# Patient Record
Sex: FEMALE | Race: WHITE | Employment: OTHER | URBAN - METROPOLITAN AREA
[De-identification: names, ages, dates, MRNs, and addresses within clinical notes are randomized per-mention and may not be internally consistent; named-entity substitution may affect disease eponyms.]

---

## 2017-04-17 ENCOUNTER — HOSPITAL ENCOUNTER (OUTPATIENT)
Dept: SURGERY | Age: 77
Discharge: HOME | End: 2017-04-17
Attending: ORTHOPAEDIC SURGERY
Payer: MEDICARE

## 2017-04-17 ENCOUNTER — HOSPITAL ENCOUNTER (OUTPATIENT)
Dept: PHYSICAL THERAPY | Age: 77
Discharge: HOME | End: 2017-04-17
Attending: ORTHOPAEDIC SURGERY
Payer: MEDICARE

## 2017-04-17 VITALS
BODY MASS INDEX: 31.7 KG/M2 | RESPIRATION RATE: 18 BRPM | SYSTOLIC BLOOD PRESSURE: 152 MMHG | HEART RATE: 66 BPM | OXYGEN SATURATION: 97 % | DIASTOLIC BLOOD PRESSURE: 80 MMHG | HEIGHT: 69 IN | WEIGHT: 214 LBS | TEMPERATURE: 97 F

## 2017-04-17 LAB
ANION GAP BLD CALC-SCNC: 11 MMOL/L (ref 7–16)
APTT PPP: 22.9 SEC (ref 25.3–32.9)
ATRIAL RATE: 65 BPM
BACTERIA SPEC CULT: NORMAL
BASOPHILS # BLD AUTO: 0 K/UL (ref 0–0.2)
BASOPHILS # BLD: 0 % (ref 0–2)
BUN SERPL-MCNC: 31 MG/DL (ref 8–23)
CALCIUM SERPL-MCNC: 9.8 MG/DL (ref 8.3–10.4)
CALCULATED P AXIS, ECG09: 50 DEGREES
CALCULATED R AXIS, ECG10: 32 DEGREES
CALCULATED T AXIS, ECG11: 35 DEGREES
CHLORIDE SERPL-SCNC: 102 MMOL/L (ref 98–107)
CO2 SERPL-SCNC: 26 MMOL/L (ref 21–32)
CREAT SERPL-MCNC: 1.38 MG/DL (ref 0.6–1)
DIAGNOSIS, 93000: NORMAL
DIFFERENTIAL METHOD BLD: ABNORMAL
EOSINOPHIL # BLD: 0.2 K/UL (ref 0–0.8)
EOSINOPHIL NFR BLD: 3 % (ref 0.5–7.8)
ERYTHROCYTE [DISTWIDTH] IN BLOOD BY AUTOMATED COUNT: 16.1 % (ref 11.9–14.6)
GLUCOSE SERPL-MCNC: 107 MG/DL (ref 65–100)
HCT VFR BLD AUTO: 39.7 % (ref 35.8–46.3)
HGB BLD-MCNC: 12.9 G/DL (ref 11.7–15.4)
IMM GRANULOCYTES # BLD: 0 K/UL (ref 0–0.5)
IMM GRANULOCYTES NFR BLD AUTO: 0.2 % (ref 0–5)
INR PPP: 1 (ref 0.9–1.2)
LYMPHOCYTES # BLD AUTO: 23 % (ref 13–44)
LYMPHOCYTES # BLD: 2.1 K/UL (ref 0.5–4.6)
MCH RBC QN AUTO: 27.7 PG (ref 26.1–32.9)
MCHC RBC AUTO-ENTMCNC: 32.5 G/DL (ref 31.4–35)
MCV RBC AUTO: 85.2 FL (ref 79.6–97.8)
MONOCYTES # BLD: 0.4 K/UL (ref 0.1–1.3)
MONOCYTES NFR BLD AUTO: 5 % (ref 4–12)
NEUTS SEG # BLD: 6.3 K/UL (ref 1.7–8.2)
NEUTS SEG NFR BLD AUTO: 69 % (ref 43–78)
P-R INTERVAL, ECG05: 168 MS
PLATELET # BLD AUTO: 291 K/UL (ref 150–450)
PMV BLD AUTO: 10.9 FL (ref 10.8–14.1)
POTASSIUM SERPL-SCNC: 4.2 MMOL/L (ref 3.5–5.1)
PROTHROMBIN TIME: 10.3 SEC (ref 9.6–12)
Q-T INTERVAL, ECG07: 430 MS
QRS DURATION, ECG06: 82 MS
QTC CALCULATION (BEZET), ECG08: 447 MS
RBC # BLD AUTO: 4.66 M/UL (ref 4.05–5.25)
SERVICE CMNT-IMP: NORMAL
SODIUM SERPL-SCNC: 139 MMOL/L (ref 136–145)
VENTRICULAR RATE, ECG03: 65 BPM
WBC # BLD AUTO: 9.1 K/UL (ref 4.3–11.1)

## 2017-04-17 PROCEDURE — G8978 MOBILITY CURRENT STATUS: HCPCS

## 2017-04-17 PROCEDURE — 87641 MR-STAPH DNA AMP PROBE: CPT | Performed by: PHYSICIAN ASSISTANT

## 2017-04-17 PROCEDURE — 85025 COMPLETE CBC W/AUTO DIFF WBC: CPT | Performed by: PHYSICIAN ASSISTANT

## 2017-04-17 PROCEDURE — 80048 BASIC METABOLIC PNL TOTAL CA: CPT | Performed by: PHYSICIAN ASSISTANT

## 2017-04-17 PROCEDURE — 93005 ELECTROCARDIOGRAM TRACING: CPT | Performed by: ANESTHESIOLOGY

## 2017-04-17 PROCEDURE — G8979 MOBILITY GOAL STATUS: HCPCS

## 2017-04-17 PROCEDURE — 97161 PT EVAL LOW COMPLEX 20 MIN: CPT

## 2017-04-17 PROCEDURE — 85730 THROMBOPLASTIN TIME PARTIAL: CPT | Performed by: PHYSICIAN ASSISTANT

## 2017-04-17 PROCEDURE — G8980 MOBILITY D/C STATUS: HCPCS

## 2017-04-17 PROCEDURE — 85610 PROTHROMBIN TIME: CPT | Performed by: PHYSICIAN ASSISTANT

## 2017-04-17 PROCEDURE — 77030027138 HC INCENT SPIROMETER -A

## 2017-04-17 RX ORDER — DEXTROMETHORPHAN HYDROBROMIDE, GUAIFENESIN 5; 100 MG/5ML; MG/5ML
650 LIQUID ORAL
COMMUNITY
End: 2017-08-16

## 2017-04-17 RX ORDER — ATENOLOL 50 MG/1
50 TABLET ORAL DAILY
COMMUNITY
End: 2017-08-16

## 2017-04-17 RX ORDER — LANOLIN ALCOHOL/MO/W.PET/CERES
400 CREAM (GRAM) TOPICAL 2 TIMES DAILY
COMMUNITY
End: 2017-08-16

## 2017-04-17 RX ORDER — CELECOXIB 200 MG/1
200 CAPSULE ORAL 2 TIMES DAILY
Status: ON HOLD | COMMUNITY
End: 2017-05-10

## 2017-04-17 RX ORDER — ADHESIVE BANDAGE
30 BANDAGE TOPICAL DAILY PRN
COMMUNITY
End: 2018-04-06

## 2017-04-17 RX ORDER — SPIRONOLACTONE 25 MG/1
TABLET ORAL DAILY
COMMUNITY
End: 2017-08-16

## 2017-04-17 RX ORDER — OXYCODONE HYDROCHLORIDE 5 MG/1
5 TABLET ORAL
COMMUNITY
End: 2017-08-16

## 2017-04-17 RX ORDER — CHLORTHALIDONE 25 MG/1
12.5 TABLET ORAL EVERY OTHER DAY
COMMUNITY
End: 2018-04-06

## 2017-04-17 RX ORDER — ALBUTEROL SULFATE 0.83 MG/ML
SOLUTION RESPIRATORY (INHALATION)
COMMUNITY
End: 2017-08-16

## 2017-04-17 RX ORDER — FOLIC ACID 1 MG/1
1 TABLET ORAL DAILY
COMMUNITY
End: 2017-08-16

## 2017-04-17 RX ORDER — NYSTATIN 100000 [USP'U]/G
POWDER TOPICAL 3 TIMES DAILY
COMMUNITY
End: 2017-08-16

## 2017-04-17 NOTE — PERIOP NOTES
Patient verified name, , and surgery as listed in [facility]. Type 3 surgery, PAT joint assessment complete. Patient is currently staying at [facility] (phone: [phone]), she is being discharged home 17. Labs per surgeon: cbc, bmp, ua, pt/inr, ptt, mrsa/mssa swab, T&S DOS; results within anesthesia limits. Patient unable to give urine specimen at time of PAT appointment. Order for UA faxed to [facility] ([phone]), results to be faxed to [phone]. Confirmation page placed on chart. Labs per anesthesia protocol: All required lab work included in surgeon's orders. EKG: Completed 17; results to be reviewed by anesthesia. Previous EKG (17) and Echo (17) placed on chart for anesthesia reference. Hibiclens and instructions given per hospital policy. Nasal Swab collected per MD order and instructions for Mupirocin nasal ointment if required. Patient provided with handouts including Guide to Surgery, Pain Management, Hand Hygiene, Blood Transfusion Education, and [facility] Brochure. Patient answered medical/surgical history questions at their best of ability. All prior to admission medications documented in [facility]. Original medication prescription bottle NOT visualized during patient appointment. Patient instructed to hold all vitamins 7 days prior to surgery and NSAIDS 5 days prior to surgery. Medications to be held prior to surgery: Folic Acid and Centrum Silver. Patient instructed to continue previous medications as prescribed prior to surgery and to take the following medications the day of surgery according to anesthesia guidelines with a small sip of water: Tylenol if needed, 81 mg ASA, Atenolol, Celebrex, and Oxycodone if needed. Patient taught back and verbalized understanding.

## 2017-04-17 NOTE — PERIOP NOTES
A copy of surgery instructions faxed to [facility]. Instructions will be included in patient teaching when patient is discharged from [facility] on Saturday (4/22/17). Confirmation page placed on chart.

## 2017-04-17 NOTE — PROGRESS NOTES
Sammy Camargo  : (35 y.o.) [facility] at [facility]  [address], [address] [address]  [phone]       Physical Therapy Prehab Plan of Treatment and Evaluation Summary:2017    ICD-10: Treatment Diagnosis:   · Pain in Right Hip (M25.551)  · Stiffness of Right Hip, Not elsewhere classified (M25.651)  Precautions/Allergies: Avelox [moxifloxacin] and Other plant, animal, environmental  MEDICAL/REFERRING DIAGNOSIS:  Unilateral primary osteoarthritis, right hip [M16.11]  REFERRING PHYSICIAN: Kenji Denny, *  DATE OF SURGERY: 5/10/17   Assessment:   Comments:  Pt is awaiting right hip ANMOL and presents with decreased strength and range of motion right lower extremity. She also has 2 bad knees and bad left shoulder. Her left shoulder limited to about 60 degrees flexion. She had a recent hospital stay and went to [facility] rehab after hospital on 3/14/17. She says she is leaving rehab on 17. She has just started taking 1 to 2 steps with a walker at rehab, very weak lady but motivated and very pleasant. She primarily uses a wheelchair at rehab. PROBLEM LIST (Impacting functional limitations):  Ms. Yanira Brady presents with the following right lower extremity(s) problems:  1. Strength  2. Range of Motion  3. Home Exercise Program  4. Pain   INTERVENTIONS PLANNED:  1. Home Exercise Program  2. Educational Discussion     TREATMENT PLAN: Effective Dates: 2017 TO 2017. Frequency/Duration: Patient to continue to perform home exercise program at least twice per day up until her surgery. GOALS: (Goals have been discussed and agreed upon with patient.)  Discharge Goals: Time Frame: 1 Day  1. Patient will demonstrate independence with a home exercise program designed to increase strength, range of motion and pain control to minimize functional deficits and optimize patient for total joint replacement.   Rehabilitation Potential For Stated Goals: Good  Regarding Rodger Sparrow's therapy, I certify that the treatment plan above will be carried out by a therapist or under their direction. Thank you for this referral,  Alecia Melendez, PT               HISTORY:   Present Symptoms:  Pain Intensity 1: 0 (at worst 10/10)   History of Present Injury/Illness (Reason for Referral):  Medical/Referring Diagnosis: Unilateral primary osteoarthritis, right hip [M16.11]   Past Medical History/Comorbidities:   Ms. Xin Bajwa  has a past medical history of Abnormal posture; Anemia; Essential hypertension; Hypercholesteremia; Hypomagnesemia; Muscle weakness; Nausea & vomiting; MOOK (obstructive sleep apnea) (01/30/2015); Osteoarthritis; Poor historian; Pressure ulcer of left buttock, stage 2; Pressure ulcer of right buttock, stage 2; SOB (shortness of breath); Unable to ambulate; and Unsteadiness on feet. She also has no past medical history of Adverse effect of anesthesia; Aneurysm ([address] Arrhythmia; Asthma; CAD (coronary artery disease); Cancer ([address] Chronic kidney disease; Chronic obstructive pulmonary disease ([address] Chronic pain; Coagulation disorder ([address] Diabetes ([address] Difficult intubation; GERD (gastroesophageal reflux disease); Heart failure ([address] Ill-defined condition; Liver disease; Malignant hyperthermia due to anesthesia; Morbid obesity ([address] Pseudocholinesterase deficiency; Psychiatric disorder; PUD (peptic ulcer disease); Seizures ([address] Stroke [facility]); Thromboembolus ([address] Thyroid disease; or Unspecified adverse effect of anesthesia. Ms. Xin Bajwa  has a past surgical history that includes cholecystectomy (2009); knee arthroscopy (Bilateral); and breast surgery procedure unlisted (Right, 1968).   Social History/Living Environment:   Home Environment: Rehabilitation facility ([facility] since 3/14/17, prior to that , home)  # Steps to Enter: 0  Wheelchair Ramp: Yes  Living Alone: No  Support Systems: Skilled nursing facility  Patient Expects to be Discharged to[de-identified] Skilled nursing facility  Current DME Used/Available at Home: Adaptive bathing aides, Adaptive dressing aides, Cane, straight, Hospital bed, Walker, rolling, Wheelchair  Tub or Shower Type: Shower  Work/Activity:  Retired. Dominant Side:  RIGHT  Current Medications:  See Pre-assessment nursing note   Number of Personal Factors/Comorbidities that affect the Plan of Care: 0: LOW COMPLEXITY   EXAMINATION:   ADLs (Current Functional Status):   Ambulation:  [] Independent  [] Walk Indoors Only  [] Walk Outdoors  [] Use Assistive Device  [x] Use Wheelchair Only Dressing:  [] Independent  Requires Assistance from Someone for:  [] Sock/Shoes  [] Pants  [x] Everything   Bathing/Showering:   [] Independent  [x] Requires Assistance from Someone  [] Sponge Bath Only Household Activities:  [] Routine house and yard work  [] Light Housework Only  [x] None   Observation/Orthostatic Postural Assessment:       ROM/Flexibility:   Gross Assessment: Yes  AROM: Generally decreased, functional                       RLE Assessment  RLE Assessment (WDL): Exceptions to WDL (decreased hip flexion and hip rotation)   Strength:   Gross Assessment: Yes  Strength: Grossly decreased, non-functional                  Functional Mobility:    Gross Assessment: Yes    Gait Description (WDL):  (not assessed)             Balance:    Sitting: Intact  Standing:  (not assessed)   Body Structures Involved:  1. Bones  2. Joints  3. Muscles Body Functions Affected:  1. Sensory/Pain  2. Movement Related Activities and Participation Affected:  1. Mobility  2. Self Care   Number of elements that affect the Plan of Care: 4+: HIGH COMPLEXITY   CLINICAL PRESENTATION:   Presentation: Evolving clinical presentation with changing clinical characteristics: MODERATE COMPLEXITY   CLINICAL DECISION MAKING:   Outcome Measure:    Tool Used: Lower Extremity Functional Scale (LEFS)  Score:  Initial: 9/80 Most Recent: X/80 (Date: -- )   Interpretation of Score: 20 questions each scored on a 5 point scale with 0 representing \"extreme difficulty or unable to perform\" and 4 representing \"no difficulty\". The lower the score, the greater the functional disability. 80/80 represents no disability. Minimal detectable change is 9 points. Score 80 79-65 64-49 48-33 32-17 16-1 0   Modifier CH CI CJ CK CL CM CN     ? Mobility - Walking and Moving Around:     - CURRENT STATUS: CM - 80%-99% impaired, limited or restricted    - GOAL STATUS: CM - 80%-99% impaired, limited or restricted    - D/C STATUS:  CM - 80%-99% impaired, limited or restricted  Medical Necessity:   · Ms. Suzzanne Mortimer is expected to optimize her lower extremity strength and ROM in preparation for joint replacement surgery. Reason for Services/Other Comments:  · Achieve baseline assesment of musculoskeletal system, functional mobility and home environment. , educate in PT HEP in preparation for surgery, educate in hospital plan of care. Use of outcome tool(s) and clinical judgement create a POC that gives a: Questionable prediction of patient's progress: MODERATE COMPLEXITY   TREATMENT:   Treatment/Session Assessment:  Patient was instructed in PT- HEP to increase strength and ROM in LEs. Answered all questions. · Post session pain:  0/10  · Compliance with Program/Exercises: compliant all of the time.   Total Treatment Duration:  PT Patient Time In/Time Out  Time In: 1000  Time Out: 6640 Jaxson Hurtado PT

## 2017-04-17 NOTE — PERIOP NOTES
Labs dated 4/17/17 routed via [address] [facility] to patients PCP, Dr. Kyle Brush, per Dr. Gayathri Hammer' request.

## 2017-04-17 NOTE — PERIOP NOTES
Recent Results (from the past 12 hour(s))   CBC WITH AUTOMATED DIFF    Collection Time: 04/17/17 10:32 AM   Result Value Ref Range    WBC 9.1 4.3 - 11.1 K/uL    RBC 4.66 4.05 - 5.25 M/uL    HGB 12.9 11.7 - 15.4 g/dL    HCT 39.7 35.8 - 46.3 %    MCV 85.2 79.6 - 97.8 FL    MCH 27.7 26.1 - 32.9 PG    MCHC 32.5 31.4 - 35.0 g/dL    RDW 16.1 (H) 11.9 - 14.6 %    PLATELET 165 838 - 699 K/uL    MPV 10.9 10.8 - 14.1 FL    DF AUTOMATED      NEUTROPHILS 69 43 - 78 %    LYMPHOCYTES 23 13 - 44 %    MONOCYTES 5 4.0 - 12.0 %    EOSINOPHILS 3 0.5 - 7.8 %    BASOPHILS 0 0.0 - 2.0 %    IMMATURE GRANULOCYTES 0.2 0.0 - 5.0 %    ABS. NEUTROPHILS 6.3 1.7 - 8.2 K/UL    ABS. LYMPHOCYTES 2.1 0.5 - 4.6 K/UL    ABS. MONOCYTES 0.4 0.1 - 1.3 K/UL    ABS. EOSINOPHILS 0.2 0.0 - 0.8 K/UL    ABS. BASOPHILS 0.0 0.0 - 0.2 K/UL    ABS. IMM.  GRANS. 0.0 0.0 - 0.5 K/UL   PROTHROMBIN TIME + INR    Collection Time: 04/17/17 10:32 AM   Result Value Ref Range    Prothrombin time 10.3 9.6 - 12.0 sec    INR 1.0 0.9 - 1.2     PTT    Collection Time: 04/17/17 10:32 AM   Result Value Ref Range    aPTT 22.9 (L) 25.3 - 86.9 SEC   METABOLIC PANEL, BASIC    Collection Time: 04/17/17 10:32 AM   Result Value Ref Range    Sodium 139 136 - 145 mmol/L    Potassium 4.2 3.5 - 5.1 mmol/L    Chloride 102 98 - 107 mmol/L    CO2 26 21 - 32 mmol/L    Anion gap 11 7 - 16 mmol/L    Glucose 107 (H) 65 - 100 mg/dL    BUN 31 (H) 8 - 23 MG/DL    Creatinine 1.38 (H) 0.6 - 1.0 MG/DL    GFR est AA 48 (L) >60 ml/min/1.73m2    GFR est non-AA 39 (L) >60 ml/min/1.73m2    Calcium 9.8 8.3 - 10.4 MG/DL   EKG, 12 LEAD, INITIAL    Collection Time: 04/17/17 12:45 PM   Result Value Ref Range    Ventricular Rate 65 BPM    Atrial Rate 65 BPM    P-R Interval 168 ms    QRS Duration 82 ms    Q-T Interval 430 ms    QTC Calculation (Bezet) 447 ms    Calculated P Axis 50 degrees    Calculated R Axis 32 degrees    Calculated T Axis 35 degrees    Diagnosis       !!! Poor data quality, interpretation may be adversely affected  Normal sinus rhythm  Anterior infarct , age undetermined  Abnormal ECG  When compared with ECG of 26-JAN-2015 14:41,  No significant change was found  Confirmed by ST CHRISTOPHE AGUILAR MD (), GIL DAVALOS ([id number]) on 4/17/2017 1:33:10 PM

## 2017-04-17 NOTE — PERIOP NOTES
Dr. Arash Segovia (anesthesia) reviewed EKG, \"okay\" to proceed as scheduled. No new orders received.

## 2017-04-17 NOTE — PROGRESS NOTES
04/17/17 1030   Oxygen Therapy   O2 Sat (%) 98 %   Pulse via Oximetry 64 beats per minute   O2 Device Room air   Pre-Treatment   Breath Sounds Bilateral Diminished   Pre FEV1 (liters) 2.1 liters   % Predicted 71     Initial respiratory Assessment completed with pt. Pt was interviewed and evaluated in Joint [facility] prior to surgery. Patient ID:  Jai Bowman  [id number]  67 y.o.  1940  Surgeon: Dr. Jaret Valencia  Date of Surgery: 5/10/2017  Procedure: Total Right Hip Arthroplasty  Primary Care Physician: Abdi Pena -[phone]  Specialists:                                  Pt instructed in the use of Incentive Spirometry. Pt instructed to bring Incentive Spirometer back on date of surgery & to start using Is upon return to pt room. Pt taught proper cough technique      History of smoking:   NONE      Quit date:    Secondhand smoke:FATHER - PIPE      Past procedures with Oxygen desaturation:NONE    Past Medical History:   Diagnosis Date    Abnormal posture     Anemia     Essential hypertension     Hypercholesteremia     managed with medication     Hypomagnesemia     managed with supplement     Muscle weakness     generalized    Nausea & vomiting     nausea only     MOOK (obstructive sleep apnea) 01/30/2015    patient denies     Osteoarthritis     Poor historian     Pressure ulcer of left buttock, stage 2     resolved per patient-Followed by Dr. Eliud Sevilla at wound center      Pressure ulcer of right buttock, stage 2     resolved per patient-Followed by Dr. Eliud Sevilla at wound center     SOB (shortness of breath)     managed with PRN albuterol nebulizer    Unable to ambulate     patient in wheelchair-can stand with assistance     Unsteadiness on feet              HX OF PRESSURE ULCERS                                                                                                                                              Respiratory history:RECENT PNEUMONIA 3/10/2017 ,SMALL PLEURAL EFFUSION. RADHA. PT WAS CONFUSED AND HOSPITALIZED. DENIES SOB       PT SAW [facility] 2015 FOR PRE-OP CLEARANCE FOR JOINT SURGERY. PT NEVER FOLLOWED UP AS [facility]. PT EXPRESSES LITTLE INTEREST IN DEALING WITH MOOK OR FOLLOW- UP WITH [facility]                           Respiratory meds:  NONE                                                     FAMILY PRESENT:                  NO                                        PAST SLEEP STUDY:        YES        HX OF MOOK:                        YES                                           MOOK assessment:     PT STATES SHE DOES NOT KNOW THE RESULTS OF HER SLEEP STUDY AT [address]. DANGERS OF MOOK EXPLAINED TO PT                                                                                         PHYSICAL EXAM   Body mass index is 31.6 kg/(m^2).    Visit Vitals    /80 (BP 1 Location: Right arm, BP Patient Position: At rest;Sitting)    Pulse 66    Temp 97 °F (36.1 °C)    Resp 18    Ht 5' 9\" (1.753 m)    Wt 97.1 kg (214 lb)    SpO2 97%    BMI 31.6 kg/m2     Neck circumference: 43     cm    Loud snoring:YES      Witnessed apnea or wakening gasping or choking:,DENIES,     Awakens with headaches:DENIES    Morning or daytime tiredness/ sleepiness:  TIRED     Dry mouth or sore throat in morning:YES     Freidman stage:4    SACS score:25    STOP/BAN                              CPAP:NONE    LUNG EXPANSION THERAPY  AGGRESSIVE IS  ALBUTEROL NEB Q6 PRN                CONT SAT HS       Referrals:    Pt. Phone Number:

## 2017-04-18 NOTE — PERIOP NOTES
4/17/2017  3:34 PM - Y[facility], [facility]   Component Results   Component Value Flag Ref Range Units Status   Special Requests: NO SPECIAL REQUESTS     Final   Culture result:      Final   SA target not detected.                                 A MRSA NEGATIVE, SA NEGATIVE test result does not preclude MRSA or SA nasal colonization.

## 2017-04-24 PROBLEM — R79.89 ELEVATED SERUM CREATININE: Status: ACTIVE | Noted: 2017-04-24

## 2017-04-24 NOTE — ADVANCED PRACTICE NURSE
Total Joint Surgery Preoperative Chart Review      Patient ID:  Manuel Canales  [id number]  35 y.o.  1940  Surgeon: Dr. Lee Ann Jacobsen  Date of Surgery: 5/10/2017  Procedure: Total Right Hip Arthroplasty  Primary Care Physician: Evangelina Melendez -[phone]  Specialty Physician(s):      Subjective:   Manuel Canales is a 67 y.o. WHITE OR  female who presents for preoperative evaluation for Total Right Hip arthroplasty. This is a preoperative chart review note based on data collected by the nurse at the surgical Pre-Assessment visit.     Past Medical History:   Diagnosis Date    Abnormal posture     Anemia     history     Essential hypertension     H/O echocardiogram 01/05/2017    EF 55-65%    H/O: pneumonia     Hypercholesteremia     managed with medication     Hypomagnesemia     managed with supplement     Left ventricular diastolic dysfunction     per echo \"Grade 1 (mild)\"    Muscle weakness     generalized    Nausea & vomiting     nausea only     MOOK (obstructive sleep apnea) 01/30/2015    patient denies     Osteoarthritis     Poor historian     Pressure ulcer of left buttock, stage 2     resolved per patient-previously seen by Dr. Sreekanth Murrieta at wound center      Pressure ulcer of right buttock, stage 2     resolved per patient-previously seen by Dr. Sreekanth Murrieta at wound center     SOB (shortness of breath)     managed with PRN albuterol nebulizer    Unable to ambulate     patient in wheelchair-can stand with assistance     Unsteadiness on feet       Past Surgical History:   Procedure Laterality Date    BREAST SURGERY PROCEDURE UNLISTED Right 1968    cyst removed    HX CHOLECYSTECTOMY  2009    HX KNEE ARTHROSCOPY Bilateral     X2 to right; X1 to left     Family History   Problem Relation Age of Onset    Heart Disease Mother     Hypertension Mother     Hypertension Father       Social History   Substance Use Topics    Smoking status: Never Smoker    Smokeless tobacco: Never Used   [place] Alcohol use No       Prior to Admission medications    Medication Sig Start Date End Date Taking? Authorizing Provider   atenolol (TENORMIN) 50 mg tablet Take 50 mg by mouth daily. Take DOS per anesthesia protocol. Yes Historical Provider   chlorthalidone (HYGROTEN) 25 mg tablet Take  by mouth daily. 1/2 tablet by mouth daily   Yes Historical Provider   folic acid (FOLVITE) 1 mg tablet Take 1 mg by mouth daily. Yes Historical Provider   spironolactone (ALDACTONE) 25 mg tablet Take  by mouth daily. 1/2 tablet once a day   Yes Historical Provider   celecoxib (CELEBREX) 200 mg capsule Take 200 mg by mouth two (2) times a day. Take DOS per anesthesia protocol   Yes Historical Provider   magnesium oxide (MAGOX) 400 mg tablet Take 400 mg by mouth two (2) times a day. Yes Historical Provider   nystatin (MYCOSTATIN) powder Apply  to affected area three (3) times daily. Yes Historical Provider   acetaminophen (TYLENOL) 650 mg CR tablet Take 650 mg by mouth every six (6) hours as needed for Pain. Take DOS if needed per anesthesia protocol. Yes Historical Provider   albuterol (PROVENTIL VENTOLIN) 2.5 mg /3 mL (0.083 %) nebulizer solution by Nebulization route every four (4) hours as needed for Wheezing. Use DOS per anesthesia protocol. Yes Historical Provider   magnesium hydroxide (MILK OF MAGNESIA) 400 mg/5 mL suspension Take 30 mL by mouth daily as needed for Constipation. Yes Historical Provider   oxyCODONE IR (ROXICODONE) 5 mg immediate release tablet Take 5 mg by mouth every four (4) hours as needed for Pain. Take DOS if needed per anesthesia protocol. Yes Historical Provider   atorvastatin (LIPITOR) 10 mg tablet Take 10 mg by mouth nightly. Indications: hypercholesterolemia   Yes Historical Provider   aspirin delayed-release 81 mg tablet Take 81 mg by mouth daily. Instructed to take DOS per Anesthesia guidelines.    Yes Historical Provider   MULTIVITS-MIN/IRON/FA/LUTEIN (CENTRUM SILVER WOMEN PO) Take 1 tablet by mouth every evening. Yes Historical Provider     Allergies   Allergen Reactions    Avelox [Moxifloxacin] Rash    Other Plant, Animal, Environmental Nausea Only     Allergic to Perfume & Smoke. Objective:     Physical Exam:   No data found. ECG:    EKG Results     Procedure 720 Value Units Date/Time    EKG, 12 LEAD, INITIAL [[id number]] Collected:  04/17/17 1245    Order Status:  Completed Updated:  04/17/17 1333     Ventricular Rate 65 BPM      Atrial Rate 65 BPM      P-R Interval 168 ms      QRS Duration 82 ms      Q-T Interval 430 ms      QTC Calculation (Bezet) 447 ms      Calculated P Axis 50 degrees      Calculated R Axis 32 degrees      Calculated T Axis 35 degrees      Diagnosis --     !!! Poor data quality, interpretation may be adversely affected  Normal sinus rhythm  Anterior infarct , age undetermined  Abnormal ECG  When compared with ECG of 26-JAN-2015 14:41,  No significant change was found  Confirmed by ST CHRISTOPHE AGUILAR MD (), GIL DAVALOS ([id number]) on 4/17/2017 1:33:10 PM      EKG, 12 LEAD, INITIAL [[id number]]     Order Status:  Canceled           Data Review:   Labs:   No results found for this or any previous visit (from the past 24 hour(s)). Problem List:  )  Patient Active Problem List   Diagnosis Code    Preop respiratory exam Z01.811    Hypertension I10    Hyperlipidemia E78.5    Rheumatoid arthritis ([facility] [address] M06.9    MOOK (obstructive sleep apnea) G47.33    Elevated serum creatinine R79.89       Total Joint Surgery Pre-Assessment Recommendations: The patient is not compliant with wearing CPAP. Recommend oxygen saturation monitoring during hospitalization. Albuterol every 6 hours as need during hospitalization. Renal protocol initiated. The patient's chart will be flagged renal risk. Renal RisK Alerts:  1. Caution with use of muscle relaxants and sedatives with reduced renal function  2. Caution with total amount of narcotics used   3.  Avoid morphine if patient has reduced renal function due to accumulations of the highly active metabolite, morphine-6-glucuronide, which can lead to sedation and respiratory depression  4. Avoid nephrotoxic drugs such as MA inhibitors  5. Consider volume status monitoring in addition to [place]  6.  Ensure hand-off to hospitalist for appropriate perioperative IV fluid management         Signed By: Adolfo Kendall NP-C    April 24, 2017

## 2017-04-24 NOTE — PERIOP NOTES
UA and culture report from 4/19/17 received from [facility] and [address] [place] [address]. Results read to [facility] NP. No new orders received.

## 2017-05-09 ENCOUNTER — ANESTHESIA EVENT (OUTPATIENT)
Dept: SURGERY | Age: 77
DRG: 470 | End: 2017-05-09
Payer: MEDICARE

## 2017-05-09 NOTE — H&P
????? Insurance: Medicare; [employer] of [address]      Office Visit: 73048 EST Level 4 Diagnosis: M16.12 Localized osteoarthrosis of left hip   M17.12 Primary osteoarthritis of left knee   M17.11 Primary osteoarthritis of right knee   M16.11 Primary osteoarthritis of right hip    Proc 1: ?????  Laterality:????? Med 1: ????? Proc 2: ?????  Laterality:????? Med 2: ????? FX 1: ?????           Laterality:  ????? FX2: ?????            Laterality: ????? Casting: ? ???? Cast Supply: ? ????   DME: ????? Laterality: ? ??? DME: ????? Laterality: ? ???   XRAY RIGHT: ?????   LEFT: ????? BILAT: ????? Follow up: Surgery   ?????        BMI:34.7  Date of Service: 2016-12-06  Work Status:  ????? Allergies:????? Medications:Atenolol-Chlorthalidone (50-25 MG); Atorvastatin Calcium (10 MG); Potassium Chloride ER (10 MEQ); Tramadol-Acetaminophen (37.5-32. .. MG)    CC: Bilateral hip and knee pain    HISTORY: Mrs. Suzzanne Mortimer is a 67year old female who returns 2 years following diagnosis of severe degenerative arthritis of her hips and knees. At that time, she was also worked up for an inflammatory arthritis, she was sent to a rheumatologist. She was started on some medication, she does not remember her diagnosis and the medication did not help and she did not go back for further treatment. She is now very immobile, had a fall 7 weeks ago, difficult for her to mobilize. She is basically in a wheelchair. She states that she was walking prior to a couple months ago when she fell but it was very difficult. She complains of bilateral hip and especially knee pain; she has some lower back pain. Denies any numbness or tingling. PMH:  The [facility] patient health form was updated by the patient, reviewed and signed. ROS:  Noted on the patient form. Pertinent positives and negatives are addressed with the patient, particularly those related to musculoskeletal concerns.   Non-orthopedic concerns were referred back to the primary care physician. PE: On exam today, the patient is alert and oriented x 3. She is sitting in a wheelchair. She has marked restriction in the ROM of her hips. It is difficult to assess the degree of hip flexion contracture that she has, she is really not mobile enough to get onto the bed, she flexes only to about 80 degrees, there is a 10 degree fixed external rotation contracture of both hips. There is pain with any attempted internal/external rotation of the hips. On examination of both knees, there is obvious swelling, there is generalized tenderness to palpation; there is pain and restriction in the ROM. Right knee ROM is limited to 20 to 80 degrees with marked crepitance; left knee is 15 to 90 degrees with marked crepitance. There is some swelling in both lower extremities, palpable pulses, neurologic exam is normal.      X-RAYS: AP pelvis and lateral of both hips reveals severe degenerative arthritis, complete loss of the cartilage space. Bone on bone articulation, osteophyte formation, subchondral cyst and subchondral sclerosis of both femoral heads and both acetabulum. X-RAYS:  AP, lateral, sunrise view and 45 degree PA view of both knees reveals severe degenerative arthritis with complete loss of the cartilage space both medially and laterally. Large osteophytes are noted, osteopenia noted on x-ray. X-RAY DIAGNOSIS:  1. Severe osteoarthritis of both knees. 2. Severe osteoarthritis of both hips. DIAGNOSIS:   1. Severe osteoarthritis of both hips and both knees. MEDICAL DECISION MAKING: Patient has severe osteoarthritis and is now basically now wheelchair bound following her fall. The only options are to consider 4 major joint replacements and these will be difficult at best, hopefully we will be able to improve her quality of life and that she will be able to go through all the surgical procedures. I discussed the difficulty with this.  I have recommended beginning with her right hip replacement followed by her left hip replacement, followed by her right knee, followed by her left knee. We discussed the surgical procedure, the surgical risk and rehab time. I believe they have a good understanding of the procedures and we will proceed with surgery.          Electronically Signed By Xenia BEE/sandie

## 2017-05-09 NOTE — H&P
[id number] [facility]  Pre Operative History and Physical Exam    Patient ID:  [facility]  [id number]  89 y.o.  1940    Today: May 9, 2017       Assessment:   1. Arthritis of the right hip      Plan:    1. Proceed with scheduled Procedure(s) (LRB):  RIGHT HIP ARTHROPLASTY TOTAL/ DEPUY (Right)            CC:  Right hip pain    HPI:   The patient has end stage arthritis of the right hip. The patient was evaluated and examined during a consultation prior to this office visit. There have been no changes to the patient's orthopedic condition since the initial consultation. The patient has failed previous conservative treatment for this condition including antiinflammatories , and lifestyle modifications. The necessity for joint replacement is present.  The patient will be admitted the day of surgery for Procedure(s) (LRB):  RIGHT HIP ARTHROPLASTY TOTAL/ DEPUY (Right)      Past Medical/Surgical History:  Past Medical History:   Diagnosis Date    Abnormal posture     Anemia     history     Essential hypertension     H/O echocardiogram 01/05/2017    EF 55-65%    H/O: pneumonia     Hypercholesteremia     managed with medication     Hypomagnesemia     managed with supplement     Left ventricular diastolic dysfunction     per echo \"Grade 1 (mild)\"    Muscle weakness     generalized    Nausea & vomiting     nausea only     MOOK (obstructive sleep apnea) 01/30/2015    patient denies     Osteoarthritis     Poor historian     Pressure ulcer of left buttock, stage 2     resolved per patient-previously seen by Dr. Chelle Reyes at wound center      Pressure ulcer of right buttock, stage 2     resolved per patient-previously seen by Dr. Chelle Reyes at wound center     SOB (shortness of breath)     managed with PRN albuterol nebulizer    Unable to ambulate     patient in wheelchair-can stand with assistance     Unsteadiness on feet      Past Surgical History:   Procedure Laterality Date    BREAST SURGERY PROCEDURE UNLISTED Right 1968    cyst removed    HX CHOLECYSTECTOMY  2009    HX KNEE ARTHROSCOPY Bilateral     X2 to right; X1 to left        Allergies: Allergies   Allergen Reactions    Avelox [Moxifloxacin] Rash    Other Plant, Animal, Environmental Nausea Only     Allergic to Perfume & Smoke. Objective:                    HEENT: NC/AT                   Lungs:  Unlabored respirations, clear breath sounds                    Heart:   RRR                    Abdomen: soft                   Extremities:  Pain with ROM of the right hip    Meds:   No current facility-administered medications for this encounter. Current Outpatient Prescriptions   Medication Sig    atenolol (TENORMIN) 50 mg tablet Take 50 mg by mouth daily. Take DOS per anesthesia protocol.  chlorthalidone (HYGROTEN) 25 mg tablet Take  by mouth daily. 1/2 tablet by mouth daily    folic acid (FOLVITE) 1 mg tablet Take 1 mg by mouth daily.  spironolactone (ALDACTONE) 25 mg tablet Take  by mouth daily. 1/2 tablet once a day    celecoxib (CELEBREX) 200 mg capsule Take 200 mg by mouth two (2) times a day. Take DOS per anesthesia protocol    magnesium oxide (MAGOX) 400 mg tablet Take 400 mg by mouth two (2) times a day.  nystatin (MYCOSTATIN) powder Apply  to affected area three (3) times daily.  acetaminophen (TYLENOL) 650 mg CR tablet Take 650 mg by mouth every six (6) hours as needed for Pain. Take DOS if needed per anesthesia protocol.  albuterol (PROVENTIL VENTOLIN) 2.5 mg /3 mL (0.083 %) nebulizer solution by Nebulization route every four (4) hours as needed for Wheezing. Use DOS per anesthesia protocol.  magnesium hydroxide (MILK OF MAGNESIA) 400 mg/5 mL suspension Take 30 mL by mouth daily as needed for Constipation.  oxyCODONE IR (ROXICODONE) 5 mg immediate release tablet Take 5 mg by mouth every four (4) hours as needed for Pain. Take DOS if needed per anesthesia protocol.     atorvastatin (LIPITOR) 10 mg tablet Take 10 mg by mouth nightly. Indications: hypercholesterolemia    aspirin delayed-release 81 mg tablet Take 81 mg by mouth daily. Instructed to take DOS per Anesthesia guidelines.  MULTIVITS-MIN/IRON/FA/LUTEIN (CENTRUM SILVER WOMEN PO) Take 1 tablet by mouth every evening. Labs:  Hospital Outpatient Visit on 04/17/2017   Component Date Value Ref Range Status    WBC 04/17/2017 9.1  4.3 - 11.1 K/uL Final    RBC 04/17/2017 4.66  4.05 - 5.25 M/uL Final    HGB 04/17/2017 12.9  11.7 - 15.4 g/dL Final    HCT 04/17/2017 39.7  35.8 - 46.3 % Final    MCV 04/17/2017 85.2  79.6 - 97.8 FL Final    MCH 04/17/2017 27.7  26.1 - 32.9 PG Final    MCHC 04/17/2017 32.5  31.4 - 35.0 g/dL Final    RDW 04/17/2017 16.1* 11.9 - 14.6 % Final    PLATELET 40/48/6747 149  150 - 450 K/uL Final    MPV 04/17/2017 10.9  10.8 - 14.1 FL Final    DF 04/17/2017 AUTOMATED    Final    NEUTROPHILS 04/17/2017 69  43 - 78 % Final    LYMPHOCYTES 04/17/2017 23  13 - 44 % Final    MONOCYTES 04/17/2017 5  4.0 - 12.0 % Final    EOSINOPHILS 04/17/2017 3  0.5 - 7.8 % Final    BASOPHILS 04/17/2017 0  0.0 - 2.0 % Final    IMMATURE GRANULOCYTES 04/17/2017 0.2  0.0 - 5.0 % Final    ABS. NEUTROPHILS 04/17/2017 6.3  1.7 - 8.2 K/UL Final    ABS. LYMPHOCYTES 04/17/2017 2.1  0.5 - 4.6 K/UL Final    ABS. MONOCYTES 04/17/2017 0.4  0.1 - 1.3 K/UL Final    ABS. EOSINOPHILS 04/17/2017 0.2  0.0 - 0.8 K/UL Final    ABS. BASOPHILS 04/17/2017 0.0  0.0 - 0.2 K/UL Final    ABS. IMM.  GRANS. 04/17/2017 0.0  0.0 - 0.5 K/UL Final    Prothrombin time 04/17/2017 10.3  9.6 - 12.0 sec Final    INR 04/17/2017 1.0  0.9 - 1.2   Final    Comment: Suggested therapeutic INR range:  Venous thrombosis and embolus  2.0-3.0  Prosthetic heart valve         2.5-3.5      aPTT 04/17/2017 22.9* 25.3 - 32.9 SEC Final    Heparin Therapeutic Range = 56.6-81.7 secs    Sodium 04/17/2017 139  136 - 145 mmol/L Final    Potassium 04/17/2017 4.2  3.5 - 5.1 mmol/L Final  Chloride 04/17/2017 102  98 - 107 mmol/L Final    CO2 04/17/2017 26  21 - 32 mmol/L Final    Anion gap 04/17/2017 11  7 - 16 mmol/L Final    Glucose 04/17/2017 107* 65 - 100 mg/dL Final    Comment: 47 - 60 mg/dl Consistent with, but not fully diagnostic of hypoglycemia. 101 - 125 mg/dl Impaired fasting glucose/consistent with pre-diabetes mellitus  > 126 mg/dl Fasting glucose consistent with overt diabetes mellitus      BUN 04/17/2017 31* 8 - 23 MG/DL Final    Creatinine 04/17/2017 1.38* 0.6 - 1.0 MG/DL Final    GFR est AA 04/17/2017 48* >60 ml/min/1.73m2 Final    GFR est non-AA 04/17/2017 39* >60 ml/min/1.73m2 Final    Comment: (NOTE)  Estimated GFR is calculated using the Modification of Diet in Renal   Disease (MDRD) Study equation, reported for both  Americans   (GFRAA) and non- Americans (GFRNA), and normalized to 1.73m2   body surface area. The physician must decide which value applies to   the patient. The MDRD study equation should only be used in   individuals age 25 or older. It has not been validated for the   following: pregnant women, patients with serious comorbid conditions,   or on certain medications, or persons with extremes of body size,   muscle mass, or nutritional status.  Calcium 04/17/2017 9.8  8.3 - 10.4 MG/DL Final    Special Requests: 04/17/2017 NO SPECIAL REQUESTS    Final    Culture result: 04/17/2017 SA target not detected. A MRSA NEGATIVE, SA NEGATIVE test result does not preclude MRSA or SA nasal colonization.     Final    Ventricular Rate 04/17/2017 65  BPM Final    Atrial Rate 04/17/2017 65  BPM Final    P-R Interval 04/17/2017 168  ms Final    QRS Duration 04/17/2017 82  ms Final    Q-T Interval 04/17/2017 430  ms Final    QTC Calculation (Bezet) 04/17/2017 447  ms Final    Calculated P Axis 04/17/2017 50  degrees Final    Calculated R Axis 04/17/2017 32  degrees Final    Calculated T Axis 04/17/2017 35 degrees Final    Diagnosis 04/17/2017    Final                    Value:!!! Poor data quality, interpretation may be adversely affected  Normal sinus rhythm  Anterior infarct , age undetermined  Abnormal ECG  When compared with ECG of 26-JAN-2015 14:41,  No significant change was found  Confirmed by ST CHRISTOPHE AGUILAR MD (), GIL DAVALOS ([id number]) on 4/17/2017 1:33:10 PM                   Patient Active Problem List   Diagnosis Code    Preop respiratory exam Z01.811    Hypertension I10    Hyperlipidemia E78.5    Rheumatoid arthritis ([facility] [address] M06.9    MOOK (obstructive sleep apnea) G47.33    Elevated serum creatinine R79.89         Signed By: ALMA Torres  May 9, 2017

## 2017-05-10 ENCOUNTER — APPOINTMENT (OUTPATIENT)
Dept: GENERAL RADIOLOGY | Age: 77
DRG: 470 | End: 2017-05-10
Payer: MEDICARE

## 2017-05-10 ENCOUNTER — HOSPITAL ENCOUNTER (INPATIENT)
Age: 77
LOS: 3 days | Discharge: NURSING, REHAB OR LONG-TERM CARE | DRG: 470 | End: 2017-05-13
Attending: ORTHOPAEDIC SURGERY | Admitting: ORTHOPAEDIC SURGERY
Payer: MEDICARE

## 2017-05-10 ENCOUNTER — ANESTHESIA (OUTPATIENT)
Dept: SURGERY | Age: 77
DRG: 470 | End: 2017-05-10
Payer: MEDICARE

## 2017-05-10 PROBLEM — Z96.641 STATUS POST RIGHT HIP REPLACEMENT: Status: ACTIVE | Noted: 2017-05-10

## 2017-05-10 LAB
ABO + RH BLD: NORMAL
BLOOD GROUP ANTIBODIES SERPL: NORMAL
GLUCOSE BLD STRIP.AUTO-MCNC: 107 MG/DL (ref 65–100)
HGB BLD-MCNC: 10 G/DL (ref 11.7–15.4)
SPECIMEN EXP DATE BLD: NORMAL

## 2017-05-10 PROCEDURE — 74011250637 HC RX REV CODE- 250/637: Performed by: PHYSICIAN ASSISTANT

## 2017-05-10 PROCEDURE — 77030012890

## 2017-05-10 PROCEDURE — 74011250636 HC RX REV CODE- 250/636

## 2017-05-10 PROCEDURE — 65270000029 HC RM PRIVATE

## 2017-05-10 PROCEDURE — 77030032490 HC SLV COMPR SCD KNE COVD -B

## 2017-05-10 PROCEDURE — 0SR904A REPLACEMENT OF RIGHT HIP JOINT WITH CERAMIC ON POLYETHYLENE SYNTHETIC SUBSTITUTE, UNCEMENTED, OPEN APPROACH: ICD-10-PCS | Performed by: ORTHOPAEDIC SURGERY

## 2017-05-10 PROCEDURE — 77030020782 HC GWN BAIR PAWS FLX 3M -B: Performed by: ANESTHESIOLOGY

## 2017-05-10 PROCEDURE — 77030011640 HC PAD GRND REM COVD -A: Performed by: ORTHOPAEDIC SURGERY

## 2017-05-10 PROCEDURE — 36415 COLL VENOUS BLD VENIPUNCTURE: CPT | Performed by: PHYSICIAN ASSISTANT

## 2017-05-10 PROCEDURE — 77030018547 HC SUT ETHBND1 J&J -B: Performed by: ORTHOPAEDIC SURGERY

## 2017-05-10 PROCEDURE — 74011000258 HC RX REV CODE- 258: Performed by: ORTHOPAEDIC SURGERY

## 2017-05-10 PROCEDURE — 97161 PT EVAL LOW COMPLEX 20 MIN: CPT

## 2017-05-10 PROCEDURE — 74011250636 HC RX REV CODE- 250/636: Performed by: PHYSICIAN ASSISTANT

## 2017-05-10 PROCEDURE — 76210000016 HC OR PH I REC 1 TO 1.5 HR: Performed by: ORTHOPAEDIC SURGERY

## 2017-05-10 PROCEDURE — 77030002966 HC SUT PDS J&J -A: Performed by: ORTHOPAEDIC SURGERY

## 2017-05-10 PROCEDURE — 77030008477 HC STYL SATN SLP COVD -A: Performed by: ANESTHESIOLOGY

## 2017-05-10 PROCEDURE — 82962 GLUCOSE BLOOD TEST: CPT

## 2017-05-10 PROCEDURE — 86580 TB INTRADERMAL TEST: CPT | Performed by: ORTHOPAEDIC SURGERY

## 2017-05-10 PROCEDURE — 86900 BLOOD TYPING SEROLOGIC ABO: CPT | Performed by: PHYSICIAN ASSISTANT

## 2017-05-10 PROCEDURE — 77030008703 HC TU ET UNCUF COVD -A: Performed by: ANESTHESIOLOGY

## 2017-05-10 PROCEDURE — 77030034849: Performed by: ORTHOPAEDIC SURGERY

## 2017-05-10 PROCEDURE — 72170 X-RAY EXAM OF PELVIS: CPT

## 2017-05-10 PROCEDURE — 77030018836 HC SOL IRR NACL ICUM -A: Performed by: ORTHOPAEDIC SURGERY

## 2017-05-10 PROCEDURE — 74011000250 HC RX REV CODE- 250: Performed by: ORTHOPAEDIC SURGERY

## 2017-05-10 PROCEDURE — 74011250636 HC RX REV CODE- 250/636: Performed by: ANESTHESIOLOGY

## 2017-05-10 PROCEDURE — 74011250637 HC RX REV CODE- 250/637: Performed by: ANESTHESIOLOGY

## 2017-05-10 PROCEDURE — 77030012935 HC DRSG AQUACEL BMS -B: Performed by: ORTHOPAEDIC SURGERY

## 2017-05-10 PROCEDURE — 77030035236 HC SUT PDS STRATFX BARB J&J -B: Performed by: ORTHOPAEDIC SURGERY

## 2017-05-10 PROCEDURE — 74011000250 HC RX REV CODE- 250

## 2017-05-10 PROCEDURE — 77030031139 HC SUT VCRL2 J&J -A: Performed by: ORTHOPAEDIC SURGERY

## 2017-05-10 PROCEDURE — 77030013727 HC IRR FAN PULSVC ZIMM -B: Performed by: ORTHOPAEDIC SURGERY

## 2017-05-10 PROCEDURE — 74011250636 HC RX REV CODE- 250/636: Performed by: ORTHOPAEDIC SURGERY

## 2017-05-10 PROCEDURE — 74011000302 HC RX REV CODE- 302: Performed by: ORTHOPAEDIC SURGERY

## 2017-05-10 PROCEDURE — 77030019940 HC BLNKT HYPOTHRM STRY -B: Performed by: ANESTHESIOLOGY

## 2017-05-10 PROCEDURE — 77030018986 HC SUT ETHBND4 J&J -B: Performed by: ORTHOPAEDIC SURGERY

## 2017-05-10 PROCEDURE — 76010000162 HC OR TIME 1.5 TO 2 HR INTENSV-TIER 1: Performed by: ORTHOPAEDIC SURGERY

## 2017-05-10 PROCEDURE — 94760 N-INVAS EAR/PLS OXIMETRY 1: CPT

## 2017-05-10 PROCEDURE — 77030027138 HC INCENT SPIROMETER -A

## 2017-05-10 PROCEDURE — 76060000034 HC ANESTHESIA 1.5 TO 2 HR: Performed by: ORTHOPAEDIC SURGERY

## 2017-05-10 PROCEDURE — 77030008467 HC STPLR SKN COVD -B: Performed by: ORTHOPAEDIC SURGERY

## 2017-05-10 PROCEDURE — 85018 HEMOGLOBIN: CPT | Performed by: PHYSICIAN ASSISTANT

## 2017-05-10 PROCEDURE — 77010033678 HC OXYGEN DAILY

## 2017-05-10 PROCEDURE — 77030019557 HC ELECTRD VES SEAL MEDT -F: Performed by: ORTHOPAEDIC SURGERY

## 2017-05-10 PROCEDURE — 77030006777 HC BLD SAW OSC CNMD -B: Performed by: ORTHOPAEDIC SURGERY

## 2017-05-10 PROCEDURE — 77030008459 HC STPLR SKN COOP -B: Performed by: ORTHOPAEDIC SURGERY

## 2017-05-10 PROCEDURE — C1776 JOINT DEVICE (IMPLANTABLE): HCPCS | Performed by: ORTHOPAEDIC SURGERY

## 2017-05-10 PROCEDURE — 77030012939 HC DRSG HYDRCOIL BMS -A

## 2017-05-10 PROCEDURE — 77030019908 HC STETH ESOPH SIMS -A: Performed by: ANESTHESIOLOGY

## 2017-05-10 PROCEDURE — 74011000250 HC RX REV CODE- 250: Performed by: ANESTHESIOLOGY

## 2017-05-10 PROCEDURE — 97165 OT EVAL LOW COMPLEX 30 MIN: CPT

## 2017-05-10 DEVICE — ELIMINATOR H APEX FOR 48-60MM PINN HIP SHELL: Type: IMPLANTABLE DEVICE | Site: HIP | Status: FUNCTIONAL

## 2017-05-10 DEVICE — HEAD FEM DIA36MM +5MM OFFSET 12/14 TAPR HIP CERAMIC BIOLOX: Type: IMPLANTABLE DEVICE | Site: HIP | Status: FUNCTIONAL

## 2017-05-10 DEVICE — SHELL ACET DIA58MM NO H GRIPTION DOME DSGN 100 SER PINN: Type: IMPLANTABLE DEVICE | Site: HIP | Status: FUNCTIONAL

## 2017-05-10 DEVICE — STEM FEM SZ 14 L140MM NK L38.5MM 42MM STD OFFSET 135DEG HIP: Type: IMPLANTABLE DEVICE | Site: HIP | Status: FUNCTIONAL

## 2017-05-10 DEVICE — LINER ACET OD58MM ID36MM LIP ALTRX PINN: Type: IMPLANTABLE DEVICE | Site: HIP | Status: FUNCTIONAL

## 2017-05-10 RX ORDER — DEXAMETHASONE SODIUM PHOSPHATE 4 MG/ML
INJECTION, SOLUTION INTRA-ARTICULAR; INTRALESIONAL; INTRAMUSCULAR; INTRAVENOUS; SOFT TISSUE AS NEEDED
Status: DISCONTINUED | OUTPATIENT
Start: 2017-05-10 | End: 2017-05-10 | Stop reason: HOSPADM

## 2017-05-10 RX ORDER — ROPIVACAINE HYDROCHLORIDE 2 MG/ML
INJECTION, SOLUTION EPIDURAL; INFILTRATION; PERINEURAL AS NEEDED
Status: DISCONTINUED | OUTPATIENT
Start: 2017-05-10 | End: 2017-05-10 | Stop reason: HOSPADM

## 2017-05-10 RX ORDER — CHLORTHALIDONE 25 MG/1
25 TABLET ORAL DAILY
Status: DISCONTINUED | OUTPATIENT
Start: 2017-05-10 | End: 2017-05-10 | Stop reason: SDUPTHER

## 2017-05-10 RX ORDER — POTASSIUM CHLORIDE 750 MG/1
10 TABLET, EXTENDED RELEASE ORAL DAILY
Status: DISCONTINUED | OUTPATIENT
Start: 2017-05-11 | End: 2017-05-11

## 2017-05-10 RX ORDER — SODIUM CHLORIDE 0.9 % (FLUSH) 0.9 %
5-10 SYRINGE (ML) INJECTION EVERY 8 HOURS
Status: DISCONTINUED | OUTPATIENT
Start: 2017-05-10 | End: 2017-05-13 | Stop reason: HOSPADM

## 2017-05-10 RX ORDER — DEXAMETHASONE SODIUM PHOSPHATE 100 MG/10ML
10 INJECTION INTRAMUSCULAR; INTRAVENOUS ONCE
Status: ACTIVE | OUTPATIENT
Start: 2017-05-11 | End: 2017-05-12

## 2017-05-10 RX ORDER — LANOLIN ALCOHOL/MO/W.PET/CERES
400 CREAM (GRAM) TOPICAL 2 TIMES DAILY
Status: DISCONTINUED | OUTPATIENT
Start: 2017-05-10 | End: 2017-05-13 | Stop reason: HOSPADM

## 2017-05-10 RX ORDER — MIDAZOLAM HYDROCHLORIDE 1 MG/ML
2 INJECTION, SOLUTION INTRAMUSCULAR; INTRAVENOUS ONCE
Status: DISCONTINUED | OUTPATIENT
Start: 2017-05-10 | End: 2017-05-10 | Stop reason: HOSPADM

## 2017-05-10 RX ORDER — ADHESIVE BANDAGE
30 BANDAGE TOPICAL DAILY PRN
Status: DISCONTINUED | OUTPATIENT
Start: 2017-05-10 | End: 2017-05-13 | Stop reason: HOSPADM

## 2017-05-10 RX ORDER — ACETAMINOPHEN 10 MG/ML
1000 INJECTION, SOLUTION INTRAVENOUS ONCE
Status: COMPLETED | OUTPATIENT
Start: 2017-05-10 | End: 2017-05-10

## 2017-05-10 RX ORDER — ALBUTEROL SULFATE 0.83 MG/ML
2.5 SOLUTION RESPIRATORY (INHALATION)
Status: DISCONTINUED | OUTPATIENT
Start: 2017-05-10 | End: 2017-05-13 | Stop reason: HOSPADM

## 2017-05-10 RX ORDER — CEFAZOLIN SODIUM IN 0.9 % NACL 2 G/50 ML
2 INTRAVENOUS SOLUTION, PIGGYBACK (ML) INTRAVENOUS EVERY 8 HOURS
Status: COMPLETED | OUTPATIENT
Start: 2017-05-10 | End: 2017-05-13

## 2017-05-10 RX ORDER — ASPIRIN 325 MG
325 TABLET, DELAYED RELEASE (ENTERIC COATED) ORAL EVERY 12 HOURS
Status: DISCONTINUED | OUTPATIENT
Start: 2017-05-10 | End: 2017-05-13 | Stop reason: HOSPADM

## 2017-05-10 RX ORDER — FOLIC ACID 1 MG/1
1 TABLET ORAL DAILY
Status: DISCONTINUED | OUTPATIENT
Start: 2017-05-10 | End: 2017-05-13 | Stop reason: HOSPADM

## 2017-05-10 RX ORDER — FENTANYL CITRATE 50 UG/ML
100 INJECTION, SOLUTION INTRAMUSCULAR; INTRAVENOUS ONCE
Status: DISCONTINUED | OUTPATIENT
Start: 2017-05-10 | End: 2017-05-10 | Stop reason: HOSPADM

## 2017-05-10 RX ORDER — NALOXONE HYDROCHLORIDE 0.4 MG/ML
.2-.4 INJECTION, SOLUTION INTRAMUSCULAR; INTRAVENOUS; SUBCUTANEOUS
Status: DISCONTINUED | OUTPATIENT
Start: 2017-05-10 | End: 2017-05-13 | Stop reason: HOSPADM

## 2017-05-10 RX ORDER — OXYCODONE HYDROCHLORIDE 5 MG/1
10 TABLET ORAL
Status: DISCONTINUED | OUTPATIENT
Start: 2017-05-10 | End: 2017-05-10 | Stop reason: HOSPADM

## 2017-05-10 RX ORDER — SODIUM CHLORIDE 0.9 % (FLUSH) 0.9 %
5-10 SYRINGE (ML) INJECTION AS NEEDED
Status: DISCONTINUED | OUTPATIENT
Start: 2017-05-10 | End: 2017-05-13 | Stop reason: HOSPADM

## 2017-05-10 RX ORDER — ROCURONIUM BROMIDE 10 MG/ML
INJECTION, SOLUTION INTRAVENOUS AS NEEDED
Status: DISCONTINUED | OUTPATIENT
Start: 2017-05-10 | End: 2017-05-10 | Stop reason: HOSPADM

## 2017-05-10 RX ORDER — ATENOLOL 50 MG/1
50 TABLET ORAL DAILY
Status: DISCONTINUED | OUTPATIENT
Start: 2017-05-10 | End: 2017-05-10 | Stop reason: SDUPTHER

## 2017-05-10 RX ORDER — HYDROMORPHONE HYDROCHLORIDE 2 MG/1
2 TABLET ORAL
Status: DISCONTINUED | OUTPATIENT
Start: 2017-05-10 | End: 2017-05-13 | Stop reason: HOSPADM

## 2017-05-10 RX ORDER — OXYCODONE HYDROCHLORIDE 5 MG/1
5 TABLET ORAL
Status: DISCONTINUED | OUTPATIENT
Start: 2017-05-10 | End: 2017-05-10 | Stop reason: HOSPADM

## 2017-05-10 RX ORDER — MIDAZOLAM HYDROCHLORIDE 1 MG/ML
2 INJECTION, SOLUTION INTRAMUSCULAR; INTRAVENOUS ONCE
Status: COMPLETED | OUTPATIENT
Start: 2017-05-10 | End: 2017-05-10

## 2017-05-10 RX ORDER — POTASSIUM CHLORIDE 750 MG/1
20 TABLET, FILM COATED, EXTENDED RELEASE ORAL DAILY
COMMUNITY

## 2017-05-10 RX ORDER — MORPHINE SULFATE 10 MG/ML
INJECTION, SOLUTION INTRAMUSCULAR; INTRAVENOUS AS NEEDED
Status: DISCONTINUED | OUTPATIENT
Start: 2017-05-10 | End: 2017-05-10 | Stop reason: HOSPADM

## 2017-05-10 RX ORDER — NEOMYCIN AND POLYMYXIN B SULFATES 40; 200000 MG/ML; [USP'U]/ML
SOLUTION IRRIGATION AS NEEDED
Status: DISCONTINUED | OUTPATIENT
Start: 2017-05-10 | End: 2017-05-10 | Stop reason: HOSPADM

## 2017-05-10 RX ORDER — PROPOFOL 10 MG/ML
INJECTION, EMULSION INTRAVENOUS AS NEEDED
Status: DISCONTINUED | OUTPATIENT
Start: 2017-05-10 | End: 2017-05-10 | Stop reason: HOSPADM

## 2017-05-10 RX ORDER — SODIUM CHLORIDE 9 MG/ML
100 INJECTION, SOLUTION INTRAVENOUS CONTINUOUS
Status: DISPENSED | OUTPATIENT
Start: 2017-05-10 | End: 2017-05-11

## 2017-05-10 RX ORDER — SODIUM CHLORIDE, SODIUM LACTATE, POTASSIUM CHLORIDE, CALCIUM CHLORIDE 600; 310; 30; 20 MG/100ML; MG/100ML; MG/100ML; MG/100ML
75 INJECTION, SOLUTION INTRAVENOUS CONTINUOUS
Status: DISCONTINUED | OUTPATIENT
Start: 2017-05-10 | End: 2017-05-10 | Stop reason: HOSPADM

## 2017-05-10 RX ORDER — SPIRONOLACTONE 25 MG/1
12.5 TABLET ORAL DAILY
Status: DISCONTINUED | OUTPATIENT
Start: 2017-05-11 | End: 2017-05-11

## 2017-05-10 RX ORDER — LIDOCAINE HYDROCHLORIDE 10 MG/ML
0.1 INJECTION INFILTRATION; PERINEURAL AS NEEDED
Status: DISCONTINUED | OUTPATIENT
Start: 2017-05-10 | End: 2017-05-10 | Stop reason: HOSPADM

## 2017-05-10 RX ORDER — ACETAMINOPHEN 500 MG
1000 TABLET ORAL EVERY 6 HOURS
Status: DISCONTINUED | OUTPATIENT
Start: 2017-05-11 | End: 2017-05-13 | Stop reason: HOSPADM

## 2017-05-10 RX ORDER — FENTANYL CITRATE 50 UG/ML
INJECTION, SOLUTION INTRAMUSCULAR; INTRAVENOUS AS NEEDED
Status: DISCONTINUED | OUTPATIENT
Start: 2017-05-10 | End: 2017-05-10 | Stop reason: HOSPADM

## 2017-05-10 RX ORDER — GLYCOPYRROLATE 0.2 MG/ML
INJECTION INTRAMUSCULAR; INTRAVENOUS AS NEEDED
Status: DISCONTINUED | OUTPATIENT
Start: 2017-05-10 | End: 2017-05-10 | Stop reason: HOSPADM

## 2017-05-10 RX ORDER — ALBUTEROL SULFATE 0.83 MG/ML
2.5 SOLUTION RESPIRATORY (INHALATION)
Status: DISCONTINUED | OUTPATIENT
Start: 2017-05-10 | End: 2017-05-10 | Stop reason: SDUPTHER

## 2017-05-10 RX ORDER — HYDROMORPHONE HYDROCHLORIDE 1 MG/ML
1 INJECTION, SOLUTION INTRAMUSCULAR; INTRAVENOUS; SUBCUTANEOUS
Status: DISCONTINUED | OUTPATIENT
Start: 2017-05-10 | End: 2017-05-13 | Stop reason: HOSPADM

## 2017-05-10 RX ORDER — AMOXICILLIN 250 MG
2 CAPSULE ORAL DAILY
Status: DISCONTINUED | OUTPATIENT
Start: 2017-05-11 | End: 2017-05-12

## 2017-05-10 RX ORDER — FAMOTIDINE 20 MG/1
20 TABLET, FILM COATED ORAL ONCE
Status: COMPLETED | OUTPATIENT
Start: 2017-05-10 | End: 2017-05-10

## 2017-05-10 RX ORDER — CELECOXIB 200 MG/1
200 CAPSULE ORAL EVERY 12 HOURS
Status: DISCONTINUED | OUTPATIENT
Start: 2017-05-10 | End: 2017-05-13 | Stop reason: HOSPADM

## 2017-05-10 RX ORDER — DIPHENHYDRAMINE HCL 25 MG
25 CAPSULE ORAL
Status: DISCONTINUED | OUTPATIENT
Start: 2017-05-10 | End: 2017-05-13 | Stop reason: HOSPADM

## 2017-05-10 RX ORDER — HYDROMORPHONE HYDROCHLORIDE 2 MG/ML
0.5 INJECTION, SOLUTION INTRAMUSCULAR; INTRAVENOUS; SUBCUTANEOUS
Status: DISCONTINUED | OUTPATIENT
Start: 2017-05-10 | End: 2017-05-10 | Stop reason: HOSPADM

## 2017-05-10 RX ORDER — LIDOCAINE HYDROCHLORIDE 20 MG/ML
INJECTION, SOLUTION EPIDURAL; INFILTRATION; INTRACAUDAL; PERINEURAL AS NEEDED
Status: DISCONTINUED | OUTPATIENT
Start: 2017-05-10 | End: 2017-05-10 | Stop reason: HOSPADM

## 2017-05-10 RX ORDER — ONDANSETRON 2 MG/ML
4 INJECTION INTRAMUSCULAR; INTRAVENOUS
Status: DISCONTINUED | OUTPATIENT
Start: 2017-05-10 | End: 2017-05-13 | Stop reason: HOSPADM

## 2017-05-10 RX ORDER — CEFAZOLIN SODIUM IN 0.9 % NACL 2 G/50 ML
2 INTRAVENOUS SOLUTION, PIGGYBACK (ML) INTRAVENOUS ONCE
Status: COMPLETED | OUTPATIENT
Start: 2017-05-10 | End: 2017-05-10

## 2017-05-10 RX ORDER — NEOSTIGMINE METHYLSULFATE 1 MG/ML
INJECTION INTRAVENOUS AS NEEDED
Status: DISCONTINUED | OUTPATIENT
Start: 2017-05-10 | End: 2017-05-10 | Stop reason: HOSPADM

## 2017-05-10 RX ORDER — ONDANSETRON 2 MG/ML
INJECTION INTRAMUSCULAR; INTRAVENOUS AS NEEDED
Status: DISCONTINUED | OUTPATIENT
Start: 2017-05-10 | End: 2017-05-10 | Stop reason: HOSPADM

## 2017-05-10 RX ADMIN — REGULAR STRENGTH 325 MG: 325 TABLET ORAL at 20:39

## 2017-05-10 RX ADMIN — FOLIC ACID 1 MG: 1 TABLET ORAL at 16:23

## 2017-05-10 RX ADMIN — ACETAMINOPHEN 1000 MG: 10 INJECTION, SOLUTION INTRAVENOUS at 16:21

## 2017-05-10 RX ADMIN — NEOSTIGMINE METHYLSULFATE 2 MG: 1 INJECTION INTRAVENOUS at 09:16

## 2017-05-10 RX ADMIN — MIDAZOLAM HYDROCHLORIDE 1 MG: 1 INJECTION, SOLUTION INTRAMUSCULAR; INTRAVENOUS at 07:03

## 2017-05-10 RX ADMIN — Medication 400 MG: at 16:23

## 2017-05-10 RX ADMIN — SODIUM CHLORIDE, SODIUM LACTATE, POTASSIUM CHLORIDE, AND CALCIUM CHLORIDE: 600; 310; 30; 20 INJECTION, SOLUTION INTRAVENOUS at 07:28

## 2017-05-10 RX ADMIN — FENTANYL CITRATE 50 MCG: 50 INJECTION, SOLUTION INTRAMUSCULAR; INTRAVENOUS at 07:50

## 2017-05-10 RX ADMIN — SODIUM CHLORIDE, SODIUM LACTATE, POTASSIUM CHLORIDE, AND CALCIUM CHLORIDE: 600; 310; 30; 20 INJECTION, SOLUTION INTRAVENOUS at 08:17

## 2017-05-10 RX ADMIN — CEFAZOLIN 2 G: 1 INJECTION, POWDER, FOR SOLUTION INTRAMUSCULAR; INTRAVENOUS; PARENTERAL at 13:21

## 2017-05-10 RX ADMIN — FENTANYL CITRATE 50 MCG: 50 INJECTION, SOLUTION INTRAMUSCULAR; INTRAVENOUS at 07:44

## 2017-05-10 RX ADMIN — HYDROMORPHONE HYDROCHLORIDE 0.5 MG: 2 INJECTION, SOLUTION INTRAMUSCULAR; INTRAVENOUS; SUBCUTANEOUS at 09:47

## 2017-05-10 RX ADMIN — PROPOFOL 140 MG: 10 INJECTION, EMULSION INTRAVENOUS at 07:50

## 2017-05-10 RX ADMIN — HYDROMORPHONE HYDROCHLORIDE 2 MG: 2 TABLET ORAL at 16:24

## 2017-05-10 RX ADMIN — HYDROMORPHONE HYDROCHLORIDE 2 MG: 2 TABLET ORAL at 20:39

## 2017-05-10 RX ADMIN — ROCURONIUM BROMIDE 35 MG: 10 INJECTION, SOLUTION INTRAVENOUS at 07:50

## 2017-05-10 RX ADMIN — LIDOCAINE HYDROCHLORIDE 100 MG: 20 INJECTION, SOLUTION EPIDURAL; INFILTRATION; INTRACAUDAL; PERINEURAL at 07:50

## 2017-05-10 RX ADMIN — CEFAZOLIN 2 G: 1 INJECTION, POWDER, FOR SOLUTION INTRAMUSCULAR; INTRAVENOUS; PARENTERAL at 21:40

## 2017-05-10 RX ADMIN — SODIUM CHLORIDE, SODIUM LACTATE, POTASSIUM CHLORIDE, AND CALCIUM CHLORIDE 75 ML/HR: 600; 310; 30; 20 INJECTION, SOLUTION INTRAVENOUS at 06:04

## 2017-05-10 RX ADMIN — ONDANSETRON 4 MG: 2 INJECTION INTRAMUSCULAR; INTRAVENOUS at 07:55

## 2017-05-10 RX ADMIN — NEOSTIGMINE METHYLSULFATE 3 MG: 1 INJECTION INTRAVENOUS at 09:13

## 2017-05-10 RX ADMIN — FAMOTIDINE 20 MG: 20 TABLET ORAL at 06:04

## 2017-05-10 RX ADMIN — GLYCOPYRROLATE 4 MG: 0.2 INJECTION INTRAMUSCULAR; INTRAVENOUS at 09:13

## 2017-05-10 RX ADMIN — SODIUM CHLORIDE 100 ML/HR: 900 INJECTION, SOLUTION INTRAVENOUS at 20:39

## 2017-05-10 RX ADMIN — FENTANYL CITRATE 50 MCG: 50 INJECTION, SOLUTION INTRAMUSCULAR; INTRAVENOUS at 08:32

## 2017-05-10 RX ADMIN — CEFAZOLIN 2 G: 1 INJECTION, POWDER, FOR SOLUTION INTRAMUSCULAR; INTRAVENOUS; PARENTERAL at 07:42

## 2017-05-10 RX ADMIN — LIDOCAINE HYDROCHLORIDE 0.1 ML: 10 INJECTION, SOLUTION INFILTRATION; PERINEURAL at 06:04

## 2017-05-10 RX ADMIN — TUBERCULIN PURIFIED PROTEIN DERIVATIVE 5 UNITS: 5 INJECTION, SOLUTION INTRADERMAL at 06:04

## 2017-05-10 RX ADMIN — HYDROMORPHONE HYDROCHLORIDE 0.5 MG: 2 INJECTION, SOLUTION INTRAMUSCULAR; INTRAVENOUS; SUBCUTANEOUS at 10:00

## 2017-05-10 RX ADMIN — DEXAMETHASONE SODIUM PHOSPHATE 10 MG: 4 INJECTION, SOLUTION INTRA-ARTICULAR; INTRALESIONAL; INTRAMUSCULAR; INTRAVENOUS; SOFT TISSUE at 07:55

## 2017-05-10 RX ADMIN — GLYCOPYRROLATE 0.4 MG: 0.2 INJECTION INTRAMUSCULAR; INTRAVENOUS at 09:16

## 2017-05-10 NOTE — ANESTHESIA POSTPROCEDURE EVALUATION
Post-Anesthesia Evaluation and Assessment    Patient: Ramana Kirkpatrick MRN: [id number]  SSN: [id number]    YOB: 1940  Age: 67 y.o. Sex: female       Cardiovascular Function/Vital Signs  Visit Vitals    /66    Pulse 65    Temp 36.7 °C (98 °F)    Resp 16    Ht 5' 9\" (1.753 m)    Wt 97 kg (213 lb 13.5 oz)    SpO2 100%    BMI 31.58 kg/m2       Patient is status post spinal anesthesia for Procedure(s):  RIGHT HIP ARTHROPLASTY TOTAL. Nausea/Vomiting: None    Postoperative hydration reviewed and adequate. Pain:  Pain Scale 1: Visual (05/10/17 1011)  Pain Intensity 1: 0 (05/10/17 1011)   Managed    Neurological Status:   Neuro (WDL): Within Defined Limits (05/10/17 1011)  Neuro  Neurologic State: Drowsy (05/10/17 1011)  Orientation Level: Oriented to person;Oriented to place (05/10/17 1011)  LUE Motor Response: Purposeful (05/10/17 1011)  RUE Motor Response: Purposeful (05/10/17 1011)   At baseline    Mental Status and Level of Consciousness: Arousable    Pulmonary Status:   O2 Device: Nasal cannula (05/10/17 0932)   Adequate oxygenation and airway patent    Complications related to anesthesia: None    Post-anesthesia assessment completed.  No concerns    Signed By: Felecia Teague MD     May 10, 2017

## 2017-05-10 NOTE — ANESTHESIA PREPROCEDURE EVALUATION
Anesthetic History               Review of Systems / Medical History      Pulmonary        Sleep apnea: No treatment           Neuro/Psych              Cardiovascular    Hypertension              Exercise tolerance: >4 METS     GI/Hepatic/Renal         Renal disease: CRI       Endo/Other        Arthritis     Other Findings            Physical Exam    Airway  Mallampati: I  TM Distance: > 6 cm  Neck ROM: normal range of motion   Mouth opening: Normal     Cardiovascular  Regular rate and rhythm,  S1 and S2 normal,  no murmur, click, rub, or gallop  Rhythm: regular  Rate: normal         Dental    Dentition: Full lower dentures and Full upper dentures     Pulmonary  Breath sounds clear to auscultation               Abdominal         Other Findings            Anesthetic Plan    ASA: 3  Anesthesia type: general            Anesthetic plan and risks discussed with: Patient      No toradol; TXA ok

## 2017-05-10 NOTE — PERIOP NOTES
Betadine lavage: 17.5cc of betadine lot # [id number], exp. Date 1mar2019,  in 500cc of . 9NS Lot # [id number], exp.  Date : 1/19

## 2017-05-10 NOTE — IP AVS SNAPSHOT
Current Discharge Medication List  
  
ASK your doctor about these medications Dose & Instructions Dispensing Information Comments Morning Noon Evening Bedtime  
 acetaminophen 650 mg CR tablet Commonly known as:  TYLENOL Your last dose was: Your next dose is:    
   
   
 Dose:  650 mg Take 650 mg by mouth every six (6) hours as needed for Pain. Take DOS if needed per anesthesia protocol. Refills:  0  
     
   
   
   
  
 albuterol 2.5 mg /3 mL (0.083 %) nebulizer solution Commonly known as:  PROVENTIL VENTOLIN Your last dose was: Your next dose is:    
   
   
 by Nebulization route every four (4) hours as needed for Wheezing. Use DOS per anesthesia protocol. Refills:  0  
     
   
   
   
  
 aspirin delayed-release 81 mg tablet Your last dose was: Your next dose is:    
   
   
 Dose:  81 mg Take 81 mg by mouth daily. Instructed to take DOS per Anesthesia guidelines. Refills:  0  
     
   
   
   
  
 atenolol 50 mg tablet Commonly known as:  TENORMIN Your last dose was: Your next dose is:    
   
   
 Dose:  50 mg Take 50 mg by mouth daily. Take DOS per anesthesia protocol. Refills:  0  
     
   
   
   
  
 atorvastatin 10 mg tablet Commonly known as:  LIPITOR Your last dose was: Your next dose is:    
   
   
 Dose:  10 mg Take 10 mg by mouth nightly. Indications: hypercholesterolemia Refills:  0 CENTRUM SILVER WOMEN PO Your last dose was: Your next dose is:    
   
   
 Dose:  1 Tab Take 1 tablet by mouth every evening. Refills:  0  
     
   
   
   
  
 chlorthalidone 25 mg tablet Commonly known as:  Merna Petty Your last dose was: Your next dose is: Take  by mouth daily. 1/2 tablet by mouth daily Refills:  0  
     
   
   
   
  
 folic acid 1 mg tablet Commonly known as:  [employer]  
   
 Your last dose was: Your next dose is:    
   
   
 Dose:  1 mg Take 1 mg by mouth daily. Refills:  0  
     
   
   
   
  
 MAGOX 400 mg tablet Generic drug:  magnesium oxide Your last dose was: Your next dose is:    
   
   
 Dose:  400 mg Take 400 mg by mouth two (2) times a day. Refills:  0 MILK OF MAGNESIA 400 mg/5 mL suspension Generic drug:  magnesium hydroxide Your last dose was: Your next dose is:    
   
   
 Dose:  30 mL Take 30 mL by mouth daily as needed for Constipation. Refills:  0  
     
   
   
   
  
 nystatin powder Commonly known as:  MYCOSTATIN Your last dose was: Your next dose is:    
   
   
 Apply  to affected area three (3) times daily. Refills:  0  
     
   
   
   
  
 oxyCODONE IR 5 mg immediate release tablet Commonly known as:  Camille Beath Your last dose was: Your next dose is:    
   
   
 Dose:  5 mg Take 5 mg by mouth every four (4) hours as needed for Pain. Take DOS if needed per anesthesia protocol. Refills:  0  
     
   
   
   
  
 potassium chloride SR 10 mEq tablet Commonly known as:  KLOR-CON 10 Your last dose was: Your next dose is: Take  by mouth. Pt. unsure of dosage- PCP started pt on this due to leg cramping Refills:  0  
     
   
   
   
  
 spironolactone 25 mg tablet Commonly known as:  ALDACTONE Your last dose was: Your next dose is: Take  by mouth daily. 1/2 tablet once a day Refills:  0

## 2017-05-10 NOTE — PERIOP NOTES
TRANSFER - OUT REPORT:    Verbal report given to ZACHARY De Los Santos on Sammy Camargo  being transferred to room 334 for routine progression of care       Report consisted of patients Situation, Background, Assessment and   Recommendations(SBAR). Information from the following report(s) SBAR, OR Summary, Procedure Summary, Intake/Output and MAR was reviewed with the receiving nurse. Opportunity for questions and clarification was provided.       Patient transported with:   O2 @ 3 liters

## 2017-05-10 NOTE — PROGRESS NOTES
Care Management Interventions  Mode of Transport at Discharge: Self  Transition of Care Consult (CM Consult): SNF  Partner SNF: Yes  Physical Therapy Consult: Yes  Occupational Therapy Consult: Yes  Current Support Network: Lives with Spouse  Confirm Follow Up Transport: Family  Plan discussed with Pt/Family/Caregiver: Yes  Freedom of Choice Offered: Yes  Discharge Location  Discharge Placement: Skilled nursing facility    Patient is a 67 yr old female admitted for a right ANMOL. Patient reports she has made arrangements to go to [facility] rehab on d/c. Referral sent for their review. Patient was at [facility] rehab in March and used all but 19 Medicare skilled days. Patient can go back to the NH but will not have coverage beyond 19 days. Patient was current with [facility]. She was just d/c'd from their services last week. We will make plans for transfer to [address] [place] [address]  on Sat 5-13 after her three night Inpt stay. Will follow.   [facility]

## 2017-05-10 NOTE — PERIOP NOTES
TRANSFER - IN REPORT:    Verbal report received from [facility] on Particia Pellant  being received from 3rd floor ortho for routine progression of care      Report consisted of patients Situation, Background, Assessment and   Recommendations(SBAR). Information from the following report(s) SBAR, Kardex, ED Summary, OR Summary, Procedure Summary, Intake/Output, MAR, Accordion, Recent Results and Med Rec Status was reviewed with the receiving nurse. Opportunity for questions and clarification was provided. Assessment completed upon patients arrival to unit and care assumed.

## 2017-05-10 NOTE — PROGRESS NOTES
05/10/17 1253   Oxygen Therapy   O2 Sat (%) 97 %   Pulse via Oximetry 68 beats per minute   O2 Device Nasal cannula   O2 Flow Rate (L/min) 3 l/min   FIO2 (%) 32 %   Patient sleeping. .. Mouth open; decreased O2 to 2L . .. Will check to see when she's awake for IS instruct. Joint camp notes reviewed; C/S# 10 at bedside.

## 2017-05-10 NOTE — WOUND CARE
Noted consult. Talked with primary nurse. Wound is chapped skin not open ulcer (scarred). Phone consult completed today. Will start Duo Derm for now. Will follow up.

## 2017-05-10 NOTE — PROGRESS NOTES
TRANSFER - IN REPORT:    Verbal report received from Todd Sherman RN on Divine Talavera  being received from PACU for routine post - op      Report consisted of patients Situation, Background, Assessment and   Recommendations(SBAR). Information from the following report(s) SBAR, Intake/Output and MAR was reviewed with the receiving nurse. Opportunity for questions and clarification was provided. Assessment completed upon patients arrival to unit and care assumed. Family in room. Pt drowsy. Oriented to room, bed controls, and how to order meals. Aquacel dry and intact to R hip. TEDs and SCDs to LEs. Yellow gripper socks to feet and instructed not to get up without staff to assist. Has pink flaky area to L buttock.

## 2017-05-10 NOTE — OP NOTES
Total Hip Procedure Note    Joshua Monge,  [id number],  1940    Pre-operative Diagnosis:  Primary osteoarthritis of right hip [M16.11]  Post-operative Diagnosis: Status post right hip replacement    Procedure: Procedure(s) (LRB):  RIGHT HIP ARTHROPLASTY TOTAL (Right)  Location: [address] [place] [address]  Surgeon: Josephine Givens MD  Assistant: Hollis Sebastian PA-C     Anesthesia: General  Findings: severe degenerative arthritis, acetabular osteophytes and bone spurs around the femoral head. EBL: 300cc  BMI: Body mass index is 31.58 kg/(m^2). Procedure: The complexity of total joint surgery requires the use of a first assistant for positioning, retraction and expertise in closure. Joshua Monge was brought to the operating room and positioned on the operating table. Anesthesia was administered. A jeong catheter was placed preoperatively and IV antibiotics were administered. A time out identifying the patient, procedure, operative side and surgeon was administered and charted by the OR Nurse. The patient was positioned on the contralateral decubitus position. The limb was prepped and draped in the usual sterile fashion. The Posterior approach was utilized to expose the hip. The incision was carried through the subcutaneous tissue and underlying fascia with hemostasis obtained using the bovie cauterization. A Charmley retractor was inserted. The short external rotators were divided at their insertion. The sciatic nerve was palpated and identified. Next, a T-shaped capsular incision was accomplished and femoral head was dislocated. The neck was osteotomized in the appropriate position just above the lesser trochanteric region. The neck cut was measured. Acetabular retractors were placed and the appropriate capsulotomy performed. Soft tissue was removed from the acetabulum. The acetabulum was sequentially reamed. Using trial components the acetabular component was sized.  The acetabular component was impacted at approximately 40 degrees horizontal tilt and 20 degrees anteversion. No screws were used to fixate the cup. The real polyethylene liner was impacted into position. Utilizing the femoral retractor, the canal was prepared with appropriate lateralization and reamed with the starter reamer. The canal was then broached progressively to accommodate the DePuy stem. The broach was positioned with the anatomic femoral anteversion. A calcar planar was utilized. Trials were preformed using various neck length until the most suitable one was determined and found to be stable to flexion greater than 90 degrees and on internal and external rotation. Limb lengths were thought to be equilibrated and with appropriate stability as mentioned above. All trial components were removed. The cementless permanent stem was impacted into place. A trial reduction was performed and the above neck length was selected. The permanent ceramic femoral head was impacted into place. The hip was reduced and the stability was as mentioned as above. Copious irrigation was accomplished about the surgical site. The sciatic nerve was palpated and noted to be intact. The capsule was repaired with an absorbable suture and the external rotators were reattached with a #5 Mersilene. The operative hip was injected with 60 cc of Neuropin, 1cc of 10 mg of morphine, and 1 cc of 30 mg of Toradol. The Hip was then soaked with a diluted betadine solution for approximately Min and then thoroughly irrigated. A #1 PDS suture was used to re-approximate the fascia. Then, 1 gram (100 mg/ml) of Transexamic Acid was injected into the joint space. An insorb stapler with absorbable sutures were used to approximate the subcutaneous layers. Staples were applied in an occlusive fashion and a sterile bandage was placed. The patient was then rolled to a supine position. The sponge, needle and instrument counts were correct.  The patient tolerated the procedure without difficulty and left the operating room in satisfactory condition. Implants:   Implant Name Type Inv.  Item Serial No.  Lot No. LRB No. Used   CUP ACET PINN 100 W/ 58 --  - [id number]  CUP ACET PINN 100 W/ 58 --  [id number] [facility] [id number] Right 1   PLUG ACET APCL H ELIM POS STP --  - [id number]  PLUG ACET APCL H ELIM POS STP --  [id number] [facility] [id number] Right 1   LINER ACET PINN LIP LNR [id number] -- ALTRX - [id number]  LINER ACET PINN LIP LNR [id number] -- ALTRX [id number] [facility] [id number] Right 1   STEM FEM CORAIL STD COL SZ 14 --  - [id number]  STEM FEM CORAIL STD COL SZ 14 --  [id number] [facility] [id number] Right 1   HEAD FEM S-ROM 36MM +5MM NK -- [employer] DEL[employer] - [id number]   HEAD FEM S-ROM 36MM +5MM NK -- Eveleen Sanna [id number] [facility] [id number] Right 1           Signed By: Patrick Castellano MD  5/10/2017,  9:25 AM

## 2017-05-10 NOTE — H&P
The patient has end stage arthritis of the right hip. The patient was see and examined and there are no changes to the patient's orthopedic condition. They have tried conservative treatment for this condition; including antiinflammatories and lifestyle modifications and have failed. The necessity for the joint replacement is still present, and the H&P from the office is still current.  The patient will be admitted today for Procedure(s) (LRB):  RIGHT HIP ARTHROPLASTY TOTAL/ DEPUY (Right)

## 2017-05-10 NOTE — PROGRESS NOTES
Was up to side of bed with therapists assisting. Duoderm applied to L buttock per wound nurse recommendation. Wound nurse will see tomorrow.

## 2017-05-10 NOTE — PROGRESS NOTES
Problem: Mobility Impaired (Adult and Pediatric)  Goal: *Acute Goals and Plan of Care (Insert Text)  GOALS (1-4 days):  (1.)Ms. Kareem Paz will move from supine to sit and sit to supine in bed with INDEPENDENT. (2.)Ms. Kareem Paz will transfer from bed to chair and chair to bed with INDEPENDENT using the least restrictive device. (3.)Ms. Kareem Paz will ambulate with INDEPENDENT for 250 feet with the least restrictive device. (4.)Ms. Kareem Paz will ambulate up/down 3 steps with bilateral railing with MINIMAL ASSIST with no device. (5.)Ms. Kareem Paz will state/observe ANMOL precautions with 0 verbal cues. ________________________________________________________________________________________________  Outcome: Progressing Towards Goal      PHYSICAL THERAPY JOINT CAMP ANMOL: INITIAL ASSESSMENT 5/10/2017  INPATIENT: Hospital Day: 1  [place] / Product Type: Medicare /      NAME/AGE/GENDER: Ramana Kirkpatrick is a 67 y.o. female    PRIMARY DIAGNOSIS:  Primary osteoarthritis of right hip [M16.11]              Procedure(s) and Anesthesia Type:     * RIGHT HIP ARTHROPLASTY TOTAL - General (Right)  ICD-10: Treatment Diagnosis:        · Pain in Right Hip (M25.551)  · Stiffness of Right Hip, Not elsewhere classified (M25.651)  · Difficulty in walking, Not elsewhere classified (R26.2)  · Other abnormalities of gait and mobility (R26.89)       ASSESSMENT:      Ms. Kareem Paz presents status post right total hip replacement with decreased independence with bed mobility, transfers, gait, and therapeutic exercise. Therapy will maximize independence with functional mobility. This section established at most recent assessment   PROBLEM LIST (Impairments causing functional limitations):  1. Decreased Strength  2. Decreased Ambulation Ability/Technique  3. Decreased Balance  4. Increased Pain  5.  Decreased Activity Tolerance    INTERVENTIONS PLANNED: (Benefits and precautions of physical therapy have been discussed with the patient.)  1. Bed Mobility  2. Gait Training  3. Home Exercise Program (HEP)  4. Therapeutic Exercise/Strengthening  5. Transfer Training  6. Range of Motion: active/assisted/passive  7. Therapeutic Activities  8. Group Therapy      TREATMENT PLAN: Frequency/Duration: Follow patient BID   to address above goals. Rehabilitation Potential For Stated Goals: GOOD      RECOMMENDED REHABILITATION/EQUIPMENT: (at time of discharge pending progress): Continue Skilled Therapy and Home Health: Physical Therapy. HISTORY:   History of Present Injury/Illness (Reason for Referral):  Decreased mobility ability  Past Medical History/Comorbidities:   Ms. Suzzanne Mortimer  has a past medical history of Abnormal posture; Anemia; Essential hypertension; H/O echocardiogram (01/05/2017); H/O: pneumonia; Hypercholesteremia; Hypomagnesemia; Left ventricular diastolic dysfunction; Muscle weakness; Nausea & vomiting; MOOK (obstructive sleep apnea) (01/30/2015); Osteoarthritis; Poor historian; Pressure ulcer of left buttock, stage 2; Pressure ulcer of right buttock, stage 2; SOB (shortness of breath); Status post right hip replacement (5/10/2017); Unable to ambulate; and Unsteadiness on feet. She also has no past medical history of Adverse effect of anesthesia; Aneurysm ([address] Arrhythmia; Asthma; CAD (coronary artery disease); Cancer ([address] Chronic kidney disease; Chronic obstructive pulmonary disease ([address] Chronic pain; Coagulation disorder ([address] Diabetes ([address] Difficult intubation; GERD (gastroesophageal reflux disease); Heart failure ([address] Ill-defined condition; Liver disease; Malignant hyperthermia due to anesthesia; Morbid obesity ([address] Pseudocholinesterase deficiency; Psychiatric disorder; PUD (peptic ulcer disease); Seizures ([address] Stroke [facility]); Thromboembolus ([address] Thyroid disease; or Unspecified adverse effect of anesthesia.   Ms. Suzzanne Mortimer  has a past surgical history that includes cholecystectomy (2009); knee arthroscopy (Bilateral); and breast surgery procedure unlisted (Right, 1968). Social History/Living Environment:   Home Environment: Private residence  # Steps to Enter: 3  One/Two Story Residence: One story  Living Alone: Yes  Support Systems: Friends \ neighbors  Patient Expects to be Discharged to[de-identified] Rehabilitation facility  Current DME Used/Available at Home: [employer], straight, Walker, rolling  Tub or Shower Type: Shower  Prior Level of Function/Work/Activity:  Independent with ambulation with a walker. Independent with ADLs. Number of Personal Factors/Comorbidities that affect the Plan of Care: 0: LOW COMPLEXITY   EXAMINATION:   Most Recent Physical Functioning:   Gross Assessment: Yes  Gross Assessment  AROM: Generally decreased, functional  Strength: Generally decreased, functional  Coordination: Generally decreased, functional  Sensation: Intact                        Bed Mobility  Supine to Sit: Moderate assistance;Assist x2  Sit to Supine: Moderate assistance;Assist x2     Transfers  Sit to Stand: Minimum assistance;Assist x2  Stand to Sit: Minimum assistance;Assist x2     Balance  Sitting: Intact  Standing: Pull to stand; With support    Posture  Posture (WDL): Within defined limits              Distance (ft): 5 Feet (ft) (to head of bed)  Ambulation - Level of Assistance: Modified independent  Assistive Device: Walker, rolling  Base of Support: Narrowed  Speed/Keli: Pace decreased (<100 feet/min)  Gait Abnormalities: Antalgic         Braces/Orthotics: none             Body Structures Involved:  1. Bones  2. Joints  3. Muscles Body Functions Affected:  1. Neuromusculoskeletal  2. Movement Related Activities and Participation Affected:  1.  Mobility   Number of elements that affect the Plan of Care: 4+: HIGH COMPLEXITY   CLINICAL PRESENTATION:   Presentation: Stable and uncomplicated: LOW COMPLEXITY   CLINICAL DECISION MAKIN [address] Short Form  How much difficulty does the patient currently have. .. Unable A Lot A Little None   1. Turning over in bed (including adjusting bedclothes, sheets and blankets)? [ ] 1   [ ] 2   [X] 3   [ ] 4   2. Sitting down on and standing up from a chair with arms ( e.g., wheelchair, bedside commode, etc.)   [ ] 1   [ ] 2   [X] 3   [ ] 4   3. Moving from lying on back to sitting on the side of the bed? [ ] 1   [ ] 2   [X] 3   [ ] 4   How much help from another person does the patient currently need. .. Total A Lot A Little None   4. Moving to and from a bed to a chair (including a wheelchair)? [ ] 1   [ ] 2   [X] 3   [ ] 4   5. Need to walk in hospital room? [ ] 1   [ ] 2   [X] 3   [ ] 4   6. Climbing 3-5 steps with a railing? [ ] 1   [ ] 2   [X] 3   [ ] 4   © 2007, Trustees of [facility] [employer], under license to [employer]. All rights reserved       Score:  Initial: 18 Most Recent: X (Date: -- )     Interpretation of Tool:  Represents activities that are increasingly more difficult (i.e. Bed mobility, Transfers, Gait). Score 24 23 22-20 19-15 14-10 9-7 6       Modifier CH CI CJ CK CL CM CN         · Mobility - Walking and Moving Around:               - CURRENT STATUS:    CK - 40%-59% impaired, limited or restricted               - GOAL STATUS:           CK - 40%-59% impaired, limited or restricted               - D/C STATUS:                       CK - 40%-59% impaired, limited or restricted  [place] / Product Type: Medicare /       Medical Necessity:     · Skilled intervention continues to be required due to decreased mobility ability. Reason for Services/Other Comments:  · Patient continues to require skilled intervention due to decreased mobility ability.    Use of outcome tool(s) and clinical judgement create a POC that gives a: Clear prediction of patient's progress: LOW COMPLEXITY                 TREATMENT:   (In addition to Assessment/Re-Assessment sessions the following treatments were rendered)      Pre-treatment Symptoms/Complaints:  none  Pain: Initial:   Pain Intensity 1: 0  Post Session:  none      Assessment/Reassessment only, no treatment provided today        Date:    Date:    Date:      ACTIVITY/EXERCISE AM PM AM PM AM PM   GROUP THERAPY  [ ]  [ ]  [ ]  [ ]  [ ]  [ ]   Ankle Pumps               Quad Sets               Gluteal Sets               Hip ABd/ADduction               Straight Leg Raises               Knee Slides               Short Arc Quads               Long Arc Quads               Chair Slides                               B = bilateral; AA = active assistive; A = active; P = passive       Treatment/Session Assessment:         Response to Treatment:  Agreeable. Education:  [ ] Home Exercises  [X] Fall Precautions  [X] Hip Precautions [ ] Going Home Video  [ ] Knee/Hip Prosthesis Review  [ ] Roney Apgar Management/Safety [ ] Adaptive Equipment as Needed         Interdisciplinary Collaboration:   · Physical Therapist  · Occupational Therapist  · Registered Nurse     After treatment position/precautions:   · Supine in bed  · Bed/Chair-wheels locked  · Bed in low position  · Call light within reach  · RN notified     Compliance with Program/Exercises: compliant most of the time. Recommendations/Intent for next treatment session:  Treatment next visit will focus on increasing Ms. Sparrow's independence with bed mobility, transfers, gait training, strength/ROM exercises, modalities for pain, and patient education.        Total Treatment Duration:  PT Patient Time In/Time Out  Time In: 1355  Time Out: 14[address]

## 2017-05-10 NOTE — PERIOP NOTES
TRANSFER - OUT REPORT:    Verbal report given to Susan LOVELL RN(name) on Teja Fearing  being transferred to preop holding for routine progression of care       Report consisted of patients Situation, Background, Assessment and   Recommendations(SBAR). Information from the following report(s) SBAR, MAR and Recent Results was reviewed with the receiving nurse. Lines:   Peripheral IV 05/10/17 Left Hand (Active)   Site Assessment Clean, dry, & intact 5/10/2017  5:47 AM   Phlebitis Assessment 0 5/10/2017  5:47 AM   Infiltration Assessment 0 5/10/2017  5:47 AM   Dressing Status Clean, dry, & intact 5/10/2017  5:47 AM   Dressing Type Tape;Transparent 5/10/2017  5:47 AM   Hub Color/Line Status Pink; Infusing 5/10/2017  5:47 AM   Action Taken Blood drawn 5/10/2017  5:47 AM        Opportunity for questions and clarification was provided.       Patient transported with:   [facility]

## 2017-05-10 NOTE — IP AVS SNAPSHOT
Rosa Jacobson 
 
 
 [address] 
[phone] Patient: Storm Koehler MRN: [id number] :1940 You are allergic to the following Allergen Reactions Avelox (Moxifloxacin) Rash Other Plant, Animal, Environmental Nausea Only Allergic to Perfume & Smoke. Immunizations Administered for This Admission Name Date  
 TB Skin Test (PPD) Intradermal 5/10/2017 Recent Documentation Height Weight Breastfeeding? BMI OB Status Smoking Status 1.753 m 97 kg No 31.58 kg/m2 Postmenopausal Never Smoker Emergency Contacts Name Discharge Info Relation Home Work Mobile [facility] DISCHARGE CAREGIVER [3] Son [22] [phone] Coleman Barnes  Boyfriend [17] [phone] About your hospitalization You were admitted on:  May 10, 2017 You last received care in the:  [facility] You were discharged on:  May 13, 2017 Unit phone number:  [phone] Why you were hospitalized Your primary diagnosis was:  Status Post Right Hip Replacement Providers Seen During Your Hospitalizations Provider Role Specialty Primary office phone Iris Robledo MD Attending Provider Orthopedic Surgery [phone] Your Primary Care Physician (PCP) Primary Care Physician Office Phone Office Fax [facility] [phone] Follow-up Information Follow up With Details Comments Contact Info [address] [place] [address] Tobi España 
[phone] Current Discharge Medication List  
  
ASK your doctor about these medications Dose & Instructions Dispensing Information Comments Morning Noon Evening Bedtime  
 acetaminophen 650 mg CR tablet Commonly known as:  TYLENOL Your last dose was:     
   
Your next dose is:    
   
   
 Dose:  650 mg  
 Take 650 mg by mouth every six (6) hours as needed for Pain. Take DOS if needed per anesthesia protocol. Refills:  0  
     
   
   
   
  
 albuterol 2.5 mg /3 mL (0.083 %) nebulizer solution Commonly known as:  PROVENTIL VENTOLIN Your last dose was: Your next dose is:    
   
   
 by Nebulization route every four (4) hours as needed for Wheezing. Use DOS per anesthesia protocol. Refills:  0  
     
   
   
   
  
 aspirin delayed-release 81 mg tablet Your last dose was: Your next dose is:    
   
   
 Dose:  81 mg Take 81 mg by mouth daily. Instructed to take DOS per Anesthesia guidelines. Refills:  0  
     
   
   
   
  
 atenolol 50 mg tablet Commonly known as:  TENORMIN Your last dose was: Your next dose is:    
   
   
 Dose:  50 mg Take 50 mg by mouth daily. Take DOS per anesthesia protocol. Refills:  0  
     
   
   
   
  
 atorvastatin 10 mg tablet Commonly known as:  LIPITOR Your last dose was: Your next dose is:    
   
   
 Dose:  10 mg Take 10 mg by mouth nightly. Indications: hypercholesterolemia Refills:  0 CENTRUM SILVER WOMEN PO Your last dose was: Your next dose is:    
   
   
 Dose:  1 Tab Take 1 tablet by mouth every evening. Refills:  0  
     
   
   
   
  
 chlorthalidone 25 mg tablet Commonly known as:  Faye Albe Your last dose was: Your next dose is: Take  by mouth daily. 1/2 tablet by mouth daily Refills:  0  
     
   
   
   
  
 folic acid 1 mg tablet Commonly known as:  [employer] Your last dose was: Your next dose is:    
   
   
 Dose:  1 mg Take 1 mg by mouth daily. Refills:  0  
     
   
   
   
  
 MAGOX 400 mg tablet Generic drug:  magnesium oxide Your last dose was: Your next dose is:    
   
   
 Dose:  400 mg Take 400 mg by mouth two (2) times a day. Refills:  0 MILK OF MAGNESIA 400 mg/5 mL suspension Generic drug:  magnesium hydroxide Your last dose was: Your next dose is:    
   
   
 Dose:  30 mL Take 30 mL by mouth daily as needed for Constipation. Refills:  0  
     
   
   
   
  
 nystatin powder Commonly known as:  MYCOSTATIN Your last dose was: Your next dose is:    
   
   
 Apply  to affected area three (3) times daily. Refills:  0  
     
   
   
   
  
 oxyCODONE IR 5 mg immediate release tablet Commonly known as:  Yecenia Deagil Your last dose was: Your next dose is:    
   
   
 Dose:  5 mg Take 5 mg by mouth every four (4) hours as needed for Pain. Take DOS if needed per anesthesia protocol. Refills:  0  
     
   
   
   
  
 potassium chloride SR 10 mEq tablet Commonly known as:  KLOR-CON 10 Your last dose was: Your next dose is: Take  by mouth. Pt. unsure of dosage- PCP started pt on this due to leg cramping Refills:  0  
     
   
   
   
  
 spironolactone 25 mg tablet Commonly known as:  ALDACTONE Your last dose was: Your next dose is: Take  by mouth daily. 1/2 tablet once a day Refills:  0 Discharge Instructions None Discharge Orders None [employer] Announcement We are excited to announce that we are making your provider's discharge notes available to you in [employer]. You will see these notes when they are completed and signed by the physician that discharged you from your recent hospital stay. If you have any questions or concerns about any information you see in [employer], please call the Health Information Department where you were seen or reach out to your Primary Care Provider for more information about your plan of care. Introducing [facility]!    
 Jacob Alexis introduces [employer] patient portal. Now you can access parts of your medical record, email your doctor's office, and request medication refills online. 1. In your internet browser, go to [email or web address] 2. Click on the First Time User? Click Here link in the Sign In box. You will see the New Member Sign Up page. 3. Enter your [employer] Access Code exactly as it appears below. You will not need to use this code after youve completed the sign-up process. If you do not sign up before the expiration date, you must request a new code. · [employer] Access Code: [email or web address] Expires: 6/27/2017  8:25 AM 
 
4. Enter the last four digits of your Social Security Number (xxxx) and Date of Birth (mm/dd/yyyy) as indicated and click Submit. You will be taken to the next sign-up page. 5. Create a [employer] ID. This will be your [employer] login ID and cannot be changed, so think of one that is secure and easy to remember. 6. Create a [employer] password. You can change your password at any time. 7. Enter your Password Reset Question and Answer. This can be used at a later time if you forget your password. 8. Enter your e-mail address. You will receive e-mail notification when new information is available in [address] 9. Click Sign Up. You can now view and download portions of your medical record. 10. Click the Download Summary menu link to download a portable copy of your medical information. If you have questions, please visit the Frequently Asked Questions section of the [employer] website. Remember, [employer] is NOT to be used for urgent needs. For medical emergencies, dial 911. Now available from your iPhone and Android! General Information Please provide this summary of care documentation to your next provider. Patient Signature:  ____________________________________________________________ Date:  ____________________________________________________________  
  
Hurst Abelson  Provider Signature: ____________________________________________________________ Date:  ____________________________________________________________

## 2017-05-10 NOTE — CONSULTS
MD Carlos   Medical Director  [address] [place] [address], [address]  Tel: [phone]     Physical Medicine & Rehabilitation Note-consult    Patient: Storm Koehler MRN: [id number]  SSN: [id number]    YOB: 1940  Age: 67 y.o. Sex: female      Admit Date: 5/10/2017  Admitting Physician: Iris Robledo MD    Medical Decision Making/Plan/Recommend: Gait impairment and functional deficits following right total hip arthroplasty. Discussed rehab plans and goals with patient. She is progressing well, and expect no major barriers to progress. Recommend sub acute rehab at [facility]. Continue PT, OT for RLE strengthening, mobility, transfers, and gait training. Will follow progress. Chief Complaint : Gait dysfunction secondary to below.   Admit Diagnosis: Primary osteoarthritis of right hip [M16.11]  right total hip arthroplasty 5/10/2017  Pain  DVT risk  Post op hemorrhagic anemia  Essential hypertension  Osteoarthritis  Acute Rehab Dx:  Gait impairment  Debility    Mobility and ambulation deficits  Self Care/ADL deficits    Medical Dx:  Past Medical History:   Diagnosis Date    Abnormal posture     Anemia     history     Essential hypertension     H/O echocardiogram 01/05/2017    EF 55-65%    H/O: pneumonia     Hypercholesteremia     managed with medication     Hypomagnesemia     managed with supplement     Left ventricular diastolic dysfunction     per echo \"Grade 1 (mild)\"    Muscle weakness     generalized    Nausea & vomiting     nausea only     MOOK (obstructive sleep apnea) 01/30/2015    patient denies     Osteoarthritis     Poor historian     Pressure ulcer of left buttock, stage 2     resolved per patient-previously seen by Dr. Nir Faith at wound center      Pressure ulcer of right buttock, stage 2     resolved per patient-previously seen by Dr. Nir Faith at wound center     SOB (shortness of breath)     managed with PRN albuterol nebulizer    Status post right hip replacement 5/10/2017    Unable to ambulate     patient in wheelchair-can stand with assistance     Unsteadiness on feet      Subjective:     Date of Evaluation:  May 10, 2017    HPI: Teja Schultz is a 67 y.o. female patient at [address] [place] [address] who was admitted on 5/10/2017  by Eric Christian MD with below mentioned medical history, is being seen for Physical Medicine and Rehabilitation consult. Teja Schultz had painful right hip aggravated by walking due to severe DJD. Her symptoms were no longer controlled with conservative management. She underwent a right total hip arthroplasty per Dr. Eric Christian MD on 5/10/2017. The ppost operative course has been medically uncomplicated. We are consulted to assist with rehab needs and placement. Patient's allowed WBAT RLE. Hip precautions to be followed strictly for RLE. Jovana has tolerated post op PT well so far. She has no unusual barriers. She will be limited by post op right hip pain, decreased ROM and decreased strength. Teja Schultz is seen and examined today. Medical Records reviewed. Patient states she was at [address] [place] [address] since 3/14/2017. Se states her knees are painful and arthritic. She has been independent with ambulation at her baseline, limited by hip pain. Prior Level of Function/Work/Activity:  Independent with ambulation with a walker. Independent with ADLs.     Current Level of Function:  bed mobility - min A, transfers - min A, decreased balance, ambulation - 3 < 10' with CGA using a RW       Family History   Problem Relation Age of Onset    Heart Disease Mother     Hypertension Mother     Hypertension Father       Social History   Substance Use Topics    Smoking status: Never Smoker    Smokeless tobacco: Never Used    Alcohol use No     Past Surgical History:   Procedure Laterality Date    BREAST SURGERY PROCEDURE UNLISTED Right 1968    cyst removed    HX CHOLECYSTECTOMY  2009    HX KNEE ARTHROSCOPY Bilateral     X2 to right; X1 to left      Prior to Admission medications    Medication Sig Start Date End Date Taking? Authorizing Provider   potassium chloride SR (KLOR-CON 10) 10 mEq tablet Take  by mouth. Pt. unsure of dosage- PCP started pt on this due to leg cramping   Yes Historical Provider   atenolol (TENORMIN) 50 mg tablet Take 50 mg by mouth daily. Take DOS per anesthesia protocol. Yes Historical Provider   chlorthalidone (HYGROTEN) 25 mg tablet Take  by mouth daily. 1/2 tablet by mouth daily   Yes Historical Provider   folic acid (FOLVITE) 1 mg tablet Take 1 mg by mouth daily. Yes Historical Provider   spironolactone (ALDACTONE) 25 mg tablet Take  by mouth daily. 1/2 tablet once a day   Yes Historical Provider   magnesium oxide (MAGOX) 400 mg tablet Take 400 mg by mouth two (2) times a day. Yes Historical Provider   nystatin (MYCOSTATIN) powder Apply  to affected area three (3) times daily. Yes Historical Provider   acetaminophen (TYLENOL) 650 mg CR tablet Take 650 mg by mouth every six (6) hours as needed for Pain. Take DOS if needed per anesthesia protocol. Yes Historical Provider   albuterol (PROVENTIL VENTOLIN) 2.5 mg /3 mL (0.083 %) nebulizer solution by Nebulization route every four (4) hours as needed for Wheezing. Use DOS per anesthesia protocol. Yes Historical Provider   oxyCODONE IR (ROXICODONE) 5 mg immediate release tablet Take 5 mg by mouth every four (4) hours as needed for Pain. Take DOS if needed per anesthesia protocol. Yes Historical Provider   atorvastatin (LIPITOR) 10 mg tablet Take 10 mg by mouth nightly. Indications: hypercholesterolemia   Yes Historical Provider   aspirin delayed-release 81 mg tablet Take 81 mg by mouth daily. Instructed to take DOS per Anesthesia guidelines. Yes Historical Provider   MULTIVITS-MIN/IRON/FA/LUTEIN (CENTRUM SILVER WOMEN PO) Take 1 tablet by mouth every evening.    Yes Historical Provider magnesium hydroxide (MILK OF MAGNESIA) 400 mg/5 mL suspension Take 30 mL by mouth daily as needed for Constipation. Historical Provider     Allergies   Allergen Reactions    Avelox [Moxifloxacin] Rash    Other Plant, Animal, Environmental Nausea Only     Allergic to Perfume & Smoke. Review of Systems: +right hip pain. Denies chest pain, shortness of breath, cough, headache, visual problems, abdominal pain, dysurea, calf pain. Pertinent positives are as noted in the medical records and unremarkable otherwise. Objective:     Vitals:  Blood pressure 130/64, pulse 86, temperature 95.7 °F (35.4 °C), resp. rate 16, height 5' 9\" (1.753 m), weight 213 lb 13.5 oz (97 kg), SpO2 96 %, not currently breastfeeding. Temp (24hrs), Av.5 °F (35.8 °C), Min:95.7 °F (35.4 °C), Max:98 °F (36.7 °C)    BMI (calculated): 31.6 (17 0732)   Intake and Output:  701 - 05/10 1900  In: 1950 [I.V.:1950]  Out: 282 [Urine:175]    Physical Exam:   General: Alert and age appropriately oriented. No acute cardio respiratory distress. HEENT: Normocephalic, no conjunctival pallor, no scleral icterus  Oral mucosa moist without cyanosis   Lungs: Clear to auscultation bilaterally. Respiration even and unlabored   Heart: Regular rate and rhythm, S1, S2   No  murmurs, clicks, rub or gallops   Abdomen: Soft, non-tender, non-distended. Genitourinary: defered   Neuromuscular:      Grossly no focal motor deficits. Right knee extension strong. Right ankle dorsiflexion 5/5  Right ankle plantarflexion 5/5  No sensory deficits distally BLE to soft touch. Skin/extremity: Calves non tender BLE. No rashes, no marginal erythema.                                                                                          Labs/Studies:  Recent Results (from the past 72 hour(s))   TYPE & SCREEN    Collection Time: 05/10/17  6:00 AM   Result Value Ref Range    Crossmatch Expiration 2017     ABO/Rh(D) O POSITIVE     Antibody screen NEG    GLUCOSE, POC    Collection Time: 05/10/17  6:00 AM   Result Value Ref Range    Glucose (POC) 107 (H) 65 - 100 mg/dL   HEMOGLOBIN    Collection Time: 05/10/17  8:04 PM   Result Value Ref Range    HGB 10.0 (L) 11.7 - 15.4 g/dL       Functional Assessment:  Reviewed participation and progress in therapies  Gross Assessment  AROM: Generally decreased, functional (05/10/17 1458)  Strength: Generally decreased, functional (05/10/17 1458)  Coordination: Generally decreased, functional (05/10/17 1458)  Sensation: Intact (05/10/17 1458)   Bed Mobility  Supine to Sit: Moderate assistance;Assist x2 (05/10/17 1458)  Sit to Supine: Moderate assistance;Assist x2 (05/10/17 1458)   Balance  Sitting: Intact (05/10/17 1458)  Standing: Pull to stand; With support (05/10/17 1458)               Bed/Mat Mobility  Supine to Sit: Moderate assistance;Assist x2 (05/10/17 1458)  Sit to Supine:  Moderate assistance;Assist x2 (05/10/17 1458)  Sit to Stand: Minimum assistance;Assist x2 (05/10/17 1458)     Ambulation:  Gait  Base of Support: Narrowed (05/10/17 1458)  Speed/Keli: Pace decreased (<100 feet/min) (05/10/17 1458)  Gait Abnormalities: Antalgic (05/10/17 1458)  Ambulation - Level of Assistance: Modified independent (05/10/17 1458)  Distance (ft): 5 Feet (ft) (to head of bed) (05/10/17 1458)  Assistive Device: Walker, rolling (05/10/17 1458)    Impression/Plan:     Principal Problem:    Status post right hip replacement (5/10/2017)        Current Facility-Administered Medications   Medication Dose Route Frequency Provider Last Rate Last Dose    albuterol (PROVENTIL VENTOLIN) nebulizer solution 2.5 mg  2.5 mg Nebulization Q6H PRN Mara Magana MD        folic acid (FOLVITE) tablet 1 mg  1 mg Oral DAILY ALMA [place]   1 mg at 05/10/17 1623    magnesium hydroxide (MILK OF MAGNESIA) 400 mg/5 mL oral suspension 30 mL  30 mL Oral DAILY PRN ALMA [place]        magnesium oxide (MAG-OX) tablet 400 mg  400 mg Oral BID ALMA Gerber   400 mg at 05/10/17 1623    [START ON 5/11/2017] potassium chloride (KLOR-CON) tablet 10 mEq  10 mEq Oral DAILY Viral Gerber        [START ON 5/11/2017] spironolactone (ALDACTONE) tablet 12.5 mg  12.5 mg Oral DAILY Viral Gerber        0.9% sodium chloride infusion  100 mL/hr IntraVENous CONTINUOUS ALMA Gerber 100 mL/hr at 05/10/17 2039 100 mL/hr at 05/10/17 2039    sodium chloride (NS) flush 5-10 mL  5-10 mL IntraVENous Q8H Tara Gerberma        sodium chloride (NS) flush 5-10 mL  5-10 mL IntraVENous PRN ALMA Gerber        [START ON 5/11/2017] acetaminophen (TYLENOL) tablet 1,000 mg  1,000 mg Oral Q6H Viral Gerber        celecoxib (CELEBREX) capsule 200 mg  200 mg Oral Q12H Viral Gerebr   Stopped at 05/10/17 2100    HYDROmorphone (DILAUDID) tablet 2 mg  2 mg Oral Q4H PRN ALMA Gerber   2 mg at 05/10/17 2039    HYDROmorphone (PF) (DILAUDID) injection 1 mg  1 mg IntraVENous Q3H PRN ALMA Gerber        naloxone [facility]) injection 0.2-0.4 mg  0.2-0.4 mg IntraVENous Q10MIN PRN ALMA Gerber        [START ON 5/11/2017] dexamethasone (DECADRON) injection 10 mg  10 mg IntraVENous ONCE Tara Gerberma        [facility]dansetron [facility]) injection 4 mg  4 mg IntraVENous Q4H PRN ALMA Gerber        diphenhydrAMINE (BENADRYL) capsule 25 mg  25 mg Oral Q4H PRN ALMA Gerber        [START ON 5/11/2017] senna-docusate (PERICOLACE) 8.6-50 mg per tablet 2 Tab  2 Tab Oral DAILY Viral Gerber        aspirin delayed-release tablet 325 mg  325 mg Oral Q12H Ltanya Viral Moraes   325 mg at 05/10/17 2039    [START ON 5/11/2017] tuberculin injection 5 Units  5 Units IntraDERMal Viral Mcguire        [START ON 5/11/2017] atenolol/chlorthalidone (TENORETIC) 50/25 mg   Oral DAILY Abraham Galarza MD            Recommendations: Recommend sub acute rehab at SNF.    Continue Acute Rehab Program. Continue gait training, transfer training, balance activities. Coordination of rehab/medical care. Counseling of Physical Medicine & Rehab care issues management. Monitoring and management of rehab conditions per the plan of care/orders. Will follow along with you for PM&R issues and provide you follow up. Will follow with SW/ /Admissions Coordinators regarding insurance approvals and acceptance. Rehabilitation Management/ Medical Management: 1. Devices:Walkers, Type: Rolling Walker  2. Consult:Rehab team including PT, OT,  and . 3. Disposition Rehab-discussed with patient. 4. Thigh-high or knee-high DOUGLAS's when out of bed. 5. DVT Prophylaxis - start aspirin 325mg bid x 30days. 6. Incentive spirometer Q1H while awake  7. Post op hemorrhagic anemia- monitor. 8. Activity: WBAT RLE, total hip precautions. Discussed. Will require reminders. 9. Planned Labs: CBC,BMP  10. Pain Control:  Continue scheduled tylenol  and  PRN meds. 11. Wound Care: Keep hip wound clean and dry and reinforce dressing PRN. May remove Aquacel 1 week post op ad replace with new one. Remove staples 12-14 post surgery, when incision appears appropriately closed and apply benzoin and 1/2\" steristrips. Follow up with Dr Jeff Bingham  2 weeks after discharge from rehab. Follow up with ORTHO per instructions. Thank you for the opportunity to participate in the care of this patient.     Signed By: Ba Martinez MD     May 10, 2017

## 2017-05-10 NOTE — ANESTHESIA PROCEDURE NOTES
Spinal Block    Performed by: Desire Colvin  Authorized by: Desire Colvin     Pre-procedure:   Indications: primary anesthetic  Preanesthetic Checklist: patient identified, risks and benefits discussed, anesthesia consent, patient being monitored and timeout performed      Spinal Block:   Patient Position:  Seated  Prep Region:  Lumbar  Prep: Betadine      Location:  L3-4  Technique:  Single shot  Local:  Lidocaine 1%  Local Dose (mL):  3    Needle:   Needle Type:  Quincke  Needle Gauge:  25 G  Attempts:  1      Events: CSF confirmed, no blood with aspiration and no paresthesia        Assessment:  Insertion:  Uncomplicated  Patient tolerance:  Patient tolerated the procedure well with no immediate complications

## 2017-05-10 NOTE — PROGRESS NOTES
Problem: Self Care Deficits Care Plan (Adult)  Goal: *Acute Goals and Plan of Care (Insert Text)  GOALS:   DISCHARGE GOALS (in preparation for going home/rehab): 3 days  1. Ms. Konstantin Correa will perform one lower body dressing activity with minimal assistance with adaptive equipment to demonstrate improved functional mobility and safety. 2. Ms. Konstantin Correa will perform one lower body bathing activity with minimal assistance with adaptive equipment to demonstrate improved functional mobility and safety. 3. Ms. Konstantin Correa will perform toileting/toilet transfer with contact guard assistance with adaptive equipment to demonstrate improved functional mobility and safety. 4. Ms. Konstantin Correa will perform shower transfer with contact guard assistance with adaptive equipment to demonstrate improved functional mobility and safety. 5. Ms. Konstantin Correa will state ANMOL precautions with two verbal cues to demonstrate improved functional mobility and safety. JOINT CAMP OCCUPATIONAL THERAPY ANMOL: Initial Assessment and PM 5/10/2017  INPATIENT: Hospital Day: 1  [place] / Product Type: Medicare /      NAME/AGE/GENDER: Maria Gudino is a 67 y.o. female    PRIMARY DIAGNOSIS:  Primary osteoarthritis of right hip [M16.11]              Procedure(s) and Anesthesia Type:     * RIGHT HIP ARTHROPLASTY TOTAL - General (Right)  ICD-10: Treatment Diagnosis:        · Pain in Right Hip (M25.551)  · Stiffness of Right Hip, Not elsewhere classified (M25.651)  · Other lack of cordination (R27.8)       ASSESSMENT:      Ms. Konstantin Correa is s/p right ANMOL and presents with decreased weight bearing on right LE and decreased independence with functional mobility and activities of daily living. Patient would benefit from skilled Occupational Therapy to maximize independence and safety with self-care task and functional mobility.   Pt would also benefit from education on lower body adaptive equipment and hip precautions post-surgery in preparation for going home or for recommendations for post-hospital rehab program.  Patient plans for further rehab at  A rehab facility. OT reviewed therapy schedule and plan of care with patient. Patient was able to transfer and perform self care skills as charted below. Patient instructed to call for assistance when needing to get up from the bed and all needs in reach. Patient verbalized understanding of call light. This section established at most recent assessment   PROBLEM LIST (Impairments causing functional limitations):  1. Decreased Strength  2. Decreased ADL/Functional Activities  3. Decreased Transfer Abilities  4. Increased Pain  5. Increased Fatigue  6. Decreased Flexibility/Joint Mobility  7. Decreased Knowledge of Precautions    INTERVENTIONS PLANNED: (Benefits and precautions of occupational therapy have been discussed with the patient.)  1. Activities of daily living training  2. Adaptive equipment training  3. Balance training  4. Clothing management  5. Donning&doffing training  6. Theraputic activity      TREATMENT PLAN: Frequency/Duration: Follow patient 1 time to address above goals. Rehabilitation Potential For Stated Goals: GOOD      RECOMMENDED REHABILITATION/EQUIPMENT: (at time of discharge pending progress): Continue Skilled Therapy and Rehab. OCCUPATIONAL PROFILE AND HISTORY:   History of Present Injury/Illness (Reason for Referral): Pt presents this date s/p (right) ANMOL. Past Medical History/Comorbidities:   Ms. Raza Lombardi  has a past medical history of Abnormal posture; Anemia; Essential hypertension; H/O echocardiogram (01/05/2017); H/O: pneumonia; Hypercholesteremia; Hypomagnesemia; Left ventricular diastolic dysfunction; Muscle weakness; Nausea & vomiting; MOOK (obstructive sleep apnea) (01/30/2015); Osteoarthritis;  Poor historian; Pressure ulcer of left buttock, stage 2; Pressure ulcer of right buttock, stage 2; SOB (shortness of breath); Status post right hip replacement (5/10/2017); Unable to ambulate; and Unsteadiness on feet. She also has no past medical history of Adverse effect of anesthesia; Aneurysm ([facility] [address] Arrhythmia; Asthma; CAD (coronary artery disease); Cancer ([facility] [address] Chronic kidney disease; Chronic obstructive pulmonary disease ([facility] [address] Chronic pain; Coagulation disorder ([facility] [address] Diabetes ([facility] [address] Difficult intubation; GERD (gastroesophageal reflux disease); Heart failure ([facility] [address] Ill-defined condition; Liver disease; Malignant hyperthermia due to anesthesia; Morbid obesity ([facility] [address] Pseudocholinesterase deficiency; Psychiatric disorder; PUD (peptic ulcer disease); Seizures ([facility] [address] Stroke [facility]); Thromboembolus ([facility] [address] Thyroid disease; or Unspecified adverse effect of anesthesia. Ms. Andrea Tate  has a past surgical history that includes cholecystectomy (2009); knee arthroscopy (Bilateral); and breast surgery procedure unlisted (Right, 1968).   Social History/Living Environment:   Home Environment: Private residence  # Steps to Enter: 3  One/Two Story Residence: One story  Living Alone: Yes  Support Systems: Friends \ neighbors  Patient Expects to be Discharged to[de-identified] Rehabilitation facility  Current DME Used/Available at Home: Earnie Jolie, straight, Walker, rolling  Tub or Shower Type: Shower  Prior Level of Function/Work/Activity:  Min assist with ADLS      Number of Personal Factors/Comorbidities that affect the Plan of Care: Brief history (0):  LOW COMPLEXITY   ASSESSMENT OF OCCUPATIONAL PERFORMANCE[de-identified]   Most Recent Physical Functioning:            Patient Vitals for the past 6 hrs:       BP BP Patient Position SpO2 O2 Flow Rate (L/min) Pulse   05/10/17 0927 151/68 At rest 100 % - 76   05/10/17 0932 140/65 - 92 % 3 l/min 71   05/10/17 0937 138/63 - 100 % 3 l/min 71   05/10/17 0942 135/64 - 100 % 3 l/min 69   05/10/17 0954 139/65 - 100 % 3 l/min 68   05/10/17 1011 139/66 - 100 % 3 l/min 65   05/10/17 1050 - - - 3 l/min -   05/10/17 1052 159/79 At rest 100 % - 65   05/10/17 1253 - - 97 % 3 l/min -   05/10/17 1300 - - 96 % 2 l/min -                       Coordination  Fine Motor Skills-Upper: Left Intact; Right Intact (arthritic deformites in B hands, c/o L Ue deficits)  Gross Motor Skills-Upper: Left Intact; Right Intact           Mental Status  Neurologic State: Alert; Appropriate for age  Orientation Level: Appropriate for age  Cognition: Appropriate decision making; Appropriate for age attention/concentration; Appropriate safety awareness; Follows commands  Perception: Appears intact  Perseveration: Perseverates during conversation  Safety/Judgement: Awareness of environment; Fall prevention                    Basic ADLs (From Assessment) Complex ADLs (From Assessment)   Basic ADL  Feeding: Setup  Oral Facial Hygiene/Grooming: Supervision  Bathing: Moderate assistance  Upper Body Dressing: Minimum assistance  Lower Body Dressing: Maximum assistance  Toileting: Total assistance (catheter)     Grooming/Bathing/Dressing Activities of Daily Living     Cognitive Retraining  Safety/Judgement: Awareness of environment; Fall prevention                 Functional Transfers  Toilet Transfer : Minimum assistance;Assist x2  Shower Transfer: Minimum assistance;Assist x2                 Physical Skills Involved:  1. Balance  2. Mobility  3. Endurance Cognitive Skills Affected (resulting in the inability to perform in a timely and safe manner):  1. Problem Solving Psychosocial Skills Affected:  1. Environmental Adaptations   Number of elements that affect the Plan of Care: 3-5:  MODERATE COMPLEXITY   CLINICAL DECISION MAKIN [facility] [address] [id number] AM-PAC 6 Clicks   Basic Mobility Inpatient Short Form  How much help from another person does the patient currently need. .. Total A Lot A Little None   1. Putting on and taking off regular lower body clothing?   [ ] 1   [X] 2   [ ] 3   [ ] 4   2. Bathing (including washing, rinsing, drying)? [ ] 1   [X] 2   [ ] 3   [ ] 4   3.   Toileting, which includes using toilet, bedpan or urinal?   [X] 1   [ ] 2   [ ] 3   [ ] 4   4. Putting on and taking off regular upper body clothing?   [ ] 1   [ ] 2   [X] 3   [ ] 4   5. Taking care of personal grooming such as brushing teeth? [ ] 1   [ ] 2   [X] 3   [ ] 4   6. Eating meals? [ ] 1   [ ] 2   [ ] 3   [X] 4   © 2007, Trustees of [address] [place] [address] [address] [address], under license to [employer]. All rights reserved   Score:  Initial:15 Most Recent: X (Date: -- )     Interpretation of Tool:  Represents activities that are increasingly more difficult (i.e. Bed mobility, Transfers, Gait). Score 24 23 22-20 19-15 14-10 9-7 6       Modifier CH CI CJ CK CL CM CN         · Self Care:               - CURRENT STATUS:    CK - 40%-59% impaired, limited or restricted               - GOAL STATUS:           CJ - 20%-39% impaired, limited or restricted               - D/C STATUS:                       ---------------To be determined---------------  [place] / [place] / Product Type: Medicare /       Medical Necessity:     · Patient is expected to demonstrate progress in balance, coordination and functional technique to decrease assistance required with self care and functional mobility and improve safety during self care and functional mobility. Reason for Services/Other Comments:  · Patient continues to require skilled intervention due to decreased self care and functional mobility. Use of outcome tool(s) and clinical judgement create a POC that gives a: LOW COMPLEXITY                 TREATMENT:   (In addition to Assessment/Re-Assessment sessions the following treatments were rendered)      Pre-treatment Symptoms/Complaints:  none  Pain: Initial:   Pain Intensity 1: 0  Post Session:  0/10      Assessment/Reassessment only, no treatment provided today     Treatment/Session Assessment:         Response to Treatment:  Tolerated well, plans to go to rehab, nurse present. Education:  [ ] Home Exercises  [X] Fall Precautions  [X] Hip Precautions [ ] Going Home Video  [ ] Knee/Hip Prosthesis Review  [X] Walker Management/Safety [X] Adaptive Equipment as Needed         Interdisciplinary Collaboration:   · Physical Therapist  · Occupational Therapist  · Registered Nurse     After treatment position/precautions:   · Supine in bed  · Bed/Chair-wheels locked  · Bed in low position  · Call light within reach  · RN notified  · Side rails x 3     Compliance with Program/Exercises: compliant all of the time. Recommendations/Intent for next treatment session:  Treatment next visit will focus on increasing Ms. Sparrow's independence with  self care and functional mobility and patient education.        Total Treatment Duration:  OT Patient Time In/Time Out  Time In: 1405  Time Out: [address], OT

## 2017-05-11 LAB
ANION GAP BLD CALC-SCNC: 7 MMOL/L (ref 7–16)
ANION GAP BLD CALC-SCNC: 7 MMOL/L (ref 7–16)
BUN SERPL-MCNC: 20 MG/DL (ref 8–23)
BUN SERPL-MCNC: 21 MG/DL (ref 8–23)
CALCIUM SERPL-MCNC: 8.3 MG/DL (ref 8.3–10.4)
CALCIUM SERPL-MCNC: 8.7 MG/DL (ref 8.3–10.4)
CHLORIDE SERPL-SCNC: 102 MMOL/L (ref 98–107)
CHLORIDE SERPL-SCNC: 105 MMOL/L (ref 98–107)
CO2 SERPL-SCNC: 30 MMOL/L (ref 21–32)
CO2 SERPL-SCNC: 30 MMOL/L (ref 21–32)
CREAT SERPL-MCNC: 1.16 MG/DL (ref 0.6–1)
CREAT SERPL-MCNC: 1.46 MG/DL (ref 0.6–1)
GLUCOSE SERPL-MCNC: 106 MG/DL (ref 65–100)
GLUCOSE SERPL-MCNC: 114 MG/DL (ref 65–100)
HGB BLD-MCNC: 9.3 G/DL (ref 11.7–15.4)
MAGNESIUM SERPL-MCNC: 1.4 MG/DL (ref 1.8–2.4)
MM INDURATION POC: 0 MM (ref 0–5)
POTASSIUM SERPL-SCNC: 2.8 MMOL/L (ref 3.5–5.1)
POTASSIUM SERPL-SCNC: 3.1 MMOL/L (ref 3.5–5.1)
PPD POC: NORMAL NEGATIVE
SODIUM SERPL-SCNC: 139 MMOL/L (ref 136–145)
SODIUM SERPL-SCNC: 142 MMOL/L (ref 136–145)

## 2017-05-11 PROCEDURE — 74011250637 HC RX REV CODE- 250/637: Performed by: INTERNAL MEDICINE

## 2017-05-11 PROCEDURE — 74011250637 HC RX REV CODE- 250/637: Performed by: PHYSICIAN ASSISTANT

## 2017-05-11 PROCEDURE — 83735 ASSAY OF MAGNESIUM: CPT | Performed by: INTERNAL MEDICINE

## 2017-05-11 PROCEDURE — 97116 GAIT TRAINING THERAPY: CPT

## 2017-05-11 PROCEDURE — 94760 N-INVAS EAR/PLS OXIMETRY 1: CPT

## 2017-05-11 PROCEDURE — 97150 GROUP THERAPEUTIC PROCEDURES: CPT

## 2017-05-11 PROCEDURE — 74011250636 HC RX REV CODE- 250/636: Performed by: INTERNAL MEDICINE

## 2017-05-11 PROCEDURE — 97110 THERAPEUTIC EXERCISES: CPT

## 2017-05-11 PROCEDURE — 80048 BASIC METABOLIC PNL TOTAL CA: CPT | Performed by: PHYSICIAN ASSISTANT

## 2017-05-11 PROCEDURE — 36415 COLL VENOUS BLD VENIPUNCTURE: CPT | Performed by: PHYSICIAN ASSISTANT

## 2017-05-11 PROCEDURE — 85018 HEMOGLOBIN: CPT | Performed by: PHYSICIAN ASSISTANT

## 2017-05-11 PROCEDURE — 74011250637 HC RX REV CODE- 250/637: Performed by: ORTHOPAEDIC SURGERY

## 2017-05-11 PROCEDURE — 65270000029 HC RM PRIVATE

## 2017-05-11 RX ORDER — ATENOLOL 50 MG/1
50 TABLET ORAL DAILY
Status: DISCONTINUED | OUTPATIENT
Start: 2017-05-12 | End: 2017-05-13 | Stop reason: HOSPADM

## 2017-05-11 RX ORDER — POTASSIUM CHLORIDE 20 MEQ/1
40 TABLET, EXTENDED RELEASE ORAL DAILY
Status: DISCONTINUED | OUTPATIENT
Start: 2017-05-11 | End: 2017-05-13 | Stop reason: HOSPADM

## 2017-05-11 RX ORDER — MAGNESIUM SULFATE HEPTAHYDRATE 40 MG/ML
2 INJECTION, SOLUTION INTRAVENOUS ONCE
Status: COMPLETED | OUTPATIENT
Start: 2017-05-11 | End: 2017-05-13

## 2017-05-11 RX ADMIN — SPIRONOLACTONE 12.5 MG: 25 TABLET, FILM COATED ORAL at 09:58

## 2017-05-11 RX ADMIN — HYDROMORPHONE HYDROCHLORIDE 2 MG: 2 TABLET ORAL at 10:14

## 2017-05-11 RX ADMIN — Medication 400 MG: at 09:58

## 2017-05-11 RX ADMIN — REGULAR STRENGTH 325 MG: 325 TABLET ORAL at 09:58

## 2017-05-11 RX ADMIN — POTASSIUM CHLORIDE 40 MEQ: 20 TABLET, EXTENDED RELEASE ORAL at 09:58

## 2017-05-11 RX ADMIN — HYDROMORPHONE HYDROCHLORIDE 2 MG: 2 TABLET ORAL at 01:02

## 2017-05-11 RX ADMIN — ACETAMINOPHEN 1000 MG: 500 TABLET, FILM COATED ORAL at 01:02

## 2017-05-11 RX ADMIN — CELECOXIB 200 MG: 200 CAPSULE ORAL at 09:58

## 2017-05-11 RX ADMIN — ATENOLOL: 50 TABLET ORAL at 09:59

## 2017-05-11 RX ADMIN — SENNOSIDES AND DOCUSATE SODIUM 2 TABLET: 8.6; 5 TABLET ORAL at 09:58

## 2017-05-11 RX ADMIN — ACETAMINOPHEN 1000 MG: 500 TABLET, FILM COATED ORAL at 12:24

## 2017-05-11 RX ADMIN — MAGNESIUM SULFATE IN WATER 2 G: 40 INJECTION, SOLUTION INTRAVENOUS at 12:24

## 2017-05-11 RX ADMIN — ACETAMINOPHEN 1000 MG: 500 TABLET, FILM COATED ORAL at 17:45

## 2017-05-11 RX ADMIN — HYDROMORPHONE HYDROCHLORIDE 2 MG: 2 TABLET ORAL at 05:41

## 2017-05-11 RX ADMIN — FOLIC ACID 1 MG: 1 TABLET ORAL at 09:59

## 2017-05-11 RX ADMIN — ACETAMINOPHEN 1000 MG: 500 TABLET, FILM COATED ORAL at 05:41

## 2017-05-11 RX ADMIN — Medication 400 MG: at 17:45

## 2017-05-11 RX ADMIN — HYDROMORPHONE HYDROCHLORIDE 2 MG: 2 TABLET ORAL at 21:59

## 2017-05-11 RX ADMIN — REGULAR STRENGTH 325 MG: 325 TABLET ORAL at 21:59

## 2017-05-11 RX ADMIN — Medication 5 ML: at 22:00

## 2017-05-11 NOTE — PROGRESS NOTES
Patient is A&Ox4. Able to verbalize needs. Resting quietly with no distress noted. Shift assessment completed. Dressing to surgical site is dry and intact. Neurovascular and peripheral vascular checks WNL. [place] draining clear yellow urine to bag. PIV infusing without difficulty. PIV dressing intact with no s/s of infection. Denies needs. Bed low and locked. Call light within reach. Instructed to call for assistance. Patient verbalizes understanding. Will continue to monitor.

## 2017-05-11 NOTE — PROGRESS NOTES
Patient is A&Ox4. Able to verbalize needs. Resting quietly with no distress noted. Dressing to surgical site is dry and intact. Neurovascular and peripheral vascular checks WNL. Medina draining clear yellow urine to bag. Denies needs. Bed low and locked. Call light within reach. Instructed to call for assistance. Patient verbalizes understanding. Will monitor.

## 2017-05-11 NOTE — PROGRESS NOTES
Got up to recliner with therapists assisting. Medicated for pain with dilaudid po. TEDs on. Good movement and sensation to feet. Aquacel to R knee. Using IS.  Instructed not to get up without staff to assist.

## 2017-05-11 NOTE — PROGRESS NOTES
05/11/17 0913   Oxygen Therapy   O2 Sat (%) 95 %   Pulse via Oximetry 73 beats per minute   O2 Device Room air   Good npc. Pt working on IS. Pt encouraged to do 10 breaths per hour while awake on IS. B/S clear. No respiratory distress noted at this time.

## 2017-05-11 NOTE — PROGRESS NOTES
May 11, 2017         Post Op day: 1 Day Post-Op Procedure(s) (LRB):  RIGHT HIP ARTHROPLASTY TOTAL (Right)      Admit Date: 5/10/2017  Admit Diagnosis: Primary osteoarthritis of right hip [M16.11]       Principle Problem: Status post right hip replacement. Subjective: Doing well, No complaints, No SOB, No Chest Pain, No Nausea or Vomiting     Objective:   Vital Signs are Stable, No Acute Distress, Alert and Oriented, Dressing is Dry,  Neurovascular exam is normal.     Assessment / Plan :  Patient Active Problem List   Diagnosis Code    Preop respiratory exam Z01.811    Hypertension I10    Hyperlipidemia E78.5    Rheumatoid arthritis ([facility] [address] M06.9    MOOK (obstructive sleep apnea) G47.33    Elevated serum creatinine R79.89    Status post right hip replacement Z96.641    Patient Vitals for the past 8 hrs:   BP Temp Pulse Resp SpO2   17 0500 122/52 98 °F (36.7 °C) 76 16 98 %   17 0057 134/57 97.8 °F (36.6 °C) 77 16 98 %    Temp (24hrs), Av.9 °F (36.1 °C), Min:95.7 °F (35.4 °C), Max:98 °F (36.7 °C)    Body mass index is 31.58 kg/(m^2).     Lab Results   Component Value Date/Time    HGB 9.3 2017 04:21 AM      Pt seen by and discussed with Supervising Physician   Continue PT  Hypokalemia: mgmt per hospitalist       Signed By: ALMA Caballero  2017,  7:31 AM

## 2017-05-11 NOTE — PROGRESS NOTES
Critical K+ 2.5 called from Magdalena Nevarez in the lab at [address] Paged Dr. Chintan Nash who was on call for ortho and got the voicemail. Spoke with Dr. Manny Mcbride and she modified daily KCL dose from 10mEq to 40mEq. Will administer dose and continue to monitor.

## 2017-05-11 NOTE — CONSULTS
HOSPITALIST H&P/CONSULT  NAME:  Jai Bowman   Age:  67 y.o.  :   1940   MRN:   [id number]  PCP: Abdi Pena MD  Consulting MD:  Treatment Team: Attending Provider: Darby Carter MD; Care Manager: GILA Natarajan; Consulting Provider: Idalia Holland MD; Utilization Review: Julianna Manley; Consulting Provider: Eber Levine MD  HPI:     This is a 68 YO female patient with a PMH of HTN and rheumatoid arthritis who underwent a R total hip replacement on 5/10/17. She remains clinically stable. Her VS have remained stable, however she has had some electrolyte imbalances, with low K and low Mg level. The hospitalist have been requested to evaluate her for this problem. 10 point ROS done and is negative except as noted in HPI.   Past Medical History:   Diagnosis Date    Abnormal posture     Anemia     history     Essential hypertension     H/O echocardiogram 2017    EF 55-65%    H/O: pneumonia     Hypercholesteremia     managed with medication     Hypomagnesemia     managed with supplement     Left ventricular diastolic dysfunction     per echo \"Grade 1 (mild)\"    Muscle weakness     generalized    Nausea & vomiting     nausea only     MOOK (obstructive sleep apnea) 2015    patient denies     Osteoarthritis     Poor historian     Pressure ulcer of left buttock, stage 2     resolved per patient-previously seen by Dr. Eliud Sevilla at wound center      Pressure ulcer of right buttock, stage 2     resolved per patient-previously seen by Dr. Eliud Sevilla at wound center     SOB (shortness of breath)     managed with PRN albuterol nebulizer    Status post right hip replacement 5/10/2017    Unable to ambulate     patient in wheelchair-can stand with assistance     Unsteadiness on feet       Past Surgical History:   Procedure Laterality Date    BREAST SURGERY PROCEDURE UNLISTED Right 1968    cyst removed    HX CHOLECYSTECTOMY  2009    HX KNEE ARTHROSCOPY Bilateral X2 to right; X1 to left      Prior to Admission Medications   Prescriptions Last Dose Informant Patient Reported? Taking? MULTIVITS-MIN/IRON/FA/LUTEIN (CENTRUM SILVER WOMEN PO) 5/3/2017 at Unknown time  Yes Yes   Sig: Take 1 tablet by mouth every evening. acetaminophen (TYLENOL) 650 mg CR tablet 5/9/2017 at Unknown time  Yes Yes   Sig: Take 650 mg by mouth every six (6) hours as needed for Pain. Take DOS if needed per anesthesia protocol. albuterol (PROVENTIL VENTOLIN) 2.5 mg /3 mL (0.083 %) nebulizer solution 5/9/2017 at Unknown time  Yes Yes   Sig: by Nebulization route every four (4) hours as needed for Wheezing. Use DOS per anesthesia protocol. aspirin delayed-release 81 mg tablet 5/10/2017 at Unknown time  Yes Yes   Sig: Take 81 mg by mouth daily. Instructed to take DOS per Anesthesia guidelines. atenolol (TENORMIN) 50 mg tablet 5/10/2017 at 0330  Yes Yes   Sig: Take 50 mg by mouth daily. Take DOS per anesthesia protocol. atorvastatin (LIPITOR) 10 mg tablet 5/9/2017 at Unknown time  Yes Yes   Sig: Take 10 mg by mouth nightly. Indications: hypercholesterolemia   chlorthalidone (HYGROTEN) 25 mg tablet 5/9/2017 at Unknown time  Yes Yes   Sig: Take  by mouth daily. 1/2 tablet by mouth daily   folic acid (FOLVITE) 1 mg tablet 5/3/2017 at Unknown time  Yes Yes   Sig: Take 1 mg by mouth daily. magnesium hydroxide (MILK OF MAGNESIA) 400 mg/5 mL suspension Not Taking at Unknown time  Yes No   Sig: Take 30 mL by mouth daily as needed for Constipation. magnesium oxide (MAGOX) 400 mg tablet 5/9/2017 at Unknown time  Yes Yes   Sig: Take 400 mg by mouth two (2) times a day. nystatin (MYCOSTATIN) powder 5/9/2017 at Unknown time  Yes Yes   Sig: Apply  to affected area three (3) times daily. oxyCODONE IR (ROXICODONE) 5 mg immediate release tablet 5/3/2017 at Unknown time  Yes Yes   Sig: Take 5 mg by mouth every four (4) hours as needed for Pain. Take DOS if needed per anesthesia protocol.    potassium chloride SR (KLOR-CON 10) 10 mEq tablet 5/3/2017 at Unknown time  Yes Yes   Sig: Take  by mouth. Pt. unsure of dosage- PCP started pt on this due to leg cramping   spironolactone (ALDACTONE) 25 mg tablet 2017 at Unknown time  Yes Yes   Sig: Take  by mouth daily. 1/2 tablet once a day      Facility-Administered Medications: None     Home meds reconciled. Allergies   Allergen Reactions    Avelox [Moxifloxacin] Rash    Other Plant, Animal, Environmental Nausea Only     Allergic to Perfume & Smoke. Social History   Substance Use Topics    Smoking status: Never Smoker    Smokeless tobacco: Never Used    Alcohol use No      Family History   Problem Relation Age of Onset    Heart Disease Mother     Hypertension Mother     Hypertension Father       Immunization History   Administered Date(s) Administered    TB Skin Test (PPD) Intradermal 05/10/2017     Objective:     Visit Vitals    /48 (BP 1 Location: Right arm, BP Patient Position: Sitting)    Pulse 62    Temp 97.7 °F (36.5 °C)    Resp 16    Ht 5' 9\" (1.753 m)    Wt 97 kg (213 lb 13.5 oz)    SpO2 98%    Breastfeeding No    BMI 31.58 kg/m2      Temp (24hrs), Av.3 °F (36.3 °C), Min:95.7 °F (35.4 °C), Max:98.8 °F (37.1 °C)    Oxygen Therapy  O2 Sat (%): 98 % (17 1121)  Pulse via Oximetry: 73 beats per minute (17 0913)  O2 Device: Room air (17 0913)  O2 Flow Rate (L/min): 2 l/min (dec to 1lpm) (05/10/17 2115)  FIO2 (%): 28 % (05/10/17 1300)  Physical Exam:  General:    Alert, cooperative, no distress   Head:   NCAT. No obvious deformity  Nose:  Nares normal. No drainage  Lungs:   CTABL. No wheezing/rhonchi/rales  Heart:   RRR. No m/r/g. Abdomen:   S/nt/nd. Bowel sounds normal.   Extremities: S/p R hip replacement   Skin:     No rashes or lesions. Not Jaundiced  Neurologic: Moves all extremities.   no gross focal deficits      Data Review:   Recent Results (from the past 24 hour(s))   HEMOGLOBIN    Collection Time: 05/10/17  8:04 PM   Result Value Ref Range    HGB 10.0 (L) 11.7 - 25.5 g/dL   METABOLIC PANEL, BASIC    Collection Time: 05/11/17  4:21 AM   Result Value Ref Range    Sodium 142 136 - 145 mmol/L    Potassium 2.8 (LL) 3.5 - 5.1 mmol/L    Chloride 105 98 - 107 mmol/L    CO2 30 21 - 32 mmol/L    Anion gap 7 7 - 16 mmol/L    Glucose 106 (H) 65 - 100 mg/dL    BUN 21 8 - 23 MG/DL    Creatinine 1.16 (H) 0.6 - 1.0 MG/DL    GFR est AA 58 (L) >60 ml/min/1.73m2    GFR est non-AA 48 (L) >60 ml/min/1.73m2    Calcium 8.3 8.3 - 10.4 MG/DL   HEMOGLOBIN    Collection Time: 05/11/17  4:21 AM   Result Value Ref Range    HGB 9.3 (L) 11.7 - 15.4 g/dL   MAGNESIUM    Collection Time: 05/11/17  8:48 AM   Result Value Ref Range    Magnesium 1.4 (LL) 1.8 - 2.4 mg/dL   METABOLIC PANEL, BASIC    Collection Time: 05/11/17  8:48 AM   Result Value Ref Range    Sodium 139 136 - 145 mmol/L    Potassium 3.1 (L) 3.5 - 5.1 mmol/L    Chloride 102 98 - 107 mmol/L    CO2 30 21 - 32 mmol/L    Anion gap 7 7 - 16 mmol/L    Glucose 114 (H) 65 - 100 mg/dL    BUN 20 8 - 23 MG/DL    Creatinine 1.46 (H) 0.6 - 1.0 MG/DL    GFR est AA 45 (L) >60 ml/min/1.73m2    GFR est non-AA 37 (L) >60 ml/min/1.73m2    Calcium 8.7 8.3 - 10.4 MG/DL   PLEASE READ & DOCUMENT PPD TEST IN 24 HRS    Collection Time: 05/11/17 10:15 AM   Result Value Ref Range    PPD  Negative    mm Induration 0 mm     Imaging /Procedures /Studies:  I personally reviewed all labs, imaging, and other studies this admission:  CXR Results  (Last 48 hours)    None        CT Results  (Last 48 hours)    None          Assessment and Plan:      Active Hospital Problems    Diagnosis Date Noted    Status post right hip replacement 05/10/2017       PLAN    # Hypokalemia and hypomagnesaemia.   -Oral potassium already ordered   -IV and oral magnesium ordered   -Will monitor electrolytes tomorrow morning   -Will hold diuretics for now, as the could make electrolyte replacement difficult     #HTN   -controlled -using atenolol/HCTZ at home. Will stop HCTZ and will use only atenolol for now   -will hold spironolactone for now     # R hip replacement   -as per ortho team      Thanks for allowing us to participate in the care of this patient. Will check repeat labs in the morning, if normal, will sign off. Can resume her regular BP meds before going home if electrolytes have been adequately replaced .         Signed By: Marvin Fairbanks MD     May 11, 2017

## 2017-05-11 NOTE — PROGRESS NOTES
Problem: Mobility Impaired (Adult and Pediatric)  Goal: *Acute Goals and Plan of Care (Insert Text)  GOALS (1-4 days):  (1.)Ms. Chintan Gamboa will move from supine to sit and sit to supine in bed with INDEPENDENT. (2.)Ms. Chintan Gamboa will transfer from bed to chair and chair to bed with INDEPENDENT using the least restrictive device. (3.)Ms. Chintan Gamboa will ambulate with INDEPENDENT for 250 feet with the least restrictive device. (4.)Ms. Chintan Gamboa will ambulate up/down 3 steps with bilateral railing with MINIMAL ASSIST with no device. Going to rehab  (5.)Ms. Chintan Gamboa will state/observe ANMOL precautions with 0 verbal cues. ________________________________________________________________________________________________  Outcome: Progressing Towards Goal      PHYSICAL THERAPY JOINT CAMP ANMOL: Daily Note and AM 5/11/2017  INPATIENT: Hospital Day: 2  [place] / Product Type: Medicare /      NAME/AGE/GENDER: Manfred Castro is a 67 y.o. female    PRIMARY DIAGNOSIS:  Primary osteoarthritis of right hip [M16.11]              Procedure(s) and Anesthesia Type:     * RIGHT HIP ARTHROPLASTY TOTAL - General (Right)  ICD-10: Treatment Diagnosis:        · Pain in Right Hip (M25.551)  · Stiffness of Right Hip, Not elsewhere classified (M25.651)  · Difficulty in walking, Not elsewhere classified (R26.2)  · Other abnormalities of gait and mobility (R26.89)       ASSESSMENT:      Ms. Chintan Gamboa presents status post right total hip replacement with decreased independence with bed mobility, transfers, gait, and therapeutic exercise. Therapy will maximize independence with functional mobility. She is very talkative this morning. She performs exercises in the bed with some help. She ambulates 12 ft using RW with Min A. She will sit up for a while and come to group this afternoon.       This section established at most recent assessment   PROBLEM LIST (Impairments causing functional limitations):  1. Decreased Strength  2. Decreased Ambulation Ability/Technique  3. Decreased Balance  4. Increased Pain  5. Decreased Activity Tolerance    INTERVENTIONS PLANNED: (Benefits and precautions of physical therapy have been discussed with the patient.)  1. Bed Mobility  2. Gait Training  3. Home Exercise Program (HEP)  4. Therapeutic Exercise/Strengthening  5. Transfer Training  6. Range of Motion: active/assisted/passive  7. Therapeutic Activities  8. Group Therapy      TREATMENT PLAN: Frequency/Duration: Follow patient BID   to address above goals. Rehabilitation Potential For Stated Goals: GOOD      RECOMMENDED REHABILITATION/EQUIPMENT: (at time of discharge pending progress): Rehab. HISTORY:   History of Present Injury/Illness (Reason for Referral):  Decreased mobility ability  Past Medical History/Comorbidities:   Ms. Melani Severino  has a past medical history of Abnormal posture; Anemia; Essential hypertension; H/O echocardiogram (01/05/2017); H/O: pneumonia; Hypercholesteremia; Hypomagnesemia; Left ventricular diastolic dysfunction; Muscle weakness; Nausea & vomiting; MOOK (obstructive sleep apnea) (01/30/2015); Osteoarthritis; Poor historian; Pressure ulcer of left buttock, stage 2; Pressure ulcer of right buttock, stage 2; SOB (shortness of breath); Status post right hip replacement (5/10/2017); Unable to ambulate; and Unsteadiness on feet. She also has no past medical history of Adverse effect of anesthesia; Aneurysm ([address] Arrhythmia; Asthma; CAD (coronary artery disease); Cancer ([address] Chronic kidney disease; Chronic obstructive pulmonary disease ([address] Chronic pain; Coagulation disorder ([address] Diabetes ([address] Difficult intubation; GERD (gastroesophageal reflux disease); Heart failure ([address] Ill-defined condition; Liver disease; Malignant hyperthermia due to anesthesia; Morbid obesity ([address]  Pseudocholinesterase deficiency; Psychiatric disorder; PUD (peptic ulcer disease); Seizures ([address] Stroke [facility]); Thromboembolus ([address] Thyroid disease; or Unspecified adverse effect of anesthesia. Ms. Storm Quintana  has a past surgical history that includes cholecystectomy (2009); knee arthroscopy (Bilateral); and breast surgery procedure unlisted (Right, ). Social History/Living Environment:   Home Environment: Private residence  # Steps to Enter: 3  One/Two Story Residence: One story  Living Alone: Yes  Support Systems: Friends \ neighbors  Patient Expects to be Discharged to[de-identified] Rehabilitation facility  Current DME Used/Available at Home: Andriy Rave, straight, Walker, rolling  Tub or Shower Type: Shower  Prior Level of Function/Work/Activity:  Independent with ambulation with a walker. Independent with ADLs. Number of Personal Factors/Comorbidities that affect the Plan of Care: 0: LOW COMPLEXITY   EXAMINATION:   Most Recent Physical Functioning:                               Bed Mobility  Supine to Sit: Minimum assistance  Sit to Supine:  (left up in chair)     Transfers  Sit to Stand: Minimum assistance  Stand to Sit: Minimum assistance                         Distance (ft): 12 Feet (ft)  Ambulation - Level of Assistance: Minimal assistance  Assistive Device: Walker, rolling  Base of Support: Narrowed  Speed/Keli: Pace decreased (<100 feet/min)  Gait Abnormalities: Antalgic         Braces/Orthotics: none     Right Hip Cold  Type: Cold/ice packs       Body Structures Involved:  1. Bones  2. Joints  3. Muscles Body Functions Affected:  1. Neuromusculoskeletal  2. Movement Related Activities and Participation Affected:  1. Mobility   Number of elements that affect the Plan of Care: 4+: HIGH COMPLEXITY   CLINICAL PRESENTATION:   Presentation: Stable and uncomplicated: LOW COMPLEXITY   CLINICAL DECISION MAKIN [facility] [address] [id number] AM-PAC 6 Clicks   Basic Mobility Inpatient Short Form  How much difficulty does the patient currently have. .. Unable A Lot A Little None   1.   Turning over in bed (including adjusting bedclothes, sheets and blankets)? [ ] 1   [ ] 2   [X] 3   [ ] 4   2. Sitting down on and standing up from a chair with arms ( e.g., wheelchair, bedside commode, etc.)   [ ] 1   [ ] 2   [X] 3   [ ] 4   3. Moving from lying on back to sitting on the side of the bed? [ ] 1   [ ] 2   [X] 3   [ ] 4   How much help from another person does the patient currently need. .. Total A Lot A Little None   4. Moving to and from a bed to a chair (including a wheelchair)? [ ] 1   [ ] 2   [X] 3   [ ] 4   5. Need to walk in hospital room? [ ] 1   [ ] 2   [X] 3   [ ] 4   6. Climbing 3-5 steps with a railing? [ ] 1   [ ] 2   [X] 3   [ ] 4   © 2007, Trustees of [address] [place] [address], under license to [employer]. All rights reserved       Score:  Initial: 18 Most Recent: X (Date: -- )     Interpretation of Tool:  Represents activities that are increasingly more difficult (i.e. Bed mobility, Transfers, Gait). Score 24 23 22-20 19-15 14-10 9-7 6       Modifier CH CI CJ CK CL CM CN         · Mobility - Walking and Moving Around:               - CURRENT STATUS:    CK - 40%-59% impaired, limited or restricted               - GOAL STATUS:           CK - 40%-59% impaired, limited or restricted               - D/C STATUS:                       CK - 40%-59% impaired, limited or restricted  [place] / Product Type: Medicare /       Medical Necessity:     · Skilled intervention continues to be required due to decreased mobility ability. Reason for Services/Other Comments:  · Patient continues to require skilled intervention due to decreased mobility ability.    Use of outcome tool(s) and clinical judgement create a POC that gives a: Clear prediction of patient's progress: LOW COMPLEXITY                 TREATMENT:   (In addition to Assessment/Re-Assessment sessions the following treatments were rendered)      Pre-treatment Symptoms/Complaints: none  Pain: Initial:   Pain Intensity 1: 2 (same after therapy)  Post Session:        Gait Training (15 Minutes):  Gait training to improve and/or restore physical functioning as related to mobility. Ambulated 12 Feet (ft) with Minimal assistance using a Walker, rolling and minimal   related to their hip position and motion to promote proper body alignment. Therapeutic Exercise: (15 Minutes):  Exercises per grid below to improve strength. Required minimal verbal cues to promote proper body alignment. Progressed range as indicated. Date:  5/11    Date:    Date:      ACTIVITY/EXERCISE AM PM AM PM AM PM   GROUP THERAPY  [ ]  [ ]  [ ]  [ ]  [ ]  [ ]   Ankle Pumps 15              Quad Sets  15             Gluteal Sets  15             Hip ABd/ADduction  15             Straight Leg Raises               Knee Slides  15             Short Arc Quads  15             Long Arc Quads               Chair Slides                               B = bilateral; AA = active assistive; A = active; P = passive       Treatment/Session Assessment:         Response to Treatment:  Agreeable. Education:  [ ] Home Exercises  [X] Fall Precautions  [X] Hip Precautions [ ] Going Home Video  [ ] Knee/Hip Prosthesis Review  [ ] [place] Fall Management/Safety [ ] Adaptive Equipment as Needed         Interdisciplinary Collaboration:   · Physical Therapy Assistant and Registered Nurse     After treatment position/precautions:   · Up in chair, Bed/Chair-wheels locked, Bed in low position, Call light within reach and RN notified     Compliance with Program/Exercises: compliant most of the time. Recommendations/Intent for next treatment session:  Treatment next visit will focus on increasing Ms. Sparrow's independence with bed mobility, transfers, gait training, strength/ROM exercises, modalities for pain, and patient education.        Total Treatment Duration:  PT Patient Time In/Time Out  Time In: 0815  Time Out: 120 Nely Smith, PTA

## 2017-05-11 NOTE — PROGRESS NOTES
Problem: Mobility Impaired (Adult and Pediatric)  Goal: *Acute Goals and Plan of Care (Insert Text)  GOALS (1-4 days):  (1.)Ms. Melani Severino will move from supine to sit and sit to supine in bed with INDEPENDENT. (2.)Ms. Melani Severino will transfer from bed to chair and chair to bed with INDEPENDENT using the least restrictive device. (3.)Ms. Melani Severino will ambulate with INDEPENDENT for 250 feet with the least restrictive device. (4.)Ms. Melani Severino will ambulate up/down 3 steps with bilateral railing with MINIMAL ASSIST with no device. Going to rehab  (5.)Ms. Melani Severino will state/observe ANMOL precautions with 0 verbal cues. ________________________________________________________________________________________________  Outcome: Progressing Towards Goal      PHYSICAL THERAPY JOINT CAMP ANMOL: Daily Note and PM 5/11/2017  INPATIENT: Hospital Day: 2  [place] / Product Type: Medicare /      NAME/AGE/GENDER: Lewis Florentino is a 67 y.o. female    PRIMARY DIAGNOSIS:  Primary osteoarthritis of right hip [M16.11]              Procedure(s) and Anesthesia Type:     * RIGHT HIP ARTHROPLASTY TOTAL - General (Right)  ICD-10: Treatment Diagnosis:        · Pain in Right Hip (M25.551)  · Stiffness of Right Hip, Not elsewhere classified (M25.651)  · Difficulty in walking, Not elsewhere classified (R26.2)  · Other abnormalities of gait and mobility (R26.89)       ASSESSMENT:      Ms. Melani Severino presents status post right total hip replacement with decreased independence with bed mobility, transfers, gait, and therapeutic exercise. Therapy will maximize independence with functional mobility. She is very talkative. She increase her distance using RW with Min A  And no LOB. She performs exercises in the gym without increase in pain. She went to bed when therapy was over. This section established at most recent assessment   PROBLEM LIST (Impairments causing functional limitations):  1.  Decreased Strength  2. Decreased Ambulation Ability/Technique  3. Decreased Balance  4. Increased Pain  5. Decreased Activity Tolerance    INTERVENTIONS PLANNED: (Benefits and precautions of physical therapy have been discussed with the patient.)  1. Bed Mobility  2. Gait Training  3. Home Exercise Program (HEP)  4. Therapeutic Exercise/Strengthening  5. Transfer Training  6. Range of Motion: active/assisted/passive  7. Therapeutic Activities  8. Group Therapy      TREATMENT PLAN: Frequency/Duration: Follow patient BID   to address above goals. Rehabilitation Potential For Stated Goals: GOOD      RECOMMENDED REHABILITATION/EQUIPMENT: (at time of discharge pending progress): Rehab. HISTORY:   History of Present Injury/Illness (Reason for Referral):  Decreased mobility ability  Past Medical History/Comorbidities:   Ms. Maik Mccoy  has a past medical history of Abnormal posture; Anemia; Essential hypertension; H/O echocardiogram (01/05/2017); H/O: pneumonia; Hypercholesteremia; Hypomagnesemia; Left ventricular diastolic dysfunction; Muscle weakness; Nausea & vomiting; MOOK (obstructive sleep apnea) (01/30/2015); Osteoarthritis; Poor historian; Pressure ulcer of left buttock, stage 2; Pressure ulcer of right buttock, stage 2; SOB (shortness of breath); Status post right hip replacement (5/10/2017); Unable to ambulate; and Unsteadiness on feet. She also has no past medical history of Adverse effect of anesthesia; Aneurysm ([address] Arrhythmia; Asthma; CAD (coronary artery disease); Cancer ([address] Chronic kidney disease; Chronic obstructive pulmonary disease ([address] Chronic pain; Coagulation disorder ([address] Diabetes ([address] Difficult intubation; GERD (gastroesophageal reflux disease); Heart failure ([address] Ill-defined condition; Liver disease; Malignant hyperthermia due to anesthesia; Morbid obesity ([address] Pseudocholinesterase deficiency; Psychiatric disorder; PUD (peptic ulcer disease); Seizures ([address]  Stroke [facility]); Thromboembolus ([facility] [address] Thyroid disease; or Unspecified adverse effect of anesthesia. Ms. Isael Villatoro  has a past surgical history that includes cholecystectomy (2009); knee arthroscopy (Bilateral); and breast surgery procedure unlisted (Right, ). Social History/Living Environment:   Home Environment: Private residence  # Steps to Enter: 3  One/Two Story Residence: One story  Living Alone: Yes  Support Systems: Friends \ neighbors  Patient Expects to be Discharged to[de-identified] Rehabilitation facility  Current DME Used/Available at Home: [address] [facility] [place], straight, Walker, rolling  Tub or Shower Type: Shower  Prior Level of Function/Work/Activity:  Independent with ambulation with a walker. Independent with ADLs. Number of Personal Factors/Comorbidities that affect the Plan of Care: 0: LOW COMPLEXITY   EXAMINATION:   Most Recent Physical Functioning:                               Bed Mobility  Supine to Sit: Minimum assistance  Sit to Supine:  (left up in chair)     Transfers  Sit to Stand: Minimum assistance  Stand to Sit: Minimum assistance                         Distance (ft): 40 Feet (ft)  Ambulation - Level of Assistance: Minimal assistance  Assistive Device: Walker, rolling  Base of Support: Narrowed  Speed/Keli: Pace decreased (<100 feet/min)  Gait Abnormalities: Antalgic         Braces/Orthotics: none     Right Hip Cold  Type: Cold/ice packs       Body Structures Involved:  1. Bones  2. Joints  3. Muscles Body Functions Affected:  1. Neuromusculoskeletal  2. Movement Related Activities and Participation Affected:  1. Mobility   Number of elements that affect the Plan of Care: 4+: HIGH COMPLEXITY   CLINICAL PRESENTATION:   Presentation: Stable and uncomplicated: LOW COMPLEXITY   CLINICAL DECISION MAKIN [facility] [address] [id number] AM-PAC 6 Clicks   Basic Mobility Inpatient Short Form  How much difficulty does the patient currently have. .. Unable A Lot A Little None   1.   Turning over in bed (including adjusting bedclothes, sheets and blankets)? [ ] 1   [ ] 2   [X] 3   [ ] 4   2. Sitting down on and standing up from a chair with arms ( e.g., wheelchair, bedside commode, etc.)   [ ] 1   [ ] 2   [X] 3   [ ] 4   3. Moving from lying on back to sitting on the side of the bed? [ ] 1   [ ] 2   [X] 3   [ ] 4   How much help from another person does the patient currently need. .. Total A Lot A Little None   4. Moving to and from a bed to a chair (including a wheelchair)? [ ] 1   [ ] 2   [X] 3   [ ] 4   5. Need to walk in hospital room? [ ] 1   [ ] 2   [X] 3   [ ] 4   6. Climbing 3-5 steps with a railing? [ ] 1   [ ] 2   [X] 3   [ ] 4   © 2007, Trustees of [address] [place], [place] [id number], under license to [employer]. All rights reserved       Score:  Initial: 18 Most Recent: X (Date: -- )     Interpretation of Tool:  Represents activities that are increasingly more difficult (i.e. Bed mobility, Transfers, Gait). Score 24 23 22-20 19-15 14-10 9-7 6       Modifier CH CI CJ CK CL CM CN         · Mobility - Walking and Moving Around:               - CURRENT STATUS:    CK - 40%-59% impaired, limited or restricted               - GOAL STATUS:           CK - 40%-59% impaired, limited or restricted               - D/C STATUS:                       CK - 40%-59% impaired, limited or restricted  [place] / Product Type: Medicare /       Medical Necessity:     · Skilled intervention continues to be required due to decreased mobility ability. Reason for Services/Other Comments:  · Patient continues to require skilled intervention due to decreased mobility ability.    Use of outcome tool(s) and clinical judgement create a POC that gives a: Clear prediction of patient's progress: LOW COMPLEXITY                 TREATMENT:   (In addition to Assessment/Re-Assessment sessions the following treatments were rendered)      Pre-treatment Symptoms/Complaints:  none  Pain: Initial:   Pain Intensity 1: 0 (0/10 after therapy)  Post Session:        Gait Training (15 Minutes):  Gait training to improve and/or restore physical functioning as related to mobility. Ambulated 40 Feet (ft) with Minimal assistance using a Walker, rolling and minimal   related to their hip position and motion to promote proper body alignment. Therapeutic Exercise: (30 Minutes (group)):  Exercises per grid below to improve strength. Required minimal verbal cues to promote proper body alignment. Progressed range as indicated. Date:  5/11    Date:    Date:      ACTIVITY/EXERCISE AM PM AM PM AM PM   GROUP THERAPY  [ ]  [x ]  [ ]  [ ]  [ ]  [ ]   Ankle Pumps 15  15            Quad Sets  15  15           Gluteal Sets  15  15           Hip ABd/ADduction  15  15           Straight Leg Raises               Knee Slides  15  15           Short Arc Quads  15  15           Long Arc Quads    15           Chair Slides                               B = bilateral; AA = active assistive; A = active; P = passive       Treatment/Session Assessment:         Response to Treatment:  Agreeable. Education:  [ ] Home Exercises  [X] Fall Precautions  [X] Hip Precautions [ ] Going Home Video  [ ] Knee/Hip Prosthesis Review  [ ] Adriana Saldana Management/Safety [ ] Adaptive Equipment as Needed         Interdisciplinary Collaboration:   · Registered Nurse     After treatment position/precautions:   · Supine in bed, Bed/Chair-wheels locked, Bed in low position, Call light within reach and RN notified     Compliance with Program/Exercises: compliant most of the time. Recommendations/Intent for next treatment session:  Treatment next visit will focus on increasing Ms. Sparrow's independence with bed mobility, transfers, gait training, strength/ROM exercises, modalities for pain, and patient education.        Total Treatment Duration:  PT Patient Time In/Time Out  Time In: 1330  Time Out: Deisy 70 Vinicioager, PTA

## 2017-05-11 NOTE — WOUND CARE
Patient seen for reported ulcer on right buttock. Noted chapped skin but intact. Is at risk for further moisture and friction. Will continue Duo Derm for now. Updated patient and nurse. Re-consult if needed.

## 2017-05-12 LAB
ANION GAP BLD CALC-SCNC: 7 MMOL/L (ref 7–16)
BUN SERPL-MCNC: 23 MG/DL (ref 8–23)
CALCIUM SERPL-MCNC: 8.5 MG/DL (ref 8.3–10.4)
CHLORIDE SERPL-SCNC: 104 MMOL/L (ref 98–107)
CO2 SERPL-SCNC: 29 MMOL/L (ref 21–32)
CREAT SERPL-MCNC: 1.25 MG/DL (ref 0.6–1)
GLUCOSE SERPL-MCNC: 100 MG/DL (ref 65–100)
HGB BLD-MCNC: 9.4 G/DL (ref 11.7–15.4)
MAGNESIUM SERPL-MCNC: 1.9 MG/DL (ref 1.8–2.4)
MM INDURATION POC: 0 MM (ref 0–5)
POTASSIUM SERPL-SCNC: 3 MMOL/L (ref 3.5–5.1)
PPD POC: NORMAL NEGATIVE
SODIUM SERPL-SCNC: 140 MMOL/L (ref 136–145)

## 2017-05-12 PROCEDURE — 97116 GAIT TRAINING THERAPY: CPT

## 2017-05-12 PROCEDURE — 97150 GROUP THERAPEUTIC PROCEDURES: CPT

## 2017-05-12 PROCEDURE — 97535 SELF CARE MNGMENT TRAINING: CPT

## 2017-05-12 PROCEDURE — 74011250637 HC RX REV CODE- 250/637: Performed by: ORTHOPAEDIC SURGERY

## 2017-05-12 PROCEDURE — 94760 N-INVAS EAR/PLS OXIMETRY 1: CPT

## 2017-05-12 PROCEDURE — 97110 THERAPEUTIC EXERCISES: CPT

## 2017-05-12 PROCEDURE — 65270000029 HC RM PRIVATE

## 2017-05-12 PROCEDURE — 85018 HEMOGLOBIN: CPT | Performed by: PHYSICIAN ASSISTANT

## 2017-05-12 PROCEDURE — 83735 ASSAY OF MAGNESIUM: CPT | Performed by: PHYSICIAN ASSISTANT

## 2017-05-12 PROCEDURE — 74011250637 HC RX REV CODE- 250/637: Performed by: INTERNAL MEDICINE

## 2017-05-12 PROCEDURE — 80048 BASIC METABOLIC PNL TOTAL CA: CPT | Performed by: PHYSICIAN ASSISTANT

## 2017-05-12 PROCEDURE — 36415 COLL VENOUS BLD VENIPUNCTURE: CPT | Performed by: PHYSICIAN ASSISTANT

## 2017-05-12 PROCEDURE — 74011250637 HC RX REV CODE- 250/637: Performed by: PHYSICIAN ASSISTANT

## 2017-05-12 PROCEDURE — 94762 N-INVAS EAR/PLS OXIMTRY CONT: CPT

## 2017-05-12 RX ORDER — POTASSIUM CHLORIDE 20 MEQ/1
60 TABLET, EXTENDED RELEASE ORAL
Status: COMPLETED | OUTPATIENT
Start: 2017-05-12 | End: 2017-05-12

## 2017-05-12 RX ORDER — BISMUTH SUBSALICYLATE 262 MG/15ML
30 LIQUID ORAL
Status: DISCONTINUED | OUTPATIENT
Start: 2017-05-12 | End: 2017-05-13 | Stop reason: HOSPADM

## 2017-05-12 RX ADMIN — POTASSIUM CHLORIDE 60 MEQ: 20 TABLET, EXTENDED RELEASE ORAL at 12:35

## 2017-05-12 RX ADMIN — CELECOXIB 200 MG: 200 CAPSULE ORAL at 08:46

## 2017-05-12 RX ADMIN — Medication 400 MG: at 17:17

## 2017-05-12 RX ADMIN — POTASSIUM CHLORIDE 40 MEQ: 20 TABLET, EXTENDED RELEASE ORAL at 08:46

## 2017-05-12 RX ADMIN — ACETAMINOPHEN 1000 MG: 500 TABLET, FILM COATED ORAL at 17:17

## 2017-05-12 RX ADMIN — REGULAR STRENGTH 325 MG: 325 TABLET ORAL at 21:00

## 2017-05-12 RX ADMIN — ACETAMINOPHEN 1000 MG: 500 TABLET, FILM COATED ORAL at 03:55

## 2017-05-12 RX ADMIN — SENNOSIDES AND DOCUSATE SODIUM 2 TABLET: 8.6; 5 TABLET ORAL at 08:46

## 2017-05-12 RX ADMIN — FOLIC ACID 1 MG: 1 TABLET ORAL at 08:46

## 2017-05-12 RX ADMIN — REGULAR STRENGTH 325 MG: 325 TABLET ORAL at 08:46

## 2017-05-12 RX ADMIN — BISMUTH SUBSALICYLATE 30 ML: 262 LIQUID ORAL at 17:17

## 2017-05-12 RX ADMIN — ATENOLOL 50 MG: 50 TABLET ORAL at 08:46

## 2017-05-12 RX ADMIN — Medication 400 MG: at 08:46

## 2017-05-12 RX ADMIN — CELECOXIB 200 MG: 200 CAPSULE ORAL at 21:00

## 2017-05-12 RX ADMIN — ACETAMINOPHEN 1000 MG: 500 TABLET, FILM COATED ORAL at 12:35

## 2017-05-12 RX ADMIN — HYDROMORPHONE HYDROCHLORIDE 2 MG: 2 TABLET ORAL at 08:53

## 2017-05-12 RX ADMIN — HYDROMORPHONE HYDROCHLORIDE 2 MG: 2 TABLET ORAL at 03:55

## 2017-05-12 NOTE — PROGRESS NOTES
05/12/17 0854   Oxygen Therapy   O2 Sat (%) 95 %   Pulse via Oximetry 66 beats per minute   O2 Device Room air   Patient achieved    1250      Ml/sec on IS. Patient encouraged to do every hour while awake-patient agreed and demonstrated. No shortness of breath or distress noted. BS are clear b/l.

## 2017-05-12 NOTE — PROGRESS NOTES
Problem: Mobility Impaired (Adult and Pediatric)  Goal: *Acute Goals and Plan of Care (Insert Text)  GOALS (1-4 days):  (1.)Ms. Rasia Ramirez will move from supine to sit and sit to supine in bed with INDEPENDENT. (2.)Ms. Raisa Ramirez will transfer from bed to chair and chair to bed with INDEPENDENT using the least restrictive device. (3.)Ms. Raisa Ramirez will ambulate with INDEPENDENT for 250 feet with the least restrictive device. (4.)Ms. Raisa Ramirez will ambulate up/down 3 steps with bilateral railing with MINIMAL ASSIST with no device. Going to rehab  (5.)Ms. Raisa Ramirez will state/observe ANMOL precautions with 0 verbal cues. ________________________________________________________________________________________________  Outcome: Progressing Towards Goal      PHYSICAL THERAPY JOINT CAMP ANMOL: Daily Note and PM 5/12/2017  INPATIENT: Hospital Day: 3  [place] / Product Type: Medicare /      NAME/AGE/GENDER: Sarita Valiente is a 67 y.o. female    PRIMARY DIAGNOSIS:  Primary osteoarthritis of right hip [M16.11]              Procedure(s) and Anesthesia Type:     * RIGHT HIP ARTHROPLASTY TOTAL - General (Right)  ICD-10: Treatment Diagnosis:        · Pain in Right Hip (M25.551)  · Stiffness of Right Hip, Not elsewhere classified (M25.651)  · Difficulty in walking, Not elsewhere classified (R26.2)  · Other abnormalities of gait and mobility (R26.89)       ASSESSMENT:      Ms. Raisa Ramirez presents status post right total hip replacement with decreased independence with bed mobility, transfers, gait, and therapeutic exercise. Therapy will maximize independence with functional mobility. She is very talkative. She increase her distance using RW with CGA and no LOB. She performs exercises in the gym without any problems. This section established at most recent assessment   PROBLEM LIST (Impairments causing functional limitations):  1. Decreased Strength  2.  Decreased Ambulation Ability/Technique  3. Decreased Balance  4. Increased Pain  5. Decreased Activity Tolerance    INTERVENTIONS PLANNED: (Benefits and precautions of physical therapy have been discussed with the patient.)  1. Bed Mobility  2. Gait Training  3. Home Exercise Program (HEP)  4. Therapeutic Exercise/Strengthening  5. Transfer Training  6. Range of Motion: active/assisted/passive  7. Therapeutic Activities  8. Group Therapy      TREATMENT PLAN: Frequency/Duration: Follow patient BID   to address above goals. Rehabilitation Potential For Stated Goals: GOOD      RECOMMENDED REHABILITATION/EQUIPMENT: (at time of discharge pending progress): Rehab. HISTORY:   History of Present Injury/Illness (Reason for Referral):  Decreased mobility ability  Past Medical History/Comorbidities:   Ms. Yanira Brady  has a past medical history of Abnormal posture; Anemia; Essential hypertension; H/O echocardiogram (01/05/2017); H/O: pneumonia; Hypercholesteremia; Hypomagnesemia; Left ventricular diastolic dysfunction; Muscle weakness; Nausea & vomiting; MOOK (obstructive sleep apnea) (01/30/2015); Osteoarthritis; Poor historian; Pressure ulcer of left buttock, stage 2; Pressure ulcer of right buttock, stage 2; SOB (shortness of breath); Status post right hip replacement (5/10/2017); Unable to ambulate; and Unsteadiness on feet. She also has no past medical history of Adverse effect of anesthesia; Aneurysm ([address] Arrhythmia; Asthma; CAD (coronary artery disease); Cancer ([address] Chronic kidney disease; Chronic obstructive pulmonary disease ([address] Chronic pain; Coagulation disorder ([address] Diabetes ([address] Difficult intubation; GERD (gastroesophageal reflux disease); Heart failure ([address] Ill-defined condition; Liver disease; Malignant hyperthermia due to anesthesia; Morbid obesity ([address] Pseudocholinesterase deficiency; Psychiatric disorder; PUD (peptic ulcer disease); Seizures ([address] Stroke [facility]); Thromboembolus ([address]  Thyroid disease; or Unspecified adverse effect of anesthesia. Ms. Storm Quintana  has a past surgical history that includes cholecystectomy (2009); knee arthroscopy (Bilateral); and breast surgery procedure unlisted (Right, 1968). Social History/Living Environment:   Home Environment: Private residence  # Steps to Enter: 3  One/Two Story Residence: One story  Living Alone: Yes  Support Systems: Friends \ neighbors  Patient Expects to be Discharged to[de-identified] Rehabilitation facility  Current DME Used/Available at Home: Andriy Rave, straight, Walker, rolling  Tub or Shower Type: Shower  Prior Level of Function/Work/Activity:  Independent with ambulation with a walker. Independent with ADLs. Number of Personal Factors/Comorbidities that affect the Plan of Care: 0: LOW COMPLEXITY   EXAMINATION:   Most Recent Physical Functioning:                                     Transfers  Sit to Stand: Contact guard assistance  Stand to Sit: Contact guard assistance                Gait Training: Yes     Weight Bearing Status  Right Side Weight Bearing: As tolerated  Distance (ft): 50 Feet (ft)  Ambulation - Level of Assistance: Contact guard assistance  Assistive Device: Walker, rolling  Base of Support: Narrowed  Speed/Keli: Pace decreased (<100 feet/min)  Gait Abnormalities: Antalgic         Braces/Orthotics: none     Right Hip Cold  Type: Cold/ice packs       Body Structures Involved:  1. Bones  2. Joints  3. Muscles Body Functions Affected:  1. Neuromusculoskeletal  2. Movement Related Activities and Participation Affected:  1. Mobility   Number of elements that affect the Plan of Care: 4+: HIGH COMPLEXITY   CLINICAL PRESENTATION:   Presentation: Stable and uncomplicated: LOW COMPLEXITY   CLINICAL DECISION MAKING:   [employer] AM-PAC 6 Clicks   Basic Mobility Inpatient Short Form  How much difficulty does the patient currently have. .. Unable A Lot A Little None   1. Turning over in bed (including adjusting bedclothes, sheets and blankets)?    [ ] 1 [ ] 2   [X] 3   [ ] 4   2. Sitting down on and standing up from a chair with arms ( e.g., wheelchair, bedside commode, etc.)   [ ] 1   [ ] 2   [X] 3   [ ] 4   3. Moving from lying on back to sitting on the side of the bed? [ ] 1   [ ] 2   [X] 3   [ ] 4   How much help from another person does the patient currently need. .. Total A Lot A Little None   4. Moving to and from a bed to a chair (including a wheelchair)? [ ] 1   [ ] 2   [X] 3   [ ] 4   5. Need to walk in hospital room? [ ] 1   [ ] 2   [X] 3   [ ] 4   6. Climbing 3-5 steps with a railing? [ ] 1   [ ] 2   [X] 3   [ ] 4   © 2007, Trustees of [address] [place] [address], under license to [employer]. All rights reserved       Score:  Initial: 18 Most Recent: X (Date: -- )     Interpretation of Tool:  Represents activities that are increasingly more difficult (i.e. Bed mobility, Transfers, Gait). Score 24 23 22-20 19-15 14-10 9-7 6       Modifier CH CI CJ CK CL CM CN         · Mobility - Walking and Moving Around:               - CURRENT STATUS:    CK - 40%-59% impaired, limited or restricted               - GOAL STATUS:           CK - 40%-59% impaired, limited or restricted               - D/C STATUS:                       CK - 40%-59% impaired, limited or restricted  [place] / Product Type: Medicare /       Medical Necessity:     · Skilled intervention continues to be required due to decreased mobility ability. Reason for Services/Other Comments:  · Patient continues to require skilled intervention due to decreased mobility ability.    Use of outcome tool(s) and clinical judgement create a POC that gives a: Clear prediction of patient's progress: LOW COMPLEXITY                 TREATMENT:   (In addition to Assessment/Re-Assessment sessions the following treatments were rendered)      Pre-treatment Symptoms/Complaints:  none  Pain: Initial:   Pain Intensity 1: 0 (0/10 after therapy)  Post Session:        Gait Training (15 Minutes):  Gait training to improve and/or restore physical functioning as related to mobility. Ambulated 50 Feet (ft) with Contact guard assistance using a Walker, rolling and minimal   related to their hip position and motion to promote proper body alignment. Therapeutic Exercise: (30 Minutes (group)):  Exercises per grid below to improve strength. Required minimal verbal cues to promote proper body alignment. Progressed range as indicated. Date:  5/11    Date:  5/12    Date:      ACTIVITY/EXERCISE AM PM AM PM AM PM   GROUP THERAPY  [ ]  [x ]  [x ]  [ ]  [ ]  [ ]   Ankle Pumps 15  15  20          Quad Sets  15  15  20         Gluteal Sets  15  15  20         Hip ABd/ADduction  15  15  20         Straight Leg Raises               Knee Slides  15  15  20         Short Arc Quads  15  15  20         Long Arc Quads    15  20         Chair Slides                               B = bilateral; AA = active assistive; A = active; P = passive       Treatment/Session Assessment:         Response to Treatment:  She is making good progress with gait and exercises. Education:  [ ] Home Exercises  [X] Fall Precautions  [X] Hip Precautions [ ] Going Home Video  [ ] Knee/Hip Prosthesis Review  [ ] Kristan Cunha Management/Safety [ ] Adaptive Equipment as Needed         Interdisciplinary Collaboration:   · Registered Nurse     After treatment position/precautions:   · Up in chair, Bed/Chair-wheels locked, Bed in low position, Call light within reach and RN notified     Compliance with Program/Exercises: compliant most of the time. Recommendations/Intent for next treatment session:  Treatment next visit will focus on increasing Ms. Sparrow's independence with bed mobility, transfers, gait training, strength/ROM exercises, modalities for pain, and patient education.        Total Treatment Duration:  PT Patient Time In/Time Out  Time In: 1030  Time Out: Marky Smith PTA

## 2017-05-12 NOTE — PROGRESS NOTES
Problem: Self Care Deficits Care Plan (Adult)  Goal: *Acute Goals and Plan of Care (Insert Text)  GOALS:   DISCHARGE GOALS (in preparation for going home/rehab): 3 days  1. Ms. Jacque Linares will perform one lower body dressing activity with minimal assistance with adaptive equipment to demonstrate improved functional mobility and safety. GOAL MET 5/12/2017    2. Ms. Jacque Linares will perform one lower body bathing activity with minimal assistance with adaptive equipment to demonstrate improved functional mobility and safety. GOAL MET 5/12/2017    3. Ms. Jacque Linares will perform toileting/toilet transfer with contact guard assistance with adaptive equipment to demonstrate improved functional mobility and safety. Progressing  4. Ms. Jacque Linares will perform shower transfer with contact guard assistance with adaptive equipment to demonstrate improved functional mobility and safety. Progressing  5. Ms. Jacque Linares will state ANMOL precautions with two verbal cues to demonstrate improved functional mobility and safety. Progressing         JOINT CAMP OCCUPATIONAL THERAPY ANMOL: Daily Note and AM 5/12/2017  INPATIENT: Hospital Day: 3  [place] / Product Type: Medicare /      NAME/AGE/GENDER: Maggie Mayer is a 67 y.o. female    PRIMARY DIAGNOSIS:  Primary osteoarthritis of right hip [M16.11]              Procedure(s) and Anesthesia Type:     * RIGHT HIP ARTHROPLASTY TOTAL - General (Right)  ICD-10: Treatment Diagnosis:        · Pain in Right Hip (M25.551)  · Stiffness of Right Hip, Not elsewhere classified (M25.651)  · Other lack of cordination (R27.8)       ASSESSMENT:      Ms. Jacque Linares  is s/p Right ANMOL and presents with decreased weight bearing on Right LE and decreased independence with functional mobility and activities of daily living.   Patient completed shower and dressing as charter below in ADL grid and is ambulating with rolling walker and min/CGA assist.  Patient has met 2/5 goals and plans to go to a REhab facility for continued therapy services. OT reviewed hip precautions throughout session and issued long handled sponge for home use. Patient instructed to call for assistance when needing to get up from recliner and all needs in reach. Patient verbalized understanding of call light. This section established at most recent assessment   PROBLEM LIST (Impairments causing functional limitations):  1. Decreased Strength  2. Decreased ADL/Functional Activities  3. Decreased Transfer Abilities  4. Increased Pain  5. Increased Fatigue  6. Decreased Flexibility/Joint Mobility  7. Decreased Knowledge of Precautions    INTERVENTIONS PLANNED: (Benefits and precautions of occupational therapy have been discussed with the patient.)  1. Activities of daily living training  2. Adaptive equipment training  3. Balance training  4. Clothing management  5. Donning&doffing training  6. Theraputic activity      TREATMENT PLAN: Frequency/Duration: Follow patient 1 time to address above goals. Rehabilitation Potential For Stated Goals: GOOD      RECOMMENDED REHABILITATION/EQUIPMENT: (at time of discharge pending progress): Continue Skilled Therapy and Rehab. OCCUPATIONAL PROFILE AND HISTORY:   History of Present Injury/Illness (Reason for Referral): Pt presents this date s/p (right) ANMOL. Past Medical History/Comorbidities:   Ms. Melani Severino  has a past medical history of Abnormal posture; Anemia; Essential hypertension; H/O echocardiogram (01/05/2017); H/O: pneumonia; Hypercholesteremia; Hypomagnesemia; Left ventricular diastolic dysfunction; Muscle weakness; Nausea & vomiting; MOOK (obstructive sleep apnea) (01/30/2015); Osteoarthritis; Poor historian; Pressure ulcer of left buttock, stage 2; Pressure ulcer of right buttock, stage 2; SOB (shortness of breath); Status post right hip replacement (5/10/2017); Unable to ambulate; and Unsteadiness on feet.  She also has no past medical history of Adverse effect of anesthesia; Aneurysm ([facility] Arrhythmia; Asthma; CAD (coronary artery disease); Cancer ([facility] Chronic kidney disease; Chronic obstructive pulmonary disease ([facility] Chronic pain; Coagulation disorder ([facility] Diabetes ([facility] Difficult intubation; GERD (gastroesophageal reflux disease); Heart failure ([facility] Ill-defined condition; Liver disease; Malignant hyperthermia due to anesthesia; Morbid obesity ([facility] Pseudocholinesterase deficiency; Psychiatric disorder; PUD (peptic ulcer disease); Seizures ([facility] Stroke [facility]); Thromboembolus ([facility] Thyroid disease; or Unspecified adverse effect of anesthesia. Ms. Gladis Bradley  has a past surgical history that includes cholecystectomy (2009); knee arthroscopy (Bilateral); and breast surgery procedure unlisted (Right, 1968). Social History/Living Environment:   Home Environment: Private residence  # Steps to Enter: 3  One/Two Story Residence: One story  Living Alone: Yes  Support Systems: Friends \ neighbors  Patient Expects to be Discharged to[de-identified] Rehabilitation facility  Current DME Used/Available at Home: Cane, straight, Walker, rolling  Tub or Shower Type: Shower  Prior Level of Function/Work/Activity:  Min assist with ADLS      Number of Personal Factors/Comorbidities that affect the Plan of Care: Brief history (0):  LOW COMPLEXITY   ASSESSMENT OF OCCUPATIONAL PERFORMANCE[de-identified]   Most Recent Physical Functioning:            Patient Vitals for the past 6 hrs:   BP BP Patient Position SpO2 Pulse   05/12/17 0743 162/76 At rest 95 % 68   05/12/17 0854 - - 95 % -                                   Mental Status  Neurologic State: Alert; Appropriate for age  Orientation Level: Appropriate for age  Cognition: Appropriate decision making; Appropriate for age attention/concentration; Appropriate safety awareness; Follows commands  Perception: Appears intact  Perseveration: No perseveration noted  Safety/Judgement: Awareness of environment; Fall prevention                    Basic ADLs (From Assessment) Complex ADLs (From Assessment)   Basic ADL  Feeding: Setup  Oral Facial Hygiene/Grooming: Supervision  Bathing: Moderate assistance  Upper Body Dressing: Minimum assistance  Lower Body Dressing: Maximum assistance  Toileting: Total assistance (catheter)     Grooming/Bathing/Dressing Activities of Daily Living   Grooming  Grooming Assistance: Supervision/set up  Washing Face: Supervision/set-up  Washing Hands: Supervision/set-up Cognitive Retraining  Safety/Judgement: Awareness of environment; Fall prevention   Upper Body Bathing  Bathing Assistance: Supervision/set-up  Position Performed: Seated in chair     Lower Body Bathing  Bathing Assistance: Minimum assistance  Perineal  : Contact guard assistance  Position Performed: Standing  Lower Body : Minimum assistance  Position Performed: Seated in chair  Adaptive Equipment: Grab bar;Long handled sponge; Tub bench     Upper Body Dressing Assistance  Dressing Assistance: Supervision/set-up  Pullover Shirt: Supervision/set-up Functional Transfers  Toilet Transfer : Contact guard assistance;Minimum assistance  Shower Transfer: Contact guard assistance;Minimum assistance   Lower Body Dressing Assistance  Dressing Assistance: Minimum assistance  Underpants: Minimum assistance  Pants With Elastic Waist: Minimum assistance  Socks: Moderate assistance  Antiembolitic Stockings: Maximum assistance             Physical Skills Involved:  1. Balance  2. Mobility  3. Endurance Cognitive Skills Affected (resulting in the inability to perform in a timely and safe manner):  1. Problem Solving Psychosocial Skills Affected:  1. Environmental Adaptations   Number of elements that affect the Plan of Care: 3-5:  MODERATE COMPLEXITY   CLINICAL DECISION MAKIN [facility] [address] [address] AM-PAC 6 Clicks   Basic Mobility Inpatient Short Form  How much help from another person does the patient currently need. .. Total A Lot A Little None   1.   Putting on and taking off regular lower body clothing?   [ ] 1   [X] 2   [ ] 3   [ ] 4   2. Bathing (including washing, rinsing, drying)? [ ] 1   [X] 2   [ ] 3   [ ] 4   3. Toileting, which includes using toilet, bedpan or urinal?   [X] 1   [ ] 2   [ ] 3   [ ] 4   4. Putting on and taking off regular upper body clothing?   [ ] 1   [ ] 2   [X] 3   [ ] 4   5. Taking care of personal grooming such as brushing teeth? [ ] 1   [ ] 2   [X] 3   [ ] 4   6. Eating meals? [ ] 1   [ ] 2   [ ] 3   [X] 4   © 2007, Trustees of [address] [place] [address] [address] [address], under license to [employer]. All rights reserved   Score:  Initial:15 Most Recent: X (Date: -- )     Interpretation of Tool:  Represents activities that are increasingly more difficult (i.e. Bed mobility, Transfers, Gait). Score 24 23 22-20 19-15 14-10 9-7 6       Modifier CH CI CJ CK CL CM CN         · Self Care:               - CURRENT STATUS:    CK - 40%-59% impaired, limited or restricted               - GOAL STATUS:           CJ - 20%-39% impaired, limited or restricted               - D/C STATUS:                       ---------------To be determined---------------  [place] / Product Type: Medicare /       Medical Necessity:     · Patient is expected to demonstrate progress in balance, coordination and functional technique to decrease assistance required with self care and functional mobility and improve safety during self care and functional mobility. Reason for Services/Other Comments:  · Patient continues to require skilled intervention due to decreased self care and functional mobility.    Use of outcome tool(s) and clinical judgement create a POC that gives a: LOW COMPLEXITY                 TREATMENT:   (In addition to Assessment/Re-Assessment sessions the following treatments were rendered)      Pre-treatment Symptoms/Complaints:  none  Pain: Initial:   Pain Intensity 1: 0  Post Session:  0/10      Self Care: (230): Procedure(s) (per grid) utilized to improve and/or restore self-care/home management as related to dressing, bathing, toileting and grooming. Required minimal visual, verbal and tactile cueing to facilitate activities of daily living skills, compensatory activities and hip precautions and application during ADLS. Treatment/Session Assessment:         Response to Treatment:  Tolerated well, plans to go to rehab     Education:  [ ] Home Exercises  [X] Fall Precautions  [X] Hip Precautions [ ] Going Home Video  [ ] Knee/Hip Prosthesis Review  [X] Walker Management/Safety [X] Adaptive Equipment as Needed         Interdisciplinary Collaboration:   · Occupational Therapist and Registered Nurse     After treatment position/precautions:   · Supine in bed  · Bed/Chair-wheels locked  · Bed in low position  · Call light within reach  · RN notified  · Side rails x 3     Compliance with Program/Exercises: compliant all of the time. Recommendations/Intent for next treatment session:  Treatment next visit will focus on increasing Ms. Sparrow's independence with  self care and functional mobility and patient education.        Total Treatment Duration:30  OT Patient Time In/Time Out  Time In: 6203  Time Out: 0900     Nathaniel Ansari OT

## 2017-05-12 NOTE — PROGRESS NOTES
Problem: Mobility Impaired (Adult and Pediatric)  Goal: *Acute Goals and Plan of Care (Insert Text)  GOALS (1-4 days):  (1.)Ms. Lila Charlton will move from supine to sit and sit to supine in bed with INDEPENDENT. (2.)Ms. Lila Charlton will transfer from bed to chair and chair to bed with INDEPENDENT using the least restrictive device. (3.)Ms. Lila Charlton will ambulate with INDEPENDENT for 250 feet with the least restrictive device. (4.)Ms. Lila Charlton will ambulate up/down 3 steps with bilateral railing with MINIMAL ASSIST with no device. Going to rehab  (5.)Ms. Lila Charlton will state/observe ANMOL precautions with 0 verbal cues. ________________________________________________________________________________________________  Outcome: Progressing Towards Goal      PHYSICAL THERAPY JOINT CAMP ANMOL: Daily Note and PM 5/12/2017  INPATIENT: Hospital Day: 3  [place] / Product Type: Medicare /      NAME/AGE/GENDER: Teja Schultz is a 67 y.o. female    PRIMARY DIAGNOSIS:  Primary osteoarthritis of right hip [M16.11]              Procedure(s) and Anesthesia Type:     * RIGHT HIP ARTHROPLASTY TOTAL - General (Right)  ICD-10: Treatment Diagnosis:        · Pain in Right Hip (M25.551)  · Stiffness of Right Hip, Not elsewhere classified (M25.651)  · Difficulty in walking, Not elsewhere classified (R26.2)  · Other abnormalities of gait and mobility (R26.89)       ASSESSMENT:      Ms. Lila Charlton sitting up in chair on contact and complaining of her stomach hurting and not feeling well. Pt agreed to work on her ANMOL exercises in the chair but she held her stomach the whole time and kept her eyes closed most of the time. Let RN know to see if there was anything she could do to help pt. Hope to further progress tomorrow when pt feeling better. This section established at most recent assessment   PROBLEM LIST (Impairments causing functional limitations):  1. Decreased Strength  2.  Decreased Ambulation Ability/Technique  3. Decreased Balance  4. Increased Pain  5. Decreased Activity Tolerance    INTERVENTIONS PLANNED: (Benefits and precautions of physical therapy have been discussed with the patient.)  1. Bed Mobility  2. Gait Training  3. Home Exercise Program (HEP)  4. Therapeutic Exercise/Strengthening  5. Transfer Training  6. Range of Motion: active/assisted/passive  7. Therapeutic Activities  8. Group Therapy      TREATMENT PLAN: Frequency/Duration: Follow patient BID   to address above goals. Rehabilitation Potential For Stated Goals: GOOD      RECOMMENDED REHABILITATION/EQUIPMENT: (at time of discharge pending progress): Rehab. HISTORY:   History of Present Injury/Illness (Reason for Referral):  Decreased mobility ability  Past Medical History/Comorbidities:   Ms. Hadley Maria  has a past medical history of Abnormal posture; Anemia; Essential hypertension; H/O echocardiogram (01/05/2017); H/O: pneumonia; Hypercholesteremia; Hypomagnesemia; Left ventricular diastolic dysfunction; Muscle weakness; Nausea & vomiting; MOOK (obstructive sleep apnea) (01/30/2015); Osteoarthritis; Poor historian; Pressure ulcer of left buttock, stage 2; Pressure ulcer of right buttock, stage 2; SOB (shortness of breath); Status post right hip replacement (5/10/2017); Unable to ambulate; and Unsteadiness on feet. She also has no past medical history of Adverse effect of anesthesia; Aneurysm ([address] Arrhythmia; Asthma; CAD (coronary artery disease); Cancer ([address] Chronic kidney disease; Chronic obstructive pulmonary disease ([address] Chronic pain; Coagulation disorder ([address] Diabetes ([address] Difficult intubation; GERD (gastroesophageal reflux disease); Heart failure ([address] Ill-defined condition; Liver disease; Malignant hyperthermia due to anesthesia; Morbid obesity ([address] Pseudocholinesterase deficiency; Psychiatric disorder; PUD (peptic ulcer disease); Seizures ([address] Stroke [facility]); Thromboembolus ([address]  Thyroid disease; or Unspecified adverse effect of anesthesia. Ms. Ritu Caceres  has a past surgical history that includes cholecystectomy (2009); knee arthroscopy (Bilateral); and breast surgery procedure unlisted (Right, ). Social History/Living Environment:   Home Environment: Private residence  # Steps to Enter: 3  One/Two Story Residence: One story  Living Alone: Yes  Support Systems: Friends \ neighbors  Patient Expects to be Discharged to[de-identified] Rehabilitation facility  Current DME Used/Available at Home: [employer], straight, Walker, rolling  Tub or Shower Type: Shower  Prior Level of Function/Work/Activity:  Independent with ambulation with a walker. Independent with ADLs. Number of Personal Factors/Comorbidities that affect the Plan of Care: 0: LOW COMPLEXITY   EXAMINATION:   Most Recent Physical Functioning:                                     Transfers  Sit to Stand: Contact guard assistance  Stand to Sit: Contact guard assistance                Gait Training: No     Weight Bearing Status  Right Side Weight Bearing: As tolerated  Distance (ft): 0 Feet (ft)  Ambulation - Level of Assistance: Contact guard assistance  Assistive Device: Walker, rolling  Base of Support: Narrowed  Speed/Keli: Pace decreased (<100 feet/min)  Gait Abnormalities: Antalgic         Braces/Orthotics: none     Right Hip Cold  Type: Cold/ice packs       Body Structures Involved:  1. Bones  2. Joints  3. Muscles Body Functions Affected:  1. Neuromusculoskeletal  2. Movement Related Activities and Participation Affected:  1. Mobility   Number of elements that affect the Plan of Care: 4+: HIGH COMPLEXITY   CLINICAL PRESENTATION:   Presentation: Stable and uncomplicated: LOW COMPLEXITY   CLINICAL DECISION MAKIN [facility] [address] [id number] AM-PAC 6 Clicks   Basic Mobility Inpatient Short Form  How much difficulty does the patient currently have. .. Unable A Lot A Little None   1. Turning over in bed (including adjusting bedclothes, sheets and blankets)?    [ ] 1 [ ] 2   [X] 3   [ ] 4   2. Sitting down on and standing up from a chair with arms ( e.g., wheelchair, bedside commode, etc.)   [ ] 1   [ ] 2   [X] 3   [ ] 4   3. Moving from lying on back to sitting on the side of the bed? [ ] 1   [ ] 2   [X] 3   [ ] 4   How much help from another person does the patient currently need. .. Total A Lot A Little None   4. Moving to and from a bed to a chair (including a wheelchair)? [ ] 1   [ ] 2   [X] 3   [ ] 4   5. Need to walk in hospital room? [ ] 1   [ ] 2   [X] 3   [ ] 4   6. Climbing 3-5 steps with a railing? [ ] 1   [ ] 2   [X] 3   [ ] 4   © 2007, Trustees of [address] [place] [address], under license to [employer]. All rights reserved       Score:  Initial: 18 Most Recent: X (Date: -- )     Interpretation of Tool:  Represents activities that are increasingly more difficult (i.e. Bed mobility, Transfers, Gait). Score 24 23 22-20 19-15 14-10 9-7 6       Modifier CH CI CJ CK CL CM CN         · Mobility - Walking and Moving Around:               - CURRENT STATUS:    CK - 40%-59% impaired, limited or restricted               - GOAL STATUS:           CK - 40%-59% impaired, limited or restricted               - D/C STATUS:                       CK - 40%-59% impaired, limited or restricted  [place] / Product Type: Medicare /       Medical Necessity:     · Skilled intervention continues to be required due to decreased mobility ability. Reason for Services/Other Comments:  · Patient continues to require skilled intervention due to decreased mobility ability.    Use of outcome tool(s) and clinical judgement create a POC that gives a: Clear prediction of patient's progress: LOW COMPLEXITY                 TREATMENT:   (In addition to Assessment/Re-Assessment sessions the following treatments were rendered)      Pre-treatment Symptoms/Complaints:  Complaining of stomach pain  Pain: Initial: no hip pain but stomach Post Session:  Still complaining of stomach pain, RN notified      Therapeutic Exercise: (15 Minutes):  Exercises per grid below to improve strength. Required minimal verbal cues to promote proper body alignment. Progressed range as indicated. Date:      Date:      Date:      ACTIVITY/EXERCISE AM PM AM PM AM PM   GROUP THERAPY  [ ]  [x ]  [x ]  [ ]  [ ]  [ ]   Ankle Pumps 15  15  20   20       Quad Sets  15  15  20  20       Gluteal Sets  15  15  20  20       Hip ABd/ADduction  15  15  20  20       Straight Leg Raises               Knee Slides  15  15  20  20       Short Arc Quads  15  15  20  20       Long Arc Quads    15  20         Chair Slides                               B = bilateral; AA = active assistive; A = active; P = passive       Treatment/Session Assessment:         Response to Treatment:  Limited participation this afternoon due to her stomach     Education:  [ ] Home Exercises  [X] Fall Precautions  [X] Hip Precautions [ ] Going Home Video  [ ] Knee/Hip Prosthesis Review  [ ] Roney Apgar Management/Safety [ ] Adaptive Equipment as Needed         Interdisciplinary Collaboration:   · Registered Nurse     After treatment position/precautions:   · Up in chair, Bed/Chair-wheels locked, Call light within reach and RN notified     Compliance with Program/Exercises: compliant most of the time. Recommendations/Intent for next treatment session:  Treatment next visit will focus on increasing Ms. Sparrow's independence with bed mobility, transfers, gait training, strength/ROM exercises, modalities for pain, and patient education.        Total Treatment Duration:  PT Patient Time In/Time Out  Time In: 1320  Time Out: [address], PT

## 2017-05-12 NOTE — PROGRESS NOTES
Patient is A&Ox4. Able to verbalize needs. Resting quietly with no distress noted. Shift assessment completed. Dressing to surgical site is dry and intact. Neurovascular and peripheral vascular checks WNL. Voiding clear yellow urine. Ambulates with walker x1 assist. PIV capped and flushed without difficulty. Dressing to PIV intact with no S/S of infection. Iceman applied to knee. Denies needs. Bed low and locked. Call light within reach. Instructed to call for assistance. Patient verbalizes understanding. Will continue to monitor.

## 2017-05-12 NOTE — PROGRESS NOTES
May 12, 2017         Post Op day: 2 Days Post-Op Procedure(s) (LRB):  RIGHT HIP ARTHROPLASTY TOTAL (Right)      Admit Date: 5/10/2017  Admit Diagnosis: Primary osteoarthritis of right hip [M16.11]       Principle Problem: Status post right hip replacement. Subjective: Doing well, No complaints, No SOB, No Chest Pain, No Nausea or Vomiting     Objective:   Vital Signs are Stable, No Acute Distress, Alert and Oriented, Dressing is Dry,  Neurovascular exam is normal.     Assessment / Plan :  Patient Active Problem List   Diagnosis Code    Preop respiratory exam Z01.811    Hypertension I10    Hyperlipidemia E78.5    Rhe[address]oid arthritis [address] [address] M06.9    MOOK (obstructive sleep apnea) G47.33    Elevated serum creatinine R79.89    Status post right hip replacement Z96.641    Patient Vitals for the past 8 hrs:   BP Temp Pulse Resp SpO2   17 0339 154/74 97 °F (36.1 °C) 72 16 92 %    Temp (24hrs), Av.8 °F (36 °C), Min:96.2 °F (35.7 °C), Max:97.7 °F (36.5 °C)    Body mass index is 31.58 kg/(m^2).     Lab Results   Component Value Date/Time    HGB 9.4 2017 04:37 AM      Pt seen by and discussed with [address] per hospitalist   Awaiting rehab placement tomorrow      Signed By: ALMA Gonzalez  2017,  7:41 AM

## 2017-05-12 NOTE — PROGRESS NOTES
Medicated for pain with dilaudid po. Sitting up in bed. Aquacel dry and intact to R hip. Duoderm to L buttock rolled up, removed. Pt to get up to shower with OT soon. Good movement and sensation to feet.

## 2017-05-12 NOTE — PROGRESS NOTES
Hospitalist Progress Note    2017  Admit Date: 5/10/2017 12:13 AM   NAME: Jai Bowman   :  1940   MRN:  [id number]   Attending: Darby Pink., MD  PCP:  Abdi Pena MD    SUBJECTIVE:     Pt is a 68 yo female s/p right ANMOL 05/10 per Jaret Valencia. Hospitalist have been consulted to assist with hypokalemia and hypomag. Pt reports these are both chronic issues and that she takes KCL and Mag daily but unsure of her dosages. She stopped taking both 5 days prior to surgery. Of note she also takes HCTZ and Aldactone for HTN. This morning pt is resting in bed. She states feeling well, good appetite. She is hopeful for d/c to rehab tomorrow. Review of Systems negative with exception of pertinent positives noted above  PHYSICAL EXAM     Visit Vitals    /76 (BP 1 Location: Left arm, BP Patient Position: At rest)    Pulse 68    Temp 96.6 °F (35.9 °C)    Resp 16    Ht 5' 9\" (1.753 m)    Wt 97 kg (213 lb 13.5 oz)    SpO2 95%    Breastfeeding No    BMI 31.58 kg/m2      Temp (24hrs), Av.8 °F (36 °C), Min:96.2 °F (35.7 °C), Max:97.7 °F (36.5 °C)    Oxygen Therapy  O2 Sat (%): 95 % (17 0854)  Pulse via Oximetry: 66 beats per minute (17 0854)  O2 Device: Room air (17 0854)  O2 Flow Rate (L/min): 2 l/min (dec to 1lpm) (05/10/17 2115)  FIO2 (%): 28 % (05/10/17 1300)    Intake/Output Summary (Last 24 hours) at 17 0944  Last data filed at 17 2212   Gross per 24 hour   Intake              720 ml   Output                0 ml   Net              720 ml      General: No acute distress    Lungs:  CTA Bilaterally.    Heart:  Regular rate and rhythm,  No murmur, rub, or gallop  Abdomen: Soft, Non distended, Non tender, Positive bowel sounds  Extremities: No cyanosis, clubbing or edema  Neurologic:  No focal deficits    ASSESSMENT      Active Hospital Problems    Diagnosis Date Noted    Status post right hip replacement 05/10/2017     A/P:    Osteoarthritis- s/p right ANMLO. Cont PT/OT, pain management, DVT prophylaxis per ortho    Hypokalemia, hypomag- Pt has been taking both orally at home but unsure of dosages. She stopped taking both 5 days prior to surgery. Replace K today. Recommend to cont 40 meq KCL daily and 400 mg Magox BID. Recommend repeat BMP in 2-3 days. HCTZ remains on hold but okay to resume Aldactone. Hospitalist will sign off. Recommend to d/c on 40 meq KCL daily and 400 mg Magox BID. BMP every Mon/Thurs until labs normalized while in STR.       Signed By: Talia Ferro NP     May 12, 2017

## 2017-05-13 VITALS
HEART RATE: 76 BPM | RESPIRATION RATE: 17 BRPM | WEIGHT: 213.85 LBS | HEIGHT: 69 IN | OXYGEN SATURATION: 98 % | TEMPERATURE: 97.5 F | SYSTOLIC BLOOD PRESSURE: 148 MMHG | BODY MASS INDEX: 31.67 KG/M2 | DIASTOLIC BLOOD PRESSURE: 84 MMHG

## 2017-05-13 LAB
HGB BLD-MCNC: 9.5 G/DL (ref 11.7–15.4)
MM INDURATION POC: 0 MM (ref 0–5)
PPD POC: NORMAL NEGATIVE

## 2017-05-13 PROCEDURE — 36415 COLL VENOUS BLD VENIPUNCTURE: CPT | Performed by: PHYSICIAN ASSISTANT

## 2017-05-13 PROCEDURE — 74011250637 HC RX REV CODE- 250/637: Performed by: INTERNAL MEDICINE

## 2017-05-13 PROCEDURE — 97116 GAIT TRAINING THERAPY: CPT

## 2017-05-13 PROCEDURE — 85018 HEMOGLOBIN: CPT | Performed by: PHYSICIAN ASSISTANT

## 2017-05-13 PROCEDURE — 97150 GROUP THERAPEUTIC PROCEDURES: CPT

## 2017-05-13 PROCEDURE — 74011250637 HC RX REV CODE- 250/637: Performed by: PHYSICIAN ASSISTANT

## 2017-05-13 RX ADMIN — Medication 400 MG: at 07:50

## 2017-05-13 RX ADMIN — POTASSIUM CHLORIDE 40 MEQ: 20 TABLET, EXTENDED RELEASE ORAL at 07:48

## 2017-05-13 RX ADMIN — ACETAMINOPHEN 1000 MG: 500 TABLET, FILM COATED ORAL at 05:56

## 2017-05-13 RX ADMIN — REGULAR STRENGTH 325 MG: 325 TABLET ORAL at 07:50

## 2017-05-13 RX ADMIN — ATENOLOL 50 MG: 50 TABLET ORAL at 07:50

## 2017-05-13 RX ADMIN — HYDROMORPHONE HYDROCHLORIDE 2 MG: 2 TABLET ORAL at 07:48

## 2017-05-13 RX ADMIN — FOLIC ACID 1 MG: 1 TABLET ORAL at 07:49

## 2017-05-13 NOTE — PROGRESS NOTES
Awake and alert,,assessment comp,eted,,,dorsiflexing both feet well,,Aqua becky dressing on right hip intact,clean and dry, given all p.o.medications plus one tablet Dilaudid (2mgs. ),prn for pain level=3/10. Ophelia Cohening very conversant,,no distress noted so far,,IV access on right hand was taken out. .. Ophelia Cohening Ophelia Cohening Ophelia Leyva

## 2017-05-13 NOTE — ROUTINE PROCESS
Phone report given to [facility] (name) on Mrs. Sienna Zuniga who is being transferred to the [facility] for routine progression of care. .... Report consisted of Situation,Background,Assessment,and Recommendation (SBAR). .... The following report also provided. ...... OR summary,,,,Intake and Output,,,,MAR;s,,,,,Lab. result. ... Opportunity for questions and for clarifications also provided to receiving nurse. .. Lea Line Lea Line Lea Line

## 2017-05-13 NOTE — PROGRESS NOTES
05/12/17 2128   Oxygen Therapy   O2 Sat (%) 96 %   Pulse via Oximetry 68 beats per minute   O2 Device Room air   O2 Flow Rate (L/min) 0 l/min   FIO2 (%) 21 %    Continuous sat monitor ordered QHS. Pt set up on Monitor #10. Alarms set per protocol. Pt on room air. NAD.

## 2017-05-13 NOTE — DISCHARGE SUMMARY
Sandy Sainz MD  Medical Director  [address] [place] [address], [address] [place] [address]  Tel: [phone]       REHABILITATION DISCHARGE SUMMARY     Patient: Manuel Canales MRN: [id number]  SSN: [id number]    YOB: 1940  Age: 67 y.o. Sex: female      Date: 5/13/2017  Admit Date: 5/10/2017  Discharge Date: 5/13/2017    Admitting Physician: Uriel Varela MD   Primary Care Physician: Evangelina Melendez MD     Admission Condition: good    Chief Complaint : Gait dysfunction secondary to below. Admit Diagnosis: Primary osteoarthritis of right hip [M16.11]  right total hip arthroplasty 5/10/2017  Pain  DVT risk  Post op hemorrhagic anemia  Essential hypertension  Osteoarthritis  Hypokalemia  hypomagnesemia  Acute Rehab Dx:  Gait impairment  Debility   Mobility and ambulation deficits  Self Care/ADL deficits    HPI: Manuel Canales is a 67 y.o. female patient at [address] [place] [address] who was admitted on 5/10/2017 by Uriel Varela MD with below mentioned medical history, is being seen for Physical Medicine and Rehabilitation. Manuel Canales had painful right hip aggravated by walking due to severe DJD. Her symptoms were no longer controlled with conservative management. She underwent a right total hip arthroplasty per Dr. Uriel Varela MD on 5/10/2017. The ppost operative course has been noted by mild hypokalemia, and hypomagnesemia. Patient resumed on K and mg supplements and diuretics temporarily had been on hold. Patient's allowed WBAT RLE. Hip precautions to be followed strictly for RLE. Jovana has tolerated post op PT well so far. She has no unusual barriers. She will be limited by post op right hip pain, decreased ROM and decreased strength. Manuel Canales is seen and examined today. Medical Records reviewed. Patient states she was at [facility] since 3/14/2017. She states her knees are painful and arthritic.   She has been independent with ambulation at her baseline, limited by hip pain.       Rehabiitation Course:   Functional  Level On Admission: Walk: Moderate [place]  Functional Level At Discharge: Walk: Contact Guard Assist  Home Architecture: Home Situation  Home Environment: Private residence (05/10/17 1405)  # Steps to Enter: 3 (05/10/17 1405)  One/Two Story Residence: One story (05/10/17 1405)  Living Alone: Yes (05/10/17 1405)  Support Systems: Friends \ neighbors (05/10/17 1405)  Patient Expects to be Discharged to[de-identified] Rehabilitation facility (05/10/17 1405)  Current DME Used/Available at Home: [address], [address], straight;Walker, rolling (05/10/17 1405)  Tub or Shower Type: Shower (05/10/17 1405)     Past Medical History:   Diagnosis Date    Abnormal posture     Anemia     history     Essential hypertension     H/O echocardiogram 01/05/2017    EF 55-65%    H/O: pneumonia     Hypercholesteremia     managed with medication     Hypomagnesemia     managed with supplement     Left ventricular diastolic dysfunction     per echo \"Grade 1 (mild)\"    Muscle weakness     generalized    Nausea & vomiting     nausea only     MOOK (obstructive sleep apnea) 01/30/2015    patient denies     Osteoarthritis     Poor historian     Pressure ulcer of left buttock, stage 2     resolved per patient-previously seen by Dr. Wily Davis at wound center      Pressure ulcer of right buttock, stage 2     resolved per patient-previously seen by Dr. Wily Davis at wound center     SOB (shortness of breath)     managed with PRN albuterol nebulizer    Status post right hip replacement 5/10/2017    Unable to ambulate     patient in wheelchair-can stand with assistance     Unsteadiness on feet       Past Surgical History:   Procedure Laterality Date    BREAST SURGERY PROCEDURE UNLISTED Right 1968    cyst removed    HX CHOLECYSTECTOMY  2009    HX KNEE ARTHROSCOPY Bilateral     X2 to right; X1 to left      Family History   Problem Relation Age of Onset    Heart Disease Mother     Hypertension Mother     Hypertension Father       Social History   Substance Use Topics    Smoking status: Never Smoker    Smokeless tobacco: Never Used    Alcohol use No       Allergies   Allergen Reactions    Avelox [Moxifloxacin] Rash    Other Plant, Animal, Environmental Nausea Only     Allergic to Perfume & Smoke. Prior to Admission medications    Medication Sig Start Date End Date Taking? Authorizing Provider   potassium chloride SR (KLOR-CON 10) 10 mEq tablet Take  by mouth. Pt. unsure of dosage- PCP started pt on this due to leg cramping   Yes Historical Provider   atenolol (TENORMIN) 50 mg tablet Take 50 mg by mouth daily. Take DOS per anesthesia protocol. Yes Historical Provider   chlorthalidone (HYGROTEN) 25 mg tablet Take  by mouth daily. 1/2 tablet by mouth daily   Yes Historical Provider   folic acid (FOLVITE) 1 mg tablet Take 1 mg by mouth daily. Yes Historical Provider   spironolactone (ALDACTONE) 25 mg tablet Take  by mouth daily. 1/2 tablet once a day   Yes Historical Provider   magnesium oxide (MAGOX) 400 mg tablet Take 400 mg by mouth two (2) times a day. Yes Historical Provider   nystatin (MYCOSTATIN) powder Apply  to affected area three (3) times daily. Yes Historical Provider   acetaminophen (TYLENOL) 650 mg CR tablet Take 650 mg by mouth every six (6) hours as needed for Pain. Take DOS if needed per anesthesia protocol. Yes Historical Provider   albuterol (PROVENTIL VENTOLIN) 2.5 mg /3 mL (0.083 %) nebulizer solution by Nebulization route every four (4) hours as needed for Wheezing. Use DOS per anesthesia protocol. Yes Historical Provider   oxyCODONE IR (ROXICODONE) 5 mg immediate release tablet Take 5 mg by mouth every four (4) hours as needed for Pain. Take DOS if needed per anesthesia protocol. Yes Historical Provider   atorvastatin (LIPITOR) 10 mg tablet Take 10 mg by mouth nightly.  Indications: hypercholesterolemia   Yes Historical Provider   aspirin delayed-release 81 mg tablet Take 81 mg by mouth daily. Instructed to take DOS per Anesthesia guidelines. Yes Historical Provider   MULTIVITS-MIN/IRON/FA/LUTEIN (CENTRUM SILVER WOMEN PO) Take 1 tablet by mouth every evening. Yes Historical Provider   magnesium hydroxide (MILK OF MAGNESIA) 400 mg/5 mL suspension Take 30 mL by mouth daily as needed for Constipation.     Historical Provider       Current Medications:  Current Facility-Administered Medications   Medication Dose Route Frequency Provider Last Rate Last Dose    bismuth subsalicylate (PEPTO-BISMOL) oral suspension 30 mL  30 mL Oral Q6H PRN Jeffy Knight MD   30 mL at 05/12/17 1717    potassium chloride (K-DUR, KLOR-CON) SR tablet 40 mEq  40 mEq Oral DAILY Brandy Dumas MD   40 mEq at 05/13/17 0748    atenolol (TENORMIN) tablet 50 mg  50 mg Oral DAILY Che Benton MD   50 mg at 05/13/17 0750    albuterol (PROVENTIL VENTOLIN) nebulizer solution 2.5 mg  2.5 mg Nebulization Q6H PRN Jeffy Knight MD        folic acid (FOLVITE) tablet 1 mg  1 mg Oral DAILY Viral Corral   1 mg at 05/13/17 0749    magnesium hydroxide (MILK OF MAGNESIA) 400 mg/5 mL oral suspension 30 mL  30 mL Oral DAILY PRN ALMA Corral        magnesium oxide (MAG-OX) tablet 400 mg  400 mg Oral BID ALMA Corral   400 mg at 05/13/17 0750    sodium chloride (NS) flush 5-10 mL  5-10 mL IntraVENous Q8H ALMA Corral   5 mL at 05/11/17 2200    sodium chloride (NS) flush 5-10 mL  5-10 mL IntraVENous PRN ALMA Corral        acetaminophen (TYLENOL) tablet 1,000 mg  1,000 mg Oral Q6H ALMA Corral   1,000 mg at 05/13/17 0556    celecoxib (CELEBREX) capsule 200 mg  200 mg Oral Q12H Viral Corral   Stopped at 05/13/17 0751    HYDROmorphone (DILAUDID) tablet 2 mg  2 mg Oral Q4H PRN ALMA Corral   2 mg at 05/13/17 0748    HYDROmorphone (PF) (DILAUDID) injection 1 mg  1 mg IntraVENous Q3H PRN Alexis Parekh, PA        naloxone [facility]) injection 0.2-0.4 mg  0.2-0.4 mg IntraVENous Q10MIN PRN ALMA Velásquez        ondansetron [facility]) injection 4 mg  4 mg IntraVENous Q4H PRN ALMA Velásquez        diphenhydrAMINE (BENADRYL) capsule 25 mg  25 mg Oral Q4H PRN Viral Velásquez        aspirin delayed-release tablet 325 mg  325 mg Oral Q12H Johnny Irwin [place]   325 mg at 05/13/17 0750        Review of Systems: Denies chest pain, shortness of breath, cough, headache, visual problems, abdominal pain, dysurea, calf pain. Pertinent positives are as noted in the medical records and unremarkable otherwise. Vital Signs:   Patient Vitals for the past 8 hrs:   BP Temp Pulse Resp SpO2   05/13/17 0747 198/80 97.5 °F (36.4 °C) 76 17 98 %   05/13/17 0502 142/80 98.2 °F (36.8 °C) 75 16 97 %        Physical Exam:   General: Alert and age appropriately oriented. No acute cardio respiratory distress. HEENT: Normocephalic. Oral mucosa moist without cyanosis. Lungs: Clear to auscultation  bilaterally. Respiration even and unlabored   Heart: Regular rate and rhythm, S1, S2   No  murmurs, clicks, rub or gallops   Abdomen: Soft, non-tender, nondistended. Bowel sounds present   Genitourinary: defered   Neuromuscular:      Grossly no focal neurological deficits noted. L Ankle dorsiflexion 5/5   L Ankle plantarflexion 5/5  R Ankle dorsiflexion 5/5   R Ankle plantarflexion 5/5  No sensory deficits. Skin/extremity: No rashes, no erythema. Calves soft. Wound covered.      Skin Incision(s)/Wound(s):        Lab Review:   Recent Results (from the past 72 hour(s))   HEMOGLOBIN    Collection Time: 05/10/17  8:04 PM   Result Value Ref Range    HGB 10.0 (L) 11.7 - 59.5 g/dL   METABOLIC PANEL, BASIC    Collection Time: 05/11/17  4:21 AM   Result Value Ref Range    Sodium 142 136 - 145 mmol/L    Potassium 2.8 (LL) 3.5 - 5.1 mmol/L    Chloride 105 98 - 107 mmol/L    CO2 30 21 - 32 mmol/L    Anion gap 7 7 - 16 mmol/L    Glucose 106 (H) 65 - 100 mg/dL    BUN 21 8 - 23 MG/DL    Creatinine 1.16 (H) 0.6 - 1.0 MG/DL    GFR est AA 58 (L) >60 ml/min/1.73m2    GFR est non-AA 48 (L) >60 ml/min/1.73m2    Calcium 8.3 8.3 - 10.4 MG/DL   HEMOGLOBIN    Collection Time: 05/11/17  4:21 AM   Result Value Ref Range    HGB 9.3 (L) 11.7 - 15.4 g/dL   MAGNESIUM    Collection Time: 05/11/17  8:48 AM   Result Value Ref Range    Magnesium 1.4 (LL) 1.8 - 2.4 mg/dL   METABOLIC PANEL, BASIC    Collection Time: 05/11/17  8:48 AM   Result Value Ref Range    Sodium 139 136 - 145 mmol/L    Potassium 3.1 (L) 3.5 - 5.1 mmol/L    Chloride 102 98 - 107 mmol/L    CO2 30 21 - 32 mmol/L    Anion gap 7 7 - 16 mmol/L    Glucose 114 (H) 65 - 100 mg/dL    BUN 20 8 - 23 MG/DL    Creatinine 1.46 (H) 0.6 - 1.0 MG/DL    GFR est AA 45 (L) >60 ml/min/1.73m2    GFR est non-AA 37 (L) >60 ml/min/1.73m2    Calcium 8.7 8.3 - 10.4 MG/DL   PLEASE READ & DOCUMENT PPD TEST IN 24 HRS    Collection Time: 05/11/17 10:15 AM   Result Value Ref Range    PPD  Negative    mm Induration 0 mm   HEMOGLOBIN    Collection Time: 05/12/17  4:37 AM   Result Value Ref Range    HGB 9.4 (L) 11.7 - 55.9 g/dL   METABOLIC PANEL, BASIC    Collection Time: 05/12/17  4:37 AM   Result Value Ref Range    Sodium 140 136 - 145 mmol/L    Potassium 3.0 (L) 3.5 - 5.1 mmol/L    Chloride 104 98 - 107 mmol/L    CO2 29 21 - 32 mmol/L    Anion gap 7 7 - 16 mmol/L    Glucose 100 65 - 100 mg/dL    BUN 23 8 - 23 MG/DL    Creatinine 1.25 (H) 0.6 - 1.0 MG/DL    GFR est AA 53 (L) >60 ml/min/1.73m2    GFR est non-AA 44 (L) >60 ml/min/1.73m2    Calcium 8.5 8.3 - 10.4 MG/DL   MAGNESIUM    Collection Time: 05/12/17  4:37 AM   Result Value Ref Range    Magnesium 1.9 1.8 - 2.4 mg/dL   PLEASE READ & DOCUMENT PPD TEST IN 48 HRS    Collection Time: 05/12/17  9:02 AM   Result Value Ref Range    PPD  Negative    mm Induration 0 mm   HEMOGLOBIN    Collection Time: 05/13/17  4:54 AM   Result Value Ref Range    HGB 9.5 (L) 11.7 - 15.4 g/dL       PT Initial  PT Most Recent   AROM: Generally decreased, functional (05/10/17 1458) Generally decreased, functional (05/10/17 1458)         Strength: Generally decreased, functional (05/10/17 1458) Generally decreased, functional (05/10/17 1458)   Coordination: Generally decreased, functional (05/10/17 1458) Generally decreased, functional (05/10/17 1458)         Sensation: Intact (05/10/17 1458) Intact (05/10/17 1458)         Distance (ft): 5 Feet (ft) (to head of bed) (05/10/17 1458) 0 Feet (ft) (05/12/17 1300)   Assistive Device: Walker, rolling (05/10/17 1458) Walker, rolling (05/12/17 1100)       OT Initial OT Most Recent                                               ST Initial ST Most Recent                        Principal Problem:    Status post right hip replacement (5/10/2017)          Condition on discharge from [facility] to SNF:  good  Rehabilitation  potential : Good   Goals in Rehab : Patient to reach maximal rehabilitation potential in functional mobility,ambulation and ADL/ self care skills ; to improve strength and endurance  Plan / Disposition :STR-Rehab  as discussed with patient/ family and the Case management/ Social service team  Expected Length Of Stay : Depending on pace of progress in mobility and self care in PT and OT ,therapies    Discharge Instructions:  Rehabilitation Management/ Medical Management: 1. Devices:Walkers, Type: Rolling Walker  2. Consult:Rehab team including PT, OT,  and . 3. Disposition Rehab-discussed with patient. 4. Thigh-high or knee-high DOUGLAS's when out of bed. 5. DVT Prophylaxis - aspirin 325mg bid x 30days. Please notify surgeon at [address] if DVT diagnosed. 6. Incentive spirometer Q1H while awake  7. Post op hemorrhagic anemia- monitor. 8. Activity: WBAT RLE, hip precautions for RLE. 9. Planned Labs: CBC, BMP weekly, on Monday and as needed please. .   10. Pain Control: fair control. Continue scheduled tylenol, celebrex and  PRN meds. Continue current management. 11. Wound Care: May remove Aquacel 1 week post op and replace with Tegaderm and sterile gauze dressing. Keep wound clean and dry and reinforce dressing PRN. Remove staples 12-14 post surgery, when incision appears appropriately closed and apply benzoin and 1/2\" steristrips. 12. In case of complications: Please notify surgeon at [facility] [address] if suspect infection, cellulitis, wound dehiscence or instrument failure, and if considering starting antibiotic. 13. Hypokalemia, hypomagnesemia - resume KCL and Mag as was taken prior to surgery. It is noted Patient was at onetime on HCTZ. Aldactone is resumed at discharge. Follow up with ORTHO per instructions. [address] [place] [address] [phone]  Follow up with Dr Leopold Ito  2 weeks after discharge from rehab. [phone]. Transfer Medications:            HYDROmorphone (DILAUDID) tablet 2 mg 1 Tab  Ordered Dose: 2mg Route: Oral Frequency: EVERY 3 HOURS  as needed for pain      acetaminophen (TYLENOL) tablet 1,000 mg Ordered Dose: 1,000 mg Route: Oral Frequency: EVERY 8 HOURS x 1 week then change to prn.         aspirin delayed-release tablet 325 mg Ordered Dose: 325 mg Route: Oral Frequency: EVERY 12 HOURS x 30 days then stop. Take with food or milk or full glass of water. senna-docusate (PERICOLACE) 8.6-50 mg per tablet 2 Tab Ordered Dose: 2 Tab Route: Oral Frequency: DAILY       Current Discharge Medication List      CONTINUE these medications which have NOT CHANGED    Details   potassium chloride SR (KLOR-CON 10) 10 mEq tablet Take  by mouth. Pt. unsure of dosage- PCP started pt on this due to leg cramping      atenolol (TENORMIN) 50 mg tablet Take 50 mg by mouth daily. chlorthalidone (HYGROTEN) 25 mg tablet Take  by mouth daily. 1/2 tablet by mouth daily      folic acid (FOLVITE) 1 mg tablet Take 1 mg by mouth daily. spironolactone (ALDACTONE) 25 mg tablet Take  by mouth daily.  1/2 tablet once a day magnesium oxide (MAGOX) 400 mg tablet Take 400 mg by mouth two (2) times a day. nystatin (MYCOSTATIN) powder Apply  to affected area three (3) times daily as needed. acetaminophen (TYLENOL) 650 mg CR tablet Take 650 mg by mouth every six (6) hours as needed for Pain. albuterol (PROVENTIL VENTOLIN) 2.5 mg /3 mL (0.083 %) nebulizer solution by Nebulization route every four (4) hours as needed for Wheezing. oxyCODONE IR (ROXICODONE) 5 mg immediate release tablet Take 5 mg by mouth every four (4) hours as needed for Pain. HOLD.       atorvastatin (LIPITOR) 10 mg tablet Take 10 mg by mouth nightly. Indications: hypercholesterolemia    HOLD. aspirin delayed-release 81 mg tablet Take 81 mg by mouth daily. HOLD. Resume when aspirn 325mg bid dose finished. MULTIVITS-MIN/IRON/FA/LUTEIN (CENTRUM SILVER WOMEN PO) Take 1 tablet by mouth every evening.      magnesium hydroxide (MILK OF MAGNESIA) 400 mg/5 mL suspension Take 30 mL by mouth daily as needed for Constipation. O.K. Admit to sub acute rehab today. Discharge time: 35 minutes.     Signed By: Veena Guerrero MD     May 13, 2017

## 2017-05-13 NOTE — PROGRESS NOTES
Problem: Mobility Impaired (Adult and Pediatric)  Goal: *Acute Goals and Plan of Care (Insert Text)  GOALS (1-4 days):  (1.)Ms. Konstantin Correa will move from supine to sit and sit to supine in bed with INDEPENDENT. (2.)Ms. Konstantin Correa will transfer from bed to chair and chair to bed with INDEPENDENT using the least restrictive device. (3.)Ms. Konstantin Correa will ambulate with INDEPENDENT for 250 feet with the least restrictive device. (4.)Ms. Konstantin Correa will ambulate up/down 3 steps with bilateral railing with MINIMAL ASSIST with no device. Going to rehab  (5.)Ms. Konstantin Correa will state/observe ANMOL precautions with 0 verbal cues. ________________________________________________________________________________________________  Outcome: Progressing Towards Goal      PHYSICAL THERAPY JOINT CAMP ANMOL: Daily Note and AM 5/13/2017  INPATIENT: Hospital Day: 4  [place] / Product Type: Medicare /      NAME/AGE/GENDER: Maria Gudino is a 67 y.o. female    PRIMARY DIAGNOSIS:  Primary osteoarthritis of right hip [M16.11]              Procedure(s) and Anesthesia Type:     * RIGHT HIP ARTHROPLASTY TOTAL - General (Right)  ICD-10: Treatment Diagnosis:        · Pain in Right Hip (M25.551)  · Stiffness of Right Hip, Not elsewhere classified (M25.651)  · Difficulty in walking, Not elsewhere classified (R26.2)  · Other abnormalities of gait and mobility (R26.89)       ASSESSMENT:      Ms. Konstantin Correa is supine in bed on arrival.  She states she needs to go to the restroom. Pt ambulated to bathroom. Pt states she was not walking much before surgery. Pt brought to the gym for exercises. Pt returned to room and left sitting up in chair with needs in reach. This section established at most recent assessment   PROBLEM LIST (Impairments causing functional limitations):  1. Decreased Strength  2. Decreased Ambulation Ability/Technique  3. Decreased Balance  4. Increased Pain  5.  Decreased Activity Tolerance    INTERVENTIONS PLANNED: (Benefits and precautions of physical therapy have been discussed with the patient.)  1. Bed Mobility  2. Gait Training  3. Home Exercise Program (HEP)  4. Therapeutic Exercise/Strengthening  5. Transfer Training  6. Range of Motion: active/assisted/passive  7. Therapeutic Activities  8. Group Therapy      TREATMENT PLAN: Frequency/Duration: Follow patient BID   to address above goals. Rehabilitation Potential For Stated Goals: GOOD      RECOMMENDED REHABILITATION/EQUIPMENT: (at time of discharge pending progress): Rehab. HISTORY:   History of Present Injury/Illness (Reason for Referral):  Decreased mobility ability  Past Medical History/Comorbidities:   Ms. Andrea Tate  has a past medical history of Abnormal posture; Anemia; Essential hypertension; H/O echocardiogram (01/05/2017); H/O: pneumonia; Hypercholesteremia; Hypomagnesemia; Left ventricular diastolic dysfunction; Muscle weakness; Nausea & vomiting; MOOK (obstructive sleep apnea) (01/30/2015); Osteoarthritis; Poor historian; Pressure ulcer of left buttock, stage 2; Pressure ulcer of right buttock, stage 2; SOB (shortness of breath); Status post right hip replacement (5/10/2017); Unable to ambulate; and Unsteadiness on feet. She also has no past medical history of Adverse effect of anesthesia; Aneurysm ([address] Arrhythmia; Asthma; CAD (coronary artery disease); Cancer ([address] Chronic kidney disease; Chronic obstructive pulmonary disease ([address] Chronic pain; Coagulation disorder ([address] Diabetes ([address] Difficult intubation; GERD (gastroesophageal reflux disease); Heart failure ([address] Ill-defined condition; Liver disease; Malignant hyperthermia due to anesthesia; Morbid obesity ([address] Pseudocholinesterase deficiency; Psychiatric disorder; PUD (peptic ulcer disease); Seizures ([address] Stroke [facility]); Thromboembolus ([address] Thyroid disease; or Unspecified adverse effect of anesthesia.   Ms. Andrea Tate  has a past surgical history that includes cholecystectomy (2009); knee arthroscopy (Bilateral); and breast surgery procedure unlisted (Right, ). Social History/Living Environment:   Home Environment: Private residence  # Steps to Enter: 3  One/Two Story Residence: One story  Living Alone: Yes  Support Systems: Friends \ neighbors  Patient Expects to be Discharged to[de-identified] Rehabilitation facility  Current DME Used/Available at Home: Haley Juan Miguel, straight, Walker, rolling  Tub or Shower Type: Shower  Prior Level of Function/Work/Activity:  Independent with ambulation with a walker. Independent with ADLs. Number of Personal Factors/Comorbidities that affect the Plan of Care: 0: LOW COMPLEXITY   EXAMINATION:   Most Recent Physical Functioning:                                     Transfers  Sit to Stand: Contact guard assistance  Stand to Sit: Contact guard assistance                      Weight Bearing Status  Right Side Weight Bearing: As tolerated  Distance (ft): 20 Feet (ft)  Ambulation - Level of Assistance: Contact guard assistance  Assistive Device: Walker, rolling  Base of Support: Narrowed  Speed/Keli: Pace decreased (<100 feet/min)  Gait Abnormalities: Antalgic         Braces/Orthotics: none     Right Hip Cold  Type: Cold/ice packs       Body Structures Involved:  1. Bones  2. Joints  3. Muscles Body Functions Affected:  1. Neuromusculoskeletal  2. Movement Related Activities and Participation Affected:  1. Mobility   Number of elements that affect the Plan of Care: 4+: HIGH COMPLEXITY   CLINICAL PRESENTATION:   Presentation: Stable and uncomplicated: LOW COMPLEXITY   CLINICAL DECISION MAKIN [facility] [id number] AM-PAC 6 Clicks   Basic Mobility Inpatient Short Form  How much difficulty does the patient currently have. .. Unable A Lot A Little None   1. Turning over in bed (including adjusting bedclothes, sheets and blankets)? [ ] 1   [ ] 2   [X] 3   [ ] 4   2.   Sitting down on and standing up from a chair with arms ( e.g., wheelchair, bedside commode, etc.)   [ ] 1   [ ] 2   [X] 3   [ ] 4   3. Moving from lying on back to sitting on the side of the bed? [ ] 1   [ ] 2   [X] 3   [ ] 4   How much help from another person does the patient currently need. .. Total A Lot A Little None   4. Moving to and from a bed to a chair (including a wheelchair)? [ ] 1   [ ] 2   [X] 3   [ ] 4   5. Need to walk in hospital room? [ ] 1   [ ] 2   [X] 3   [ ] 4   6. Climbing 3-5 steps with a railing? [ ] 1   [ ] 2   [X] 3   [ ] 4   © 2007, Trustees of [address] [place] [address] [address] [id number], under license to [employer]. All rights reserved       Score:  Initial: 18 Most Recent: X (Date: -- )     Interpretation of Tool:  Represents activities that are increasingly more difficult (i.e. Bed mobility, Transfers, Gait). Score 24 23 22-20 19-15 14-10 9-7 6       Modifier CH CI CJ CK CL CM CN         · Mobility - Walking and Moving Around:               - CURRENT STATUS:    CK - 40%-59% impaired, limited or restricted               - GOAL STATUS:           CK - 40%-59% impaired, limited or restricted               - D/C STATUS:                       CK - 40%-59% impaired, limited or restricted  [place] / Product Type: Medicare /       Medical Necessity:     · Skilled intervention continues to be required due to decreased mobility ability. Reason for Services/Other Comments:  · Patient continues to require skilled intervention due to decreased mobility ability.    Use of outcome tool(s) and clinical judgement create a POC that gives a: Clear prediction of patient's progress: LOW COMPLEXITY                 TREATMENT:   (In addition to Assessment/Re-Assessment sessions the following treatments were rendered)      Pre-treatment Symptoms/Complaints:  Complaining of stomach pain  Pain: Initial: not rated     Post Session:  4/10 visually      Therapeutic Exercise: (30 Minutes (group)):  Exercises per grid below to improve strength. Required minimal verbal cues to promote proper body alignment. Progressed range as indicated. Gait Training (15 Minutes):  Gait training to improve and/or restore physical functioning as related to mobility, strength and balance. Ambulated 20 Feet (ft) with Contact guard assistance using a Walker, rolling and minimal   related to their stance phase and stride length to promote proper body alignment, promote proper body posture and promote proper body mechanics. Date:  5/11    Date:  5/12    Date:  5/13/17    ACTIVITY/EXERCISE AM PM AM PM AM PM   GROUP THERAPY  [ ]  [x ]  [x ]  [ ]  [ X]  [ ]   Ankle Pumps 15  15  20   20 20      Quad Sets  15  15  20  20  20     Gluteal Sets  15  15  20  20  20     Hip ABd/ADduction  15  15  20  20  20     Straight Leg Raises               Knee Slides  15  15  20  20  20     Short Arc Quads  15  15  20  20  20     Long Arc Quads    15  20    20     Chair Slides                               B = bilateral; AA = active assistive; A = active; P = passive       Treatment/Session Assessment:         Response to Treatment:  Pt limited in walking due to pain     Education:  [ ] Home Exercises  [X] Fall Precautions  [X] Hip Precautions [ ] Going Home Video  [ ] Knee/Hip Prosthesis Review  [ ] Vernadine Peaks Management/Safety [ ] Adaptive Equipment as Needed         Interdisciplinary Collaboration:   · Registered Nurse     After treatment position/precautions:   · Up in chair, Bed/Chair-wheels locked, Call light within reach and RN notified     Compliance with Program/Exercises: compliant most of the time. Recommendations/Intent for next treatment session:  Treatment next visit will focus on increasing Ms. Sparrow's independence with bed mobility, transfers, gait training, strength/ROM exercises, modalities for pain, and patient education.        Total Treatment Duration:  PT Patient Time In/Time Out  Time In: 0845  Time Out: 0930     Sonja Hanson PTA

## 2017-08-08 NOTE — H&P
[address] [facility]  Pre Operative History and Physical Exam    Patient ID:  Cassie Moreno  [id number]  64 y.o.  1940    Today: August 8, 2017       Assessment:   1. Arthritis of the right knee        Plan:    1. Proceed with scheduled Procedure(s) (LRB):  RIGHT KNEE ARTHROPLASTY TOTAL / FNB / Dene Fer / NEPHEW (Right)            CC:  Right knee pain    HPI:   The patient has end stage arthritis of the right knee. The patient was evaluated and examined during a consultation prior to this office visit. There have been no changes to the patient's orthopedic condition since the initial consultation. The patient has failed previous conservative treatment for this condition including antiinflammatories , and lifestyle modifications. The necessity for joint replacement is present.  The patient will be admitted the day of surgery for Procedure(s) (LRB):  RIGHT KNEE ARTHROPLASTY TOTAL / FNB / Dene Fer / NEPHEW (Right)      Past Medical/Surgical History:  Past Medical History:   Diagnosis Date    Abnormal posture     Anemia     history     Essential hypertension     H/O echocardiogram 01/05/2017    EF 55-65%    H/O: pneumonia     Hypercholesteremia     managed with medication     Hypomagnesemia     managed with supplement     Left ventricular diastolic dysfunction     per echo \"Grade 1 (mild)\"    Muscle weakness     generalized    Nausea & vomiting     nausea only     MOOK (obstructive sleep apnea) 01/30/2015    patient denies     Osteoarthritis     Poor historian     Pressure ulcer of left buttock, stage 2     resolved per patient-previously seen by Dr. Aly Willson at wound center      Pressure ulcer of right buttock, stage 2     resolved per patient-previously seen by Dr. Aly Willson at wound center     SOB (shortness of breath)     managed with PRN albuterol nebulizer    Status post right hip replacement 5/10/2017    Unable to ambulate     patient in wheelchair-can stand with assistance     Unsteadiness on feet      Past Surgical History:   Procedure Laterality Date    BREAST SURGERY PROCEDURE UNLISTED Right 1968    cyst removed    HX CHOLECYSTECTOMY  2009    HX KNEE ARTHROSCOPY Bilateral     X2 to right; X1 to left        Allergies: Allergies   Allergen Reactions    Avelox [Moxifloxacin] Rash    Other Plant, Animal, Environmental Nausea Only     Allergic to Perfume & Smoke. Objective:                    HEENT: NC/AT                   Lungs:  Unlabored respirations, clear breath sounds                    Heart:   RRR                    Abdomen: soft                   Extremities:  Pain with ROM of the right knee    Meds:   No current facility-administered medications for this encounter. Current Outpatient Prescriptions   Medication Sig    potassium chloride SR (KLOR-CON 10) 10 mEq tablet Take  by mouth. Pt. unsure of dosage- PCP started pt on this due to leg cramping    atenolol (TENORMIN) 50 mg tablet Take 50 mg by mouth daily. Take DOS per anesthesia protocol.  chlorthalidone (HYGROTEN) 25 mg tablet Take  by mouth daily. 1/2 tablet by mouth daily    folic acid (FOLVITE) 1 mg tablet Take 1 mg by mouth daily.  spironolactone (ALDACTONE) 25 mg tablet Take  by mouth daily. 1/2 tablet once a day    magnesium oxide (MAGOX) 400 mg tablet Take 400 mg by mouth two (2) times a day.  nystatin (MYCOSTATIN) powder Apply  to affected area three (3) times daily.  acetaminophen (TYLENOL) 650 mg CR tablet Take 650 mg by mouth every six (6) hours as needed for Pain. Take DOS if needed per anesthesia protocol.  albuterol (PROVENTIL VENTOLIN) 2.5 mg /3 mL (0.083 %) nebulizer solution by Nebulization route every four (4) hours as needed for Wheezing. Use DOS per anesthesia protocol.  magnesium hydroxide (MILK OF MAGNESIA) 400 mg/5 mL suspension Take 30 mL by mouth daily as needed for Constipation.     oxyCODONE IR (ROXICODONE) 5 mg immediate release tablet Take 5 mg by mouth every four (4) hours as needed for Pain. Take DOS if needed per anesthesia protocol.  atorvastatin (LIPITOR) 10 mg tablet Take 10 mg by mouth nightly. Indications: hypercholesterolemia    aspirin delayed-release 81 mg tablet Take 81 mg by mouth daily. Instructed to take DOS per Anesthesia guidelines.  MULTIVITS-MIN/IRON/FA/LUTEIN (CENTRUM SILVER WOMEN PO) Take 1 tablet by mouth every evening. Labs:  Admission on 05/10/2017, Discharged on 05/13/2017   Component Date Value Ref Range Status    mm Induration 05/13/2017 0  mm Final    0    Crossmatch Expiration 05/10/2017 05/13/2017   Final    ABO/Rh(D) 05/10/2017 O POSITIVE   Final    Antibody screen 05/10/2017 NEG   Final    Glucose (POC) 05/10/2017 107* 65 - 100 mg/dL Final    Comment: 47 - 60 mg/dl Consistent with, but not fully diagnostic of hypoglycemia. 101 - 125 mg/dl Impaired fasting glucose/consistent with pre-diabetes mellitus  > 126 mg/dl Fasting glucose consistent with overt diabetes mellitus      mm Induration 05/11/2017 0  mm Final    mm Induration 05/12/2017 0  mm Final    HGB 05/10/2017 10.0* 11.7 - 15.4 g/dL Final    Sodium 05/11/2017 142  136 - 145 mmol/L Final    Potassium 05/11/2017 2.8* 3.5 - 5.1 mmol/L Final    Comment: RESULTS VERIFIED, PHONED TO AND READ BACK BY  SUSANNE LOVELL RN AT 0533 05/11/2017 BY CTAYLOR      Chloride 05/11/2017 105  98 - 107 mmol/L Final    CO2 05/11/2017 30  21 - 32 mmol/L Final    Anion gap 05/11/2017 7  7 - 16 mmol/L Final    Glucose 05/11/2017 106* 65 - 100 mg/dL Final    Comment: 47 - 60 mg/dl Consistent with, but not fully diagnostic of hypoglycemia.   101 - 125 mg/dl Impaired fasting glucose/consistent with pre-diabetes mellitus  > 126 mg/dl Fasting glucose consistent with overt diabetes mellitus      BUN 05/11/2017 21  8 - 23 MG/DL Final    Creatinine 05/11/2017 1.16* 0.6 - 1.0 MG/DL Final    GFR est AA 05/11/2017 58* >60 ml/min/1.73m2 Final    GFR est non-AA 05/11/2017 48* >60 ml/min/1.73m2 Final    Comment: (NOTE)  Estimated GFR is calculated using the Modification of Diet in Renal   Disease (MDRD) Study equation, reported for both  Americans   (GFRAA) and non- Americans (GFRNA), and normalized to 1.73m2   body surface area. The physician must decide which value applies to   the patient. The MDRD study equation should only be used in   individuals age 25 or older. It has not been validated for the   following: pregnant women, patients with serious comorbid conditions,   or on certain medications, or persons with extremes of body size,   muscle mass, or nutritional status.  Calcium 05/11/2017 8.3  8.3 - 10.4 MG/DL Final    HGB 05/11/2017 9.3* 11.7 - 15.4 g/dL Final    Magnesium 05/11/2017 1.4* 1.8 - 2.4 mg/dL Final    Comment: RESULTS CHECKED X 2  RESULTS VERIFIED, PHONED TO AND READ BACK BY  BALDOMERO ORTIZ RN [id number]      Sodium 05/11/2017 139  136 - 145 mmol/L Final    Potassium 05/11/2017 3.1* 3.5 - 5.1 mmol/L Final    Chloride 05/11/2017 102  98 - 107 mmol/L Final    CO2 05/11/2017 30  21 - 32 mmol/L Final    Anion gap 05/11/2017 7  7 - 16 mmol/L Final    Glucose 05/11/2017 114* 65 - 100 mg/dL Final    Comment: 47 - 60 mg/dl Consistent with, but not fully diagnostic of hypoglycemia.   101 - 125 mg/dl Impaired fasting glucose/consistent with pre-diabetes mellitus  > 126 mg/dl Fasting glucose consistent with overt diabetes mellitus      BUN 05/11/2017 20  8 - 23 MG/DL Final    Creatinine 05/11/2017 1.46* 0.6 - 1.0 MG/DL Final    GFR est AA 05/11/2017 45* >60 ml/min/1.73m2 Final    GFR est non-AA 05/11/2017 37* >60 ml/min/1.73m2 Final    Calcium 05/11/2017 8.7  8.3 - 10.4 MG/DL Final    HGB 05/12/2017 9.4* 11.7 - 15.4 g/dL Final    Sodium 05/12/2017 140  136 - 145 mmol/L Final    Potassium 05/12/2017 3.0* 3.5 - 5.1 mmol/L Final    Chloride 05/12/2017 104  98 - 107 mmol/L Final    CO2 05/12/2017 29  21 - 32 mmol/L Final    Anion gap 05/12/2017 7  7 - 16 mmol/L Final    Glucose 05/12/2017 100  65 - 100 mg/dL Final    Comment: 47 - 60 mg/dl Consistent with, but not fully diagnostic of hypoglycemia.   101 - 125 mg/dl Impaired fasting glucose/consistent with pre-diabetes mellitus  > 126 mg/dl Fasting glucose consistent with overt diabetes mellitus      BUN 05/12/2017 23  8 - 23 MG/DL Final    Creatinine 05/12/2017 1.25* 0.6 - 1.0 MG/DL Final    GFR est AA 05/12/2017 53* >60 ml/min/1.73m2 Final    GFR est non-AA 05/12/2017 44* >60 ml/min/1.73m2 Final    Calcium 05/12/2017 8.5  8.3 - 10.4 MG/DL Final    Magnesium 05/12/2017 1.9  1.8 - 2.4 mg/dL Final    HGB 05/13/2017 9.5* 11.7 - 15.4 g/dL Final                 Patient Active Problem List   Diagnosis Code    Preop respiratory exam Z01.811    Hypertension I10    Hyperlipidemia E78.5    Rheumatoid arthritis ([facility] [address] M06.9    MOOK (obstructive sleep apnea) G47.33    Elevated serum creatinine R79.89    Status post right hip replacement Z96.641         Signed By: ALMA Umanzor  August 8, 2017

## 2017-08-16 ENCOUNTER — HOSPITAL ENCOUNTER (OUTPATIENT)
Dept: SURGERY | Age: 77
Discharge: HOME | End: 2017-08-16
Attending: ORTHOPAEDIC SURGERY
Payer: MEDICARE

## 2017-08-16 VITALS
TEMPERATURE: 97.2 F | DIASTOLIC BLOOD PRESSURE: 67 MMHG | HEIGHT: 69 IN | WEIGHT: 220 LBS | BODY MASS INDEX: 32.58 KG/M2 | HEART RATE: 67 BPM | SYSTOLIC BLOOD PRESSURE: 147 MMHG | OXYGEN SATURATION: 96 %

## 2017-08-16 LAB
ANION GAP SERPL CALC-SCNC: 7 MMOL/L (ref 7–16)
APTT PPP: 25.8 SEC (ref 23.5–31.7)
BACTERIA SPEC CULT: ABNORMAL
BUN SERPL-MCNC: 32 MG/DL (ref 8–23)
CALCIUM SERPL-MCNC: 9.5 MG/DL (ref 8.3–10.4)
CHLORIDE SERPL-SCNC: 105 MMOL/L (ref 98–107)
CO2 SERPL-SCNC: 28 MMOL/L (ref 21–32)
CREAT SERPL-MCNC: 1.16 MG/DL (ref 0.6–1)
ERYTHROCYTE [DISTWIDTH] IN BLOOD BY AUTOMATED COUNT: 14.6 % (ref 11.9–14.6)
GLUCOSE SERPL-MCNC: 88 MG/DL (ref 65–100)
HCT VFR BLD AUTO: 38.3 % (ref 35.8–46.3)
HGB BLD-MCNC: 12.2 G/DL (ref 11.7–15.4)
INR PPP: 1 (ref 0.9–1.2)
MCH RBC QN AUTO: 26.1 PG (ref 26.1–32.9)
MCHC RBC AUTO-ENTMCNC: 31.9 G/DL (ref 31.4–35)
MCV RBC AUTO: 81.8 FL (ref 79.6–97.8)
PLATELET # BLD AUTO: 304 K/UL (ref 150–450)
PMV BLD AUTO: 10.2 FL (ref 10.8–14.1)
POTASSIUM SERPL-SCNC: 4 MMOL/L (ref 3.5–5.1)
PROTHROMBIN TIME: 10.9 SEC (ref 9.6–12)
RBC # BLD AUTO: 4.68 M/UL (ref 4.05–5.25)
SERVICE CMNT-IMP: ABNORMAL
SODIUM SERPL-SCNC: 140 MMOL/L (ref 136–145)
WBC # BLD AUTO: 10.3 K/UL (ref 4.3–11.1)

## 2017-08-16 PROCEDURE — 85610 PROTHROMBIN TIME: CPT | Performed by: ORTHOPAEDIC SURGERY

## 2017-08-16 PROCEDURE — 85027 COMPLETE CBC AUTOMATED: CPT | Performed by: ORTHOPAEDIC SURGERY

## 2017-08-16 PROCEDURE — 85730 THROMBOPLASTIN TIME PARTIAL: CPT | Performed by: ORTHOPAEDIC SURGERY

## 2017-08-16 PROCEDURE — 87641 MR-STAPH DNA AMP PROBE: CPT | Performed by: ORTHOPAEDIC SURGERY

## 2017-08-16 PROCEDURE — 80048 BASIC METABOLIC PNL TOTAL CA: CPT | Performed by: ORTHOPAEDIC SURGERY

## 2017-08-16 RX ORDER — ATENOLOL AND CHLORTHALIDONE TABLET 50; 25 MG/1; MG/1
1 TABLET ORAL DAILY
COMMUNITY

## 2017-08-16 RX ORDER — ZINC GLUCONATE 10 MG
1 LOZENGE ORAL DAILY
COMMUNITY

## 2017-08-16 RX ORDER — UREA 10 %
800 LOTION (ML) TOPICAL DAILY
COMMUNITY

## 2017-08-16 NOTE — PERIOP NOTES
Maritza No MD    Your patient recently had labs drawn during a hospital appointment due to an upcoming surgery. The results are attached. If you have any questions or concerns please reach out to your patient for a follow-up in your office. Please do not respond to this message as my mailbox is not monitored. You may call [phone] with questions or concerns.       Recent Results (from the past 12 hour(s))   CBC W/O DIFF    Collection Time: 08/16/17  3:10 PM   Result Value Ref Range    WBC 10.3 4.3 - 11.1 K/uL    RBC 4.68 4.05 - 5.25 M/uL    HGB 12.2 11.7 - 15.4 g/dL    HCT 38.3 35.8 - 46.3 %    MCV 81.8 79.6 - 97.8 FL    MCH 26.1 26.1 - 32.9 PG    MCHC 31.9 31.4 - 35.0 g/dL    RDW 14.6 11.9 - 14.6 %    PLATELET 901 118 - 361 K/uL    MPV 10.2 (L) 10.8 - 49.9 FL   METABOLIC PANEL, BASIC    Collection Time: 08/16/17  3:10 PM   Result Value Ref Range    Sodium 140 136 - 145 mmol/L    Potassium 4.0 3.5 - 5.1 mmol/L    Chloride 105 98 - 107 mmol/L    CO2 28 21 - 32 mmol/L    Anion gap 7 7 - 16 mmol/L    Glucose 88 65 - 100 mg/dL    BUN 32 (H) 8 - 23 MG/DL    Creatinine 1.16 (H) 0.6 - 1.0 MG/DL    GFR est AA 58 (L) >60 ml/min/1.73m2    GFR est non-AA 48 (L) >60 ml/min/1.73m2    Calcium 9.5 8.3 - 10.4 MG/DL   PROTHROMBIN TIME + INR    Collection Time: 08/16/17  3:10 PM   Result Value Ref Range    Prothrombin time 10.9 9.6 - 12.0 sec    INR 1.0 0.9 - 1.2     PTT    Collection Time: 08/16/17  3:10 PM   Result Value Ref Range    aPTT 25.8 23.5 - 31.7 SEC

## 2017-08-16 NOTE — PERIOP NOTES
Patient verified name, , and surgery as listed in [facility]. Type 3 surgery, walk in assessment complete. Labs per surgeon: CBC, BMP, PT/PTT ; results within [facility] protocols. MRSA nasal swab pending. Labs per anesthesia protocol: included in surgeons orders. EKG: last done 17 during PAT appointment for previous joint replacement. Result was abnormal. Dr. Tylor Islas with anesthesia reviewed the EKG on 17 and said \"okay to proceed\". Patient refused to do urine specimen today due to the inability to walk and stand well. States \"I'm afraid I will fall\". Dr. Qi Castañeda office called and voicemail left with Casimir Simmonds to inform them of this. Placed U/A order under sign and held and placed a note on the front of the chart for U/A to be obtained dos. Printed from EMR: ekg's dated 17 & 17 and echo dated 17. Records on chart for reference. Hibiclens and instructions to return bottle on DOS given per hospital policy. Nasal Swab collected per MD order and instructions for Mupirocin nasal ointment if required. Patient provided with handouts including Guide to Surgery, Pain Management, Hand Hygiene, Blood Transfusion Education, and [facility] Brochure. Patient answered medical/surgical history questions at their best of ability. All prior to admission medications documented in [facility]. Original medication prescription bottles visualized during patient appointment. Patient instructed to hold all vitamins 7 days prior to surgery and NSAIDS 5 days prior to surgery. Medications to be held: vitamin, aleve. Patient instructed to continue previous medications as prescribed prior to surgery and to take the following medications the day of surgery according to anesthesia guidelines with a small sip of water: 81 mg aspirin, atenolol-chlorthalidone, atorvastatin. Patient teach back successful and patient demonstrates knowledge of instruction.

## 2017-08-17 RX ORDER — MUPIROCIN 20 MG/G
OINTMENT TOPICAL 2 TIMES DAILY
COMMUNITY
Start: 2017-08-17 | End: 2017-08-24

## 2017-08-17 NOTE — PERIOP NOTES
Pt called back, reports she called [address] and was told there is not a Rx for her. This RN informed pt that Rx was called to [address] in [facility] as she directed PAT staff. This RN will call and f/u with pharmacy and call pt with information. Ghulam Dorado Jatinder (# [phone])- spoke with pharmacy staff member- informed that this RN called in Rx earlier today on the voicemail system. Pt just called RN to report that pharmacy told her there were no Rx available for her to ? Per staff member, will connect RN with pharmacist. Aminata Ortiz on hold for 5+ minutes without . RN called [address] back- spoke with pharmacy tech- explained above. Per tech, voicemail has not been checked yet- Explained to tech that Rx called in over 1 hours ago- message on voicemail system reports pharmacist checks voicemail Rx every hour? Per Tech, will obtain Rx from voicemail and fill for pt . This RN called pt back, explained that pharmacy had not checked voicemail Rx system when she called to check on her Rx. Instructed pt to wait an hour or so and then call pharmacy back to confirm Rx ready for her to  since pt lives over 30 min away from pharmacy.

## 2017-08-17 NOTE — ADVANCED PRACTICE NURSE
Total Joint Surgery Preoperative Chart Review      Patient ID:  Darshan Rodriguez  [id number]  89 y.o.  1940  Surgeon: Dr. Ruma Gil  Date of Surgery: 8/21/2017  Procedure: Total Right Knee Arthroplasty  Primary Care Physician: Marciano Winchester [phone]  Specialty Physician(s):      Subjective:   Darshan Rodriguez is a 67 y.o. WHITE OR  female who presents for preoperative evaluation for Total Right Knee arthroplasty. This is a preoperative chart review note based on data collected by the nurse at the surgical Pre-Assessment visit.     Past Medical History:   Diagnosis Date    Abnormal posture     Anemia     history     Essential hypertension     on med for control     H/O echocardiogram 01/05/2017    EF 55-65%    H/O: pneumonia     01/2017    Hypercholesteremia     managed with medication     Hypomagnesemia     managed with supplement     Left ventricular diastolic dysfunction     per echo \"Grade 1 (mild)\"    Muscle weakness     generalized    Nausea & vomiting     nausea only     MOOK (obstructive sleep apnea) 01/30/2015    patient denies     Osteoarthritis     Poor historian     Pressure ulcer of left buttock, stage 2     resolved per patient-previously seen by Dr. Vicenta Coronado at wound center      Pressure ulcer of right buttock, stage 2     resolved per patient-previously seen by Dr. Vicenta Coronado at wound center     SOB (shortness of breath)     Status post right hip replacement 5/10/2017    Unable to ambulate     patient in wheelchair-can stand with assistance     Unsteadiness on feet       Past Surgical History:   Procedure Laterality Date    BREAST SURGERY PROCEDURE UNLISTED Right 1968    cyst removed    HX CHOLECYSTECTOMY  2009    HX HIP REPLACEMENT Right 05/10/2017    HX KNEE ARTHROSCOPY Bilateral     X2 to right; X1 to left     Family History   Problem Relation Age of Onset    Heart Disease Mother     Hypertension Mother     Hypertension Father       Social History   Substance Use Topics    Smoking status: Never Smoker    Smokeless tobacco: Never Used    Alcohol use No       Prior to Admission medications    Medication Sig Start Date End Date Taking? Authorizing Provider   mupirocin (BACTROBAN) 2 % ointment by Both Nostrils route two (2) times a day. 8/17/17 8/21/17 Yes Historical Provider   atenolol-chlorthalidone (TENORETIC) 50-25 mg per tablet Take 1 Tab by mouth daily. Take / use AM day of surgery  per anesthesia protocols. Indications: hypertension   Yes Historical Provider   folic acid 114 mcg tablet Take 800 mcg by mouth daily. Yes Historical Provider   NAPROXEN SODIUM (ALEVE PO) Take 2 Tabs by mouth daily as needed for Pain. Yes Historical Provider   magnesium 250 mg tab Take 1 Tab by mouth daily. Yes Historical Provider   potassium chloride SR (KLOR-CON 10) 10 mEq tablet Take 20 mEq by mouth daily. PCP started pt on this due to leg cramping   Yes Historical Provider   chlorthalidone (HYGROTEN) 25 mg tablet Take 12.5 mg by mouth every other day. Yes Historical Provider   magnesium hydroxide (MILK OF MAGNESIA) 400 mg/5 mL suspension Take 30 mL by mouth daily as needed for Constipation. Yes Historical Provider   atorvastatin (LIPITOR) 10 mg tablet Take 10 mg by mouth daily. Take / use AM day of surgery  per anesthesia protocols. Indications: hypercholesterolemia   Yes Historical Provider   aspirin delayed-release 81 mg tablet Take 81 mg by mouth daily. Instructed to take DOS per Anesthesia guidelines. Yes Historical Provider   MULTIVITS-MIN/IRON/FA/LUTEIN (CENTRUM SILVER WOMEN PO) Take 1 Tab by mouth daily. Yes Historical Provider     Allergies   Allergen Reactions    Avelox [Moxifloxacin] Rash    Other Plant, Animal, Environmental Nausea Only     Allergic to Perfume & Smoke. Objective:     Physical Exam:   No data found.       ECG:    EKG Results     None          Data Review:   Labs:   reviewed      Problem List:  )  Patient Active Problem List Diagnosis Code    Preop respiratory exam Z01.811    Hypertension I10    Hyperlipidemia E78.5    Rheumatoid arthritis ([facility] [address] M06.9    MOOK (obstructive sleep apnea) G47.33    Elevated serum creatinine R79.89    Status post right hip replacement Z96.641       Total Joint Surgery Pre-Assessment Recommendations: The patient is not compliant with wearing CPAP. Recommend oxygen saturation monitoring during hospitalization and oxygen at 3 liter via nc qhs. Renal protocol initiated. The patient's chart will be flagged renal risk. Renal RisK Alerts:  1. Caution with use of muscle relaxants and sedatives with reduced renal function  2. Caution with total amount of narcotics used   3. Avoid morphine if patient has reduced renal function due to accumulations of the highly active metabolite, morphine-6-glucuronide, which can lead to sedation and respiratory depression  4. Avoid nephrotoxic drugs such as MA inhibitors  5. Consider volume status monitoring in addition to [place]  6.  Ensure hand-off to hospitalist for appropriate perioperative IV fluid management      Signed By: SHAILESH Madrigal    August 17, 2017

## 2017-08-17 NOTE — PERIOP NOTES
Nasal swab results reviewed:  Culture result:  (A)    Final   MRSA target DNA not detected, SA target DNA detected.   A MRSA negative, SA positive test result does not preclude MRSA nasal colonization          Called pt and informed of results    Instructed:Called Mupirocin Rx to [facility]  Pt to apply to ea nostril intranasally twice a day for 5 days beginning :today- 8/17/17.l

## 2017-08-20 ENCOUNTER — ANESTHESIA EVENT (OUTPATIENT)
Dept: SURGERY | Age: 77
DRG: 470 | End: 2017-08-20
Payer: MEDICARE

## 2017-08-21 ENCOUNTER — ANESTHESIA (OUTPATIENT)
Dept: SURGERY | Age: 77
DRG: 470 | End: 2017-08-21
Payer: MEDICARE

## 2017-08-21 ENCOUNTER — HOSPITAL ENCOUNTER (INPATIENT)
Age: 77
LOS: 3 days | Discharge: NURSING, REHAB OR LONG-TERM CARE | DRG: 470 | End: 2017-08-24
Attending: ORTHOPAEDIC SURGERY | Admitting: ORTHOPAEDIC SURGERY
Payer: MEDICARE

## 2017-08-21 DIAGNOSIS — Z96.651 STATUS POST TOTAL RIGHT KNEE REPLACEMENT: Primary | ICD-10-CM

## 2017-08-21 PROBLEM — N18.9 CKD (CHRONIC KIDNEY DISEASE): Chronic | Status: ACTIVE | Noted: 2017-08-21

## 2017-08-21 LAB
ABO + RH BLD: NORMAL
APPEARANCE UR: CLEAR
BACTERIA URNS QL MICRO: 0 /HPF
BILIRUB UR QL: NEGATIVE
BLOOD GROUP ANTIBODIES SERPL: NORMAL
CASTS URNS QL MICRO: 0 /LPF
COLOR UR: YELLOW
CRYSTALS URNS QL MICRO: 0 /LPF
EPI CELLS #/AREA URNS HPF: 0 /HPF
GLUCOSE BLD STRIP.AUTO-MCNC: 105 MG/DL (ref 65–100)
GLUCOSE UR STRIP.AUTO-MCNC: NEGATIVE MG/DL
HGB BLD-MCNC: 11.1 G/DL (ref 11.7–15.4)
HGB UR QL STRIP: ABNORMAL
KETONES UR QL STRIP.AUTO: NEGATIVE MG/DL
LEUKOCYTE ESTERASE UR QL STRIP.AUTO: NEGATIVE
MUCOUS THREADS URNS QL MICRO: 0 /LPF
NITRITE UR QL STRIP.AUTO: NEGATIVE
PH UR STRIP: 7 [PH] (ref 5–9)
PROT UR STRIP-MCNC: NEGATIVE MG/DL
RBC #/AREA URNS HPF: NORMAL /HPF
SP GR UR REFRACTOMETRY: 1.01 (ref 1–1.02)
SPECIMEN EXP DATE BLD: NORMAL
UROBILINOGEN UR QL STRIP.AUTO: 0.2 EU/DL (ref 0.2–1)
WBC URNS QL MICRO: 0 /HPF

## 2017-08-21 PROCEDURE — 77030034849: Performed by: ORTHOPAEDIC SURGERY

## 2017-08-21 PROCEDURE — 77030031139 HC SUT VCRL2 J&J -A: Performed by: ORTHOPAEDIC SURGERY

## 2017-08-21 PROCEDURE — 74011000250 HC RX REV CODE- 250: Performed by: ANESTHESIOLOGY

## 2017-08-21 PROCEDURE — 76010000171 HC OR TIME 2 TO 2.5 HR INTENSV-TIER 1: Performed by: ORTHOPAEDIC SURGERY

## 2017-08-21 PROCEDURE — 97161 PT EVAL LOW COMPLEX 20 MIN: CPT

## 2017-08-21 PROCEDURE — 36415 COLL VENOUS BLD VENIPUNCTURE: CPT | Performed by: PHYSICIAN ASSISTANT

## 2017-08-21 PROCEDURE — 74011250636 HC RX REV CODE- 250/636: Performed by: PHYSICIAN ASSISTANT

## 2017-08-21 PROCEDURE — 74011000258 HC RX REV CODE- 258: Performed by: ORTHOPAEDIC SURGERY

## 2017-08-21 PROCEDURE — 94760 N-INVAS EAR/PLS OXIMETRY 1: CPT

## 2017-08-21 PROCEDURE — 77030003602 HC NDL NRV BLK BBMI -B: Performed by: ANESTHESIOLOGY

## 2017-08-21 PROCEDURE — 76210000006 HC OR PH I REC 0.5 TO 1 HR: Performed by: ORTHOPAEDIC SURGERY

## 2017-08-21 PROCEDURE — 77030018836 HC SOL IRR NACL ICUM -A: Performed by: ORTHOPAEDIC SURGERY

## 2017-08-21 PROCEDURE — 86580 TB INTRADERMAL TEST: CPT | Performed by: ORTHOPAEDIC SURGERY

## 2017-08-21 PROCEDURE — 77030013727 HC IRR FAN PULSVC ZIMM -B: Performed by: ORTHOPAEDIC SURGERY

## 2017-08-21 PROCEDURE — 77030012890

## 2017-08-21 PROCEDURE — 0SRC0J9 REPLACEMENT OF RIGHT KNEE JOINT WITH SYNTHETIC SUBSTITUTE, CEMENTED, OPEN APPROACH: ICD-10-PCS | Performed by: ORTHOPAEDIC SURGERY

## 2017-08-21 PROCEDURE — 76060000035 HC ANESTHESIA 2 TO 2.5 HR: Performed by: ORTHOPAEDIC SURGERY

## 2017-08-21 PROCEDURE — 65270000029 HC RM PRIVATE

## 2017-08-21 PROCEDURE — 74011000250 HC RX REV CODE- 250: Performed by: ORTHOPAEDIC SURGERY

## 2017-08-21 PROCEDURE — 81003 URINALYSIS AUTO W/O SCOPE: CPT | Performed by: ORTHOPAEDIC SURGERY

## 2017-08-21 PROCEDURE — C1776 JOINT DEVICE (IMPLANTABLE): HCPCS | Performed by: ORTHOPAEDIC SURGERY

## 2017-08-21 PROCEDURE — 74011000302 HC RX REV CODE- 302: Performed by: ORTHOPAEDIC SURGERY

## 2017-08-21 PROCEDURE — 77030012935 HC DRSG AQUACEL BMS -B: Performed by: ORTHOPAEDIC SURGERY

## 2017-08-21 PROCEDURE — 77030011640 HC PAD GRND REM COVD -A: Performed by: ORTHOPAEDIC SURGERY

## 2017-08-21 PROCEDURE — C1713 ANCHOR/SCREW BN/BN,TIS/BN: HCPCS | Performed by: ORTHOPAEDIC SURGERY

## 2017-08-21 PROCEDURE — 77030035236 HC SUT PDS STRATFX BARB J&J -B: Performed by: ORTHOPAEDIC SURGERY

## 2017-08-21 PROCEDURE — 74011250637 HC RX REV CODE- 250/637: Performed by: PHYSICIAN ASSISTANT

## 2017-08-21 PROCEDURE — 77030006720 HC BLD PAT RMR ZIMM -B: Performed by: ORTHOPAEDIC SURGERY

## 2017-08-21 PROCEDURE — 86900 BLOOD TYPING SEROLOGIC ABO: CPT | Performed by: ORTHOPAEDIC SURGERY

## 2017-08-21 PROCEDURE — 77030002966 HC SUT PDS J&J -A: Performed by: ORTHOPAEDIC SURGERY

## 2017-08-21 PROCEDURE — 85018 HEMOGLOBIN: CPT | Performed by: PHYSICIAN ASSISTANT

## 2017-08-21 PROCEDURE — 77010033678 HC OXYGEN DAILY

## 2017-08-21 PROCEDURE — 77030011208: Performed by: ORTHOPAEDIC SURGERY

## 2017-08-21 PROCEDURE — 82962 GLUCOSE BLOOD TEST: CPT

## 2017-08-21 PROCEDURE — 77030006789 HC BLD SAW OSC STRY -C: Performed by: ORTHOPAEDIC SURGERY

## 2017-08-21 PROCEDURE — 81015 MICROSCOPIC EXAM OF URINE: CPT | Performed by: ORTHOPAEDIC SURGERY

## 2017-08-21 PROCEDURE — 77030020782 HC GWN BAIR PAWS FLX 3M -B: Performed by: ANESTHESIOLOGY

## 2017-08-21 PROCEDURE — 77030008467 HC STPLR SKN COVD -B: Performed by: ORTHOPAEDIC SURGERY

## 2017-08-21 PROCEDURE — 74011000250 HC RX REV CODE- 250

## 2017-08-21 PROCEDURE — 77030002912 HC SUT ETHBND J&J -A: Performed by: ORTHOPAEDIC SURGERY

## 2017-08-21 PROCEDURE — 94762 N-INVAS EAR/PLS OXIMTRY CONT: CPT

## 2017-08-21 PROCEDURE — 74011250636 HC RX REV CODE- 250/636: Performed by: ORTHOPAEDIC SURGERY

## 2017-08-21 PROCEDURE — 77030019557 HC ELECTRD VES SEAL MEDT -F: Performed by: ORTHOPAEDIC SURGERY

## 2017-08-21 PROCEDURE — 77030032490 HC SLV COMPR SCD KNE COVD -B

## 2017-08-21 PROCEDURE — 76010010054 HC POST OP PAIN BLOCK: Performed by: ORTHOPAEDIC SURGERY

## 2017-08-21 PROCEDURE — 74011250637 HC RX REV CODE- 250/637: Performed by: ANESTHESIOLOGY

## 2017-08-21 PROCEDURE — 74011250636 HC RX REV CODE- 250/636

## 2017-08-21 PROCEDURE — 77030036688 HC BLNKT CLD THER S2SG -B

## 2017-08-21 PROCEDURE — 74011250636 HC RX REV CODE- 250/636: Performed by: ANESTHESIOLOGY

## 2017-08-21 PROCEDURE — 97166 OT EVAL MOD COMPLEX 45 MIN: CPT

## 2017-08-21 PROCEDURE — 76942 ECHO GUIDE FOR BIOPSY: CPT | Performed by: ORTHOPAEDIC SURGERY

## 2017-08-21 PROCEDURE — 77030020143 HC AIRWY LARYN INTUB CGAS -A: Performed by: ANESTHESIOLOGY

## 2017-08-21 DEVICE — (D)CEMENT BNE HV R 40GM -- DUPE USE ITEM 353850: Type: IMPLANTABLE DEVICE | Site: KNEE | Status: FUNCTIONAL

## 2017-08-21 DEVICE — GENESIS II NON-POROUS TIBIAL                                    BASEPLATE SIZE 5 RIGHT
Type: IMPLANTABLE DEVICE | Site: KNEE | Status: FUNCTIONAL
Brand: GENESIS II

## 2017-08-21 DEVICE — LEGION POSTERIOR STABILIZED                                    OXINIUM FEMORAL SIZE 5 RIGHT
Type: IMPLANTABLE DEVICE | Site: KNEE | Status: FUNCTIONAL
Brand: LEGION

## 2017-08-21 DEVICE — GENESIS II PRIMARY FEMORAL LUGS
Type: IMPLANTABLE DEVICE | Site: KNEE | Status: FUNCTIONAL
Brand: GENESIS II

## 2017-08-21 RX ORDER — LIDOCAINE HYDROCHLORIDE 20 MG/ML
INJECTION, SOLUTION EPIDURAL; INFILTRATION; INTRACAUDAL; PERINEURAL AS NEEDED
Status: DISCONTINUED | OUTPATIENT
Start: 2017-08-21 | End: 2017-08-21 | Stop reason: HOSPADM

## 2017-08-21 RX ORDER — FENTANYL CITRATE 50 UG/ML
INJECTION, SOLUTION INTRAMUSCULAR; INTRAVENOUS AS NEEDED
Status: DISCONTINUED | OUTPATIENT
Start: 2017-08-21 | End: 2017-08-21 | Stop reason: HOSPADM

## 2017-08-21 RX ORDER — HYDROMORPHONE HYDROCHLORIDE 2 MG/1
2 TABLET ORAL
Status: DISCONTINUED | OUTPATIENT
Start: 2017-08-21 | End: 2017-08-24 | Stop reason: HOSPADM

## 2017-08-21 RX ORDER — OXYCODONE HYDROCHLORIDE 5 MG/1
5 TABLET ORAL
Status: DISCONTINUED | OUTPATIENT
Start: 2017-08-21 | End: 2017-08-21 | Stop reason: HOSPADM

## 2017-08-21 RX ORDER — ROCURONIUM BROMIDE 10 MG/ML
INJECTION, SOLUTION INTRAVENOUS AS NEEDED
Status: DISCONTINUED | OUTPATIENT
Start: 2017-08-21 | End: 2017-08-21 | Stop reason: HOSPADM

## 2017-08-21 RX ORDER — MORPHINE SULFATE 10 MG/ML
INJECTION, SOLUTION INTRAMUSCULAR; INTRAVENOUS AS NEEDED
Status: DISCONTINUED | OUTPATIENT
Start: 2017-08-21 | End: 2017-08-21 | Stop reason: HOSPADM

## 2017-08-21 RX ORDER — HYDROMORPHONE HYDROCHLORIDE 2 MG/ML
INJECTION, SOLUTION INTRAMUSCULAR; INTRAVENOUS; SUBCUTANEOUS AS NEEDED
Status: DISCONTINUED | OUTPATIENT
Start: 2017-08-21 | End: 2017-08-21 | Stop reason: HOSPADM

## 2017-08-21 RX ORDER — CELECOXIB 200 MG/1
200 CAPSULE ORAL EVERY 12 HOURS
Status: DISCONTINUED | OUTPATIENT
Start: 2017-08-21 | End: 2017-08-21

## 2017-08-21 RX ORDER — DEXAMETHASONE SODIUM PHOSPHATE 100 MG/10ML
10 INJECTION INTRAMUSCULAR; INTRAVENOUS ONCE
Status: COMPLETED | OUTPATIENT
Start: 2017-08-22 | End: 2017-08-22

## 2017-08-21 RX ORDER — SODIUM CHLORIDE 0.9 % (FLUSH) 0.9 %
5-10 SYRINGE (ML) INJECTION EVERY 8 HOURS
Status: DISCONTINUED | OUTPATIENT
Start: 2017-08-21 | End: 2017-08-24 | Stop reason: HOSPADM

## 2017-08-21 RX ORDER — SODIUM CHLORIDE, SODIUM LACTATE, POTASSIUM CHLORIDE, CALCIUM CHLORIDE 600; 310; 30; 20 MG/100ML; MG/100ML; MG/100ML; MG/100ML
75 INJECTION, SOLUTION INTRAVENOUS CONTINUOUS
Status: DISCONTINUED | OUTPATIENT
Start: 2017-08-21 | End: 2017-08-21 | Stop reason: HOSPADM

## 2017-08-21 RX ORDER — ZINC GLUCONATE 10 MG
1 LOZENGE ORAL DAILY
Status: DISCONTINUED | OUTPATIENT
Start: 2017-08-22 | End: 2017-08-21 | Stop reason: CLARIF

## 2017-08-21 RX ORDER — FENTANYL CITRATE 50 UG/ML
100 INJECTION, SOLUTION INTRAMUSCULAR; INTRAVENOUS ONCE
Status: COMPLETED | OUTPATIENT
Start: 2017-08-21 | End: 2017-08-21

## 2017-08-21 RX ORDER — AMOXICILLIN 250 MG
2 CAPSULE ORAL DAILY
Status: DISCONTINUED | OUTPATIENT
Start: 2017-08-22 | End: 2017-08-24 | Stop reason: HOSPADM

## 2017-08-21 RX ORDER — LANOLIN ALCOHOL/MO/W.PET/CERES
200 CREAM (GRAM) TOPICAL DAILY
Status: DISCONTINUED | OUTPATIENT
Start: 2017-08-22 | End: 2017-08-24 | Stop reason: HOSPADM

## 2017-08-21 RX ORDER — ACETAMINOPHEN 500 MG
1000 TABLET ORAL EVERY 6 HOURS
Status: DISCONTINUED | OUTPATIENT
Start: 2017-08-22 | End: 2017-08-24 | Stop reason: HOSPADM

## 2017-08-21 RX ORDER — GLYCOPYRROLATE 0.2 MG/ML
INJECTION INTRAMUSCULAR; INTRAVENOUS AS NEEDED
Status: DISCONTINUED | OUTPATIENT
Start: 2017-08-21 | End: 2017-08-21 | Stop reason: HOSPADM

## 2017-08-21 RX ORDER — DIPHENHYDRAMINE HCL 25 MG
25 CAPSULE ORAL
Status: DISCONTINUED | OUTPATIENT
Start: 2017-08-21 | End: 2017-08-24 | Stop reason: HOSPADM

## 2017-08-21 RX ORDER — HYDROMORPHONE HYDROCHLORIDE 1 MG/ML
1 INJECTION, SOLUTION INTRAMUSCULAR; INTRAVENOUS; SUBCUTANEOUS
Status: DISCONTINUED | OUTPATIENT
Start: 2017-08-21 | End: 2017-08-24 | Stop reason: HOSPADM

## 2017-08-21 RX ORDER — SODIUM CHLORIDE 9 MG/ML
100 INJECTION, SOLUTION INTRAVENOUS CONTINUOUS
Status: DISPENSED | OUTPATIENT
Start: 2017-08-21 | End: 2017-08-23

## 2017-08-21 RX ORDER — DEXAMETHASONE SODIUM PHOSPHATE 4 MG/ML
INJECTION, SOLUTION INTRA-ARTICULAR; INTRALESIONAL; INTRAMUSCULAR; INTRAVENOUS; SOFT TISSUE AS NEEDED
Status: DISCONTINUED | OUTPATIENT
Start: 2017-08-21 | End: 2017-08-21 | Stop reason: HOSPADM

## 2017-08-21 RX ORDER — MIDAZOLAM HYDROCHLORIDE 1 MG/ML
2 INJECTION, SOLUTION INTRAMUSCULAR; INTRAVENOUS
Status: DISCONTINUED | OUTPATIENT
Start: 2017-08-21 | End: 2017-08-21 | Stop reason: HOSPADM

## 2017-08-21 RX ORDER — TRANEXAMIC ACID 100 MG/ML
INJECTION, SOLUTION INTRAVENOUS AS NEEDED
Status: DISCONTINUED | OUTPATIENT
Start: 2017-08-21 | End: 2017-08-21 | Stop reason: HOSPADM

## 2017-08-21 RX ORDER — FAMOTIDINE 20 MG/1
20 TABLET, FILM COATED ORAL ONCE
Status: COMPLETED | OUTPATIENT
Start: 2017-08-21 | End: 2017-08-21

## 2017-08-21 RX ORDER — LIDOCAINE HYDROCHLORIDE 10 MG/ML
0.1 INJECTION INFILTRATION; PERINEURAL AS NEEDED
Status: DISCONTINUED | OUTPATIENT
Start: 2017-08-21 | End: 2017-08-21 | Stop reason: HOSPADM

## 2017-08-21 RX ORDER — HYDRALAZINE HYDROCHLORIDE 20 MG/ML
10 INJECTION INTRAMUSCULAR; INTRAVENOUS
Status: DISCONTINUED | OUTPATIENT
Start: 2017-08-21 | End: 2017-08-24 | Stop reason: HOSPADM

## 2017-08-21 RX ORDER — MIDAZOLAM HYDROCHLORIDE 1 MG/ML
5 INJECTION, SOLUTION INTRAMUSCULAR; INTRAVENOUS ONCE
Status: COMPLETED | OUTPATIENT
Start: 2017-08-21 | End: 2017-08-21

## 2017-08-21 RX ORDER — HYDROMORPHONE HYDROCHLORIDE 2 MG/ML
0.5 INJECTION, SOLUTION INTRAMUSCULAR; INTRAVENOUS; SUBCUTANEOUS
Status: DISCONTINUED | OUTPATIENT
Start: 2017-08-21 | End: 2017-08-21 | Stop reason: HOSPADM

## 2017-08-21 RX ORDER — SODIUM CHLORIDE, SODIUM LACTATE, POTASSIUM CHLORIDE, CALCIUM CHLORIDE 600; 310; 30; 20 MG/100ML; MG/100ML; MG/100ML; MG/100ML
INJECTION, SOLUTION INTRAVENOUS
Status: DISCONTINUED | OUTPATIENT
Start: 2017-08-21 | End: 2017-08-21 | Stop reason: HOSPADM

## 2017-08-21 RX ORDER — ASPIRIN 325 MG
325 TABLET ORAL 2 TIMES DAILY
Qty: 60 TAB | Refills: 1 | Status: SHIPPED | OUTPATIENT
Start: 2017-08-21 | End: 2017-09-25

## 2017-08-21 RX ORDER — SODIUM CHLORIDE 0.9 % (FLUSH) 0.9 %
5-10 SYRINGE (ML) INJECTION AS NEEDED
Status: DISCONTINUED | OUTPATIENT
Start: 2017-08-21 | End: 2017-08-24 | Stop reason: HOSPADM

## 2017-08-21 RX ORDER — CEFAZOLIN SODIUM IN 0.9 % NACL 2 G/50 ML
2 INTRAVENOUS SOLUTION, PIGGYBACK (ML) INTRAVENOUS ONCE
Status: DISCONTINUED | OUTPATIENT
Start: 2017-08-21 | End: 2017-08-21 | Stop reason: HOSPADM

## 2017-08-21 RX ORDER — ROPIVACAINE HYDROCHLORIDE 2 MG/ML
INJECTION, SOLUTION EPIDURAL; INFILTRATION; PERINEURAL AS NEEDED
Status: DISCONTINUED | OUTPATIENT
Start: 2017-08-21 | End: 2017-08-21 | Stop reason: HOSPADM

## 2017-08-21 RX ORDER — ONDANSETRON 2 MG/ML
4 INJECTION INTRAMUSCULAR; INTRAVENOUS
Status: DISCONTINUED | OUTPATIENT
Start: 2017-08-21 | End: 2017-08-24 | Stop reason: HOSPADM

## 2017-08-21 RX ORDER — CHLORTHALIDONE 25 MG/1
12.5 TABLET ORAL EVERY OTHER DAY
Status: DISCONTINUED | OUTPATIENT
Start: 2017-08-21 | End: 2017-08-21 | Stop reason: SDUPTHER

## 2017-08-21 RX ORDER — KETOROLAC TROMETHAMINE 30 MG/ML
INJECTION, SOLUTION INTRAMUSCULAR; INTRAVENOUS AS NEEDED
Status: DISCONTINUED | OUTPATIENT
Start: 2017-08-21 | End: 2017-08-21 | Stop reason: HOSPADM

## 2017-08-21 RX ORDER — ASPIRIN 325 MG
325 TABLET, DELAYED RELEASE (ENTERIC COATED) ORAL EVERY 12 HOURS
Status: DISCONTINUED | OUTPATIENT
Start: 2017-08-21 | End: 2017-08-24 | Stop reason: HOSPADM

## 2017-08-21 RX ORDER — NALOXONE HYDROCHLORIDE 0.4 MG/ML
.2-.4 INJECTION, SOLUTION INTRAMUSCULAR; INTRAVENOUS; SUBCUTANEOUS
Status: DISCONTINUED | OUTPATIENT
Start: 2017-08-21 | End: 2017-08-24 | Stop reason: HOSPADM

## 2017-08-21 RX ORDER — NEOSTIGMINE METHYLSULFATE 1 MG/ML
INJECTION, SOLUTION INTRAVENOUS AS NEEDED
Status: DISCONTINUED | OUTPATIENT
Start: 2017-08-21 | End: 2017-08-21 | Stop reason: HOSPADM

## 2017-08-21 RX ORDER — CEFAZOLIN SODIUM IN 0.9 % NACL 2 G/50 ML
2 INTRAVENOUS SOLUTION, PIGGYBACK (ML) INTRAVENOUS EVERY 8 HOURS
Status: COMPLETED | OUTPATIENT
Start: 2017-08-21 | End: 2017-08-22

## 2017-08-21 RX ORDER — ACETAMINOPHEN 10 MG/ML
1000 INJECTION, SOLUTION INTRAVENOUS ONCE
Status: COMPLETED | OUTPATIENT
Start: 2017-08-21 | End: 2017-08-21

## 2017-08-21 RX ORDER — HYDROMORPHONE HYDROCHLORIDE 2 MG/1
2 TABLET ORAL
Qty: 40 TAB | Refills: 0 | Status: SHIPPED | OUTPATIENT
Start: 2017-08-21 | End: 2018-04-06

## 2017-08-21 RX ORDER — ATENOLOL AND CHLORTHALIDONE TABLET 50; 25 MG/1; MG/1
1 TABLET ORAL DAILY
Status: DISCONTINUED | OUTPATIENT
Start: 2017-08-22 | End: 2017-08-21 | Stop reason: SDUPTHER

## 2017-08-21 RX ORDER — POTASSIUM CHLORIDE 20 MEQ/1
20 TABLET, EXTENDED RELEASE ORAL DAILY
Status: DISCONTINUED | OUTPATIENT
Start: 2017-08-22 | End: 2017-08-24 | Stop reason: HOSPADM

## 2017-08-21 RX ORDER — CEFAZOLIN SODIUM 1 G/3ML
INJECTION, POWDER, FOR SOLUTION INTRAMUSCULAR; INTRAVENOUS AS NEEDED
Status: DISCONTINUED | OUTPATIENT
Start: 2017-08-21 | End: 2017-08-21 | Stop reason: HOSPADM

## 2017-08-21 RX ORDER — PROPOFOL 10 MG/ML
INJECTION, EMULSION INTRAVENOUS AS NEEDED
Status: DISCONTINUED | OUTPATIENT
Start: 2017-08-21 | End: 2017-08-21 | Stop reason: HOSPADM

## 2017-08-21 RX ORDER — FOLIC ACID 1 MG/1
1 TABLET ORAL DAILY
Status: DISCONTINUED | OUTPATIENT
Start: 2017-08-22 | End: 2017-08-24 | Stop reason: HOSPADM

## 2017-08-21 RX ORDER — HYDROCODONE BITARTRATE AND ACETAMINOPHEN 5; 325 MG/1; MG/1
2 TABLET ORAL AS NEEDED
Status: DISCONTINUED | OUTPATIENT
Start: 2017-08-21 | End: 2017-08-21 | Stop reason: HOSPADM

## 2017-08-21 RX ORDER — ROPIVACAINE HYDROCHLORIDE 5 MG/ML
INJECTION, SOLUTION EPIDURAL; INFILTRATION; PERINEURAL AS NEEDED
Status: DISCONTINUED | OUTPATIENT
Start: 2017-08-21 | End: 2017-08-21 | Stop reason: HOSPADM

## 2017-08-21 RX ADMIN — ASPIRIN 325 MG: 325 TABLET, DELAYED RELEASE ORAL at 20:14

## 2017-08-21 RX ADMIN — GLYCOPYRROLATE 0.4 MG: 0.2 INJECTION INTRAMUSCULAR; INTRAVENOUS at 12:31

## 2017-08-21 RX ADMIN — HYDROMORPHONE HYDROCHLORIDE 0.2 MG: 2 INJECTION, SOLUTION INTRAMUSCULAR; INTRAVENOUS; SUBCUTANEOUS at 12:35

## 2017-08-21 RX ADMIN — Medication 5 ML: at 14:00

## 2017-08-21 RX ADMIN — PROPOFOL 150 MG: 10 INJECTION, EMULSION INTRAVENOUS at 10:47

## 2017-08-21 RX ADMIN — TUBERCULIN PURIFIED PROTEIN DERIVATIVE 5 UNITS: 5 INJECTION, SOLUTION INTRADERMAL at 08:54

## 2017-08-21 RX ADMIN — FENTANYL CITRATE 50 MCG: 50 INJECTION, SOLUTION INTRAMUSCULAR; INTRAVENOUS at 10:20

## 2017-08-21 RX ADMIN — FAMOTIDINE 20 MG: 20 TABLET, FILM COATED ORAL at 09:00

## 2017-08-21 RX ADMIN — FENTANYL CITRATE 50 MCG: 50 INJECTION, SOLUTION INTRAMUSCULAR; INTRAVENOUS at 11:30

## 2017-08-21 RX ADMIN — ROPIVACAINE HYDROCHLORIDE 20 ML: 5 INJECTION, SOLUTION EPIDURAL; INFILTRATION; PERINEURAL at 10:25

## 2017-08-21 RX ADMIN — MIDAZOLAM 3 MG: 1 INJECTION INTRAMUSCULAR; INTRAVENOUS at 10:20

## 2017-08-21 RX ADMIN — HYDROMORPHONE HYDROCHLORIDE 0.2 MG: 2 INJECTION, SOLUTION INTRAMUSCULAR; INTRAVENOUS; SUBCUTANEOUS at 12:10

## 2017-08-21 RX ADMIN — ACETAMINOPHEN 1000 MG: 500 TABLET, FILM COATED ORAL at 23:32

## 2017-08-21 RX ADMIN — CEFAZOLIN 2 G: 1 INJECTION, POWDER, FOR SOLUTION INTRAMUSCULAR; INTRAVENOUS; PARENTERAL at 18:00

## 2017-08-21 RX ADMIN — HYDROMORPHONE HYDROCHLORIDE 2 MG: 2 TABLET ORAL at 20:14

## 2017-08-21 RX ADMIN — SODIUM CHLORIDE, SODIUM LACTATE, POTASSIUM CHLORIDE, CALCIUM CHLORIDE: 600; 310; 30; 20 INJECTION, SOLUTION INTRAVENOUS at 10:15

## 2017-08-21 RX ADMIN — ROCURONIUM BROMIDE 5 MG: 10 INJECTION, SOLUTION INTRAVENOUS at 12:16

## 2017-08-21 RX ADMIN — CEFAZOLIN SODIUM 2 G: 1 INJECTION, POWDER, FOR SOLUTION INTRAMUSCULAR; INTRAVENOUS at 10:35

## 2017-08-21 RX ADMIN — NEOSTIGMINE METHYLSULFATE 3 MG: 1 INJECTION, SOLUTION INTRAVENOUS at 12:31

## 2017-08-21 RX ADMIN — SODIUM CHLORIDE, SODIUM LACTATE, POTASSIUM CHLORIDE, CALCIUM CHLORIDE: 600; 310; 30; 20 INJECTION, SOLUTION INTRAVENOUS at 11:15

## 2017-08-21 RX ADMIN — FENTANYL CITRATE 25 MCG: 50 INJECTION, SOLUTION INTRAMUSCULAR; INTRAVENOUS at 10:55

## 2017-08-21 RX ADMIN — PROPOFOL 50 MG: 10 INJECTION, EMULSION INTRAVENOUS at 10:49

## 2017-08-21 RX ADMIN — SODIUM CHLORIDE, SODIUM LACTATE, POTASSIUM CHLORIDE, AND CALCIUM CHLORIDE 75 ML/HR: 600; 310; 30; 20 INJECTION, SOLUTION INTRAVENOUS at 08:54

## 2017-08-21 RX ADMIN — LIDOCAINE HYDROCHLORIDE 0.1 ML: 10 INJECTION, SOLUTION INFILTRATION; PERINEURAL at 08:54

## 2017-08-21 RX ADMIN — LIDOCAINE HYDROCHLORIDE 40 MG: 20 INJECTION, SOLUTION EPIDURAL; INFILTRATION; INTRACAUDAL; PERINEURAL at 10:47

## 2017-08-21 RX ADMIN — HYDROMORPHONE HYDROCHLORIDE 0.2 MG: 2 INJECTION, SOLUTION INTRAMUSCULAR; INTRAVENOUS; SUBCUTANEOUS at 12:07

## 2017-08-21 RX ADMIN — HYDROMORPHONE HYDROCHLORIDE 0.5 MG: 2 INJECTION, SOLUTION INTRAMUSCULAR; INTRAVENOUS; SUBCUTANEOUS at 13:15

## 2017-08-21 RX ADMIN — FENTANYL CITRATE 50 MCG: 50 INJECTION, SOLUTION INTRAMUSCULAR; INTRAVENOUS at 11:25

## 2017-08-21 RX ADMIN — ACETAMINOPHEN 1000 MG: 10 INJECTION, SOLUTION INTRAVENOUS at 18:00

## 2017-08-21 RX ADMIN — ROCURONIUM BROMIDE 10 MG: 10 INJECTION, SOLUTION INTRAVENOUS at 12:12

## 2017-08-21 RX ADMIN — HYDROMORPHONE HYDROCHLORIDE 0.2 MG: 2 INJECTION, SOLUTION INTRAMUSCULAR; INTRAVENOUS; SUBCUTANEOUS at 12:20

## 2017-08-21 RX ADMIN — FENTANYL CITRATE 25 MCG: 50 INJECTION, SOLUTION INTRAMUSCULAR; INTRAVENOUS at 11:07

## 2017-08-21 RX ADMIN — DEXAMETHASONE SODIUM PHOSPHATE 10 MG: 4 INJECTION, SOLUTION INTRA-ARTICULAR; INTRALESIONAL; INTRAMUSCULAR; INTRAVENOUS; SOFT TISSUE at 11:05

## 2017-08-21 RX ADMIN — FENTANYL CITRATE 50 MCG: 50 INJECTION, SOLUTION INTRAMUSCULAR; INTRAVENOUS at 10:47

## 2017-08-21 RX ADMIN — TRANEXAMIC ACID 1000 MG: 100 INJECTION, SOLUTION INTRAVENOUS at 10:50

## 2017-08-21 NOTE — CONSULTS
Hospitalist Consult Note     Admit Date:  2017  4:13 AM   Name:  Jacqueline Peterson   Age:  67 y.o.  :  1940   MRN:  [id number]   PCP:  Alberto Sr MD  Treatment Team: Attending Provider: Juliana Cat MD; Care Manager: GILA Espinosa; Utilization Review: Gale Su    HPI:     Ms. Pan Frye is a 68 yo female with PMH of HTN, CKD post op right TKA. Eating lunch and doing well        ROS as noted in HPI.   Past Medical History:   Diagnosis Date    Abnormal posture     Anemia     history     Essential hypertension     on med for control     H/O echocardiogram 2017    EF 55-65%    H/O: pneumonia     2017    Hypercholesteremia     managed with medication     Hypomagnesemia     managed with supplement     Left ventricular diastolic dysfunction     per echo \"Grade 1 (mild)\"    Muscle weakness     generalized    Nausea & vomiting     nausea only     MOOK (obstructive sleep apnea) 2015    patient denies     Osteoarthritis     Poor historian     Pressure ulcer of left buttock, stage 2     resolved per patient-previously seen by Dr. Lurdes Grant at wound center      Pressure ulcer of right buttock, stage 2     resolved per patient-previously seen by Dr. Lurdes Grant at wound center     SOB (shortness of breath)     Status post right hip replacement 5/10/2017    Status post total right knee replacement 2017    Unable to ambulate     patient in wheelchair-can stand with assistance     Unsteadiness on feet       Past Surgical History:   Procedure Laterality Date    BREAST SURGERY PROCEDURE UNLISTED Right 1968    cyst removed    HX CHOLECYSTECTOMY  2009    HX HIP REPLACEMENT Right 05/10/2017    HX KNEE ARTHROSCOPY Bilateral     X2 to right; X1 to left      Current Facility-Administered Medications   Medication Dose Route Frequency    [START ON 6/15/9735] folic acid (FOLVITE) tablet 1 mg  1 mg Oral DAILY    0.9% sodium chloride infusion  100 mL/hr IntraVENous CONTINUOUS    sodium chloride (NS) flush 5-10 mL  5-10 mL IntraVENous Q8H    sodium chloride (NS) flush 5-10 mL  5-10 mL IntraVENous PRN    ceFAZolin in 0.9% NS (ANCEF) IVPB soln 2 g  2 g IntraVENous Q8H    acetaminophen (OFIRMEV) infusion 1,000 mg  1,000 mg IntraVENous ONCE    [START ON 8/22/2017] acetaminophen (TYLENOL) tablet 1,000 mg  1,000 mg Oral Q6H    HYDROmorphone (DILAUDID) tablet 2 mg  2 mg Oral Q4H PRN    HYDROmorphone (PF) (DILAUDID) injection 1 mg  1 mg IntraVENous Q3H PRN    naloxone (NARCAN) injection 0.2-0.4 mg  0.2-0.4 mg IntraVENous Q10MIN PRN    [START ON 8/22/2017] dexamethasone (DECADRON) injection 10 mg  10 mg IntraVENous ONCE    ondansetron (ZOFRAN) injection 4 mg  4 mg IntraVENous Q4H PRN    diphenhydrAMINE (BENADRYL) capsule 25 mg  25 mg Oral Q4H PRN    [START ON 8/22/2017] senna-docusate (PERICOLACE) 8.6-50 mg per tablet 2 Tab  2 Tab Oral DAILY    aspirin delayed-release tablet 325 mg  325 mg Oral Q12H    [START ON 8/22/2017] READ PPD 24HR, 48HR, 72HR  1 Each Other Q24H    [START ON 8/22/2017] atenolol/chlorthalidone (TENORETIC) 50/25 mg   Oral DAILY    [START ON 8/22/2017] magnesium oxide (MAG-OX) tablet 200 mg  200 mg Oral DAILY    [START ON 8/22/2017] potassium chloride (K-DUR, KLOR-CON) SR tablet 20 mEq  20 mEq Oral DAILY     Allergies   Allergen Reactions    Avelox [Moxifloxacin] Rash    Other Plant, Animal, Environmental Nausea Only     Allergic to Perfume & Smoke.       Social History   Substance Use Topics    Smoking status: Never Smoker    Smokeless tobacco: Never Used    Alcohol use No      Family History   Problem Relation Age of Onset    Heart Disease Mother     Hypertension Mother     Hypertension Father       Immunization History   Administered Date(s) Administered    TB Skin Test (PPD) Intradermal 05/10/2017, 08/21/2017       Objective:   Patient Vitals for the past 24 hrs:   Temp Pulse Resp BP SpO2   08/21/17 1506 96.4 °F (35.8 °C) 62 14 173/59 98 %   08/21/17 1351 - 65 12 128/60 100 %   08/21/17 1339 - (!) 58 14 118/57 100 %   08/21/17 1324 - 61 14 124/55 100 %   08/21/17 1309 - (!) 59 12 116/55 99 %   08/21/17 1304 - 61 12 120/58 99 %   08/21/17 1259 - (!) 59 12 119/57 98 %   08/21/17 1254 98.4 °F (36.9 °C) 65 16 119/56 94 %   08/21/17 1026 - 81 16 150/70 97 %   08/21/17 1020 - 80 16 179/84 97 %   08/21/17 1013 - 80 16 188/83 100 %   08/21/17 0825 97.8 °F (36.6 °C) 67 16 177/81 100 %     Oxygen Therapy  O2 Sat (%): 98 % (08/21/17 1506)  O2 Device: Nasal cannula (08/21/17 1339)  O2 Flow Rate (L/min): 3 l/min (08/21/17 1351)    Intake/Output Summary (Last 24 hours) at 08/21/17 1538  Last data filed at 08/21/17 1407   Gross per 24 hour   Intake             2150 ml   Output               50 ml   Net             2100 ml       Physical Exam:  General:    Well nourished. Alert. Eyes:   Normal sclera. Extraocular movements intact. ENT:  Normocephalic, atraumatic. Moist mucous membranes  CV:   RRR. No murmur, rub, or gallop. Lungs:  CTAB. No wheezing, rhonchi, or rales. Anterior exam   Abdomen: Soft, nontender, nondistended. Bowel sounds normal.   Extremities: Warm and dry. No cyanosis or edema. Neurologic: grossly intact. .  Skin:     No rashes or jaundice. Psych:  Normal mood and affect. I reviewed the labs, imaging, EKGs, telemetry, and other studies done this admission.   Data Review:   Recent Results (from the past 24 hour(s))   TYPE & SCREEN    Collection Time: 08/21/17  8:50 AM   Result Value Ref Range    Crossmatch Expiration 08/24/2017     ABO/Rh(D) Stacie Emmie POSITIVE     Antibody screen NEG    GLUCOSE, POC    Collection Time: 08/21/17  8:57 AM   Result Value Ref Range    Glucose (POC) 105 (H) 65 - 100 mg/dL   URINALYSIS W/ RFLX MICROSCOPIC    Collection Time: 08/21/17 11:15 AM   Result Value Ref Range    Color YELLOW      Appearance CLEAR      Specific gravity 1.010 1.001 - 1.023      pH (UA) 7.0 5.0 - 9.0      Protein NEGATIVE NEG mg/dL    Glucose NEGATIVE  NEG mg/dL    Ketone NEGATIVE  NEG mg/dL    Bilirubin NEGATIVE  NEG      Blood TRACE (A) NEG      Urobilinogen 0.2 0.2 - 1.0 EU/dL    Nitrites NEGATIVE  NEG      Leukocyte Esterase NEGATIVE  NEG     URINE MICROSCOPIC    Collection Time: 08/21/17 11:15 AM   Result Value Ref Range    WBC 0 0 /hpf    RBC 0-3 0 /hpf    Epithelial cells 0 0 /hpf    Bacteria 0 0 /hpf    Casts 0 0 /lpf    Crystals, urine 0 0 /LPF    Mucus 0 0 /lpf       All Micro Results     None          Other Studies:  No results found.     Assessment and Plan:     Hospital Problems as of 8/21/2017  Date Reviewed: 8/21/2017          Codes Class Noted - Resolved POA    * (Principal)Status post total right knee replacement ICD-10-CM: H43.141  ICD-9-CM: V43.65  8/21/2017 - Present No        CKD (chronic kidney disease) (Chronic) ICD-10-CM: N18.9  ICD-9-CM: 585.9  8/21/2017 - Present Yes        Hypertension (Chronic) ICD-10-CM: I10  ICD-9-CM: 401.9  1/29/2015 - Present Yes              PLAN:  · Stop celebrex, follow kidney function   · Continue oral antihypertensive, prn hydralazine   · Will be available as needed, please call thanks    Signed:  Moe Sawant MD

## 2017-08-21 NOTE — PERIOP NOTES
TRANSFER - IN REPORT:    Verbal report received from Ana Paula Truong RN on [facility]  being received from [facility] for routine progression of care      Report consisted of patients Situation, Background, Assessment and   Recommendations(SBAR). Information from the following report(s) SBAR and MAR was reviewed with the receiving nurse. Opportunity for questions and clarification was provided. Assessment completed upon patients arrival to unit and care assumed.

## 2017-08-21 NOTE — OP NOTES
[facility]  Total Knee Arthroplasty  Marsha Show   : 1940  Medical Record [id number]  Pre-operative Diagnosis:  Primary osteoarthritis of right knee [M17.11]  Post-operative Diagnosis: Status post total right knee replacement  Location: [address] [place] [address]  Surgeon: Jennifer Levin MD  Assistant: Steve Carr PA-C    Anesthesia: Spinal and FNB    Procedure: Procedure(s) (LRB):  RIGHT KNEE ARTHROPLASTY TOTAL / [place] / [employer]eline [employer] / [employer] (Right)    The complexity of the total joint surgery requires the use of a first assistant for positioning, retraction and expertise in closure. Tourniquet Time: 0 minutes  EBL: 250cc  Findings: severe degenerative arthritis, patellar osteophytes, posterior femoral osteophytes   BMI: Body mass index is 32.96 kg/(m^2). Brigette Henry was brought to the operating room and positioned on the operating table. She was anethestized with anesthesia. A jeong catheter was placed preoperatively and IV antibiotics was administered. Prior to the incision being made a timeout was called identifying the patient, procedure ,operative side and surgeon. .The operative leg was prepped and draped in the usual sterile manner. An anterior longitudinal incision was accomplished just medial to the tibial tubercle and extending approximal 6 centimeters proximal to the superior pole of the patella. A medial parapatellar capsular incision was performed. The medial capsular flap was elevated around to the insertion of the semimembranous tendon. The patella was everted and the knee flexed and externally rotated. The medial and external menisci were excised. The lateral half of the fat pad excised and the patella femoral ligament was released. The anterior cruciate ligament was resected and the posterior cruciate ligament was substituted. Using extramedullary instrumentation, the tibial cut was accomplished with appropriate posterior slope. Approxiamately 9mm of bone was removed from the high side of the tibia. The distal femur was next addressed. A drill hole was made above the intracondolar notch. Using appropriate intramedullary instrumentation,a five degree valgus distal cut was accomplished. The femur was sized. The anterior and posterior cuts were then made about the distal femur. The osteophytes were removed from the tibial and femoral surfaces. The flexion and extension gaps were assessed with the appropriate spacer blocks. Additional surgical procedures included: none. The flexion and extension gaps were deemed appropriately balanced. The appropriate cutting blocks were then utilized to perform the anterior chamfer, posterior chamfer and notch cuts, with appropriate lateral tranlation accomplished for the patellofemoral groove. The tibia baseplate was sized. The tibial base plate was pinned into place with the appropriate external rotation and stem site prepared. A preliminary range of motion was accomplished with  trial components. The patient was able to obtain full extension as well as appropriate flexion. The patient's ligaments were stable in flexion and extension to medial and lateral stressing and the alignment was through the appropriate mechanical axis. The patella was then everted. The bone was resected to accomodate the three peg  patella button. A trial reduction revealed appropriate tracking through the patellofemoral groove with no lateral retinacular release being accomplished. All trial components were removed. The knee was irrigated. There were no femoral deficiencies. There were no tibial deficiencies. No augmentation was utilized. Two packages of cement were mixed and the permanent Tibial and Femoral components were cemented into place. The patella component was then  cemented in place.     [facility] knee was placed through range of motion and noted to be stable as mentioned above with the trail components. The wound was dry, therefore no drain was used. The operative knee was injected with 60cc of Naropin, 10 cc's of morphine and 1 cc of 30mg of Toradol. The knee was then soaked with a diluted betadine solution for approximately 3 min. This was then thoroughly irrigated. The capsular layer was closed using a #1 PDS suture. The knee joint was then injected with transexamic acid. The subcutaneous layers were closed using 2-0 Stratafix suture. Finally the skin was closed using 3-0 Vicryl and skin staples, which were applied in occlusive fashion and sterile bandage applied. An Iceman cryo pad was applied on the operative leg. Sponge count and needle counts were correct. Jacqueline Peterson left the operating room     Implants:   Implant Name Type Inv.  Item Serial No.  Lot No. LRB No. Used   CEMENT BNE HV R 40GM -- PALACOS - [id number]  CEMENT BNE HV R 40GM -- PALACOS [id number] [employer] [id number] Right 2   35MM PATELLA   [id number] [facility] [employer] [id number] Right 1   LUG FEM FIX PATRICIA BRIJESH II --  - [id number]  LUG FEM FIX PATRICIA BRIJESH II --  [id number] [facility] [employer] [id number] Right 1   BASEPLT FEM NP 5 RT -- BRIJESH II - [id number]  BASEPLT FEM NP 5 RT -- BRIJESH II [id number] [employer] [id number] Right 1   FEM POST OXIN LEGION SZ5 RT -- SPC BRIJESH II - [id number]  FEM POST OXIN LEGION SZ5 RT -- SPC BRIJESH II [id number] [employer] [id number] Right 1   SIZE 5-6 9 MM LEGION PS XLPE HIGH FLEXION ARTICULAR INSERT     [id number] [facility] [employer] [id number] Right 1           Signed By: Demond Blanton MD  8/21/2017,  12:50 PM

## 2017-08-21 NOTE — PERIOP NOTES
TRANSFER - OUT REPORT:    Verbal report given to Aliya(name) on Arpan Duncan  being transferred to pacu(unit) for routine progression of care       Report consisted of patients Situation, Background, Assessment and   Recommendations(SBAR). Information from the following report(s) SBAR and MAR was reviewed with the receiving nurse. Lines:   Peripheral IV 08/21/17 Left Hand (Active)   Site Assessment Clean, dry, & intact 8/21/2017  8:41 AM   Phlebitis Assessment 0 8/21/2017  8:41 AM   Infiltration Assessment 0 8/21/2017  8:41 AM   Dressing Status Clean, dry, & intact 8/21/2017  8:41 AM   Dressing Type Transparent;Tape 8/21/2017  8:41 AM   Hub Color/Line Status Green; Infusing 8/21/2017  8:41 AM        Opportunity for questions and clarification was provided.       Patient transported with:   Tech   [id number]

## 2017-08-21 NOTE — PROGRESS NOTES
Problem: Self Care Deficits Care Plan (Adult)  Goal: *Acute Goals and Plan of Care (Insert Text)  GOALS:   DISCHARGE GOALS (in preparation for going home/rehab): 3 days  1. Ms. Kemal Velez will perform one lower body dressing activity with minimal assistance required to demonstrate improved functional mobility and safety. 2. Ms. Kemal Velez will perform one lower body bathing activity with minimal assistance required to demonstrate improved functional mobility and safety. 3. Ms. Kemal Velez will perform toileting/toilet transfer with contact guard assistance to demonstrate improved functional mobility and safety. 4. Ms. Kemal Velez will perform shower transfer with contact guard assistance to demonstrate improved functional mobility and safety. JOINT CAMP OCCUPATIONAL THERAPY TKA: Initial Assessment 8/21/2017  INPATIENT: Hospital Day: 1  [place] / Product Type: Medicare /      NAME/AGE/GENDER: Regina Stephens is a 67 y.o. female    PRIMARY DIAGNOSIS:  Primary osteoarthritis of right knee [M17.11]              Procedure(s) and Anesthesia Type:     * RIGHT KNEE ARTHROPLASTY TOTAL / FNB / [facility] / NEPHEW - Spinal (Right)  ICD-10: Treatment Diagnosis:        · Pain in Right Knee (M25.561)  · Stiffness of Right Knee, Not elsewhere classified (M09.911)       ASSESSMENT:      Ms. Kemal Velez is s/p R TKA and presents with decreased weight bearing on R LE and decreased independence with functional mobility and activities of daily living. Patient would benefit from skilled Occupational Therapy to maximize independence and safety with self-care task and functional mobility. Pt would also benefit from education on adaptive equipment and safety precautions in preparation for going home or for recommendations for post-hospital rehab program.  Patient plans for further rehab at home with home health services and good family support. OT reviewed therapy schedule and plan of care with patient. Patient was able to transfer and preform self care skills as charted below. Patient instructed to call for assistance when needing to get up from the bed and all needs in reach. Patient verbalized understanding of call light. This section established at most recent assessment   PROBLEM LIST (Impairments causing functional limitations):  1. Decreased Strength  2. Decreased ADL/Functional Activities  3. Decreased Transfer Abilities  4. Increased Pain  5. Increased Fatigue  6. Decreased Flexibility/Joint Mobility  7. Decreased Knowledge of Precautions    INTERVENTIONS PLANNED: (Benefits and precautions of occupational therapy have been discussed with the patient.)  1. Activities of daily living training  2. Adaptive equipment training  3. Balance training  4. Clothing management  5. Donning&doffing training  6. Theraputic activity      TREATMENT PLAN: Frequency/Duration: Follow patient qd to address above goals. Rehabilitation Potential For Stated Goals: GOOD      RECOMMENDED REHABILITATION/EQUIPMENT: (at time of discharge pending progress): Continue Skilled Therapy and Rehab. OCCUPATIONAL PROFILE AND HISTORY:   History of Present Injury/Illness (Reason for Referral): Pt presents this date s/p (R) TKA. Past Medical History/Comorbidities:   Ms. 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  She also has no past medical history of Adverse effect of anesthesia; Aneurysm ([facility] Arrhythmia; Asthma; CAD (coronary artery disease); Cancer ([facility] [facility] Chronic kidney disease; Chronic obstructive pulmonary disease ([facility] [facility] Chronic pain; Coagulation disorder ([facility] [address] [facility] Diabetes ([facility] [address] [facility] Difficult intubation; Endocarditis; GERD (gastroesophageal reflux disease); Heart failure ([facility] [address] [facility] Ill-defined condition; Liver disease; Malignant hyperthermia due to anesthesia; Morbid obesity ([facility] [address] [facility] Nicotine vapor product user; Non-nicotine vapor product user; Pseudocholinesterase deficiency; Psychiatric disorder; PUD (peptic ulcer disease); Rheumatic fever; Seizures ([facility] Stroke [facility]); Thromboembolus ([facility] Thyroid disease; or Unspecified adverse effect of anesthesia. Ms. Brooks Hatfield  has a past surgical history that includes cholecystectomy (2009); knee arthroscopy (Bilateral); breast surgery procedure unlisted (Right, 1968); and hip replacement (Right, 05/10/2017). Social History/Living Environment:   Patient Expects to be Discharged to[de-identified] Other (comment) (to surgery)  Prior Level of Function/Work/Activity:  independent   Number of Personal Factors/Comorbidities that affect the Plan of Care: Expanded review of therapy/medical records (1-2):  MODERATE COMPLEXITY   ASSESSMENT OF OCCUPATIONAL PERFORMANCE[de-identified]   Most Recent Physical Functioning:   Balance  Sitting: Intact  Standing: Pull to stand; With support; Impaired        Patient Vitals for the past 6 hrs:       BP BP Patient Position SpO2 O2 Flow Rate (L/min) Pulse   08/21/17 1013 188/83 At rest 100 % 2 l/min 80   08/21/17 1020 179/84 At rest 97 % 2 l/min 80   08/21/17 1026 150/70 At rest 97 % 2 l/min 81   08/21/17 1254 119/56 Head of bed elevated (Comment degrees) 94 % 4 l/min 65   08/21/17 1259 119/57 - 98 % 4 l/min (!) 59   08/21/17 1304 120/58 - 99 % 4 l/min 61   08/21/17 1309 116/55 - 99 % 4 l/min (!) 59   08/21/17 1317 - - - 3 l/min -   08/21/17 1324 124/55 - 100 % - 61   08/21/17 1339 118/57 - 100 % 3 l/min (!) 58   08/21/17 1351 128/60 - 100 % 3 l/min 65 08/21/17 1506 173/59 - 98 % - 62        Gross Assessment: Yes  Gross Assessment  AROM: Generally decreased, functional (BUE)  Strength: Generally decreased, functional (BUE)  Coordination: Generally decreased, functional (BUE)  Tone: Normal  Sensation: Intact                    Vision  Acuity: Within Defined Limits     Mental Status  Neurologic State: Alert  Orientation Level: Oriented X4  Cognition: Follows commands  Perception: Appears intact  Perseveration: No perseveration noted  Safety/Judgement: Awareness of environment; Fall prevention                    Basic ADLs (From Assessment) Complex ADLs (From Assessment)   Basic ADL  Feeding: Setup  Oral Facial Hygiene/Grooming: Setup  Bathing: Moderate assistance  Upper Body Dressing: Setup  Lower Body Dressing: Maximum assistance  Toileting: Maximum assistance     Grooming/Bathing/Dressing Activities of Daily Living     Cognitive Retraining  Safety/Judgement: Awareness of environment; Fall prevention                 Functional Transfers  Toilet Transfer : Minimum assistance;Assist x2  Shower Transfer: Minimum assistance;Assist x2     Bed/Mat Mobility  Supine to Sit: Moderate assistance  Sit to Supine: Moderate assistance  Sit to Stand: Minimum assistance;Assist x2  Bed to Chair: Minimum assistance;Assist x2           Physical Skills Involved:  1. Balance  2. Strength  3. Activity Tolerance Cognitive Skills Affected (resulting in the inability to perform in a timely and safe manner): 1. none Psychosocial Skills Affected:  1. none   Number of elements that affect the Plan of Care: 1-3:  LOW COMPLEXITY   CLINICAL DECISION MAKING:   Antonio HICKMANPAC 6 Clicks   Daily Activity Inpatient Short Form  How much help from another person does the patient currently need. .. Total A Lot A Little None   1. Putting on and taking off regular lower body clothing?   [ ] 1   [X] 2   [ ] 3   [ ] 4   2. Bathing (including washing, rinsing, drying)?    [ ] 1   [X] 2   [ ] 3 [ ] 4   3. Toileting, which includes using toilet, bedpan or urinal?   [ ] 1   [X] 2   [ ] 3   [ ] 4   4. Putting on and taking off regular upper body clothing?   [ ] 1   [X] 2   [ ] 3   [ ] 4   5. Taking care of personal grooming such as brushing teeth? [ ] 1   [ ] 2   [X] 3   [ ] 4   6. Eating meals? [ ] 1   [ ] 2   [X] 3   [ ] 4   © 2007, Trustees of [address] [place] [address] [address] [address], under license to [employer]. All rights reserved   Score:  Initial: 14 Most Recent: X (Date: -- )     Interpretation of Tool:  Represents activities that are increasingly more difficult (i.e. Bed mobility, Transfers, Gait). Score 24 23 22-20 19-15 14-10 9-7 6       Modifier CH CI CJ CK CL CM CN         · Self Care:               - CURRENT STATUS:    CL - 60%-79% impaired, limited or restricted               - GOAL STATUS:           CK - 40%-59% impaired, limited or restricted               - D/C STATUS:                       ---------------To be determined---------------  [place] / Product Type: Medicare /       Medical Necessity:     · Patient is expected to demonstrate progress in strength, balance, coordination and functional technique to increase independence with self care and functional mobility. Reason for Services/Other Comments:  · Patient continues to require skilled intervention due to decrease self care and mobility.    Use of outcome tool(s) and clinical judgement create a POC that gives a: MODERATE COMPLEXITY                 TREATMENT:   (In addition to Assessment/Re-Assessment sessions the following treatments were rendered)      Pre-treatment Symptoms/Complaints:  No complaints  Pain: Initial:   Pain Intensity 1: 0  Post Session:  0      Assessment/Reassessment only, no treatment provided today     Treatment/Session Assessment:         Response to Treatment:  Tolerated well     Education:  [ ] Home Exercises  [X] Fall Precautions  [ ] Hip Precautions [ ] Going Home Video  [X] Knee/Hip Prosthesis Review  [X] Walker Management/Safety [X] Adaptive Equipment as Needed         Interdisciplinary Collaboration:   · Physical Therapist  · Occupational Therapist  · Registered Nurse     After treatment position/precautions:   · Supine in bed  · Bed/Chair-wheels locked  · Bed in low position  · Caregiver at bedside  · Call light within reach  · RN notified  · Side rails x 3     Compliance with Program/Exercises: Will assess as treatment progresses. Recommendations/Intent for next treatment session:  Treatment next visit will focus on increasing Ms. Sparrow's independence with bed mobility, transfers, self care, functional mobility, modalities for pain, and patient education.        Total Treatment Duration:  OT Patient Time In/Time Out  Time In: 1510  Time Out: 700 [facility] Stephanie Mayer

## 2017-08-21 NOTE — PROGRESS NOTES
08/21/17 1545   Oxygen Therapy   O2 Sat (%) 100 %   Pulse via Oximetry 80 beats per minute   O2 Device Nasal cannula   O2 Flow Rate (L/min) 3 l/min  (weaned to 2 )   Patient sleepy, unable to do IS at this time. BS are clear b/l.    Joint Camp notes reviewed- continuous sat #7 ordered HS

## 2017-08-21 NOTE — H&P
The patient has end stage arthritis of the right knee. The patient was see and examined and there are no changes to the patient's orthopedic condition. They have tried conservative treatment for this condition; including antiinflammatories and lifestyle modifications and have failed. The necessity for the joint replacement is still present, and the H&P from the office is still current. The patient will be admitted today for Procedure(s) (LRB):  RIGHT KNEE ARTHROPLASTY TOTAL / Yenifer Kearns / Joseph Jolley / SVEN (Right) .

## 2017-08-21 NOTE — PROGRESS NOTES
Problem: Mobility Impaired (Adult and Pediatric)  Goal: *Acute Goals and Plan of Care (Insert Text)  GOALS (1-4 days):  (1.)Ms. Tasha Carlson will move from supine to sit and sit to supine in bed with CONTACT GUARD ASSIST.  (2.)Ms. Tasha Carlson will transfer from bed to chair and chair to bed with STAND BY ASSIST using the least restrictive device. (3.)Ms. Tasha Carlson will ambulate with STAND BY ASSIST for  feet with the least restrictive device. ((4.)Ms. Tasha Carlson will increase right knee ROM to 5°-80°.  ________________________________________________________________________________________________      PHYSICAL THERAPY JOINT CAMP TKA: INITIAL ASSESSMENT, TREATMENT DAY: DAY OF ASSESSMENT, PM 8/21/2017  INPATIENT: Hospital Day: 1  [place] / Product Type: Medicare /      NAME/AGE/GENDER: Rachelle Barry is a 67 y.o. female    PRIMARY DIAGNOSIS:  Primary osteoarthritis of right knee [M17.11]              Procedure(s) and Anesthesia Type:     * RIGHT KNEE ARTHROPLASTY TOTAL / FNB / [facility] / NEPHEW - Spinal (Right)  ICD-10: Treatment Diagnosis:        · Pain in Right Knee (M25.561)  · Stiffness of Right Knee, Not elsewhere classified (M25.661)  · Difficulty in walking, Not elsewhere classified (R26.2)       ASSESSMENT:      Ms. Tasha Carlson presents with impaired strength & mobility s/p right TKA. Pt also had decreased stability during out of bed activity. Pt buzz benefit from follow up therapy to help restore safe function prior to returning home with caregiver      This section established at most recent assessment   PROBLEM LIST (Impairments causing functional limitations):  1. Decreased Strength  2. Decreased ADL/Functional Activities  3. Decreased Transfer Abilities  4. Decreased Ambulation Ability/Technique  5. Decreased Balance  6. Increased Pain  7. Decreased Activity Tolerance  8. Decreased Flexibility/Joint Mobility  9.  Decreased [place] with Home Exercise Program INTERVENTIONS PLANNED: (Benefits and precautions of physical therapy have been discussed with the patient.)  1. Bed Mobility  2. Gait Training  3. Home Exercise Program (HEP)  4. Therapeutic Exercise/Strengthening  5. Transfer Training  6. Range of Motion: active/assisted/passive  7. Therapeutic Activities  8. Group Therapy      TREATMENT PLAN: Frequency/Duration: Follow patient BID   to address above goals. Rehabilitation Potential For Stated Goals: GOOD      RECOMMENDED REHABILITATION/EQUIPMENT: (at time of discharge pending progress): Continue Skilled Therapy and pt could return home with 24/7 assist if pt becomes manageable for caregiver, if not, pt may need arehab stay @ SNF. HISTORY:   History of Present Injury/Illness (Reason for Referral): The patient has end stage arthritis of the right knee. The patient was evaluated and examined during a consultation prior to this office visit. There have been no changes to the patient's orthopedic condition since the initial consultation. The patient has failed previous conservative treatment for this condition including antiinflammatories , and lifestyle modifications. The necessity for joint replacement is present. The patient will be admitted the day of surgery for Procedure(s) (LRB):  RIGHT KNEE ARTHROPLASTY TOTAL / FNB / Lutricia Arely / NEPHEW (Right)    Past Medical History/Comorbidities:   Ms. 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 She also has no past medical history of Adverse effect of anesthesia; Aneurysm ([facility] Arrhythmia; Asthma; CAD (coronary artery disease); Cancer ([facility] Chronic kidney disease; Chronic obstructive pulmonary disease ([facility] Chronic pain; Coagulation disorder ([facility] Diabetes ([facility] Difficult intubation; Endocarditis; GERD (gastroesophageal reflux disease); Heart failure ([facility] Ill-defined condition; Liver disease; Malignant hyperthermia due to anesthesia; Morbid obesity ([facility] Nicotine vapor product user; Non-nicotine vapor product user; Pseudocholinesterase deficiency; Psychiatric disorder; PUD (peptic ulcer disease); Rheumatic fever; Seizures ([facility] [address] [facility] Stroke [facility]); Thromboembolus ([facility] Thyroid disease; or Unspecified adverse effect of anesthesia. Ms. Osorio Mcrae  has a past surgical history that includes cholecystectomy (2009); knee arthroscopy (Bilateral); breast surgery procedure unlisted (Right, 1968); and hip replacement (Right, 05/10/2017).   Social History/Living Environment:   Home Environment: Private residence  # Steps to Enter: 0  Wheelchair Ramp: Yes  One/Two Story Residence: One story  Living Alone: No  Support Systems: Spouse/Significant Other/Partner  Patient Expects to be Discharged to[de-identified] Skilled nursing facility  Current DME Used/Available at Home: Walker, rolling  Prior Level of Function/Work/Activity:  Pt was ambulating in the home with rolling walker & SBA prior to this admission   Number of Personal Factors/Comorbidities that affect the Plan of Care: 3+: HIGH COMPLEXITY   EXAMINATION:   Most Recent Physical Functioning:   Gross Assessment: Yes  Gross Assessment  AROM: Generally decreased, functional (left LE)  Strength: Generally decreased, functional (left LE)  Coordination: Generally decreased, functional (left LE)  Tone:  (hypotonus)  Sensation:  (nerve block still active)          RLE PROM  R Knee Flexion: 50 (~post op)  R Knee Extension: -15 (~post op)             Bed Mobility  Supine to Sit: Moderate assistance  Sit to Supine: Moderate assistance  Scooting: Minimum assistance     Transfers  Sit to Stand: Minimum assistance;Assist x2  Stand to Sit: Minimum assistance;Assist x2  Bed to Chair:  (NA)     Balance  Sitting: Intact; Without support  Standing: Impaired; With support (walker)                Weight Bearing Status  Right Side Weight Bearing: As tolerated  Distance (ft): 5 Feet (ft)  Ambulation - Level of Assistance: Minimal assistance;Assist x2  Assistive Device: Walker, rolling  Speed/Keli: Shuffled; Slow  Stance: Right decreased  Gait Abnormalities: Antalgic;Decreased step clearance         Braces/Orthotics: none     Right Knee Cold  Type: Cryocuff       Body Structures Involved:  1. Joints  2. Muscles Body Functions Affected:  1. Sensory/Pain  2. Movement Related Activities and Participation Affected:  1. General Tasks and Demands  2. Mobility   Number of elements that affect the Plan of Care: 4+: HIGH COMPLEXITY   CLINICAL PRESENTATION:   Presentation: Stable and uncomplicated: LOW COMPLEXITY   CLINICAL DECISION MAKING:   [employer] AM-PAC 6 Clicks   Basic Mobility Inpatient Short Form  How much difficulty does the patient currently have. .. Unable A Lot A Little None   1. Turning over in bed (including adjusting bedclothes, sheets and blankets)? [ ] 1   [X] 2   [ ] 3   [ ] 4   2. Sitting down on and standing up from a chair with arms ( e.g., wheelchair, bedside commode, etc.)   [ ] 1   [ ] 2   [X] 3   [ ] 4   3. Moving from lying on back to sitting on the side of the bed? [ ] 1   [X] 2   [ ] 3   [ ] 4   How much help from another person does the patient currently need. .. Total A Lot A Little None   4. Moving to and from a bed to a chair (including a wheelchair)? [ ] 1   [ ] 2   [X] 3   [ ] 4   5. Need to walk in hospital room? [ ] 1   [ ] 2   [X] 3   [ ] 4   6. Climbing 3-5 steps with a railing?    [X] 1   [ ] 2   [ ] 3   [ ] 4   © 2007, Trustees of [address] [place] [address], under license to [employer]. All rights reserved       Score:  Initial: 14 Most Recent: X (Date: -- )     Interpretation of Tool:  Represents activities that are increasingly more difficult (i.e. Bed mobility, Transfers, Gait). Score 24 23 22-20 19-15 14-10 9-7 6       Modifier CH CI CJ CK CL CM CN         · Mobility - Walking and Moving Around:               - CURRENT STATUS:    CL - 60%-79% impaired, limited or restricted               - GOAL STATUS:           CK - 40%-59% impaired, limited or restricted               - D/C STATUS:                       ---------------To be determined---------------  [place] / Product Type: Medicare /       Medical Necessity:     · Patient is expected to demonstrate progress in strength, range of motion, balance, coordination and functional technique to decrease assistance required with bed mobility, transfers & gait. Reason for Services/Other Comments:  · Patient continues to require skilled intervention due to pt not safe with functional mobility.    Use of outcome tool(s) and clinical judgement create a POC that gives a: Clear prediction of patient's progress: LOW COMPLEXITY                 TREATMENT:   (In addition to Assessment/Re-Assessment sessions the following treatments were rendered)      Pre-treatment Symptoms/Complaints:  weakness  Pain: Initial: visual scale  Pain Intensity 1: 0  Post Session:  0/10      Assessment/Reassessment only, no treatment provided today        Date:    Date:    Date:      ACTIVITY/EXERCISE AM PM AM PM AM PM   GROUP THERAPY  [ ]  [ ]  [ ]  [ ]  [ ]  [ ]   Ankle Pumps               Quad Sets               Gluteal Sets               Hip ABd/ADduction               Straight Leg Raises               Knee Slides               Short Arc Quads               Long Arc Quads               Chair Slides                               B = bilateral; AA = active assistive; A = active; P = passive       Treatment/Session Assessment:         Response to Treatment:  Tolerated well     Education:  [ ] Home Exercises  [X] Fall Precautions  [ ] Hip Precautions [ ] D/C Instruction Review  [ ] Knee/Hip Prosthesis Review  [X] Walker Management/Safety [ ] Adaptive Equipment as Needed         Interdisciplinary Collaboration:   · Occupational Therapist  · Registered Nurse     After treatment position/precautions:   · Supine in bed  · Bed/Chair-wheels locked  · Bed in low position  · Caregiver at bedside  · Call light within reach  · RN notified  · Family at bedside     Compliance with Program/Exercises: Will assess as treatment progresses. Recommendations/Intent for next treatment session:  Treatment next visit will focus on increasing Ms. Sparrow's independence with bed mobility, transfers, gait training, strength/ROM exercises, modalities for pain, and patient education.        Total Treatment Duration:  PT Patient Time In/Time Out  Time In: 1455  Time Out: 1959 [facility], PT

## 2017-08-21 NOTE — DISCHARGE INSTRUCTIONS
[id number] [facility]   Patient Discharge Instructions    Chevy Sabillon / [id number] : 1940    Admitted 2017 Discharged: 2017     IF YOU HAVE ANY PROBLEMS ONCE YOU ARE AT HOME CALL THE FOLLOWING NUMBERS:   Main office number: [phone]      Medications    · The medications you are to continue on are listed on the medication reconciliation sheet. · Narcotic pain medications as well as supplemental iron can cause constipation. If this occurs try stopping the narcotic pain medication and/or the iron. · It is important that you take the medication exactly as they are prescribed. · Medications which increase your risk of blood clots are listed to stop for 5 weeks after surgery as well as medications or supplements which increase your risk of bleeding complications. · Keep your medication in the bottles provided by the pharmacist and keep a list of the medication names, dosages, and times to be taken in your wallet. · Do not take other medications without consulting your doctor. Important Information    Do NOT smoke as this will greatly increase your risk of infection! Resume your prehospital diet. If you have excessive nausea or vomitting call your doctor's office     Leg swelling and warmth is normal for 6 months after surgery. If you experience swelling in your leg elevate you leg while laying down with your toes above your heart. If you have sudden onset severe swelling with leg pain call our office. Use Del Hose stockings until we see you in the office for your follow up appointment. The stitches deep inside take approximately 6 months to dissolve. There will be sharp shooting, stinging and burning pain. This is normal and will resolve between 3-6 months after surgery. Difficulty sleeping is normal following total Knee and Hip replacement. You may try melatonin, an over-the-counter sleep aid or benadryl to help with sleep.  Most patients will resume sleeping through the night 8 weeks after surgery. Home Physical Therapy is arranged. Home Health will contact you within 48 hrs of discharge that you have chosen. If you have not received a call within this time frame please contact that provider you chose. You should be given this information before you leave the hospital.     You are at a risk for falls. Use the rolling walker when walking. Patients who have had a joint replacement should not drive if they are still taking narcotic pain mediation during the daytime hours. Most patients wean themselves off of pain medication within 2-5 weeks after surgery. When to Call the office    - If you have a temperature greater then 101  - Uncontrolled vomiting   - Loose control of your bladder or bowel function  - Are unable to bear any wieght   - Need a pain medication refill     Information obtained by :  I understand that if any problems occur once I am at home I am to contact my physician. I understand and acknowledge receipt of the instructions indicated above.                                                                                                                                            Physician's or R.N.'s Signature                                                                  Date/Time                                                                                                                                              Patient or Representative Signature                                                          Date/Time

## 2017-08-21 NOTE — ANESTHESIA PREPROCEDURE EVALUATION
Anesthetic History     PONV          Review of Systems / Medical History  Patient summary reviewed and pertinent labs reviewed    Pulmonary        Sleep apnea: No treatment           Neuro/Psych              Cardiovascular    Hypertension: well controlled              Exercise tolerance: >4 METS     GI/Hepatic/Renal         Renal disease: CRI       Endo/Other        Arthritis     Other Findings            Physical Exam    Airway  Mallampati: I  TM Distance: > 6 cm  Neck ROM: normal range of motion   Mouth opening: Normal     Cardiovascular  Regular rate and rhythm,  S1 and S2 normal,  no murmur, click, rub, or gallop  Rhythm: regular  Rate: normal         Dental    Dentition: Full lower dentures and Full upper dentures     Pulmonary  Breath sounds clear to auscultation               Abdominal  GI exam deferred       Other Findings            Anesthetic Plan    ASA: 3  Anesthesia type: general      Post-op pain plan if not by surgeon: peripheral nerve block single      Anesthetic plan and risks discussed with: Patient

## 2017-08-21 NOTE — PERIOP NOTES
TRANSFER - IN REPORT:    Verbal report received from [address] [place] [address] on Regina Morenoland  being received from [facility] for routine progression of care      Report consisted of patients Situation, Background, Assessment and   Recommendations(SBAR). Information from the following report(s) SBAR and MAR was reviewed with the receiving nurse. Opportunity for questions and clarification was provided. Assessment completed upon patients arrival to unit and care assumed.

## 2017-08-21 NOTE — PERIOP NOTES
TRANSFER - OUT REPORT:    Verbal report given to Mynor Cerda RN(name) on Nivia Osler  being transferred to [facility](unit) for routine post - op       Report consisted of patients Situation, Background, Assessment and   Recommendations(SBAR). Information from the following report(s) SBAR, Kardex, OR Summary, Procedure Summary, Intake/Output and MAR was reviewed with the receiving nurse. Opportunity for questions and clarification was provided.       Patient transported with:   O2 @ 3 liters  Tech

## 2017-08-21 NOTE — PROGRESS NOTES
Assessment completed. Pt alert and oriented X4. BLE are numb, but moving them slightly. Pedal pulses are papable. No signs of distress noted. Oriented pt to room, unit, dining on call, IS, and pain management. Pt on 1 liter oxygen via nasal cannula. Lungs clear to auscultation. [place] to drainage with no loops in tubing, and stat lock in place. Yellow/straw color urine noted. IV intact with no redness, or swelling noted infusing. Incision to right knee is covered with Aquacel dressing. Ice applied and plexi-pulses on. Pt denies pain. Call light within reach and bed in low position and locked. Pt informed to call out for needs verbalizes understanding. Family at bedside.

## 2017-08-21 NOTE — PROGRESS NOTES
TRANSFER - IN REPORT:    Verbal report received from [facility]) on Ed Sear  being received from PACU(unit) for routine progression of care      Report consisted of patients Situation, Background, Assessment and   Recommendations(SBAR). Information from the following report(s) SBAR, ED Summary, MAR, Accordion and Recent Results was reviewed with the receiving nurse. Opportunity for questions and clarification was provided. Assessment completed upon patients arrival to unit and care assumed.

## 2017-08-21 NOTE — ANESTHESIA PROCEDURE NOTES
Peripheral Block    Start time: 8/21/2017 10:21 AM  End time: 8/21/2017 10:25 AM  Performed by: Aracelis Berry  Authorized by: Darby ALANIS       Pre-procedure: Indications: at surgeon's request, post-op pain management and procedure for pain    Preanesthetic Checklist: patient identified, risks and benefits discussed, site marked, timeout performed, anesthesia consent given and patient being monitored    Timeout Time: 10:20          Block Type:   Block Type:   Adductor canal  Laterality:  Right  Monitoring:  Standard ASA monitoring, responsive to questions, continuous pulse ox, oxygen, frequent vital sign checks and heart rate  Injection Technique:  Single shot  Procedures: ultrasound guided    Patient Position: prone  Prep: chlorhexidine    Location:  Mid thigh  Needle Type:  Stimuplex  Needle Gauge:  22 G  Needle Localization:  Ultrasound guidance  Medication Injected:  0.5%  ropivacaine  Adds:  Epi 1:200K  Volume (mL):  20    Assessment:  Number of attempts:  1  Injection Assessment:  Incremental injection every 5 mL, no paresthesia, ultrasound image on chart, local visualized surrounding nerve on ultrasound, negative aspiration for blood and no intravascular symptoms  Patient tolerance:  Patient tolerated the procedure well with no immediate complications

## 2017-08-21 NOTE — ANESTHESIA POSTPROCEDURE EVALUATION
Post-Anesthesia Evaluation and Assessment    Patient: Bianca Valle MRN: [id number]  SSN: [id number]    YOB: 1940  Age: 67 y.o. Sex: female       Cardiovascular Function/Vital Signs  Visit Vitals    /55    Pulse 61    Temp 36.9 °C (98.4 °F)    Resp 14    Ht 5' 8.5\" (1.74 m)    Wt 99.8 kg (220 lb)    SpO2 100%    BMI 32.96 kg/m2       Patient is status post general anesthesia for Procedure(s):  RIGHT KNEE ARTHROPLASTY TOTAL / Jo Palomino / Judson.Renuka / NEPHEW. Nausea/Vomiting: None    Postoperative hydration reviewed and adequate. Pain:  Pain Scale 1: Numeric (0 - 10) (08/21/17 1324)  Pain Intensity 1: 0 (08/21/17 1324)   Managed    Neurological Status:   Neuro (WDL): Exceptions to WDL (08/21/17 1254)  Neuro  Neurologic State: Eyes open spontaneously (08/21/17 1324)  Cognition: Follows commands (08/21/17 1324)  LUE Motor Response: Purposeful (08/21/17 1309)  LLE Motor Response: Purposeful (08/21/17 1309)  RUE Motor Response: Purposeful (08/21/17 1309)  RLE Motor Response: Numbness; Pharmacologically paralyzed (08/21/17 1309)   At baseline    Mental Status and Level of Consciousness: Arousable    Pulmonary Status:   O2 Device: CO2 nasal cannula (08/21/17 1254)   Adequate oxygenation and airway patent    Complications related to anesthesia: None    Post-anesthesia assessment completed.  No concerns    Signed By: Nguyễn Salinas MD     August 21, 2017

## 2017-08-22 LAB
ANION GAP SERPL CALC-SCNC: 9 MMOL/L (ref 7–16)
BUN SERPL-MCNC: 33 MG/DL (ref 8–23)
CALCIUM SERPL-MCNC: 8.5 MG/DL (ref 8.3–10.4)
CHLORIDE SERPL-SCNC: 104 MMOL/L (ref 98–107)
CO2 SERPL-SCNC: 27 MMOL/L (ref 21–32)
CREAT SERPL-MCNC: 1.23 MG/DL (ref 0.6–1)
GLUCOSE SERPL-MCNC: 120 MG/DL (ref 65–100)
HGB BLD-MCNC: 10.6 G/DL (ref 11.7–15.4)
MM INDURATION POC: 0 MM (ref 0–5)
POTASSIUM SERPL-SCNC: 4.2 MMOL/L (ref 3.5–5.1)
PPD POC: NORMAL NEGATIVE
SODIUM SERPL-SCNC: 140 MMOL/L (ref 136–145)

## 2017-08-22 PROCEDURE — 97116 GAIT TRAINING THERAPY: CPT

## 2017-08-22 PROCEDURE — 97110 THERAPEUTIC EXERCISES: CPT

## 2017-08-22 PROCEDURE — 94760 N-INVAS EAR/PLS OXIMETRY 1: CPT

## 2017-08-22 PROCEDURE — 65270000029 HC RM PRIVATE

## 2017-08-22 PROCEDURE — 74011250636 HC RX REV CODE- 250/636: Performed by: PHYSICIAN ASSISTANT

## 2017-08-22 PROCEDURE — 97535 SELF CARE MNGMENT TRAINING: CPT

## 2017-08-22 PROCEDURE — 74011250637 HC RX REV CODE- 250/637: Performed by: PHYSICIAN ASSISTANT

## 2017-08-22 PROCEDURE — 97150 GROUP THERAPEUTIC PROCEDURES: CPT

## 2017-08-22 PROCEDURE — 74011250637 HC RX REV CODE- 250/637: Performed by: ORTHOPAEDIC SURGERY

## 2017-08-22 PROCEDURE — 94762 N-INVAS EAR/PLS OXIMTRY CONT: CPT

## 2017-08-22 PROCEDURE — 85018 HEMOGLOBIN: CPT | Performed by: PHYSICIAN ASSISTANT

## 2017-08-22 PROCEDURE — 80048 BASIC METABOLIC PNL TOTAL CA: CPT | Performed by: PHYSICIAN ASSISTANT

## 2017-08-22 RX ADMIN — HYDROMORPHONE HYDROCHLORIDE 2 MG: 2 TABLET ORAL at 19:28

## 2017-08-22 RX ADMIN — ATENOLOL: 50 TABLET ORAL at 08:26

## 2017-08-22 RX ADMIN — ASPIRIN 325 MG: 325 TABLET, DELAYED RELEASE ORAL at 21:34

## 2017-08-22 RX ADMIN — Medication 5 ML: at 14:00

## 2017-08-22 RX ADMIN — ASPIRIN 325 MG: 325 TABLET, DELAYED RELEASE ORAL at 08:24

## 2017-08-22 RX ADMIN — PRAVASTATIN SODIUM 2 TABLET: 20 TABLET ORAL at 08:25

## 2017-08-22 RX ADMIN — ACETAMINOPHEN 1000 MG: 500 TABLET, FILM COATED ORAL at 05:26

## 2017-08-22 RX ADMIN — CEFAZOLIN 2 G: 1 INJECTION, POWDER, FOR SOLUTION INTRAMUSCULAR; INTRAVENOUS; PARENTERAL at 01:45

## 2017-08-22 RX ADMIN — Medication 5 ML: at 22:00

## 2017-08-22 RX ADMIN — POTASSIUM CHLORIDE 20 MEQ: 20 TABLET, EXTENDED RELEASE ORAL at 09:00

## 2017-08-22 RX ADMIN — DEXAMETHASONE SODIUM PHOSPHATE 10 MG: 10 INJECTION INTRAMUSCULAR; INTRAVENOUS at 13:00

## 2017-08-22 RX ADMIN — HYDROMORPHONE HYDROCHLORIDE 2 MG: 2 TABLET ORAL at 14:16

## 2017-08-22 RX ADMIN — FOLIC ACID 1 MG: 1 TABLET ORAL at 09:00

## 2017-08-22 RX ADMIN — Medication 200 MG: at 08:25

## 2017-08-22 RX ADMIN — HYDROMORPHONE HYDROCHLORIDE 2 MG: 2 TABLET ORAL at 10:40

## 2017-08-22 RX ADMIN — ACETAMINOPHEN 1000 MG: 500 TABLET, FILM COATED ORAL at 14:17

## 2017-08-22 RX ADMIN — HYDROMORPHONE HYDROCHLORIDE 2 MG: 2 TABLET ORAL at 05:59

## 2017-08-22 NOTE — PROGRESS NOTES
Problem: Mobility Impaired (Adult and Pediatric)  Goal: *Acute Goals and Plan of Care (Insert Text)  GOALS (1-4 days):  (1.)Ms. Delight Duverney will move from supine to sit and sit to supine in bed with CONTACT GUARD ASSIST.  (2.)Ms. Delight Duverney will transfer from bed to chair and chair to bed with STAND BY ASSIST using the least restrictive device. (3.)Ms. Delight Duverney will ambulate with STAND BY ASSIST for  feet with the least restrictive device. ((4.)Ms. Delight Duverney will increase right knee ROM to 5°-80°.  ________________________________________________________________________________________________      PHYSICAL THERAPY JOINT CAMP TKA: Daily Note and AM 8/22/2017  INPATIENT: Hospital Day: 2  [place] / Product Type: Medicare /      NAME/AGE/GENDER: Teena Umaña is a 67 y.o. female    PRIMARY DIAGNOSIS:  Primary osteoarthritis of right knee [M17.11]              Procedure(s) and Anesthesia Type:     * RIGHT KNEE ARTHROPLASTY TOTAL / FNB / Shanon Paci / NEPHEW - Spinal (Right)  ICD-10: Treatment Diagnosis:        · Pain in Right Knee (M25.561)  · Stiffness of Right Knee, Not elsewhere classified (M25.661)  · Difficulty in walking, Not elsewhere classified (R26.2)       ASSESSMENT:      Ms. Delight Duverney presents with impaired strength & mobility s/p right TKA. Pt also had decreased stability during out of bed activity. Pt buzz benefit from follow up therapy to help restore safe function prior to returning home with caregiver. Pt. Doing well this am with no pain. She took a few steps to the chair with walker and assistance. She needs help with transfers. Her left knee has limited rom and strength as well. She plans on going to rehab. She did tka exercises with cues and assistance. No questions. This section established at most recent assessment   PROBLEM LIST (Impairments causing functional limitations):  1. Decreased Strength  2. Decreased ADL/Functional Activities  3.  Decreased Transfer Abilities  4. Decreased Ambulation Ability/Technique  5. Decreased Balance  6. Increased Pain  7. Decreased Activity Tolerance  8. Decreased Flexibility/Joint Mobility  9. Decreased [place] with Home Exercise Program    INTERVENTIONS PLANNED: (Benefits and precautions of physical therapy have been discussed with the patient.)  1. Bed Mobility  2. Gait Training  3. Home Exercise Program (HEP)  4. Therapeutic Exercise/Strengthening  5. Transfer Training  6. Range of Motion: active/assisted/passive  7. Therapeutic Activities  8. Group Therapy      TREATMENT PLAN: Frequency/Duration: Follow patient BID   to address above goals. Rehabilitation Potential For Stated Goals: GOOD      RECOMMENDED REHABILITATION/EQUIPMENT: (at time of discharge pending progress): Continue Skilled Therapy and pt could return home with 24/7 assist if pt becomes manageable for caregiver, if not, pt may need arehab stay @ SNF. HISTORY:   History of Present Injury/Illness (Reason for Referral): The patient has end stage arthritis of the right knee. The patient was evaluated and examined during a consultation prior to this office visit. There have been no changes to the patient's orthopedic condition since the initial consultation. The patient has failed previous conservative treatment for this condition including antiinflammatories , and lifestyle modifications. The necessity for joint replacement is present. The patient will be admitted the day of surgery for Procedure(s) (LRB):  RIGHT KNEE ARTHROPLASTY TOTAL / FNB / Jolly Truman / NEPHEW (Right)    Past Medical History/Comorbidities:   Ms. 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 She also has no past medical history of Adverse effect of anesthesia; Aneurysm ([address] Arrhythmia; Asthma; CAD (coronary artery disease); Cancer ([facility] [address] Chronic kidney disease; Chronic obstructive pulmonary disease ([address] Chronic pain; Coagulation disorder ([address] Diabetes ([facility] [address] Difficult intubation; Endocarditis; GERD (gastroesophageal reflux disease); Heart failure ([facility] [address] Ill-defined condition; Liver disease; Malignant hyperthermia due to anesthesia; Morbid obesity ([facility] [address] Nicotine vapor product user; Non-nicotine vapor product user; Pseudocholinesterase deficiency; Psychiatric disorder; PUD (peptic ulcer disease); Rheumatic fever; Seizures ([facility] [address] Stroke [facility]); Thromboembolus ([facility] [address] Thyroid disease; or Unspecified adverse effect of anesthesia. Ms. Francisca Stark  has a past surgical history that includes cholecystectomy (2009); knee arthroscopy (Bilateral); breast surgery procedure unlisted (Right, 1968); and hip replacement (Right, 05/10/2017).   Social History/Living Environment:   Home Environment: Private residence  # Steps to Enter: 0  Wheelchair Ramp: Yes  One/Two Story Residence: One story  Living Alone: No  Support Systems: Spouse/Significant Other/Partner  Patient Expects to be Discharged to[de-identified] Skilled nursing facility  Current DME Used/Available at Home: Walker, rolling  Prior Level of Function/Work/Activity:  Pt was ambulating in the home with rolling walker & SBA prior to this admission   Number of Personal Factors/Comorbidities that affect the Plan of Care: 3+: HIGH COMPLEXITY   EXAMINATION:   Most Recent Physical Functioning:                 RLE PROM  R Knee Flexion: 45  R Knee Extension: 15             Bed Mobility  Supine to Sit: Moderate assistance Transfers  Sit to Stand: Moderate assistance (needs bed really high)  Stand to Sit: Moderate assistance  Bed to Chair: Moderate assistance     Balance  Sitting: Intact  Standing: With support;Pull to stand                Weight Bearing Status  Right Side Weight Bearing: As tolerated  Distance (ft): 5 Feet (ft)  Ambulation - Level of Assistance: Minimal assistance; Moderate assistance  Assistive Device: Walker, rolling  Speed/Keli: Delayed  Step Length: Left shortened;Right shortened  Stance: Right decreased  Gait Abnormalities: Antalgic;Decreased step clearance  Interventions: Safety awareness training;Verbal cues;Manual cues; Tactile cues      Braces/Orthotics: none     Right Knee Cold  Type: Cryocuff       Body Structures Involved:  1. Joints  2. Muscles Body Functions Affected:  1. Sensory/Pain  2. Movement Related Activities and Participation Affected:  1. General Tasks and Demands  2. Mobility   Number of elements that affect the Plan of Care: 4+: HIGH COMPLEXITY   CLINICAL PRESENTATION:   Presentation: Stable and uncomplicated: LOW COMPLEXITY   CLINICAL DECISION MAKIN [facility] [id number] AM-PAC 6 Clicks   Basic Mobility Inpatient Short Form  How much difficulty does the patient currently have. .. Unable A Lot A Little None   1. Turning over in bed (including adjusting bedclothes, sheets and blankets)? [ ] 1   [X] 2   [ ] 3   [ ] 4   2. Sitting down on and standing up from a chair with arms ( e.g., wheelchair, bedside commode, etc.)   [ ] 1   [ ] 2   [X] 3   [ ] 4   3. Moving from lying on back to sitting on the side of the bed? [ ] 1   [X] 2   [ ] 3   [ ] 4   How much help from another person does the patient currently need. .. Total A Lot A Little None   4. Moving to and from a bed to a chair (including a wheelchair)? [ ] 1   [ ] 2   [X] 3   [ ] 4   5. Need to walk in hospital room? [ ] 1   [ ] 2   [X] 3   [ ] 4   6. Climbing 3-5 steps with a railing?    [X] 1   [ ] 2   [ ] 3   [ ] 4   © 2007, Trustees of [address] [place] [address] [address] [address], under license to [employer]. All rights reserved       Score:  Initial: 14 Most Recent: X (Date: -- )     Interpretation of Tool:  Represents activities that are increasingly more difficult (i.e. Bed mobility, Transfers, Gait). Score 24 23 22-20 19-15 14-10 9-7 6       Modifier CH CI CJ CK CL CM CN         · Mobility - Walking and Moving Around:               - CURRENT STATUS:    CL - 60%-79% impaired, limited or restricted               - GOAL STATUS:           CK - 40%-59% impaired, limited or restricted               - D/C STATUS:                       ---------------To be determined---------------  [place] / [place] / Product Type: Medicare /       Medical Necessity:     · Patient is expected to demonstrate progress in strength, range of motion, balance, coordination and functional technique to decrease assistance required with bed mobility, transfers & gait. Reason for Services/Other Comments:  · Patient continues to require skilled intervention due to pt not safe with functional mobility. Use of outcome tool(s) and clinical judgement create a POC that gives a: Clear prediction of patient's progress: LOW COMPLEXITY                 TREATMENT:   (In addition to Assessment/Re-Assessment sessions the following treatments were rendered)      Pre-treatment Symptoms/Complaints:  debility  Pain: Initial: 0     Post Session:  0/10      Gait Training (10 Minutes):  Gait training to improve and/or restore physical functioning as related to mobility, strength and balance. Ambulated 5 Feet (ft) with Minimal assistance; Moderate assistance using a Walker, rolling and minimal Safety awareness training;Verbal cues;Manual cues; Tactile cues related to their stance phase to promote proper body alignment, promote proper body posture and promote proper body mechanics.   Therapeutic Exercise: (15 Minutes):  Exercises per grid below to improve mobility and strength. Required minimal visual, verbal and manual cues to promote proper body alignment, promote proper body posture and promote proper body mechanics. Progressed range and repetitions as indicated. Date:  8/22  Date:    Date:      ACTIVITY/EXERCISE AM PM AM PM AM PM   GROUP THERAPY  [ ]  [ ]  [ ]  [ ]  [ ]  [ ]   Ankle Pumps 15a              Quad Sets  10a             Gluteal Sets  10a             Hip ABd/ADduction  10a             Straight Leg Raises  10a             Knee Slides  10a             Short Arc Quads               Long Arc Quads               Chair Slides                               B = bilateral; AA = active assistive; A = active; P = passive       Treatment/Session Assessment:         Response to Treatment:  Pt. Did fine     Education:  [x ] Home Exercises  [X] Fall Precautions  [ ] Hip Precautions [ ] D/C Instruction Review  [ x] Knee/Hip Prosthesis Review  [X] Walker Management/Safety [ ] Adaptive Equipment as Needed         Interdisciplinary Collaboration:   · Physical Therapy Assistant and Registered Nurse     After treatment position/precautions:   · Up in chair, Bed/Chair-wheels locked, Bed in low position and Call light within reach     Compliance with Program/Exercises: Will assess as treatment progresses. Recommendations/Intent for next treatment session:  Treatment next visit will focus on increasing Ms. Sparrow's independence with bed mobility, transfers, gait training, strength/ROM exercises, modalities for pain, and patient education.        Total Treatment Duration:  PT Patient Time In/Time Out  Time In: [place] [address]  Time Out: [address], PT

## 2017-08-22 NOTE — CONSULTS
MD Carlos   Medical Director  [address] [place] [address], [address] [facility]  Tel: [phone]     Physical Medicine & Rehabilitation Note-consult    Patient: Khadar Machado MRN: [id number]  SSN: [id number]    YOB: 1940  Age: 67 y.o. Sex: female      Admit Date: 8/21/2017  Admitting Physician: Grabiel Biggs MD    Medical Decision Making/Plan/Recommend: Gait impairment s/p right total knee arthroplasty. Therapy progressing steadily, without unusual barriers to progress. She is at [facility] with transfers, and ambulation using a RW. Patient plans for SNF discharge. Best care option is admission to a sub acute rehab program at [facility]. She will benefit from continued daily skilled rehabilitation efforts and regular medical and nursing care at SNF. Continue PT, OT for active/assisted/passive right TKA ROM, strengthening, mobility, transfers, gait training. Will follow progress. Chief Complaint : Gait dysfunction secondary to below.   Admit Diagnosis: Primary osteoarthritis of right knee [M17.11]  right total knee arthroplasty 8/21/2017  Pain  DVT risk  Post op hemorrhagic anemia  Status post right hip replacement   5/10/2017  Hypertension (1/29/2015)  CKD (chronic kidney disease) (8/21/2017)  Acute Rehab Dx:  Gait impairment  Mobility and ambulation deficits  Self Care/ADL deficits    Medical Dx:  Past Medical History:   Diagnosis Date    Abnormal posture     Anemia     history     Essential hypertension     on med for control     H/O echocardiogram 01/05/2017    EF 55-65%    H/O: pneumonia     01/2017    Hypercholesteremia     managed with medication     Hypomagnesemia     managed with supplement     Left ventricular diastolic dysfunction     per echo \"Grade 1 (mild)\"    Muscle weakness     generalized    Nausea & vomiting     nausea only     MOOK (obstructive sleep apnea) 01/30/2015    patient denies     Osteoarthritis     Poor historian     Pressure ulcer of left buttock, stage 2     resolved per patient-previously seen by Dr. Manolo Leyva at [facility]      Pressure ulcer of right buttock, stage 2     resolved per patient-previously seen by Dr. Manolo Leyva at [facility]     SOB (shortness of breath)     Status post right hip replacement 5/10/2017    Status post total right knee replacement 8/21/2017    Unable to ambulate     patient in wheelchair-can stand with assistance     Unsteadiness on feet      Subjective:     Date of Evaluation:  August 22, 2017    HPI: Katt Claire is a 67 y.o. female patient at [address] [place] [address] who was admitted on 8/21/2017  by Sandy Meza MD with below mentioned medical history, is being seen for Physical Medicine and Rehabilitation consult. Katt Claire had right knee pain and discomfort with walking and activity due to end stage DJD. She failed conservative treatment efforts. She underwent a right total knee arthroplasty per Dr. Sandy Meza MD on 8/21/2017. The patient's post operative course has been well tolerated so far. We are consulted to assist with rehab needs and placement. Patient's weight bearing status is to be WBAT RLE. Patient is starting to stand, taking steps with acute PT and OT. Patient still shows significant functional deficits due to right knee pain, decreased ROM and strength. Katt Claire is seen and examined today. Medical Records reviewed. She denies any other pre morbid functional deficits. She has been independent with ambulation, prior to admission, limited by right knee pain.       Current Level of Function:  bed mobility - mod A, transfers - CGA, decreased balance , ambulation - 130' with RW and min A.     Prior Level of Function/Work/Activity:    Pt was ambulating in the home with rolling walker & SBA prior to       Family History   Problem Relation Age of Onset    Heart Disease Mother     Hypertension Mother     Hypertension Father Social History   Substance Use Topics    Smoking status: Never Smoker    Smokeless tobacco: Never Used    Alcohol use No     Past Surgical History:   Procedure Laterality Date    BREAST SURGERY PROCEDURE UNLISTED Right 1968    cyst removed    HX CHOLECYSTECTOMY  2009    HX HIP REPLACEMENT Right 05/10/2017    HX KNEE ARTHROSCOPY Bilateral     X2 to right; X1 to left      Prior to Admission medications    Medication Sig Start Date End Date Taking? Authorizing Provider   HYDROmorphone (DILAUDID) 2 mg tablet Take 1 Tab by mouth every four (4) hours as needed for Pain. Max Daily Amount: 12 mg. 8/21/17  Yes ALMA Navarrete   aspirin (ASPIRIN) 325 mg tablet Take 1 Tab by mouth two (2) times a day for 35 days. 8/21/17 9/25/17 Yes ALMA Navarrete   atenolol-chlorthalidone (TENORETIC) 50-25 mg per tablet Take 1 Tab by mouth daily. Take / use AM day of surgery  per anesthesia protocols. Indications: hypertension   Yes Historical Provider   atorvastatin (LIPITOR) 10 mg tablet Take 10 mg by mouth daily. Take / use AM day of surgery  per anesthesia protocols. Indications: hypercholesterolemia   Yes Historical Provider   aspirin delayed-release 81 mg tablet Take 81 mg by mouth daily. Instructed to take DOS per Anesthesia guidelines. Yes Historical Provider   mupirocin (BACTROBAN) 2 % ointment by Both Nostrils route two (2) times a day. Needs one more application this evening 8/17/17 8/21/17  Historical Provider   folic acid 382 mcg tablet Take 800 mcg by mouth daily. Historical Provider   NAPROXEN SODIUM (ALEVE PO) Take 2 Tabs by mouth daily as needed for Pain. Historical Provider   magnesium 250 mg tab Take 1 Tab by mouth daily. Historical Provider   potassium chloride SR (KLOR-CON 10) 10 mEq tablet Take 20 mEq by mouth daily. PCP started pt on this due to leg cramping    Historical Provider   chlorthalidone (HYGROTEN) 25 mg tablet Take 12.5 mg by mouth every other day.     Historical Provider magnesium hydroxide (MILK OF MAGNESIA) 400 mg/5 mL suspension Take 30 mL by mouth daily as needed for Constipation. Historical Provider   MULTIVITS-MIN/IRON/FA/LUTEIN (CENTRUM SILVER WOMEN PO) Take 1 Tab by mouth daily. Historical Provider     Allergies   Allergen Reactions    Avelox [Moxifloxacin] Rash    Other Plant, Animal, Environmental Nausea Only     Allergic to Perfume & Smoke. Review of Systems: +right knee pain, +antalgic gait. Denies chest pain, shortness of breath, cough, headache, visual problems, abdominal pain, dysurea, calf pain. Pertinent positives are as noted in the medical records and unremarkable otherwise. Objective:     Vitals:  Blood pressure 146/59, pulse 68, temperature 97.5 °F (36.4 °C), resp. rate 16, height 5' 8.5\" (1.74 m), weight 220 lb (99.8 kg), SpO2 99 %. Temp (24hrs), Av.6 °F (35.9 °C), Min:95.3 °F (35.2 °C), Max:97.7 °F (36.5 °C)    BMI (calculated): 33 (17 0754)   Intake and Output:   0701 -  1900  In: 7884 [P.O.:960; I.V.:2554]  Out: 750 [Urine:750]    Physical Exam:   General: Alert and age appropriately oriented. No acute cardio respiratory distress. HEENT: Normocephalic, no conjunctival pallor, no scleral icterus  Oral mucosa moist without cyanosis, no JVD   Lungs: Clear to auscultation bilaterally. Respiration even and unlabored   Heart: Regular rate and rhythm, S1, S2   No  murmurs, clicks, rub or gallops   Abdomen: Soft, non-tender, non-distended. Genitourinary: defered   Neuromuscular:      Grossly no focal motor deficits. Right knee extension strong  Right ankle dorsiflexion 5/5  Right ankle plantarflexion 5/5  No sensory deficits distally BLE to soft touch. Skin/extremity: No calf tenderness BLE. No rashes, no marginal erythema.                                                                                          Labs/Studies:  Recent Results (from the past 72 hour(s))   TYPE & SCREEN    Collection Time: 17 8:50 AM   Result Value Ref Range    Crossmatch Expiration 08/24/2017     ABO/Rh(D) O POSITIVE     Antibody screen NEG    GLUCOSE, POC    Collection Time: 08/21/17  8:57 AM   Result Value Ref Range    Glucose (POC) 105 (H) 65 - 100 mg/dL   URINALYSIS W/ RFLX MICROSCOPIC    Collection Time: 08/21/17 11:15 AM   Result Value Ref Range    Color YELLOW      Appearance CLEAR      Specific gravity 1.010 1.001 - 1.023      pH (UA) 7.0 5.0 - 9.0      Protein NEGATIVE  NEG mg/dL    Glucose NEGATIVE  NEG mg/dL    Ketone NEGATIVE  NEG mg/dL    Bilirubin NEGATIVE  NEG      Blood TRACE (A) NEG      Urobilinogen 0.2 0.2 - 1.0 EU/dL    Nitrites NEGATIVE  NEG      Leukocyte Esterase NEGATIVE  NEG     URINE MICROSCOPIC    Collection Time: 08/21/17 11:15 AM   Result Value Ref Range    WBC 0 0 /hpf    RBC 0-3 0 /hpf    Epithelial cells 0 0 /hpf    Bacteria 0 0 /hpf    Casts 0 0 /lpf    Crystals, urine 0 0 /LPF    Mucus 0 0 /lpf   HEMOGLOBIN    Collection Time: 08/21/17  7:51 PM   Result Value Ref Range    HGB 11.1 (L) 11.7 - 15.4 g/dL   HEMOGLOBIN    Collection Time: 08/22/17  4:25 AM   Result Value Ref Range    HGB 10.6 (L) 11.7 - 82.4 g/dL   METABOLIC PANEL, BASIC    Collection Time: 08/22/17  4:25 AM   Result Value Ref Range    Sodium 140 136 - 145 mmol/L    Potassium 4.2 3.5 - 5.1 mmol/L    Chloride 104 98 - 107 mmol/L    CO2 27 21 - 32 mmol/L    Anion gap 9 7 - 16 mmol/L    Glucose 120 (H) 65 - 100 mg/dL    BUN 33 (H) 8 - 23 MG/DL    Creatinine 1.23 (H) 0.6 - 1.0 MG/DL    GFR est AA 54 (L) >60 ml/min/1.73m2    GFR est non-AA 45 (L) >60 ml/min/1.73m2    Calcium 8.5 8.3 - 10.4 MG/DL       Functional Assessment:  Reviewed participation and progress in therapies  Gross Assessment  AROM: Generally decreased, functional (left LE) (08/21/17 1600)  Strength: Generally decreased, functional (left LE) (08/21/17 1600)  Coordination: Generally decreased, functional (left LE) (08/21/17 1600)  Tone:  (hypotonus) (08/21/17 1600)  Sensation: (nerve block still active) (08/21/17 1600)   Bed Mobility  Supine to Sit: Moderate assistance (08/22/17 1000)  Sit to Supine: Moderate assistance (08/21/17 1600)  Scooting: Minimum assistance (08/21/17 1600)   Balance  Sitting: Intact (08/22/17 1400)  Standing: With support (08/22/17 1400)   Grooming  Washing Face: Supervision/set-up (08/22/17 1118)           Bed/Mat Mobility  Supine to Sit: Moderate assistance (08/22/17 1000)  Sit to Supine:  Moderate assistance (08/21/17 1600)  Sit to Stand: Contact guard assistance (08/22/17 1400)  Bed to Chair: Contact guard assistance (08/22/17 1400)  Scooting: Minimum assistance (08/21/17 1600)     Ambulation:  Gait  Speed/Keli: Delayed (08/22/17 1400)  Step Length: Left shortened;Right shortened (08/22/17 1400)  Stance: Right decreased (08/22/17 1400)  Gait Abnormalities: Antalgic (08/22/17 1400)  Ambulation - Level of Assistance: Contact guard assistance (08/22/17 1400)  Distance (ft): 136 Feet (ft) (08/22/17 1400)  Assistive Device: Walker, rolling (08/22/17 1400)  Interventions: Safety awareness training;Verbal cues (08/22/17 1400)  Duration: 15 Minutes (08/22/17 1400)    Impression/Plan:     Principal Problem:    Status post total right knee replacement (8/21/2017)    Active Problems:    Hypertension (1/29/2015)      CKD (chronic kidney disease) (8/21/2017)        Current Facility-Administered Medications   Medication Dose Route Frequency Provider Last Rate Last Dose    folic acid (FOLVITE) tablet 1 mg  1 mg Oral DAILY Loyce Axe, PA   1 mg at 08/22/17 0900    0.9% sodium chloride infusion  100 mL/hr IntraVENous CONTINUOUS Loyce Axe, PA        sodium chloride (NS) flush 5-10 mL  5-10 mL IntraVENous Q8H Loyce Axe, PA   5 mL at 08/22/17 1400    sodium chloride (NS) flush 5-10 mL  5-10 mL IntraVENous PRN Loyce Axe, PA        acetaminophen (TYLENOL) tablet 1,000 mg  1,000 mg Oral Q6H Loyce Axe, PA   1,000 mg at 08/22/17 4335    HYDROmorphone (DILAUDID) tablet 2 mg  2 mg Oral Q4H PRN ALMA Hernandez   2 mg at 08/22/17 1928    HYDROmorphone (PF) (DILAUDID) injection 1 mg  1 mg IntraVENous Q3H PRN ALMA Hernandez        naloxone [facility]) injection 0.2-0.4 mg  0.2-0.4 mg IntraVENous Q10MIN PRN ALMA Hernandez        ondansetron [facility]) injection 4 mg  4 mg IntraVENous Q4H PRN ALMA Hernandez        diphenhydrAMINE (BENADRYL) capsule 25 mg  25 mg Oral Q4H PRN ALMA Hernandez        senna-docusate (PERICOLACE) 8.6-50 mg per tablet 2 Tab  2 Tab Oral DAILY Viral Hernandez   2 Tab at 08/22/17 0825    aspirin delayed-release tablet 325 mg  325 mg Oral Q12H Scott Gonzalez [place]   325 mg at 08/22/17 2134    READ PPD 24HR, 48HR, 72HR  1 Each Other Q24H Sandy Meza MD   1 Each at 08/22/17 0900    atenolol/chlorthalidone (TENORETIC) 50/25 mg   Oral DAILY Sandy Meza MD        magnesium oxide (MAG-OX) tablet 200 mg  200 mg Oral DAILY Sandy Meza MD   200 mg at 08/22/17 0825    potassium chloride (K-DUR, KLOR-CON) SR tablet 20 mEq  20 mEq Oral DAILY Sandy Meza MD   20 mEq at 08/22/17 0900    hydrALAZINE (APRESOLINE) 20 mg/mL injection 10 mg  10 mg IntraVENous Q6H PRN Cristiane Hull MD            Recommendations: Recommend sub acute rehab at [facility]. Continue Acute Rehab Program.  Coordination of rehab/medical care. Counseling of Physical Medicine & Rehab care issues management. Rehabilitation Management/ Medical Management: 1. Devices:Walkers, Type: Rolling Walker  2. Consult:Rehab team including PT, OT,  and . 3. Disposition Rehab-discussed with patient. 4. Thigh-high or knee-high DOUGLAS's when out of bed. 5. DVT Prophylaxis - aspirin 325mg bid x 30days. 6. Incentive spirometer Q1H while awake  7. Post op hemorrhagic anemia-  monitor. 8. Activity: WBAT RLE  9. Planned Labs: CBC,BMP  10.  Pain Control: Adequately controlled with scheduled tylenol, celebrex and  PRN meds. Continue current management. 11. Wound Care: Keep right TKA wound clean and dry and reinforce dressing PRN. May remove Aquacel 1 week post op ad replace with new one. Remove staples 12-14 post surgery, when incision appears appropriately closed and apply benzoin and 1/2\" steristrips. Follow up with Dr Micki Feldman  2 weeks after discharge from rehab. Follow up with ORTHO per instructions. Thank you for the opportunity to participate in the care of this patient.     Signed By: Rios Albright MD     August 22, 2017

## 2017-08-22 NOTE — PROGRESS NOTES
Care Management Interventions  Mode of Transport at Discharge: Self  Transition of Care Consult (CM Consult): SNF  Partner SNF: Yes  Physical Therapy Consult: Yes  Occupational Therapy Consult: Yes  Current Support Network: Lives Alone  Confirm Follow Up Transport: Family  Plan discussed with Pt/Family/Caregiver: Yes  Freedom of Choice Offered: Yes  Discharge Location  Discharge Placement: Skilled nursing facility    Patient is a 67 yr old female admitted for a right TKA. She lives alone so she has requested rehab at [facility] Referral sent and approval obtained for admission on Thurs 8-24 after her three night Inpt stay. Will follow.   Gertrude Tellez

## 2017-08-22 NOTE — PROGRESS NOTES
Problem: Mobility Impaired (Adult and Pediatric)  Goal: *Acute Goals and Plan of Care (Insert Text)  GOALS (1-4 days):  (1.)Ms. Tasha Carlson will move from supine to sit and sit to supine in bed with CONTACT GUARD ASSIST.  (2.)Ms. Tasha Carlson will transfer from bed to chair and chair to bed with STAND BY ASSIST using the least restrictive device. (3.)Ms. Tasha Carlson will ambulate with STAND BY ASSIST for  feet with the least restrictive device. ((4.)Ms. Tasha Carlson will increase right knee ROM to 5°-80°.  ________________________________________________________________________________________________      PHYSICAL THERAPY JOINT CAMP TKA: Daily Note and PM 8/22/2017  INPATIENT: Hospital Day: 2  [place] / Product Type: Medicare /      NAME/AGE/GENDER: Rachelle Barry is a 67 y.o. female    PRIMARY DIAGNOSIS:  Primary osteoarthritis of right knee [M17.11]              Procedure(s) and Anesthesia Type:     * RIGHT KNEE ARTHROPLASTY TOTAL / FNB / Digna Poser / NEPHEW - Spinal (Right)  ICD-10: Treatment Diagnosis:        · Pain in Right Knee (M25.561)  · Stiffness of Right Knee, Not elsewhere classified (M25.661)  · Difficulty in walking, Not elsewhere classified (R26.2)       ASSESSMENT:      Ms. Tasha Carlson presents with impaired strength & mobility s/p right TKA. Pt also had decreased stability during out of bed activity. Pt buzz benefit from follow up therapy to help restore safe function prior to returning home with caregiver. Pt. Negar Galvan to do well. Reports no right knee pain, just left knee. She increased gait distance with walker and did tka exercises with cues. Plan on rehab. No questions. This section established at most recent assessment   PROBLEM LIST (Impairments causing functional limitations):  1. Decreased Strength  2. Decreased ADL/Functional Activities  3. Decreased Transfer Abilities  4. Decreased Ambulation Ability/Technique  5. Decreased Balance  6.  Increased Pain  7. Decreased Activity Tolerance  8. Decreased Flexibility/Joint Mobility  9. Decreased [place] with Home Exercise Program    INTERVENTIONS PLANNED: (Benefits and precautions of physical therapy have been discussed with the patient.)  1. Bed Mobility  2. Gait Training  3. Home Exercise Program (HEP)  4. Therapeutic Exercise/Strengthening  5. Transfer Training  6. Range of Motion: active/assisted/passive  7. Therapeutic Activities  8. Group Therapy      TREATMENT PLAN: Frequency/Duration: Follow patient BID   to address above goals. Rehabilitation Potential For Stated Goals: GOOD      RECOMMENDED REHABILITATION/EQUIPMENT: (at time of discharge pending progress): Continue Skilled Therapy and pt could return home with 24/7 assist if pt becomes manageable for caregiver, if not, pt may need arehab stay @ SNF. HISTORY:   History of Present Injury/Illness (Reason for Referral): The patient has end stage arthritis of the right knee. The patient was evaluated and examined during a consultation prior to this office visit. There have been no changes to the patient's orthopedic condition since the initial consultation. The patient has failed previous conservative treatment for this condition including antiinflammatories , and lifestyle modifications. The necessity for joint replacement is present. The patient will be admitted the day of surgery for Procedure(s) (LRB):  RIGHT KNEE ARTHROPLASTY TOTAL / FNB / Anabel Branch / NEPHEW (Right)    Past Medical History/Comorbidities:   Ms. 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 She also has no past medical history of Adverse effect of anesthesia; Aneurysm ([address] Arrhythmia; Asthma; CAD (coronary artery disease); Cancer ([facility] [address] Chronic kidney disease; Chronic obstructive pulmonary disease ([facility] [address] Chronic pain; Coagulation disorder ([address] Diabetes ([address] Difficult intubation; Endocarditis; GERD (gastroesophageal reflux disease); Heart failure ([address] Ill-defined condition; Liver disease; Malignant hyperthermia due to anesthesia; Morbid obesity ([facility] [address] Nicotine vapor product user; Non-nicotine vapor product user; Pseudocholinesterase deficiency; Psychiatric disorder; PUD (peptic ulcer disease); Rheumatic fever; Seizures ([facility] [address] Stroke [facility]); Thromboembolus ([facility] [address] Thyroid disease; or Unspecified adverse effect of anesthesia. Ms. Justin Kitchen  has a past surgical history that includes cholecystectomy (2009); knee arthroscopy (Bilateral); breast surgery procedure unlisted (Right, 1968); and hip replacement (Right, 05/10/2017).   Social History/Living Environment:   Home Environment: Private residence  # Steps to Enter: 0  Wheelchair Ramp: Yes  One/Two Story Residence: One story  Living Alone: No  Support Systems: Spouse/Significant Other/Partner  Patient Expects to be Discharged to[de-identified] Skilled nursing facility  Current DME Used/Available at Home: Walker, rolling  Prior Level of Function/Work/Activity:  Pt was ambulating in the home with rolling walker & SBA prior to this admission   Number of Personal Factors/Comorbidities that affect the Plan of Care: 3+: HIGH COMPLEXITY   EXAMINATION:   Most Recent Physical Functioning:                RLE AROM  R Knee Flexion: 92  R Knee Extension: 8RLE PROM  R Knee Flexion: 45  R Knee Extension: 15       RLE Strength  R Knee Flexion: 3  R Knee Extension: 3     Bed Mobility  Supine to Sit: Moderate assistance Transfers  Sit to Stand: Contact guard assistance  Stand to Sit: Contact guard assistance  Bed to Chair: Contact guard assistance     Balance  Sitting: Intact  Standing: With support                Weight Bearing Status  Right Side Weight Bearing: As tolerated  Distance (ft): 136 Feet (ft)  Ambulation - Level of Assistance: Contact guard assistance  Assistive Device: Walker, rolling  Speed/Keli: Delayed  Step Length: Left shortened;Right shortened  Stance: Right decreased  Gait Abnormalities: Antalgic  Interventions: Safety awareness training;Verbal cues      Braces/Orthotics: none     Right Knee Cold  Type: Cryocuff       Body Structures Involved:  1. Joints  2. Muscles Body Functions Affected:  1. Sensory/Pain  2. Movement Related Activities and Participation Affected:  1. General Tasks and Demands  2. Mobility   Number of elements that affect the Plan of Care: 4+: HIGH COMPLEXITY   CLINICAL PRESENTATION:   Presentation: Stable and uncomplicated: LOW COMPLEXITY   CLINICAL DECISION MAKIN[address] [place] [id number] AM-PAC 6 Clicks   Basic Mobility Inpatient Short Form  How much difficulty does the patient currently have. .. Unable A Lot A Little None   1. Turning over in bed (including adjusting bedclothes, sheets and blankets)? [ ] 1   [X] 2   [ ] 3   [ ] 4   2. Sitting down on and standing up from a chair with arms ( e.g., wheelchair, bedside commode, etc.)   [ ] 1   [ ] 2   [X] 3   [ ] 4   3. Moving from lying on back to sitting on the side of the bed? [ ] 1   [X] 2   [ ] 3   [ ] 4   How much help from another person does the patient currently need. .. Total A Lot A Little None   4. Moving to and from a bed to a chair (including a wheelchair)? [ ] 1   [ ] 2   [X] 3   [ ] 4   5. Need to walk in hospital room? [ ] 1   [ ] 2   [X] 3   [ ] 4   6. Climbing 3-5 steps with a railing? [X] 1   [ ] 2   [ ] 3   [ ] 4   © , Trustees of [address] [place] [address], under license to [employer].  All rights reserved Score:  Initial: 14 Most Recent: X (Date: -- )     Interpretation of Tool:  Represents activities that are increasingly more difficult (i.e. Bed mobility, Transfers, Gait). Score 24 23 22-20 19-15 14-10 9-7 6       Modifier CH CI CJ CK CL CM CN         · Mobility - Walking and Moving Around:               - CURRENT STATUS:    CL - 60%-79% impaired, limited or restricted               - GOAL STATUS:           CK - 40%-59% impaired, limited or restricted               - D/C STATUS:                       ---------------To be determined---------------  [place] / Product Type: Medicare /       Medical Necessity:     · Patient is expected to demonstrate progress in strength, range of motion, balance, coordination and functional technique to decrease assistance required with bed mobility, transfers & gait. Reason for Services/Other Comments:  · Patient continues to require skilled intervention due to pt not safe with functional mobility. Use of outcome tool(s) and clinical judgement create a POC that gives a: Clear prediction of patient's progress: LOW COMPLEXITY                 TREATMENT:   (In addition to Assessment/Re-Assessment sessions the following treatments were rendered)      Pre-treatment Symptoms/Complaints:  Left knee pain, did not rate  Pain: Initial: 0     Post Session:  0/10 right knee      Gait Training (15 Minutes):  Gait training to improve and/or restore physical functioning as related to mobility, strength and balance. Ambulated 136 Feet (ft) with Contact guard assistance using a Walker, rolling and minimal Safety awareness training;Verbal cues related to their stance phase to promote proper body alignment, promote proper body posture and promote proper body mechanics. Therapeutic Exercise: (45 Minutes):  Exercises per grid below to improve mobility and strength.   Required minimal visual, verbal and manual cues to promote proper body alignment, promote proper body posture and promote proper body mechanics. Progressed range and repetitions as indicated. Date:  8/22  Date:    Date:      ACTIVITY/EXERCISE AM PM AM PM AM PM   GROUP THERAPY  [ ]  [x ]  [ ]  [ ]  [ ]  [ ]   Ankle Pumps 15a  15 a           Quad Sets  10a  15a           Gluteal Sets  10a  15a           Hip ABd/ADduction  10a  15a           Straight Leg Raises  10a  15a           Knee Slides  10a  15aa           Short Arc Quads    15a           Long Arc Quads               Chair Slides    15a                           B = bilateral; AA = active assistive; A = active; P = passive       Treatment/Session Assessment:         Response to Treatment:  Pt. Doing well. Education:  [x ] Home Exercises  [X] Fall Precautions  [ ] Hip Precautions [ ] D/C Instruction Review  [ x] Knee/Hip Prosthesis Review  [X] Walker Management/Safety [ ] Adaptive Equipment as Needed         Interdisciplinary Collaboration:   · Physical Therapy Assistant and Registered Nurse     After treatment position/precautions:   · Up in chair, Bed/Chair-wheels locked, Bed in low position and Call light within reach     Compliance with Program/Exercises: Will assess as treatment progresses. No questions     Recommendations/Intent for next treatment session:  Treatment next visit will focus on increasing Ms. Sparrow's independence with bed mobility, transfers, gait training, strength/ROM exercises, modalities for pain, and patient education.        Total Treatment Duration:  PT Patient Time In/Time Out  Time In: 1300  Time Out: Jason Madrid PT

## 2017-08-22 NOTE — PROGRESS NOTES
Problem: Self Care Deficits Care Plan (Adult)  Goal: *Acute Goals and Plan of Care (Insert Text)  GOALS:   DISCHARGE GOALS (in preparation for going home/rehab): 3 days  1. Ms. Julio Stuart will perform one lower body dressing activity with minimal assistance required to demonstrate improved functional mobility and safety. 2. Ms. Julio Stuart will perform one lower body bathing activity with minimal assistance required to demonstrate improved functional mobility and safety. 3. Ms. Julio Stuart will perform toileting/toilet transfer with contact guard assistance to demonstrate improved functional mobility and safety. 4. Ms. Julio Stuart will perform shower transfer with contact guard assistance to demonstrate improved functional mobility and safety. JOINT CAMP OCCUPATIONAL THERAPY TKA: Daily Note, Treatment Day: 1st and AM 8/22/2017  INPATIENT: Hospital Day: 2  [place] / Product Type: Medicare /      NAME/AGE/GENDER: Madan Roy is a 67 y.o. female    PRIMARY DIAGNOSIS:  Primary osteoarthritis of right knee [M17.11]              Procedure(s) and Anesthesia Type:     * RIGHT KNEE ARTHROPLASTY TOTAL / FNB / [facility] [place] / NEPHEW - Spinal (Right)  ICD-10: Treatment Diagnosis:        · Pain in Right Knee (M25.561)  · Stiffness of Right Knee, Not elsewhere classified (H38.864)       ASSESSMENT:       Ms. Julio Stuart is s/p R TKA and presents with decreased weight bearing on R LE and decreased independence with functional mobility and activities of daily living. Patient completed shower and dressing as charter below in ADL grid. Patient has met 0/4 goals and plans to go to rehab. OT reviewed safety precautions throughout session and therapy schedule for the remainder of today. Patient instructed to call for assistance when needing to get up from recliner and all needs in reach. Patient verbalized understanding of call light.       This section established at most recent assessment   PROBLEM LIST (Impairments causing functional limitations):  1. Decreased Strength  2. Decreased ADL/Functional Activities  3. Decreased Transfer Abilities  4. Increased Pain  5. Increased Fatigue  6. Decreased Flexibility/Joint Mobility  7. Decreased Knowledge of Precautions    INTERVENTIONS PLANNED: (Benefits and precautions of occupational therapy have been discussed with the patient.)  1. Activities of daily living training  2. Adaptive equipment training  3. Balance training  4. Clothing management  5. Donning&doffing training  6. Theraputic activity      TREATMENT PLAN: Frequency/Duration: Follow patient qd to address above goals. Rehabilitation Potential For Stated Goals: GOOD      RECOMMENDED REHABILITATION/EQUIPMENT: (at time of discharge pending progress): Continue Skilled Therapy and Rehab. OCCUPATIONAL PROFILE AND HISTORY:   History of Present Injury/Illness (Reason for Referral): Pt presents this date s/p (R) TKA. Past Medical History/Comorbidities: Ms. 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 She also has no past medical history of Adverse effect of anesthesia; Aneurysm ([address] Arrhythmia; Asthma; CAD (coronary artery disease); Cancer ([address] Chronic kidney disease; Chronic obstructive pulmonary disease ([address] Chronic pain; Coagulation disorder ([address] Diabetes ([address] Difficult intubation; Endocarditis; GERD (gastroesophageal reflux disease);  Heart failure ([facility] [address] Ill-defined condition; Liver disease; Malignant hyperthermia due to anesthesia; Morbid obesity ([facility] [address] Nicotine vapor product user; Non-nicotine vapor product user; Pseudocholinesterase deficiency; Psychiatric disorder; PUD (peptic ulcer disease); Rheumatic fever; Seizures ([facility] [address] Stroke [facility]); Thromboembolus ([facility] [address] Thyroid disease; or Unspecified adverse effect of anesthesia. Ms. Jan Bush  has a past surgical history that includes cholecystectomy (2009); knee arthroscopy (Bilateral); breast surgery procedure unlisted (Right, 1968); and hip replacement (Right, 05/10/2017). Social History/Living Environment:   Home Environment: Private residence  # Steps to Enter: 0  Wheelchair Ramp: Yes  One/Two Story Residence: One story  Living Alone: No  Support Systems: Spouse/Significant Other/Partner  Patient Expects to be Discharged to[de-identified] Skilled nursing facility  Current DME Used/Available at Home: Walker, rolling  Prior Level of Function/Work/Activity:  independent   Number of Personal Factors/Comorbidities that affect the Plan of Care: Expanded review of therapy/medical records (1-2):  MODERATE COMPLEXITY   ASSESSMENT OF OCCUPATIONAL PERFORMANCE[de-identified]   Most Recent Physical Functioning:   Balance  Sitting: Intact  Standing: With support        Patient Vitals for the past 6 hrs:   BP SpO2 Pulse   08/22/17 0531 123/77 100 % 62   08/22/17 0800 138/54 100 % 76   08/22/17 0835 - 100 % -                                   Mental Status  Neurologic State: Alert  Orientation Level: Oriented X4  Cognition: Appropriate decision making; Appropriate for age attention/concentration  Perception: Appears intact  Perseveration: No perseveration noted  Safety/Judgement: Fall prevention            RLE PROM  R Knee Flexion: 45  R Knee Extension: 15       Basic ADLs (From Assessment) Complex ADLs (From Assessment)   Basic ADL  Feeding: Setup  Oral Facial Hygiene/Grooming: Setup  Bathing:  Moderate assistance  Upper Body Dressing: Setup  Lower Body Dressing: Maximum assistance  Toileting: Maximum assistance     Grooming/Bathing/Dressing Activities of Daily Living   Grooming  Washing Face: Supervision/set-up Cognitive Retraining  Safety/Judgement: Fall prevention   Upper Body Bathing  Bathing Assistance: Modified independent  Position Performed: Seated in chair     Lower Body Bathing  Bathing Assistance: Moderate assistance  Perineal  : Independent  Lower Body : Moderate assistance  Position Performed: Seated in chair;Standing     Upper Body Dressing Assistance  Dressing Assistance: Supervision/set-up  Bra: Supervision/set-up  Pullover Shirt: Supervision/set-up     Lower Body Dressing Assistance  Dressing Assistance: Moderate assistance  Underpants: Moderate assistance  Pants With Elastic Waist: Moderate assistance  Socks: Moderate assistance  Antiembolitic Stockings: Compensatory technique training  Shoes with Cloth Laces: Moderate assistance  Cues: Doff;Don;Physical assistance Bed/Mat Mobility  Supine to Sit: Moderate assistance  Sit to Stand: Supervision  Bed to Chair: Moderate assistance           Physical Skills Involved:  1. Balance  2. Strength  3. Activity Tolerance Cognitive Skills Affected (resulting in the inability to perform in a timely and safe manner): 1. none Psychosocial Skills Affected:  1. none   Number of elements that affect the Plan of Care: 1-3:  LOW COMPLEXITY   CLINICAL DECISION MAKIN [facility] [address] [address] AM-PAC 6 Clicks   Daily Activity Inpatient Short Form  How much help from another person does the patient currently need. .. Total A Lot A Little None   1. Putting on and taking off regular lower body clothing?   [ ] 1   [X] 2   [ ] 3   [ ] 4   2. Bathing (including washing, rinsing, drying)? [ ] 1   [X] 2   [ ] 3   [ ] 4   3. Toileting, which includes using toilet, bedpan or urinal?   [ ] 1   [X] 2   [ ] 3   [ ] 4   4. Putting on and taking off regular upper body clothing?   [ ] 1   [X] 2   [ ] 3   [ ] 4   5.   Taking care of personal grooming such as brushing teeth? [ ] 1   [ ] 2   [X] 3   [ ] 4   6. Eating meals? [ ] 1   [ ] 2   [X] 3   [ ] 4   © 2007, Trustees of [address] [place] [address] [address] [address], under license to [employer]. All rights reserved   Score:  Initial: 14 Most Recent: X (Date: -- )     Interpretation of Tool:  Represents activities that are increasingly more difficult (i.e. Bed mobility, Transfers, Gait). Score 24 23 22-20 19-15 14-10 9-7 6       Modifier CH CI CJ CK CL CM CN         · Self Care:               - CURRENT STATUS:    CL - 60%-79% impaired, limited or restricted               - GOAL STATUS:           CK - 40%-59% impaired, limited or restricted               - D/C STATUS:                       ---------------To be determined---------------  [place] / Product Type: Medicare /       Medical Necessity:     · Patient is expected to demonstrate progress in strength, balance, coordination and functional technique to increase independence with self care and functional mobility. Reason for Services/Other Comments:  · Patient continues to require skilled intervention due to decrease self care and mobility. Use of outcome tool(s) and clinical judgement create a POC that gives a: MODERATE COMPLEXITY                 TREATMENT:   (In addition to Assessment/Re-Assessment sessions the following treatments were rendered)      Pre-treatment Symptoms/Complaints:  No complaints  Pain: Initial:   Pain Intensity 1: 4  Pain Location 1: Knee  Pain Intervention(s) 1: Repositioned, Shower  Post Session:  0      Self Care: (24): Procedure(s) (per grid) utilized to improve and/or restore self-care/home management as related to dressing and bathing. Required mod verbal and manual cueing to facilitate activities of daily living skills.        Treatment/Session Assessment:         Response to Treatment:  Tolerated well     Education:  [ ] Home Exercises  [X] Fall Precautions  [ ] Hip Precautions [ ] Going Home Video  [X] Knee/Hip Prosthesis Review  [X] Walker Management/Safety [X] Adaptive Equipment as Needed         Interdisciplinary Collaboration:   · Physical Therapist, Certified Occupational Therapy Assistant and Registered Nurse     After treatment position/precautions:   · Up in chair, Bed/Chair-wheels locked, Caregiver at bedside, Call light within reach and RN notified     Compliance with Program/Exercises: Will assess as treatment progresses. Recommendations/Intent for next treatment session:  Treatment next visit will focus on increasing Ms. Sparrow's independence with bed mobility, transfers, self care, functional mobility, modalities for pain, and patient education.        Total Treatment Duration:  OT Patient Time In/Time Out  Time In: 1001  Time Out: [address]

## 2017-08-22 NOTE — PROGRESS NOTES
08/22/17 0835   Oxygen Therapy   O2 Sat (%) 100 %   Pulse via Oximetry 70 beats per minute   O2 Device Room air   Incentive Spirometry Treatment   Actual Volume (ml) 2000 ml   Number of Attempts 2   Good npc. Pt working on IS. Pt encouraged to do 10 breaths per hour while awake on IS. B/S clear. No respiratory distress noted at this time.

## 2017-08-22 NOTE — PROGRESS NOTES
08/21/17 2054   Oxygen Therapy   O2 Sat (%) 99 %   Pulse via Oximetry 77 beats per minute   O2 Device Room air  (weaned from 2L NC.)   O2 Flow Rate (L/min) 0 l/min   Incentive Spirometry Treatment   Actual Volume (ml) 1800 ml   Number of Attempts 2   Patient instructed in the use of incentive spirometry. Good npc. Pt placed on oxinet #7 at this time. Alarms set and functioning properly.

## 2017-08-22 NOTE — PROGRESS NOTES
August 22, 2017         Post Op day: 1 Day Post-OpProcedure(s) (LRB):  RIGHT KNEE ARTHROPLASTY TOTAL / FNB / Nimo Barefoot / NEPHEW (Right)      Admit Date: 8/21/2017  Admit Diagnosis: Primary osteoarthritis of right knee [M17.11]    LAB:    Recent Results (from the past 24 hour(s))   TYPE & SCREEN    Collection Time: 08/21/17  8:50 AM   Result Value Ref Range    Crossmatch Expiration 08/24/2017     ABO/Rh(D) [place] POSITIVE     Antibody screen NEG    GLUCOSE, POC    Collection Time: 08/21/17  8:57 AM   Result Value Ref Range    Glucose (POC) 105 (H) 65 - 100 mg/dL   URINALYSIS W/ RFLX MICROSCOPIC    Collection Time: 08/21/17 11:15 AM   Result Value Ref Range    Color YELLOW      Appearance CLEAR      Specific gravity 1.010 1.001 - 1.023      pH (UA) 7.0 5.0 - 9.0      Protein NEGATIVE  NEG mg/dL    Glucose NEGATIVE  NEG mg/dL    Ketone NEGATIVE  NEG mg/dL    Bilirubin NEGATIVE  NEG      Blood TRACE (A) NEG      Urobilinogen 0.2 0.2 - 1.0 EU/dL    Nitrites NEGATIVE  NEG      Leukocyte Esterase NEGATIVE  NEG     URINE MICROSCOPIC    Collection Time: 08/21/17 11:15 AM   Result Value Ref Range    WBC 0 0 /hpf    RBC 0-3 0 /hpf    Epithelial cells 0 0 /hpf    Bacteria 0 0 /hpf    Casts 0 0 /lpf    Crystals, urine 0 0 /LPF    Mucus 0 0 /lpf   HEMOGLOBIN    Collection Time: 08/21/17  7:51 PM   Result Value Ref Range    HGB 11.1 (L) 11.7 - 15.4 g/dL   HEMOGLOBIN    Collection Time: 08/22/17  4:25 AM   Result Value Ref Range    HGB 10.6 (L) 11.7 - 11.4 g/dL   METABOLIC PANEL, BASIC    Collection Time: 08/22/17  4:25 AM   Result Value Ref Range    Sodium 140 136 - 145 mmol/L    Potassium 4.2 3.5 - 5.1 mmol/L    Chloride 104 98 - 107 mmol/L    CO2 27 21 - 32 mmol/L    Anion gap 9 7 - 16 mmol/L    Glucose 120 (H) 65 - 100 mg/dL    BUN 33 (H) 8 - 23 MG/DL    Creatinine 1.23 (H) 0.6 - 1.0 MG/DL    GFR est AA 54 (L) >60 ml/min/1.73m2    GFR est non-AA 45 (L) >60 ml/min/1.73m2    Calcium 8.5 8.3 - 10.4 MG/DL     Vital Signs:    Patient Vitals for the past 8 hrs:   BP Temp Pulse Resp SpO2   17 0531 123/77 97 °F (36.1 °C) 62 14 100 %     Temp (24hrs), Av °F (36.1 °C), Min:96.2 °F (35.7 °C), Max:98.4 °F (36.9 °C)    Body mass index is 32.96 kg/(m^2). Pain Control:   Pain Assessment  Pain Scale 1: Numeric (0 - 10)  Pain Intensity 1: 4  Pain Onset 1: 3-4 yrs  Pain Location 1: Knee, Ankle  Pain Orientation 1: Right, Left  Pain Description 1: Aching, Constant, Sore  Pain Intervention(s) 1: Medication (see MAR)    Subjective: Doing well, No complaints, No SOB, No Chest Pain, No Nausea or Vomitting     Objective: Vital Signs are Stable, No Acute Distress, Alert and Oriented, Dressing is Dry,  Neurovascular exam is normal.       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         Signed By: Ela Moon MD

## 2017-08-23 LAB
HGB BLD-MCNC: 10 G/DL (ref 11.7–15.4)
MM INDURATION POC: 0 MM (ref 0–5)
PPD POC: NORMAL NEGATIVE

## 2017-08-23 PROCEDURE — 85018 HEMOGLOBIN: CPT | Performed by: PHYSICIAN ASSISTANT

## 2017-08-23 PROCEDURE — 97116 GAIT TRAINING THERAPY: CPT

## 2017-08-23 PROCEDURE — 94762 N-INVAS EAR/PLS OXIMTRY CONT: CPT

## 2017-08-23 PROCEDURE — 74011250637 HC RX REV CODE- 250/637: Performed by: PHYSICIAN ASSISTANT

## 2017-08-23 PROCEDURE — 97150 GROUP THERAPEUTIC PROCEDURES: CPT

## 2017-08-23 PROCEDURE — 97110 THERAPEUTIC EXERCISES: CPT

## 2017-08-23 PROCEDURE — 74011250637 HC RX REV CODE- 250/637: Performed by: ORTHOPAEDIC SURGERY

## 2017-08-23 PROCEDURE — 36415 COLL VENOUS BLD VENIPUNCTURE: CPT | Performed by: PHYSICIAN ASSISTANT

## 2017-08-23 PROCEDURE — 65270000029 HC RM PRIVATE

## 2017-08-23 RX ADMIN — HYDROMORPHONE HYDROCHLORIDE 2 MG: 2 TABLET ORAL at 08:56

## 2017-08-23 RX ADMIN — ASPIRIN 325 MG: 325 TABLET, DELAYED RELEASE ORAL at 22:03

## 2017-08-23 RX ADMIN — HYDROMORPHONE HYDROCHLORIDE 2 MG: 2 TABLET ORAL at 22:04

## 2017-08-23 RX ADMIN — HYDROMORPHONE HYDROCHLORIDE 2 MG: 2 TABLET ORAL at 04:55

## 2017-08-23 RX ADMIN — Medication 200 MG: at 08:56

## 2017-08-23 RX ADMIN — ACETAMINOPHEN 1000 MG: 500 TABLET, FILM COATED ORAL at 23:34

## 2017-08-23 RX ADMIN — FOLIC ACID 1 MG: 1 TABLET ORAL at 08:56

## 2017-08-23 RX ADMIN — ATENOLOL: 50 TABLET ORAL at 08:56

## 2017-08-23 RX ADMIN — HYDROMORPHONE HYDROCHLORIDE 2 MG: 2 TABLET ORAL at 17:54

## 2017-08-23 RX ADMIN — POTASSIUM CHLORIDE 20 MEQ: 20 TABLET, EXTENDED RELEASE ORAL at 08:57

## 2017-08-23 RX ADMIN — HYDROMORPHONE HYDROCHLORIDE 1 MG: 1 INJECTION, SOLUTION INTRAMUSCULAR; INTRAVENOUS; SUBCUTANEOUS at 00:03

## 2017-08-23 RX ADMIN — Medication 5 ML: at 14:00

## 2017-08-23 RX ADMIN — ACETAMINOPHEN 1000 MG: 500 TABLET, FILM COATED ORAL at 04:54

## 2017-08-23 RX ADMIN — HYDROMORPHONE HYDROCHLORIDE 2 MG: 2 TABLET ORAL at 14:07

## 2017-08-23 RX ADMIN — ACETAMINOPHEN 1000 MG: 500 TABLET, FILM COATED ORAL at 00:00

## 2017-08-23 RX ADMIN — Medication 5 ML: at 22:04

## 2017-08-23 RX ADMIN — ACETAMINOPHEN 1000 MG: 500 TABLET, FILM COATED ORAL at 14:07

## 2017-08-23 RX ADMIN — ASPIRIN 325 MG: 325 TABLET, DELAYED RELEASE ORAL at 08:56

## 2017-08-23 RX ADMIN — PRAVASTATIN SODIUM 2 TABLET: 20 TABLET ORAL at 08:56

## 2017-08-23 NOTE — PROGRESS NOTES
2017         Post Op day: 2 Days Post-OpProcedure(s) (LRB):  RIGHT KNEE ARTHROPLASTY TOTAL / FNB / Ruben Sidles / NEPHEW (Right)      Admit Date: 2017  Admit Diagnosis: Primary osteoarthritis of right knee [M17.11]    LAB:    Recent Results (from the past 24 hour(s))   HEMOGLOBIN    Collection Time: 17  5:10 AM   Result Value Ref Range    HGB 10.0 (L) 11.7 - 15.4 g/dL     Vital Signs:    Patient Vitals for the past 8 hrs:   BP Temp Pulse Resp SpO2   17 0733 158/73 97.6 °F (36.4 °C) 73 17 96 %   17 0456 (!) 150/93 98.4 °F (36.9 °C) 72 16 94 %     Temp (24hrs), Av.1 °F (36.2 °C), Min:95.3 °F (35.2 °C), Max:98.4 °F (36.9 °C)    Body mass index is 32.96 kg/(m^2). Pain Control:   Pain Assessment  Pain Scale 1: Numeric (0 - 10)  Pain Intensity 1: 10  Pain Onset 1: 3-4 yrs  Pain Location 1: Knee  Pain Orientation 1: Right  Pain Description 1: Aching, Burning, Constant, Sore  Pain Intervention(s) 1: Medication (see MAR)    Subjective: Doing well, No complaints, No SOB, No Chest Pain, No Nausea or Vomitting     Objective: Vital Signs are Stable, No Acute Distress, Alert and Oriented, Dressing is Dry,  Neurovascular exam is normal.       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         Signed By: Blair Rock MD

## 2017-08-23 NOTE — PROGRESS NOTES
Problem: Mobility Impaired (Adult and Pediatric)  Goal: *Acute Goals and Plan of Care (Insert Text)  GOALS (1-4 days):  (1.)Ms. Claritza Suarez will move from supine to sit and sit to supine in bed with CONTACT GUARD ASSIST.  (2.)Ms. Claritza Suarez will transfer from bed to chair and chair to bed with STAND BY ASSIST using the least restrictive device. (3.)Ms. Claritza Suarez will ambulate with STAND BY ASSIST for  feet with the least restrictive device. ((4.)Ms. Claritza Suarez will increase right knee ROM to 5°-80°.  ________________________________________________________________________________________________      PHYSICAL THERAPY JOINT CAMP TKA: Daily Note and PM 8/23/2017  INPATIENT: Hospital Day: 3  [place] / [place] / Product Type: Medicare /      NAME/AGE/GENDER: David Guerrero is a 67 y.o. female    PRIMARY DIAGNOSIS:  Primary osteoarthritis of right knee [M17.11]              Procedure(s) and Anesthesia Type:     * RIGHT KNEE ARTHROPLASTY TOTAL / FNB / Sindi Peer / NEPHEW - Spinal (Right)  ICD-10: Treatment Diagnosis:        · Pain in Right Knee (M25.561)  · Stiffness of Right Knee, Not elsewhere classified (M25.661)  · Difficulty in walking, Not elsewhere classified (R26.2)       ASSESSMENT:      Ms. Claritza Suarez presents with impaired strength & mobility s/p right TKA. Pt also had decreased stability during out of bed activity. Pt buzz benefit from follow up therapy to help restore safe function prior to returning home with caregiver. Pt. Continues to do well with slight increase in gait distance this pm.  She says new tka hurts less than left knee. She did tka exercises well, good rom. Plans on rehab tomorrow. This section established at most recent assessment   PROBLEM LIST (Impairments causing functional limitations):  1. Decreased Strength  2. Decreased ADL/Functional Activities  3. Decreased Transfer Abilities  4. Decreased Ambulation Ability/Technique  5. Decreased Balance  6.  Increased Pain  7. Decreased Activity Tolerance  8. Decreased Flexibility/Joint Mobility  9. Decreased [place] with Home Exercise Program    INTERVENTIONS PLANNED: (Benefits and precautions of physical therapy have been discussed with the patient.)  1. Bed Mobility  2. Gait Training  3. Home Exercise Program (HEP)  4. Therapeutic Exercise/Strengthening  5. Transfer Training  6. Range of Motion: active/assisted/passive  7. Therapeutic Activities  8. Group Therapy      TREATMENT PLAN: Frequency/Duration: Follow patient BID   to address above goals. Rehabilitation Potential For Stated Goals: GOOD      RECOMMENDED REHABILITATION/EQUIPMENT: (at time of discharge pending progress): Continue Skilled Therapy and pt could return home with 24/7 assist if pt becomes manageable for caregiver, if not, pt may need arehab stay @ SNF. HISTORY:   History of Present Injury/Illness (Reason for Referral): The patient has end stage arthritis of the right knee. The patient was evaluated and examined during a consultation prior to this office visit. There have been no changes to the patient's orthopedic condition since the initial consultation. The patient has failed previous conservative treatment for this condition including antiinflammatories , and lifestyle modifications. The necessity for joint replacement is present. The patient will be admitted the day of surgery for Procedure(s) (LRB):  RIGHT KNEE ARTHROPLASTY TOTAL / FNB / Lutricia Arely / NEPHEW (Right)    Past Medical History/Comorbidities:   Ms. Tanesha Ruiz  has a past medical history of Abnormal posture; Anemia; Essential hypertension; H/O echocardiogram (01/05/2017); H/O: pneumonia; Hypercholesteremia; Hypomagnesemia; Left ventricular diastolic dysfunction; Muscle weakness; Nausea & vomiting; MOOK (obstructive sleep apnea) (01/30/2015); Osteoarthritis;  Poor historian; Pressure ulcer of left buttock, stage 2; Pressure ulcer of right buttock, stage 2; SOB (shortness of breath); Status post right hip replacement (5/10/2017); Status post total right knee replacement (8/21/2017); Unable to ambulate; and Unsteadiness on feet. She also has no past medical history of Adverse effect of anesthesia; Aneurysm ([address] Arrhythmia; Asthma; CAD (coronary artery disease); Cancer ([facility] [address] Chronic kidney disease; Chronic obstructive pulmonary disease ([facility] [address] Chronic pain; Coagulation disorder ([facility] [address] Diabetes ([facility] [address] Difficult intubation; Endocarditis; GERD (gastroesophageal reflux disease); Heart failure ([address] Ill-defined condition; Liver disease; Malignant hyperthermia due to anesthesia; Morbid obesity ([facility] [address] Nicotine vapor product user; Non-nicotine vapor product user; Pseudocholinesterase deficiency; Psychiatric disorder; PUD (peptic ulcer disease); Rheumatic fever; Seizures ([facility] [address] Stroke [facility]); Thromboembolus ([facility] [address] Thyroid disease; or Unspecified adverse effect of anesthesia. Ms. Tawny Nolasco  has a past surgical history that includes cholecystectomy (2009); knee arthroscopy (Bilateral); breast surgery procedure unlisted (Right, 1968); and hip replacement (Right, 05/10/2017).   Social History/Living Environment:   Home Environment: Private residence  # Steps to Enter: 0  Wheelchair Ramp: Yes  One/Two Story Residence: One story  Living Alone: No  Support Systems: Spouse/Significant Other/Partner  Patient Expects to be Discharged to[de-identified] Skilled nursing facility  Current DME Used/Available at Home: Walker, rolling  Prior Level of Function/Work/Activity:  Pt was ambulating in the home with rolling walker & SBA prior to this admission   Number of Personal Factors/Comorbidities that affect the Plan of Care: 3+: HIGH COMPLEXITY   EXAMINATION:   Most Recent Physical Functioning:                RLE AROM  R Knee Flexion: 90  R Knee Extension: 8        RLE Strength  R Knee Flexion: 3  R Knee Extension: 3     Bed Mobility  Sit to Supine: Minimum assistance     Transfers  Sit to Stand: Contact guard assistance  Stand to Sit: Contact guard assistance  Bed to Chair: Contact guard assistance     Balance  Sitting: Intact  Standing: With support                Weight Bearing Status  Right Side Weight Bearing: As tolerated  Distance (ft): 150 Feet (ft)  Ambulation - Level of Assistance: Contact guard assistance  Assistive Device: Walker, rolling  Speed/Keli: Delayed  Step Length: Left shortened;Right shortened  Stance: Right decreased  Gait Abnormalities: Antalgic  Interventions: Safety awareness training;Verbal cues      Braces/Orthotics: none     Right Knee Cold  Type: Cryocuff       Body Structures Involved:  1. Joints  2. Muscles Body Functions Affected:  1. Sensory/Pain  2. Movement Related Activities and Participation Affected:  1. General Tasks and Demands  2. Mobility   Number of elements that affect the Plan of Care: 4+: HIGH COMPLEXITY   CLINICAL PRESENTATION:   Presentation: Stable and uncomplicated: LOW COMPLEXITY   CLINICAL DECISION MAKIN[address] [place] [id number] AM-PAC 6 Clicks   Basic Mobility Inpatient Short Form  How much difficulty does the patient currently have. .. Unable A Lot A Little None   1. Turning over in bed (including adjusting bedclothes, sheets and blankets)? [ ] 1   [X] 2   [ ] 3   [ ] 4   2. Sitting down on and standing up from a chair with arms ( e.g., wheelchair, bedside commode, etc.)   [ ] 1   [ ] 2   [X] 3   [ ] 4   3. Moving from lying on back to sitting on the side of the bed? [ ] 1   [X] 2   [ ] 3   [ ] 4   How much help from another person does the patient currently need. .. Total A Lot A Little None   4. Moving to and from a bed to a chair (including a wheelchair)? [ ] 1   [ ] 2   [X] 3   [ ] 4   5. Need to walk in hospital room? [ ] 1   [ ] 2   [X] 3   [ ] 4   6. Climbing 3-5 steps with a railing? [X] 1   [ ] 2   [ ] 3   [ ] 4   © , Trustees of [address] [place] [id number], under license to [employer].  All rights reserved       Score:  Initial: 14 Most Recent: X (Date: -- )     Interpretation of Tool:  Represents activities that are increasingly more difficult (i.e. Bed mobility, Transfers, Gait). Score 24 23 22-20 19-15 14-10 9-7 6       Modifier CH CI CJ CK CL CM CN         · Mobility - Walking and Moving Around:               - CURRENT STATUS:    CL - 60%-79% impaired, limited or restricted               - GOAL STATUS:           CK - 40%-59% impaired, limited or restricted               - D/C STATUS:                       ---------------To be determined---------------  [place] / [place] / Product Type: Medicare /       Medical Necessity:     · Patient is expected to demonstrate progress in strength, range of motion, balance, coordination and functional technique to decrease assistance required with bed mobility, transfers & gait. Reason for Services/Other Comments:  · Patient continues to require skilled intervention due to pt not safe with functional mobility. Use of outcome tool(s) and clinical judgement create a POC that gives a: Clear prediction of patient's progress: LOW COMPLEXITY                 TREATMENT:   (In addition to Assessment/Re-Assessment sessions the following treatments were rendered)      Pre-treatment Symptoms/Complaints:  Left knee pain greater than right  Pain: Initial: 5     Post Session:  5 right knee      Gait Training (15 Minutes):  Gait training to improve and/or restore physical functioning as related to mobility, strength and balance. Ambulated 150 Feet (ft) with Contact guard assistance using a Walker, rolling and minimal Safety awareness training;Verbal cues related to their stance phase to promote proper body alignment, promote proper body posture and promote proper body mechanics. Therapeutic Exercise: (15 Minutes):  Exercises per grid below to improve mobility and strength.   Required minimal visual, verbal and manual cues to promote proper body alignment, promote proper body posture and promote proper body mechanics. Progressed range and repetitions as indicated. Date:  8/22  Date:  8/23  Date:      ACTIVITY/EXERCISE AM PM AM PM AM PM   GROUP THERAPY  [ ]  [x ]  [x ]  [ ]  [ ]  [ ]   Ankle Pumps 15a  15 a 20 a  20a       Quad Sets  10a  15a  20a  20a       Gluteal Sets  10a  15a  20a  20a       Hip ABd/ADduction  10a  15a  20a  20a       Straight Leg Raises  10a  15a  20a  20a       Knee Slides  10a  15aa  20aa  20aa       Short Arc Quads    15a  20a  20a       Long Arc Quads               Chair Slides    15a  20a  20a                       B = bilateral; AA = active assistive; A = active; P = passive       Treatment/Session Assessment:         Response to Treatment:  Pt. Doing good     Education:  [x ] Home Exercises  [X] Fall Precautions  [ ] Hip Precautions [x ] D/C Instruction Review  [ x] Knee/Hip Prosthesis Review  [X] Walker Management/Safety [ ] Adaptive Equipment as Needed         Interdisciplinary Collaboration:   · Registered Nurse     After treatment position/precautions:   · Supine in bed, Bed/Chair-wheels locked, Bed in low position, Caregiver at bedside, Call light within reach and Family at bedside     Compliance with Program/Exercises: Will assess as treatment progresses. No questions     Recommendations/Intent for next treatment session:  Treatment next visit will focus on increasing Ms. Sparrow's independence with bed mobility, transfers, gait training, strength/ROM exercises, modalities for pain, and patient education.        Total Treatment Duration:  PT Patient Time In/Time Out  Time In: 1315  Time Out: 311 [facility],

## 2017-08-23 NOTE — PROGRESS NOTES
Shift assessment complete. Respirations present. Even and unlabored. No s/s of distress. Zero c/o pain at this time. Call light within reach. Encouraged patient to call for assistance. Patient verbalizes understanding. See Doc Flowsheet for assessment details. Patient resting in bed. Left hand saline well capped. Right total knee with dressing intact and ice man in progress. Good sensation to all extremities.

## 2017-08-23 NOTE — PROGRESS NOTES
Problem: Mobility Impaired (Adult and Pediatric)  Goal: *Acute Goals and Plan of Care (Insert Text)  GOALS (1-4 days):  (1.)Ms. Delight Duverney will move from supine to sit and sit to supine in bed with CONTACT GUARD ASSIST.  (2.)Ms. Delight Duverney will transfer from bed to chair and chair to bed with STAND BY ASSIST using the least restrictive device. (3.)Ms. Delight Duverney will ambulate with STAND BY ASSIST for  feet with the least restrictive device. ((4.)Ms. Delight Duverney will increase right knee ROM to 5°-80°.  ________________________________________________________________________________________________      PHYSICAL THERAPY JOINT CAMP TKA: Daily Note and AM 8/23/2017  INPATIENT: Hospital Day: 3  [place] / Product Type: Medicare /      NAME/AGE/GENDER: Teena Umaña is a 67 y.o. female    PRIMARY DIAGNOSIS:  Primary osteoarthritis of right knee [M17.11]              Procedure(s) and Anesthesia Type:     * RIGHT KNEE ARTHROPLASTY TOTAL / FNB / Shanon Paci / NEPHEW - Spinal (Right)  ICD-10: Treatment Diagnosis:        · Pain in Right Knee (M25.561)  · Stiffness of Right Knee, Not elsewhere classified (M25.661)  · Difficulty in walking, Not elsewhere classified (R26.2)       ASSESSMENT:      Ms. Delight Duverney presents with impaired strength & mobility s/p right TKA. Pt also had decreased stability during out of bed activity. Pt buzz benefit from follow up therapy to help restore safe function prior to returning home with caregiver. Pt. Reports she is doing well. She ambulated in the [place] with walker and did tka exercises. No questions. Plan on rehab. No questions. This section established at most recent assessment   PROBLEM LIST (Impairments causing functional limitations):  1. Decreased Strength  2. Decreased ADL/Functional Activities  3. Decreased Transfer Abilities  4. Decreased Ambulation Ability/Technique  5. Decreased Balance  6. Increased Pain  7.  Decreased Activity Tolerance  8. Decreased Flexibility/Joint Mobility  9. Decreased [place] with Home Exercise Program    INTERVENTIONS PLANNED: (Benefits and precautions of physical therapy have been discussed with the patient.)  1. Bed Mobility  2. Gait Training  3. Home Exercise Program (HEP)  4. Therapeutic Exercise/Strengthening  5. Transfer Training  6. Range of Motion: active/assisted/passive  7. Therapeutic Activities  8. Group Therapy      TREATMENT PLAN: Frequency/Duration: Follow patient BID   to address above goals. Rehabilitation Potential For Stated Goals: GOOD      RECOMMENDED REHABILITATION/EQUIPMENT: (at time of discharge pending progress): Continue Skilled Therapy and pt could return home with 24/7 assist if pt becomes manageable for caregiver, if not, pt may need arehab stay @ SNF. HISTORY:   History of Present Injury/Illness (Reason for Referral): The patient has end stage arthritis of the right knee. The patient was evaluated and examined during a consultation prior to this office visit. There have been no changes to the patient's orthopedic condition since the initial consultation. The patient has failed previous conservative treatment for this condition including antiinflammatories , and lifestyle modifications. The necessity for joint replacement is present. The patient will be admitted the day of surgery for Procedure(s) (LRB):  RIGHT KNEE ARTHROPLASTY TOTAL / FNB / Shanon Paci / NEPHEW (Right)    Past Medical History/Comorbidities:   Ms. 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 She also has no past medical history of Adverse effect of anesthesia; Aneurysm ([facility] [address] Arrhythmia; Asthma; CAD (coronary artery disease); Cancer ([facility] [address] Chronic kidney disease; Chronic obstructive pulmonary disease ([facility] [address] Chronic pain; Coagulation disorder ([facility] [address] Diabetes ([facility] [address] Difficult intubation; Endocarditis; GERD (gastroesophageal reflux disease); Heart failure ([facility] [address] Ill-defined condition; Liver disease; Malignant hyperthermia due to anesthesia; Morbid obesity ([facility] [address] Nicotine vapor product user; Non-nicotine vapor product user; Pseudocholinesterase deficiency; Psychiatric disorder; PUD (peptic ulcer disease); Rheumatic fever; Seizures ([facility] [address] Stroke [facility]); Thromboembolus ([facility] [address] Thyroid disease; or Unspecified adverse effect of anesthesia. Ms. Sheeba Snow  has a past surgical history that includes cholecystectomy (2009); knee arthroscopy (Bilateral); breast surgery procedure unlisted (Right, 1968); and hip replacement (Right, 05/10/2017).   Social History/Living Environment:   Home Environment: Private residence  # Steps to Enter: 0  Wheelchair Ramp: Yes  One/Two Story Residence: One story  Living Alone: No  Support Systems: Spouse/Significant Other/Partner  Patient Expects to be Discharged to[de-identified] Skilled nursing facility  Current DME Used/Available at Home: Walker, rolling  Prior Level of Function/Work/Activity:  Pt was ambulating in the home with rolling walker & SBA prior to this admission   Number of Personal Factors/Comorbidities that affect the Plan of Care: 3+: HIGH COMPLEXITY   EXAMINATION:   Most Recent Physical Functioning:                RLE AROM  R Knee Flexion: 90  R Knee Extension: 8        RLE Strength  R Knee Flexion: 3  R Knee Extension: 3           Transfers  Sit to Stand: Contact guard assistance  Stand to Sit: Contact guard assistance  Bed to Chair: Contact guard assistance Balance  Sitting: Intact  Standing: With support                Weight Bearing Status  Right Side Weight Bearing: As tolerated  Distance (ft): 130 Feet (ft)  Ambulation - Level of Assistance: Contact guard assistance  Assistive Device: Walker, rolling  Speed/Keli: Delayed  Step Length: Left shortened;Right shortened  Stance: Right decreased  Gait Abnormalities: Antalgic  Interventions: Safety awareness training;Verbal cues      Braces/Orthotics: none     Right Knee Cold  Type: Cryocuff       Body Structures Involved:  1. Joints  2. Muscles Body Functions Affected:  1. Sensory/Pain  2. Movement Related Activities and Participation Affected:  1. General Tasks and Demands  2. Mobility   Number of elements that affect the Plan of Care: 4+: HIGH COMPLEXITY   CLINICAL PRESENTATION:   Presentation: Stable and uncomplicated: LOW COMPLEXITY   CLINICAL DECISION MAKIN[address] [place] [id number] AM-PAC 6 Clicks   Basic Mobility Inpatient Short Form  How much difficulty does the patient currently have. .. Unable A Lot A Little None   1. Turning over in bed (including adjusting bedclothes, sheets and blankets)? [ ] 1   [X] 2   [ ] 3   [ ] 4   2. Sitting down on and standing up from a chair with arms ( e.g., wheelchair, bedside commode, etc.)   [ ] 1   [ ] 2   [X] 3   [ ] 4   3. Moving from lying on back to sitting on the side of the bed? [ ] 1   [X] 2   [ ] 3   [ ] 4   How much help from another person does the patient currently need. .. Total A Lot A Little None   4. Moving to and from a bed to a chair (including a wheelchair)? [ ] 1   [ ] 2   [X] 3   [ ] 4   5. Need to walk in hospital room? [ ] 1   [ ] 2   [X] 3   [ ] 4   6. Climbing 3-5 steps with a railing? [X] 1   [ ] 2   [ ] 3   [ ] 4   © 2007, Trustees of [address] [place] [address] [address] [id number], under license to [employer]  All rights reserved       Score:  Initial: 14 Most Recent: X (Date: -- )     Interpretation of Tool:  Represents activities that are increasingly more difficult (i.e. Bed mobility, Transfers, Gait). Score 24 23 22-20 19-15 14-10 9-7 6       Modifier CH CI CJ CK CL CM CN         · Mobility - Walking and Moving Around:               - CURRENT STATUS:    CL - 60%-79% impaired, limited or restricted               - GOAL STATUS:           CK - 40%-59% impaired, limited or restricted               - D/C STATUS:                       ---------------To be determined---------------  [place] / [place] / Product Type: Medicare /       Medical Necessity:     · Patient is expected to demonstrate progress in strength, range of motion, balance, coordination and functional technique to decrease assistance required with bed mobility, transfers & gait. Reason for Services/Other Comments:  · Patient continues to require skilled intervention due to pt not safe with functional mobility. Use of outcome tool(s) and clinical judgement create a POC that gives a: Clear prediction of patient's progress: LOW COMPLEXITY                 TREATMENT:   (In addition to Assessment/Re-Assessment sessions the following treatments were rendered)      Pre-treatment Symptoms/Complaints:  Left knee pain,   Pain: Initial: 0     Post Session:  No complaints      Gait Training (15 Minutes):  Gait training to improve and/or restore physical functioning as related to mobility, strength and balance. Ambulated 130 Feet (ft) with Contact guard assistance using a Walker, rolling and minimal Safety awareness training;Verbal cues related to their stance phase to promote proper body alignment, promote proper body posture and promote proper body mechanics. Therapeutic Exercise: (30 Minutes):  Exercises per grid below to improve mobility and strength. Required minimal visual, verbal and manual cues to promote proper body alignment, promote proper body posture and promote proper body mechanics. Progressed range and repetitions as indicated. Date:  8/22  Date:  8/23  Date:      ACTIVITY/EXERCISE AM PM AM PM AM PM   GROUP THERAPY  [ ]  [x ]  [x ]  [ ]  [ ]  [ ]   Ankle Pumps 15a  15 a 20 a         Quad Sets  10a  15a  20a         Gluteal Sets  10a  15a  20a         Hip ABd/ADduction  10a  15a  20a         Straight Leg Raises  10a  15a  20a         Knee Slides  10a  15aa  20aa         Short Arc Quads    15a  20a         Long Arc Quads               Chair Slides    15a  20a                         B = bilateral; AA = active assistive; A = active; P = passive       Treatment/Session Assessment:         Response to Treatment:  Pt. Progressing well. Education:  [x ] Home Exercises  [X] Fall Precautions  [ ] Hip Precautions [x ] D/C Instruction Review  [ x] Knee/Hip Prosthesis Review  [X] Walker Management/Safety [ ] Adaptive Equipment as Needed         Interdisciplinary Collaboration:   · Physical Therapy Assistant, Registered Nurse and Rehabilitation Attendant     After treatment position/precautions:   · Up in chair, Bed/Chair-wheels locked, Bed in low position and Call light within reach     Compliance with Program/Exercises: Will assess as treatment progresses. No questions     Recommendations/Intent for next treatment session:  Treatment next visit will focus on increasing Ms. Sparrow's independence with bed mobility, transfers, gait training, strength/ROM exercises, modalities for pain, and patient education.        Total Treatment Duration:  PT Patient Time In/Time Out  Time In: 1030  Time Out: [address], PT

## 2017-08-23 NOTE — PROGRESS NOTES
08/23/17 0815   Oxygen Therapy   O2 Sat (%) 95 %   Pulse via Oximetry 89 beats per minute   O2 Device Room air   Patient achieved   2250     Ml/sec on IS. Patient encouraged to do every hour while awake-patient agreed and demonstrated. No shortness of breath or distress noted.

## 2017-08-23 NOTE — PROGRESS NOTES
Neurovascular and peripheral vascular checks are WDL to BLE. Instructed not to ambulate without assistance from staff. Pt verbalized understanding. Call light in reach. Pain managed well with Dilaudid po.

## 2017-08-24 VITALS
SYSTOLIC BLOOD PRESSURE: 144 MMHG | HEIGHT: 69 IN | BODY MASS INDEX: 32.58 KG/M2 | RESPIRATION RATE: 17 BRPM | OXYGEN SATURATION: 94 % | DIASTOLIC BLOOD PRESSURE: 67 MMHG | HEART RATE: 70 BPM | TEMPERATURE: 95.8 F | WEIGHT: 220 LBS

## 2017-08-24 LAB
HGB BLD-MCNC: 9.3 G/DL (ref 11.7–15.4)
MM INDURATION POC: NORMAL 0MM (ref 0–5)
PPD POC: NORMAL NEGATIVE

## 2017-08-24 PROCEDURE — 36415 COLL VENOUS BLD VENIPUNCTURE: CPT | Performed by: PHYSICIAN ASSISTANT

## 2017-08-24 PROCEDURE — 74011250637 HC RX REV CODE- 250/637: Performed by: PHYSICIAN ASSISTANT

## 2017-08-24 PROCEDURE — 74011250637 HC RX REV CODE- 250/637: Performed by: ORTHOPAEDIC SURGERY

## 2017-08-24 PROCEDURE — 97150 GROUP THERAPEUTIC PROCEDURES: CPT

## 2017-08-24 PROCEDURE — 97116 GAIT TRAINING THERAPY: CPT

## 2017-08-24 PROCEDURE — 85018 HEMOGLOBIN: CPT | Performed by: PHYSICIAN ASSISTANT

## 2017-08-24 RX ADMIN — HYDROMORPHONE HYDROCHLORIDE 2 MG: 2 TABLET ORAL at 12:02

## 2017-08-24 RX ADMIN — PRAVASTATIN SODIUM 2 TABLET: 20 TABLET ORAL at 07:41

## 2017-08-24 RX ADMIN — ATENOLOL: 50 TABLET ORAL at 07:36

## 2017-08-24 RX ADMIN — ASPIRIN 325 MG: 325 TABLET, DELAYED RELEASE ORAL at 07:37

## 2017-08-24 RX ADMIN — ACETAMINOPHEN 1000 MG: 500 TABLET, FILM COATED ORAL at 12:02

## 2017-08-24 RX ADMIN — POTASSIUM CHLORIDE 20 MEQ: 20 TABLET, EXTENDED RELEASE ORAL at 07:36

## 2017-08-24 RX ADMIN — FOLIC ACID 1 MG: 1 TABLET ORAL at 07:37

## 2017-08-24 RX ADMIN — Medication 10 ML: at 05:12

## 2017-08-24 RX ADMIN — Medication 200 MG: at 07:37

## 2017-08-24 RX ADMIN — ACETAMINOPHEN 1000 MG: 500 TABLET, FILM COATED ORAL at 05:11

## 2017-08-24 RX ADMIN — HYDROMORPHONE HYDROCHLORIDE 2 MG: 2 TABLET ORAL at 07:36

## 2017-08-24 NOTE — PROGRESS NOTES
Problem: Mobility Impaired (Adult and Pediatric)  Goal: *Acute Goals and Plan of Care (Insert Text)  GOALS (1-4 days):  (1.)Ms. Felecia Willams will move from supine to sit and sit to supine in bed with CONTACT GUARD ASSIST.  (2.)Ms. Felecia Willams will transfer from bed to chair and chair to bed with STAND BY ASSIST using the least restrictive device. (3.)Ms. Felecia Willams will ambulate with STAND BY ASSIST for  feet with the least restrictive device. ((4.)Ms. Felecia Willams will increase right knee ROM to 5°-80°.  ________________________________________________________________________________________________      PHYSICAL THERAPY JOINT CAMP TKA: Daily Note and AM 8/24/2017  INPATIENT: Hospital Day: 4  [place] / [place] / Product Type: Medicare /      NAME/AGE/GENDER: Nivia Osler is a 67 y.o. female    PRIMARY DIAGNOSIS:  Primary osteoarthritis of right knee [M17.11]              Procedure(s) and Anesthesia Type:     * RIGHT KNEE ARTHROPLASTY TOTAL / FNB / [facility] / NEPHEW - Spinal (Right)  ICD-10: Treatment Diagnosis:        · Pain in Right Knee (M25.561)  · Stiffness of Right Knee, Not elsewhere classified (M25.661)  · Difficulty in walking, Not elsewhere classified (R26.2)       ASSESSMENT:      Ms. Felecia Willams presents with impaired strength & mobility s/p right TKA. Pt also had decreased stability during out of bed activity. Pt buzz benefit from follow up therapy to help restore safe function prior to returning home with caregiver. Pt. Doing well. Rates pain 5/10. She walks in the [place] with walker, cues to stand upright. She did tka exercises with cues. No quesitons. Plans on rehab. This section established at most recent assessment   PROBLEM LIST (Impairments causing functional limitations):  1. Decreased Strength  2. Decreased ADL/Functional Activities  3. Decreased Transfer Abilities  4. Decreased Ambulation Ability/Technique  5. Decreased Balance  6. Increased Pain  7.  Decreased Activity Tolerance  8. Decreased Flexibility/Joint Mobility  9. Decreased Sullivan with Home Exercise Program    INTERVENTIONS PLANNED: (Benefits and precautions of physical therapy have been discussed with the patient.)  1. Bed Mobility  2. Gait Training  3. Home Exercise Program (HEP)  4. Therapeutic Exercise/Strengthening  5. Transfer Training  6. Range of Motion: active/assisted/passive  7. Therapeutic Activities  8. Group Therapy      TREATMENT PLAN: Frequency/Duration: Follow patient BID   to address above goals. Rehabilitation Potential For Stated Goals: GOOD      RECOMMENDED REHABILITATION/EQUIPMENT: (at time of discharge pending progress): Continue Skilled Therapy and pt could return home with 24/7 assist if pt becomes manageable for caregiver, if not, pt may need arehab stay @ SNF. HISTORY:   History of Present Injury/Illness (Reason for Referral): The patient has end stage arthritis of the right knee. The patient was evaluated and examined during a consultation prior to this office visit. There have been no changes to the patient's orthopedic condition since the initial consultation. The patient has failed previous conservative treatment for this condition including antiinflammatories , and lifestyle modifications. The necessity for joint replacement is present. The patient will be admitted the day of surgery for Procedure(s) (LRB):  RIGHT KNEE ARTHROPLASTY TOTAL / FNB / [facility] / NEPHEW (Right)    Past Medical History/Comorbidities:   Ms. 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 She also has no past medical history of Adverse effect of anesthesia; Aneurysm ([facility] [address] Arrhythmia; Asthma; CAD (coronary artery disease); Cancer ([facility] [address] Chronic kidney disease; Chronic obstructive pulmonary disease ([facility] [address] Chronic pain; Coagulation disorder ([facility] [address] Diabetes ([facility] [address] Difficult intubation; Endocarditis; GERD (gastroesophageal reflux disease); Heart failure ([facility] [address] Ill-defined condition; Liver disease; Malignant hyperthermia due to anesthesia; Morbid obesity ([facility] [address] Nicotine vapor product user; Non-nicotine vapor product user; Pseudocholinesterase deficiency; Psychiatric disorder; PUD (peptic ulcer disease); Rheumatic fever; Seizures ([facility] [address] Stroke [facility]); Thromboembolus ([facility] [address] Thyroid disease; or Unspecified adverse effect of anesthesia. Ms. Tawny Nolasco  has a past surgical history that includes cholecystectomy (2009); knee arthroscopy (Bilateral); breast surgery procedure unlisted (Right, 1968); and hip replacement (Right, 05/10/2017).   Social History/Living Environment:   Home Environment: Private residence  # Steps to Enter: 0  Wheelchair Ramp: Yes  One/Two Story Residence: One story  Living Alone: No  Support Systems: Spouse/Significant Other/Partner  Patient Expects to be Discharged to[de-identified] Skilled nursing facility  Current DME Used/Available at Home: Walker, rolling  Prior Level of Function/Work/Activity:  Pt was ambulating in the home with rolling walker & SBA prior to this admission   Number of Personal Factors/Comorbidities that affect the Plan of Care: 3+: HIGH COMPLEXITY   EXAMINATION:   Most Recent Physical Functioning:                RLE AROM  R Knee Flexion: 82  R Knee Extension: 7        RLE Strength  R Knee Flexion: 3  R Knee Extension: 3           Transfers  Sit to Stand: Contact guard assistance  Stand to Sit: Contact guard assistance  Bed to Chair: Contact guard assistance     Balance  Sitting: Intact  Standing: With support                Weight Bearing Status  Right Side Weight Bearing: As tolerated  Distance (ft): 136 Feet (ft) (x 2)  Ambulation - Level of Assistance: Contact guard assistance  Assistive Device: Walker, rolling  Speed/Keli: Delayed  Step Length: Left shortened;Right shortened  Stance: Right decreased  Gait Abnormalities: Antalgic  Interventions: Safety awareness training;Verbal cues      Braces/Orthotics: none     Right Knee Cold  Type: Cryocuff       Body Structures Involved:  1. Joints  2. Muscles Body Functions Affected:  1. Sensory/Pain  2. Movement Related Activities and Participation Affected:  1. General Tasks and Demands  2. Mobility   Number of elements that affect the Plan of Care: 4+: HIGH COMPLEXITY   CLINICAL PRESENTATION:   Presentation: Stable and uncomplicated: LOW COMPLEXITY   CLINICAL DECISION MAKIN[address] [place] [id number] AM-PAC 6 Clicks   Basic Mobility Inpatient Short Form  How much difficulty does the patient currently have. .. Unable A Lot A Little None   1. Turning over in bed (including adjusting bedclothes, sheets and blankets)? [ ] 1   [X] 2   [ ] 3   [ ] 4   2. Sitting down on and standing up from a chair with arms ( e.g., wheelchair, bedside commode, etc.)   [ ] 1   [ ] 2   [X] 3   [ ] 4   3. Moving from lying on back to sitting on the side of the bed? [ ] 1   [X] 2   [ ] 3   [ ] 4   How much help from another person does the patient currently need. .. Total A Lot A Little None   4. Moving to and from a bed to a chair (including a wheelchair)? [ ] 1   [ ] 2   [X] 3   [ ] 4   5. Need to walk in hospital room? [ ] 1   [ ] 2   [X] 3   [ ] 4   6. Climbing 3-5 steps with a railing? [X] 1   [ ] 2   [ ] 3   [ ] 4   © , Trustees of [address] [place] [id number], under license to [employer].  All rights reserved       Score:  Initial: 14 Most Recent: X (Date: -- )     Interpretation of Tool:  Represents activities that are increasingly more difficult (i.e. Bed mobility, Transfers, Gait). Score 24 23 22-20 19-15 14-10 9-7 6       Modifier CH CI CJ CK CL CM CN         · Mobility - Walking and Moving Around:               - CURRENT STATUS:    CL - 60%-79% impaired, limited or restricted               - GOAL STATUS:           CK - 40%-59% impaired, limited or restricted               - D/C STATUS:                       ---------------To be determined---------------  [place] / [place] / Product Type: Medicare /       Medical Necessity:     · Patient is expected to demonstrate progress in strength, range of motion, balance, coordination and functional technique to decrease assistance required with bed mobility, transfers & gait. Reason for Services/Other Comments:  · Patient continues to require skilled intervention due to pt not safe with functional mobility. Use of outcome tool(s) and clinical judgement create a POC that gives a: Clear prediction of patient's progress: LOW COMPLEXITY                 TREATMENT:   (In addition to Assessment/Re-Assessment sessions the following treatments were rendered)      Pre-treatment Symptoms/Complaints:  Left knee pain greater than right  Pain: Initial: 5     Post Session:  5 right knee      Gait Training (15 Minutes):  Gait training to improve and/or restore physical functioning as related to mobility, strength and balance. Ambulated 136 Feet (ft) (x 2) with Contact guard assistance using a Walker, rolling and minimal Safety awareness training;Verbal cues related to their stance phase to promote proper body alignment, promote proper body posture and promote proper body mechanics. Therapeutic Exercise: (30 Minutes):  Exercises per grid below to improve mobility and strength. Required minimal visual, verbal and manual cues to promote proper body alignment, promote proper body posture and promote proper body mechanics.   Progressed range and repetitions as indicated. Date:  8/22  Date:  8/23  Date:  8/24    ACTIVITY/EXERCISE AM PM AM PM AM PM   GROUP THERAPY  [ ]  [x ]  [x ]  [ ]  [ x]  [ ]   Ankle Pumps 15a  15 a 20 a  20a  20a     Quad Sets  10a  15a  20a  20a  20a     Gluteal Sets  10a  15a  20a  20a  20a     Hip ABd/ADduction  10a  15a  20a  20a  20a     Straight Leg Raises  10a  15a  20a  20a  20a     Knee Slides  10a  15aa  20aa  20aa  20aa     Short Arc Quads    15a  20a  20a  20a     Long Arc Quads               Chair Slides    15a  20a  20a  20a                     B = bilateral; AA = active assistive; A = active; P = passive       Treatment/Session Assessment:         Response to Treatment:  Pt. Making progress     Education:  [x ] Home Exercises  [X] Fall Precautions  [ ] Hip Precautions [x ] D/C Instruction Review  [ x] Knee/Hip Prosthesis Review  [X] Walker Management/Safety [ ] Adaptive Equipment as Needed         Interdisciplinary Collaboration:   · Rehabilitation Attendant     After treatment position/precautions:   · Up in chair, Bed/Chair-wheels locked, Bed in low position and Call light within reach     Compliance with Program/Exercises: yes  No questions     Recommendations/Intent for next treatment session:  Treatment next visit will focus on increasing Ms. Sparrow's independence with bed mobility, transfers, gait training, strength/ROM exercises, modalities for pain, and patient education.        Total Treatment Duration:  PT Patient Time In/Time Out  Time In: 0900  Time Out: [address], PT

## 2017-08-24 NOTE — PROGRESS NOTES
2017         Post Op day: 3 Days Post-Op Procedure(s) (LRB):  RIGHT KNEE ARTHROPLASTY TOTAL / FNB / [facility] / NEPHEW (Right)      Admit Date: 2017  Admit Diagnosis: Primary osteoarthritis of right knee [M17.11]       Principle Problem: Status post total right knee replacement. Subjective: Doing well, No complaints, No SOB, No Chest Pain, No Nausea or Vomiting     Objective:   Vital Signs are Stable, No Acute Distress, Alert and Oriented, Dressing is Dry,  Neurovascular exam is normal.     Assessment / Plan :  Patient Active Problem List   Diagnosis Code    Preop respiratory exam Z01.811    Hypertension I10    Hyperlipidemia E78.5    Rheumatoid arthritis ([facility] [address] M06.9    MOOK (obstructive sleep apnea) G47.33    Elevated serum creatinine R79.89    Status post right hip replacement Z96.641    Status post total right knee replacement Z96.651    CKD (chronic kidney disease) N18.9    Patient Vitals for the past 8 hrs:   BP Temp Pulse Resp SpO2   17 0314 125/60 96.3 °F (35.7 °C) 65 16 95 %   17 2330 136/76 96.8 °F (36 °C) 80 16 97 %    Temp (24hrs), Av °F (36.1 °C), Min:96.3 °F (35.7 °C), Max:97.6 °F (36.4 °C)    Body mass index is 32.96 kg/(m^2).     Lab Results   Component Value Date/Time    HGB 9.3 2017 04:52 AM      Pt seen by and discussed with Supervising Physician   Continue PT       Signed By: ALMA Cedeno  2017,  7:24 AM

## 2017-08-24 NOTE — PROGRESS NOTES
08/23/17 2029   Oxygen Therapy   O2 Sat (%) 98 %   Pulse via Oximetry 72 beats per minute   O2 Device Room air   Incentive Spirometry Treatment   Actual Volume (ml) 2000 ml   Number of Attempts 2   Patient instructed in the use of incentive spirometry. Good npc. Pt placed on oxinet #7 at this time. Alarms set and functioning properly. Will monitor.

## 2017-08-24 NOTE — DISCHARGE SUMMARY
Alida Gutierrez MD  Medical Director  [address] [place] [address], [address] [facility]  Tel: [phone]       REHABILITATION DISCHARGE SUMMARY     Patient: Bianca Valle MRN: [id number]  SSN: [id number]    YOB: 1940  Age: 67 y.o. Sex: female      Date: 8/24/2017  Admit Date: 8/21/2017  Discharge Date: 8/24/2017    Admitting Physician: Nicole Perez MD   Primary Care Physician: Shaun Pang MD     Admission Condition: good    Chief Complaint : Gait dysfunction secondary to below. Admit Diagnosis: Primary osteoarthritis of right knee [M17.11]  right total knee arthroplasty 8/21/2017  Pain  DVT risk  Post op hemorrhagic anemia  Status post right hip replacement   5/10/2017  Hypertension (1/29/2015)  CKD (chronic kidney disease) (8/21/2017)  Acute Rehab Dx:  Gait impairment  Mobility and ambulation deficits  Self Care/ADL deficits       HPI: Bianca Valle is a 67 y.o. female patient at [address] [place] [address] who was admitted on 8/21/2017  by Nicole Perez MD with below mentioned medical history, is being seen for Physical Medicine and Rehabilitation. Bianca Valle had right knee pain and discomfort with walking and activity due to end stage DJD. She failed conservative treatment efforts. She underwent a right total knee arthroplasty per Dr. Nicole Perez MD on 8/21/2017. The patient's post operative course has been well tolerated so far. Patient's weight bearing status is to be WBAT RLE. Patient is starting to stand, taking steps with acute PT and OT. Patient still shows significant functional deficits due to right knee pain, decreased ROM and strength. Bianca Valle is seen and examined today. Medical Records reviewed. She denies any other pre morbid functional deficits.   She has been independent with ambulation, prior to admission, limited by right knee pain.       Rehabiitation Course:   Functional  Level On Admission: Walk: Moderate Ida  Functional Level At Discharge: Walk: Contact Guard Assist  Home Architecture: Home Situation  Home Environment: Private residence (08/21/17 1600)  # Steps to Enter: 0 (08/21/17 1600)  Wheelchair Ramp: Yes (08/21/17 1600)  One/Two Story Residence: One story (08/21/17 1600)  Living Alone: No (08/22/17 1600)  Support Systems: Spouse/Significant Other/Partner (08/21/17 1600)  Patient Expects to be Discharged to[de-identified] Skilled nursing facility (08/21/17 1600)  Current DME Used/Available at Home: Walker, rolling (08/21/17 1600)     Past Medical History:   Diagnosis Date    Abnormal posture     Anemia     history     Essential hypertension     on med for control     H/O echocardiogram 01/05/2017    EF 55-65%    H/O: pneumonia     01/2017    Hypercholesteremia     managed with medication     Hypomagnesemia     managed with supplement     Left ventricular diastolic dysfunction     per echo \"Grade 1 (mild)\"    Muscle weakness     generalized    Nausea & vomiting     nausea only     MOOK (obstructive sleep apnea) 01/30/2015    patient denies     Osteoarthritis     Poor historian     Pressure ulcer of left buttock, stage 2     resolved per patient-previously seen by Dr. Qing Abdul at wound center      Pressure ulcer of right buttock, stage 2     resolved per patient-previously seen by Dr. Qing Abdul at wound center     SOB (shortness of breath)     Status post right hip replacement 5/10/2017    Status post total right knee replacement 8/21/2017    Unable to ambulate     patient in wheelchair-can stand with assistance     Unsteadiness on feet       Past Surgical History:   Procedure Laterality Date    BREAST SURGERY PROCEDURE UNLISTED Right 1968    cyst removed    HX CHOLECYSTECTOMY  2009    HX HIP REPLACEMENT Right 05/10/2017    HX KNEE ARTHROSCOPY Bilateral     X2 to right; X1 to left      Family History   Problem Relation Age of Onset    Heart Disease Mother     Hypertension Mother    [employer] Hypertension Father       Social History   Substance Use Topics    Smoking status: Never Smoker    Smokeless tobacco: Never Used    Alcohol use No       Allergies   Allergen Reactions    Avelox [Moxifloxacin] Rash    Other Plant, Animal, Environmental Nausea Only     Allergic to Perfume & Smoke. Prior to Admission medications    Medication Sig Start Date End Date Taking? Authorizing Provider   HYDROmorphone (DILAUDID) 2 mg tablet Take 1 Tab by mouth every four (4) hours as needed for Pain. Max Daily Amount: 12 mg. 8/21/17  Yes ALMA Swanson   aspirin (ASPIRIN) 325 mg tablet Take 1 Tab by mouth two (2) times a day for 35 days. 8/21/17 9/25/17 Yes ALMA Swanson   atenolol-chlorthalidone (TENORETIC) 50-25 mg per tablet Take 1 Tab by mouth daily. Take / use AM day of surgery  per anesthesia protocols. Indications: hypertension   Yes Historical Provider   atorvastatin (LIPITOR) 10 mg tablet Take 10 mg by mouth daily. Take / use AM day of surgery  per anesthesia protocols. Indications: hypercholesterolemia   Yes Historical Provider   aspirin delayed-release 81 mg tablet Take 81 mg by mouth daily. Instructed to take DOS per Anesthesia guidelines. Yes Historical Provider   folic acid 193 mcg tablet Take 800 mcg by mouth daily. Historical Provider   NAPROXEN SODIUM (ALEVE PO) Take 2 Tabs by mouth daily as needed for Pain. Historical Provider   magnesium 250 mg tab Take 1 Tab by mouth daily. Historical Provider   potassium chloride SR (KLOR-CON 10) 10 mEq tablet Take 20 mEq by mouth daily. PCP started pt on this due to leg cramping    Historical Provider   chlorthalidone (HYGROTEN) 25 mg tablet Take 12.5 mg by mouth every other day. Historical Provider   magnesium hydroxide (MILK OF MAGNESIA) 400 mg/5 mL suspension Take 30 mL by mouth daily as needed for Constipation. Historical Provider   MULTIVITS-MIN/IRON/FA/LUTEIN (CENTRUM SILVER WOMEN PO) Take 1 Tab by mouth daily.     Historical Provider       Current Medications:  Current Facility-Administered Medications   Medication Dose Route Frequency Provider Last Rate Last Dose    folic acid (FOLVITE) tablet 1 mg  1 mg Oral DAILY Helper Slain, PA   1 mg at 08/24/17 0737    sodium chloride (NS) flush 5-10 mL  5-10 mL IntraVENous Q8H Helper Slain, [address]   10 mL at 08/24/17 0512    sodium chloride (NS) flush 5-10 mL  5-10 mL IntraVENous PRN Helper Slain, PA        acetaminophen (TYLENOL) tablet 1,000 mg  1,000 mg Oral Q6H Helper Slain, PA   1,000 mg at 08/24/17 0511    HYDROmorphone (DILAUDID) tablet 2 mg  2 mg Oral Q4H PRN Helper Slain, PA   2 mg at 08/24/17 0736    HYDROmorphone (PF) (DILAUDID) injection 1 mg  1 mg IntraVENous Q3H PRN Helper Slain, PA   1 mg at 08/23/17 0003    naloxone [facility]) injection 0.2-0.4 mg  0.2-0.4 mg IntraVENous Q10MIN PRN Helper Slain, PA        ondansetron [facility]) injection 4 mg  4 mg IntraVENous Q4H PRN Helper Slain, PA        diphenhydrAMINE (BENADRYL) capsule 25 mg  25 mg Oral Q4H PRN Helper Slain, PA        senna-docusate (PERICOLACE) 8.6-50 mg per tablet 2 Tab  2 Tab Oral DAILY Helper Slain, [address]   2 Tab at 08/24/17 0741    aspirin delayed-release tablet 325 mg  325 mg Oral Q12H Helper Slain, [address]   325 mg at 08/24/17 0737    READ PPD 24HR, 48HR, 72HR  1 Each Other Q24H Soraida Navarro MD   1 Each at 08/22/17 0900    atenolol/chlorthalidone (TENORETIC) 50/25 mg   Oral DAILY Soraida Navarro MD        magnesium oxide (MAG-OX) tablet 200 mg  200 mg Oral DAILY Soraida Navarro MD   200 mg at 08/24/17 0278    potassium chloride (K-DUR, KLOR-CON) SR tablet 20 mEq  20 mEq Oral DAILY Soraida Navarro MD   20 mEq at 08/24/17 0736    hydrALAZINE (APRESOLINE) 20 mg/mL injection 10 mg  10 mg IntraVENous Q6H PRN Moe Sawant MD            Review of Systems: Denies chest pain, shortness of breath, cough, headache, visual problems, abdominal pain, dysurea, calf pain. Pertinent positives are as noted in the medical records and unremarkable otherwise. Vital Signs:   Patient Vitals for the past 8 hrs:   BP Temp Pulse Resp SpO2   08/24/17 0740 144/67 95.8 °F (35.4 °C) 70 17 94 %   08/24/17 0314 125/60 96.3 °F (35.7 °C) 65 16 95 %        Physical Exam:   General: Alert and age appropriately oriented. No acute cardio respiratory distress. HEENT: Normocephalic. Oral mucosa moist without cyanosis. Lungs: Clear to auscultation  bilaterally. Respiration even and unlabored   Heart: Regular rate and rhythm, S1, S2   No  murmurs, clicks, rub or gallops   Abdomen: Soft, non-tender, nondistended. Bowel sounds present   Genitourinary: defered   Neuromuscular:      Grossly no focal neurological deficits noted. L Ankle dorsiflexion 5/5   L Ankle plantarflexion 5/5  R Ankle dorsiflexion 5/5   R Ankle plantarflexion 5/5  No sensory deficits. Skin/extremity: No rashes, no erythema. Calves soft. Wound covered.      Skin Incision(s)/Wound(s):        Lab Review:   Recent Results (from the past 72 hour(s))   URINALYSIS W/ RFLX MICROSCOPIC    Collection Time: 08/21/17 11:15 AM   Result Value Ref Range    Color YELLOW      Appearance CLEAR      Specific gravity 1.010 1.001 - 1.023      pH (UA) 7.0 5.0 - 9.0      Protein NEGATIVE  NEG mg/dL    Glucose NEGATIVE  NEG mg/dL    Ketone NEGATIVE  NEG mg/dL    Bilirubin NEGATIVE  NEG      Blood TRACE (A) NEG      Urobilinogen 0.2 0.2 - 1.0 EU/dL    Nitrites NEGATIVE  NEG      Leukocyte Esterase NEGATIVE  NEG     URINE MICROSCOPIC    Collection Time: 08/21/17 11:15 AM   Result Value Ref Range    WBC 0 0 /hpf    RBC 0-3 0 /hpf    Epithelial cells 0 0 /hpf    Bacteria 0 0 /hpf    Casts 0 0 /lpf    Crystals, urine 0 0 /LPF    Mucus 0 0 /lpf   HEMOGLOBIN    Collection Time: 08/21/17  7:51 PM   Result Value Ref Range    HGB 11.1 (L) 11.7 - 15.4 g/dL   HEMOGLOBIN    Collection Time: 08/22/17  4:25 AM   Result Value Ref Range    HGB 10.6 (L) 11.7 - 20.1 g/dL   METABOLIC PANEL, BASIC    Collection Time: 08/22/17  4:25 AM   Result Value Ref Range    Sodium 140 136 - 145 mmol/L    Potassium 4.2 3.5 - 5.1 mmol/L    Chloride 104 98 - 107 mmol/L    CO2 27 21 - 32 mmol/L    Anion gap 9 7 - 16 mmol/L    Glucose 120 (H) 65 - 100 mg/dL    BUN 33 (H) 8 - 23 MG/DL    Creatinine 1.23 (H) 0.6 - 1.0 MG/DL    GFR est AA 54 (L) >60 ml/min/1.73m2    GFR est non-AA 45 (L) >60 ml/min/1.73m2    Calcium 8.5 8.3 - 10.4 MG/DL   PLEASE READ & DOCUMENT PPD TEST IN 24 HRS    Collection Time: 08/22/17  9:00 AM   Result Value Ref Range    PPD  Negative    mm Induration 0 mm   HEMOGLOBIN    Collection Time: 08/23/17  5:10 AM   Result Value Ref Range    HGB 10.0 (L) 11.7 - 15.4 g/dL   PLEASE READ & DOCUMENT PPD TEST IN 48 HRS    Collection Time: 08/23/17  9:00 AM   Result Value Ref Range    PPD  Negative    mm Induration 0 mm   HEMOGLOBIN    Collection Time: 08/24/17  4:52 AM   Result Value Ref Range    HGB 9.3 (L) 11.7 - 15.4 g/dL   PLEASE READ & DOCUMENT PPD TEST IN 72 HRS    Collection Time: 08/24/17  7:44 AM   Result Value Ref Range    PPD  Negative    mm Induration  0mm       PT Initial  PT Most Recent   AROM: Generally decreased, functional (BUE) (08/21/17 1530) Generally decreased, functional (left LE) (08/21/17 1600)         Strength: Generally decreased, functional (BUE) (08/21/17 1530) Generally decreased, functional (left LE) (08/21/17 1600)   Coordination: Generally decreased, functional (BUE) (08/21/17 1530) Generally decreased, functional (left LE) (08/21/17 1600)   Tone: Normal (08/21/17 1530)  (hypotonus) (08/21/17 1600)   Sensation: Intact (08/21/17 1530)  (nerve block still active) (08/21/17 1600)         Distance (ft): 5 Feet (ft) (08/21/17 1600) 150 Feet (ft) (08/23/17 1400)   Assistive Device: Walker, rolling (08/21/17 1600) Walker, rolling (08/23/17 1400)       OT Initial OT Most Recent                                               ST Initial ST Most Recent Principal Problem:    Status post total right knee replacement (8/21/2017)    Active Problems:    Hypertension (1/29/2015)      CKD (chronic kidney disease) (8/21/2017)          Condition on discharge from [facility] SNF:  good  Rehabilitation  potential : Good   Goals in Rehab : Patient to reach maximal rehabilitation potential in functional mobility,ambulation and ADL/ self care skills ; to improve strength and endurance  Plan / Disposition :STR-Rehab  as discussed with patient/ family and the Case management/ Social service team  Expected Length Of Stay : Depending on pace of progress in mobility and self care in PT and OT ,therapies    Discharge Instructions:  Rehabilitation Management/ Medical Management: 1. Devices:Walkers, Type: Rolling Walker  2. Consult:Rehab team including PT, OT,  and . 3. Disposition Rehab-discussed with patient. 4. Thigh-high or knee-high DOUGLAS's when out of bed. 5. DVT Prophylaxis - aspirin 325mg bid x 30days. Please notify surgeon at [facility] [address] if DVT diagnosed. 6. Incentive spirometer Q1H while awake  7. Post op hemorrhagic anemia- monitor. 8. Activity: WBAT RLE  9. Planned Labs: CBC,BMP  10. Pain Control: fair control. Continue scheduled tylenol, celebrex and  PRN meds. Continue current management. 11. Wound Care: May remove Aquacel 1 week post op and replace with Tegaderm and sterile gauze dressing. Keep wound clean and dry and reinforce dressing PRN. Remove staples 12-14 post surgery, when incision appears appropriately closed and apply benzoin and 1/2\" steristrips. 12. In case of complications: Please notify surgeon at [facility] [address] if suspect infection, cellulitis, wound dehiscence or instrument failure, and if considering starting antibiotic. Follow up with ORTHO per instructions. [address] [place] [address] [phone]  Follow up with Dr Ajay Jc  2 weeks after discharge from rehab. [phone].       Transfer Medications:       acetaminophen (TYLENOL) tablet 1,000 mg Ordered Dose: 1,000 mg Route: Oral Frequency: EVERY 8 HOURS x 1 week then change to prn.            senna-docusate (PERICOLACE) 8.6-50 mg per tablet 2 Tab Ordered Dose: 2 Tab Route: Oral Frequency: DAILY       Current Discharge Medication List      START taking these medications    Details   HYDROmorphone (DILAUDID) 2 mg tablet Take 1 Tab by mouth every four (4) hours as needed for Pain. Max Daily Amount: 12 mg.        aspirin (ASPIRIN) 325 mg tablet Take 1 Tab by mouth two (2) times a day for 35 days. Take with food or milk or full glass of water. CONTINUE these medications which have NOT CHANGED    Details   atenolol-chlorthalidone (TENORETIC) 50-25 mg per tablet Take 1 Tab by mouth daily. Indications: hypertension      atorvastatin (LIPITOR) 10 mg tablet Take 10 mg by mouth daily. Indications: hypercholesterolemia    HOLD.       folic acid 809 mcg tablet Take 800 mcg by mouth daily. magnesium 250 mg tab Take 1 Tab by mouth daily. potassium chloride SR (KLOR-CON 10) 10 mEq tablet Take 20 mEq by mouth daily. PCP started pt on this due to leg cramping      chlorthalidone (HYGROTEN) 25 mg tablet Take 12.5 mg by mouth every other day. magnesium hydroxide (MILK OF MAGNESIA) 400 mg/5 mL suspension Take 30 mL by mouth daily as needed for Constipation. MULTIVITS-MIN/IRON/FA/LUTEIN (CENTRUM SILVER WOMEN PO) Take 1 Tab by mouth daily. STOP taking these medications       aspirin delayed-release 81 mg tablet Comments:  HOLD. Resume when aspirin 325mg bid dose finished  Reason for Stopping:         mupirocin (BACTROBAN) 2 % ointment Comments:   Reason for Stopping:         NAPROXEN SODIUM (ALEVE PO) Comments:   Reason for Stopping:             O.K. Admit to sub acute rehab today. Discharge time: 35 minutes.     Signed By: Terrie Krueger MD     August 24, 2017

## 2017-08-24 NOTE — PROGRESS NOTES
Pt assessment completed. Pt in bed. NAD noted. Call light and essentials within reach. Pt aware to call with needs. R knee with dressing intact and ice man in progress. Will monitor.

## 2017-08-24 NOTE — PROGRESS NOTES
TRANSFER - OUT REPORT:    [facility] Trace Lau  being transferred to [facility]) for routine progression of care       Report consisted of patients Situation, Background, Assessment and   Recommendations(SBAR). Information from the following report(s) SBAR, Kardex, OR Summary, Procedure Summary, Intake/Output, MAR and Recent Results was reviewed with the receiving nurse. Lines:   Peripheral IV 08/21/17 Left Hand (Active)   Site Assessment Clean, dry, & intact 8/23/2017 11:30 PM   Phlebitis Assessment 0 8/23/2017 11:30 PM   Infiltration Assessment 0 8/23/2017 11:30 PM   Dressing Status Clean, dry, & intact 8/23/2017 11:30 PM   Dressing Type Transparent;Tape 8/23/2017 11:30 PM   Hub Color/Line Status Capped 8/23/2017 11:30 PM        Opportunity for questions and clarification was provided.       Patient transported with:

## 2017-08-26 ENCOUNTER — PATIENT OUTREACH (OUTPATIENT)
Dept: CASE MANAGEMENT | Age: 77
End: 2017-08-26

## 2018-04-06 ENCOUNTER — HOSPITAL ENCOUNTER (OUTPATIENT)
Dept: SURGERY | Age: 78
Discharge: HOME | End: 2018-04-06
Attending: ORTHOPAEDIC SURGERY
Payer: MEDICARE

## 2018-04-06 VITALS
WEIGHT: 225 LBS | OXYGEN SATURATION: 96 % | DIASTOLIC BLOOD PRESSURE: 71 MMHG | BODY MASS INDEX: 33.33 KG/M2 | TEMPERATURE: 97.3 F | RESPIRATION RATE: 16 BRPM | SYSTOLIC BLOOD PRESSURE: 199 MMHG | HEIGHT: 69 IN | HEART RATE: 77 BPM

## 2018-04-06 LAB
ANION GAP SERPL CALC-SCNC: 10 MMOL/L (ref 7–16)
APTT PPP: 28.3 SEC (ref 23.2–35.3)
BACTERIA SPEC CULT: NORMAL
BASOPHILS # BLD: 0 K/UL (ref 0–0.2)
BASOPHILS NFR BLD: 0 % (ref 0–2)
BUN SERPL-MCNC: 35 MG/DL (ref 8–23)
CALCIUM SERPL-MCNC: 9.4 MG/DL (ref 8.3–10.4)
CHLORIDE SERPL-SCNC: 103 MMOL/L (ref 98–107)
CO2 SERPL-SCNC: 28 MMOL/L (ref 21–32)
CREAT SERPL-MCNC: 1.4 MG/DL (ref 0.6–1)
DIFFERENTIAL METHOD BLD: ABNORMAL
EOSINOPHIL # BLD: 0.2 K/UL (ref 0–0.8)
EOSINOPHIL NFR BLD: 2 % (ref 0.5–7.8)
ERYTHROCYTE [DISTWIDTH] IN BLOOD BY AUTOMATED COUNT: 15.2 % (ref 11.9–14.6)
GLUCOSE SERPL-MCNC: 113 MG/DL (ref 65–100)
HCT VFR BLD AUTO: 38 % (ref 35.8–46.3)
HGB BLD-MCNC: 11.8 G/DL (ref 11.7–15.4)
IMM GRANULOCYTES # BLD: 0 K/UL (ref 0–0.5)
IMM GRANULOCYTES NFR BLD AUTO: 0 % (ref 0–5)
INR PPP: 1
LYMPHOCYTES # BLD: 2.5 K/UL (ref 0.5–4.6)
LYMPHOCYTES NFR BLD: 23 % (ref 13–44)
MCH RBC QN AUTO: 26.2 PG (ref 26.1–32.9)
MCHC RBC AUTO-ENTMCNC: 31.1 G/DL (ref 31.4–35)
MCV RBC AUTO: 84.4 FL (ref 79.6–97.8)
MONOCYTES # BLD: 0.8 K/UL (ref 0.1–1.3)
MONOCYTES NFR BLD: 7 % (ref 4–12)
NEUTS SEG # BLD: 7.3 K/UL (ref 1.7–8.2)
NEUTS SEG NFR BLD: 68 % (ref 43–78)
PLATELET # BLD AUTO: 292 K/UL (ref 150–450)
PMV BLD AUTO: 9.8 FL (ref 10.8–14.1)
POTASSIUM SERPL-SCNC: 3.8 MMOL/L (ref 3.5–5.1)
PROTHROMBIN TIME: 13.2 SEC (ref 11.5–14.5)
RBC # BLD AUTO: 4.5 M/UL (ref 4.05–5.25)
SERVICE CMNT-IMP: NORMAL
SODIUM SERPL-SCNC: 141 MMOL/L (ref 136–145)
WBC # BLD AUTO: 10.8 K/UL (ref 4.3–11.1)

## 2018-04-06 PROCEDURE — 85610 PROTHROMBIN TIME: CPT | Performed by: PHYSICIAN ASSISTANT

## 2018-04-06 PROCEDURE — 80048 BASIC METABOLIC PNL TOTAL CA: CPT | Performed by: PHYSICIAN ASSISTANT

## 2018-04-06 PROCEDURE — 85730 THROMBOPLASTIN TIME PARTIAL: CPT | Performed by: PHYSICIAN ASSISTANT

## 2018-04-06 PROCEDURE — 85025 COMPLETE CBC W/AUTO DIFF WBC: CPT | Performed by: PHYSICIAN ASSISTANT

## 2018-04-06 PROCEDURE — 87641 MR-STAPH DNA AMP PROBE: CPT | Performed by: PHYSICIAN ASSISTANT

## 2018-04-06 RX ORDER — ASPIRIN 81 MG/1
81 TABLET ORAL DAILY
COMMUNITY
End: 2018-04-16

## 2018-04-06 RX ORDER — FUROSEMIDE 20 MG/1
20 TABLET ORAL AS NEEDED
COMMUNITY
End: 2018-07-11

## 2018-04-06 RX ORDER — ACETAMINOPHEN 325 MG/1
325 TABLET ORAL
COMMUNITY
Start: 2017-03-13

## 2018-04-06 RX ORDER — LOSARTAN POTASSIUM 100 MG/1
100 TABLET ORAL DAILY
COMMUNITY

## 2018-04-06 NOTE — PERIOP NOTES
Recent Results (from the past 8 hour(s))   CBC WITH AUTOMATED DIFF    Collection Time: 04/06/18 11:56 AM   Result Value Ref Range    WBC 10.8 4.3 - 11.1 K/uL    RBC 4.50 4.05 - 5.25 M/uL    HGB 11.8 11.7 - 15.4 g/dL    HCT 38.0 35.8 - 46.3 %    MCV 84.4 79.6 - 97.8 FL    MCH 26.2 26.1 - 32.9 PG    MCHC 31.1 (L) 31.4 - 35.0 g/dL    RDW 15.2 (H) 11.9 - 14.6 %    PLATELET 419 094 - 488 K/uL    MPV 9.8 (L) 10.8 - 14.1 FL    DF AUTOMATED      NEUTROPHILS 68 43 - 78 %    LYMPHOCYTES 23 13 - 44 %    MONOCYTES 7 4.0 - 12.0 %    EOSINOPHILS 2 0.5 - 7.8 %    BASOPHILS 0 0.0 - 2.0 %    IMMATURE GRANULOCYTES 0 0.0 - 5.0 %    ABS. NEUTROPHILS 7.3 1.7 - 8.2 K/UL    ABS. LYMPHOCYTES 2.5 0.5 - 4.6 K/UL    ABS. MONOCYTES 0.8 0.1 - 1.3 K/UL    ABS. EOSINOPHILS 0.2 0.0 - 0.8 K/UL    ABS. BASOPHILS 0.0 0.0 - 0.2 K/UL    ABS. IMM.  GRANS. 0.0 0.0 - 0.5 K/UL   PROTHROMBIN TIME + INR    Collection Time: 04/06/18 11:56 AM   Result Value Ref Range    Prothrombin time 13.2 11.5 - 14.5 sec    INR 1.0     PTT    Collection Time: 04/06/18 11:56 AM   Result Value Ref Range    aPTT 28.3 23.2 - 74.1 SEC   METABOLIC PANEL, BASIC    Collection Time: 04/06/18 11:56 AM   Result Value Ref Range    Sodium 141 136 - 145 mmol/L    Potassium 3.8 3.5 - 5.1 mmol/L    Chloride 103 98 - 107 mmol/L    CO2 28 21 - 32 mmol/L    Anion gap 10 7 - 16 mmol/L    Glucose 113 (H) 65 - 100 mg/dL    BUN 35 (H) 8 - 23 MG/DL    Creatinine 1.40 (H) 0.6 - 1.0 MG/DL    GFR est AA 47 (L) >60 ml/min/1.73m2    GFR est non-AA 39 (L) >60 ml/min/1.73m2    Calcium 9.4 8.3 - 10.4 MG/DL

## 2018-04-06 NOTE — PERIOP NOTES
Patient verified name, , and surgery as listed in [facility]. Type 3 surgery, walk in assessment complete. Labs per surgeon: cbc, bmp, pt, ptt, ua ; results within anesthesia guidelines; placed on chart for reference; routed via fax to PCP Dr Anna Marie Bucio and to surgeon, Dr Colletta Plume for further review. ; pt unable to give urine specimen; states that she cannot sit on toilet and afraid she won't be able to stand back up; message left for Emanuel Fisher at Dr Colletta Plume office regarding this information. EKG: completed on 17 placed on chart for reference; this EKG reviewed by anesthesia and  pt cleared for surgery 17. ECHO 17 in care everywhere for reference. Hibiclens and instructions to return bottle on DOS given per hospital policy. Nasal Swab collected per MD order and instructions for Mupirocin nasal ointment if required. Patient provided with handouts including Guide to Surgery, Pain Management, Hand Hygiene, Blood Transfusion Education, and [place] [facility] Brochure. Patient answered medical/surgical history questions at their best of ability. All prior to admission medications documented in [facility]. Original medication prescription bottle NOT visualized during patient appointment. Patient instructed to hold all vitamins 7 days prior to surgery and NSAIDS 5 days prior to surgery. Medications to be held: Aspirin hold for 5 days prior to surgery. Patient instructed to continue previous medications as prescribed prior to surgery and to take the following medications the day of surgery according to anesthesia guidelines with a small sip of water: Atenolol-chlorthalidone; Lipitor; Potassium; and Tylenol, if needed. .      Patient teach back successful and patient demonstrates knowledge of instruction.

## 2018-04-09 PROBLEM — N18.30 CKD (CHRONIC KIDNEY DISEASE) STAGE 3, GFR 30-59 ML/MIN (HCC): Chronic | Status: ACTIVE | Noted: 2017-08-21

## 2018-04-09 NOTE — ADVANCED PRACTICE NURSE
Total Joint Surgery Preoperative Chart Review      Patient ID:  Scottie Colon  [id number]  55 y.o.  1940  Surgeon: Dr. Skinny Calle  Date of Surgery: 4/13/2018  Procedure: Total Left Knee Arthroplasty  Primary Care Physician: Marquez Sheehan [phone]  Specialty Physician(s):      Subjective:   Scottie Colon is a 65 y.o. WHITE OR  female who presents for preoperative evaluation for Total Left Knee arthroplasty. This is a preoperative chart review note based on data collected by the nurse at the surgical Pre-Assessment visit.     Past Medical History:   Diagnosis Date    Abnormal posture     Anemia     history     BMI 35.0-35.9,adult 4/9/2018    Essential hypertension     on med for control     H/O echocardiogram 01/05/2017    EF 55-65%    H/O: pneumonia     01/2017    Hypercholesteremia     managed with medication     Hypomagnesemia     managed with supplement     Left ventricular diastolic dysfunction     per echo \"Grade 1 (mild)\"    Muscle weakness     generalized    Nausea & vomiting     nausea only     MOOK (obstructive sleep apnea) 01/30/2015    patient denies     Osteoarthritis     Poor historian     Pressure ulcer of left buttock, stage 2     resolved per patient-previously seen by Dr. Kimberlyn Arthur at wound center      Pressure ulcer of right buttock, stage 2     resolved per patient-previously seen by Dr. Kimberlyn Arthur at wound center     SOB (shortness of breath)     Status post right hip replacement 5/10/2017    Status post total right knee replacement 8/21/2017    Unable to ambulate     patient in wheelchair-can stand with assistance     Unsteadiness on feet       Past Surgical History:   Procedure Laterality Date    BREAST SURGERY PROCEDURE UNLISTED Right 1968    cyst removed    HX CHOLECYSTECTOMY  2009    HX HIP REPLACEMENT Right 05/10/2017    HX KNEE ARTHROSCOPY Bilateral     X2 to right; X1 to left    HX KNEE REPLACEMENT Right 08/2017     Family History   Problem Relation Age of Onset    Heart Disease Mother     Hypertension Mother     Hypertension Father       Social History   Substance Use Topics    Smoking status: Never Smoker    Smokeless tobacco: Never Used    Alcohol use No       Prior to Admission medications    Medication Sig Start Date End Date Taking? Authorizing Provider   losartan (COZAAR) 100 mg tablet Take 100 mg by mouth nightly. Indications: hypertension   Yes Historical Provider   furosemide (LASIX) 20 mg tablet Take 20 mg by mouth as needed. Indications: Edema   Yes Historical Provider   aspirin delayed-release 81 mg tablet Take 81 mg by mouth daily. Indications: myocardial infarction prevention   Yes Historical Provider   acetaminophen (TYLENOL) 325 mg tablet Take 325 mg by mouth every six (6) hours as needed. Takes 2 tablets as needed for pain; Take / use AM day of surgery  per anesthesia protocols. Indications: Arthritic Pain 3/13/17  Yes Historical Provider   atenolol-chlorthalidone (TENORETIC) 50-25 mg per tablet Take 1 Tab by mouth daily. Take / use AM day of surgery  per anesthesia protocols. Indications: hypertension   Yes Historical Provider   folic acid 254 mcg tablet Take 800 mcg by mouth daily. Yes Historical Provider   magnesium 250 mg tab Take 1 Tab by mouth daily. Yes Historical Provider   potassium chloride SR (KLOR-CON 10) 10 mEq tablet Take 20 mEq by mouth daily. PCP started pt on this due to leg cramping; Take / use AM day of surgery  per anesthesia protocols. Indications: hypokalemia prevention   Yes Historical Provider   atorvastatin (LIPITOR) 10 mg tablet Take 10 mg by mouth daily. Take / use AM day of surgery  per anesthesia protocols. Indications: hypercholesterolemia   Yes Historical Provider   MULTIVITS-MIN/IRON/FA/LUTEIN (CENTRUM SILVER WOMEN PO) Take 1 Tab by mouth daily.    Yes Historical Provider     Allergies   Allergen Reactions    Avelox [Moxifloxacin] Rash and Shortness of Breath    Other Plant, Animal, Environmental Nausea Only     Allergic to Perfume & Smoke. Objective:     Physical Exam:       ECG:    EKG Results     None          Data Review:   Labs:       Results for Kamila Sanchez (MRN [id number]) as of 4/9/2018 12:05   Ref. Range 4/6/2018 11:56   Sodium Latest Ref Range: 136 - 145 mmol/L 141   Potassium Latest Ref Range: 3.5 - 5.1 mmol/L 3.8   Chloride Latest Ref Range: 98 - 107 mmol/L 103   CO2 Latest Ref Range: 21 - 32 mmol/L 28   Anion gap Latest Ref Range: 7 - 16 mmol/L 10   Glucose Latest Ref Range: 65 - 100 mg/dL 113 (H)   BUN Latest Ref Range: 8 - 23 MG/DL 35 (H)   Creatinine Latest Ref Range: 0.6 - 1.0 MG/DL 1.40 (H)   Calcium Latest Ref Range: 8.3 - 10.4 MG/DL 9.4   GFR est non-AA Latest Ref Range: >60 ml/min/1.73m2 39 (L)   GFR est AA Latest Ref Range: >60 ml/min/1.73m2 47 (L)     Problem List:  )  Patient Active Problem List   Diagnosis Code    Preop respiratory exam Z01.811    Hypertension I10    Hyperlipidemia E78.5    Rheumatoid arthritis(714.0) M06.9    MOOK (obstructive sleep apnea) G47.33    Elevated serum creatinine R79.89    Status post right hip replacement Z96.641    Status post total right knee replacement Z96.651    CKD (chronic kidney disease) stage 3, GFR 30-59 ml/min N18.3    BMI 35.0-35.9,adult Z68.35       Total Joint Surgery Pre-Assessment Recommendations:        Patient with multiple comorbidities including:  BMI greater than 35, CKD3 advanced age 65  Patient would benefit from inpatient hospitalization with total knee surgery. Recommend continuous saturation monitoring hours of sleep, during hospitalization. Renal protocol initiated. The patient's chart will be flagged renal risk. Renal RisK Alerts:  1. Caution with use of muscle relaxants and sedatives with reduced renal function  2. Caution with total amount of narcotics used   3.  Avoid morphine if patient has reduced renal function due to accumulations of the highly active metabolite, morphine-6-glucuronide, which can lead to sedation and respiratory depression  4. Avoid nephrotoxic drugs such as MA inhibitors  5. Consider volume status monitoring in addition to [place]  6.  Ensure hand-off to hospitalist for appropriate perioperative IV fluid management                 Signed By: Nayely Stringer, NP-C    April 9, 2018

## 2018-04-10 NOTE — H&P
[id number] [facility]  Pre Operative History and Physical Exam    Patient ID:  Bianca Valle  [id number]  77 y.o.  1940    Today: April 10, 2018       Assessment:   1. Arthritis of the left knee        Plan:    1. Proceed with scheduled Procedure(s) (LRB):  LEFT KNEE ARTHROPLASTY TOTAL / FNB / OJEDA& NEPHEW (Left)            CC: Left knee pain    HPI:   The patient has end stage arthritis of the left knee. The patient was evaluated and examined during a consultation prior to this office visit. There have been no changes to the patient's orthopedic condition since the initial consultation. The patient has failed previous conservative treatment for this condition including antiinflammatories , and lifestyle modifications. The necessity for joint replacement is present.  The patient will be admitted the day of surgery for Procedure(s) (LRB):  LEFT KNEE ARTHROPLASTY TOTAL / FNB / OJEDA& NEPHEW (Left)      Past Medical/Surgical History:  Past Medical History:   Diagnosis Date    Abnormal posture     Anemia     history     BMI 35.0-35.9,adult 4/9/2018    Essential hypertension     on med for control     H/O echocardiogram 01/05/2017    EF 55-65%    H/O: pneumonia     01/2017    Hypercholesteremia     managed with medication     Hypomagnesemia     managed with supplement     Left ventricular diastolic dysfunction     per echo \"Grade 1 (mild)\"    Muscle weakness     generalized    Nausea & vomiting     nausea only     MOOK (obstructive sleep apnea) 01/30/2015    patient denies     Osteoarthritis     Poor historian     Pressure ulcer of left buttock, stage 2     resolved per patient-previously seen by Dr. Hansa Sheikh at wound center      Pressure ulcer of right buttock, stage 2     resolved per patient-previously seen by Dr. Hansa Sheikh at wound center     SOB (shortness of breath)     Status post right hip replacement 5/10/2017    Status post total right knee replacement 8/21/2017    Unable to ambulate     patient in wheelchair-can stand with assistance     Unsteadiness on feet      Past Surgical History:   Procedure Laterality Date    BREAST SURGERY PROCEDURE UNLISTED Right 1968    cyst removed    HX CHOLECYSTECTOMY  2009    HX HIP REPLACEMENT Right 05/10/2017    HX KNEE ARTHROSCOPY Bilateral     X2 to right; X1 to left    HX KNEE REPLACEMENT Right 08/2017        Allergies: Allergies   Allergen Reactions    Avelox [Moxifloxacin] Rash and Shortness of Breath    Other Plant, Animal, Environmental Nausea Only     Allergic to Perfume & Smoke. Physical Exam:   General: NAD, Alert, Oriented, Appears their stated age     [de-identified]: NC/AT, PERRL    Skin: No rashes, lesions or wounds seen      Psych: normal affect      Heart: Regular Rate, Rhythm     Lungs: unlabored respirations, normal breath sounds     Abdomen: Soft and non-distended     Ortho: Pain with limited ROM of the left knee    Neuro: no focal defects, sensation is equal bilaterally     Lymph: no lymphadenopathy     Meds:   No current facility-administered medications for this encounter. Current Outpatient Prescriptions   Medication Sig    losartan (COZAAR) 100 mg tablet Take 100 mg by mouth nightly. Indications: hypertension    furosemide (LASIX) 20 mg tablet Take 20 mg by mouth as needed. Indications: Edema    aspirin delayed-release 81 mg tablet Take 81 mg by mouth daily. Indications: myocardial infarction prevention    acetaminophen (TYLENOL) 325 mg tablet Take 325 mg by mouth every six (6) hours as needed. Takes 2 tablets as needed for pain; Take / use AM day of surgery  per anesthesia protocols. Indications: Arthritic Pain    atenolol-chlorthalidone (TENORETIC) 50-25 mg per tablet Take 1 Tab by mouth daily. Take / use AM day of surgery  per anesthesia protocols. Indications: hypertension    folic acid 676 mcg tablet Take 800 mcg by mouth daily.  magnesium 250 mg tab Take 1 Tab by mouth daily.     potassium chloride SR (KLOR-CON 10) 10 mEq tablet Take 20 mEq by mouth daily. PCP started pt on this due to leg cramping; Take / use AM day of surgery  per anesthesia protocols. Indications: hypokalemia prevention    atorvastatin (LIPITOR) 10 mg tablet Take 10 mg by mouth daily. Take / use AM day of surgery  per anesthesia protocols. Indications: hypercholesterolemia    MULTIVITS-MIN/IRON/FA/LUTEIN (CENTRUM SILVER WOMEN PO) Take 1 Tab by mouth daily. Labs:  Hospital Outpatient Visit on 04/06/2018   Component Date Value Ref Range Status    WBC 04/06/2018 10.8  4.3 - 11.1 K/uL Final    RBC 04/06/2018 4.50  4.05 - 5.25 M/uL Final    HGB 04/06/2018 11.8  11.7 - 15.4 g/dL Final    HCT 04/06/2018 38.0  35.8 - 46.3 % Final    MCV 04/06/2018 84.4  79.6 - 97.8 FL Final    MCH 04/06/2018 26.2  26.1 - 32.9 PG Final    MCHC 04/06/2018 31.1* 31.4 - 35.0 g/dL Final    RDW 04/06/2018 15.2* 11.9 - 14.6 % Final    PLATELET 59/98/4115 523  150 - 450 K/uL Final    MPV 04/06/2018 9.8* 10.8 - 14.1 FL Final    DF 04/06/2018 AUTOMATED    Final    NEUTROPHILS 04/06/2018 68  43 - 78 % Final    LYMPHOCYTES 04/06/2018 23  13 - 44 % Final    MONOCYTES 04/06/2018 7  4.0 - 12.0 % Final    EOSINOPHILS 04/06/2018 2  0.5 - 7.8 % Final    BASOPHILS 04/06/2018 0  0.0 - 2.0 % Final    IMMATURE GRANULOCYTES 04/06/2018 0  0.0 - 5.0 % Final    ABS. NEUTROPHILS 04/06/2018 7.3  1.7 - 8.2 K/UL Final    ABS. LYMPHOCYTES 04/06/2018 2.5  0.5 - 4.6 K/UL Final    ABS. MONOCYTES 04/06/2018 0.8  0.1 - 1.3 K/UL Final    ABS. EOSINOPHILS 04/06/2018 0.2  0.0 - 0.8 K/UL Final    ABS. BASOPHILS 04/06/2018 0.0  0.0 - 0.2 K/UL Final    ABS. IMM.  GRANS. 04/06/2018 0.0  0.0 - 0.5 K/UL Final    Prothrombin time 04/06/2018 13.2  11.5 - 14.5 sec Final    INR 04/06/2018 1.0    Final    aPTT 04/06/2018 28.3  23.2 - 35.3 SEC Final    Comment: Heparin Therapeutic Range = 74 - 123 seconds  In addition to factor deficiency, monitoring heparin therapy, etc., evaluation of a prolonged aPTT result should include consideration of preanalytic variables such as heparin flush contamination, specimen integrity issues, etc.  ** Note new reference range and method **      Sodium 04/06/2018 141  136 - 145 mmol/L Final    Potassium 04/06/2018 3.8  3.5 - 5.1 mmol/L Final    Chloride 04/06/2018 103  98 - 107 mmol/L Final    CO2 04/06/2018 28  21 - 32 mmol/L Final    Anion gap 04/06/2018 10  7 - 16 mmol/L Final    Glucose 04/06/2018 113* 65 - 100 mg/dL Final    Comment: 47 - 60 mg/dl Consistent with, but not fully diagnostic of hypoglycemia. 101 - 125 mg/dl Impaired fasting glucose/consistent with pre-diabetes mellitus  > 126 mg/dl Fasting glucose consistent with overt diabetes mellitus      BUN 04/06/2018 35* 8 - 23 MG/DL Final    Creatinine 04/06/2018 1.40* 0.6 - 1.0 MG/DL Final    GFR est AA 04/06/2018 47* >60 ml/min/1.73m2 Final    GFR est non-AA 04/06/2018 39* >60 ml/min/1.73m2 Final    Comment: (NOTE)  Estimated GFR is calculated using the Modification of Diet in Renal   Disease (MDRD) Study equation, reported for both  Americans   (GFRAA) and non- Americans (GFRNA), and normalized to 1.73m2   body surface area. The physician must decide which value applies to   the patient. The MDRD study equation should only be used in   individuals age 25 or older. It has not been validated for the   following: pregnant women, patients with serious comorbid conditions,   or on certain medications, or persons with extremes of body size,   muscle mass, or nutritional status.  Calcium 04/06/2018 9.4  8.3 - 10.4 MG/DL Final    Special Requests: 04/06/2018 NO SPECIAL REQUESTS    Final    Culture result: 04/06/2018 SA target not detected. A MRSA NEGATIVE, SA NEGATIVE test result does not preclude MRSA or SA nasal colonization.     Final                 Patient Active Problem List   Diagnosis Code    Preop respiratory exam Z01.811    Hypertension I10    Hyperlipidemia E78.5    Rheumatoid arthritis(714.0) M06.9    MOOK (obstructive sleep apnea) G47.33    Elevated serum creatinine R79.89    Status post right hip replacement Z96.641    Status post total right knee replacement Z96.651    CKD (chronic kidney disease) stage 3, GFR 30-59 ml/min N18.3    BMI 35.0-35.9,adult Z68.35         Signed By: ALMA Smith  April 10, 2018

## 2018-04-12 ENCOUNTER — ANESTHESIA EVENT (OUTPATIENT)
Dept: SURGERY | Age: 78
DRG: 470 | End: 2018-04-12
Payer: MEDICARE

## 2018-04-13 ENCOUNTER — ANESTHESIA (OUTPATIENT)
Dept: SURGERY | Age: 78
DRG: 470 | End: 2018-04-13
Payer: MEDICARE

## 2018-04-13 ENCOUNTER — HOSPITAL ENCOUNTER (INPATIENT)
Age: 78
LOS: 3 days | Discharge: NURSING, REHAB OR LONG-TERM CARE | DRG: 470 | End: 2018-04-16
Attending: ORTHOPAEDIC SURGERY | Admitting: ORTHOPAEDIC SURGERY
Payer: MEDICARE

## 2018-04-13 DIAGNOSIS — Z96.652 STATUS POST TOTAL LEFT KNEE REPLACEMENT: Primary | ICD-10-CM

## 2018-04-13 DIAGNOSIS — I10 ESSENTIAL HYPERTENSION: ICD-10-CM

## 2018-04-13 LAB
ABO + RH BLD: NORMAL
APPEARANCE UR: CLEAR
BILIRUB UR QL: NEGATIVE
BLOOD GROUP ANTIBODIES SERPL: NORMAL
COLOR UR: YELLOW
GLUCOSE BLD STRIP.AUTO-MCNC: 90 MG/DL (ref 65–100)
GLUCOSE UR STRIP.AUTO-MCNC: NEGATIVE MG/DL
HGB BLD-MCNC: 11.3 G/DL (ref 11.7–15.4)
HGB UR QL STRIP: NEGATIVE
KETONES UR QL STRIP.AUTO: NEGATIVE MG/DL
LEUKOCYTE ESTERASE UR QL STRIP.AUTO: NEGATIVE
NITRITE UR QL STRIP.AUTO: NEGATIVE
PH UR STRIP: 7 [PH] (ref 5–9)
PROT UR STRIP-MCNC: NEGATIVE MG/DL
SP GR UR REFRACTOMETRY: 1.01 (ref 1–1.02)
SPECIMEN EXP DATE BLD: NORMAL
UROBILINOGEN UR QL STRIP.AUTO: 1 EU/DL (ref 0.2–1)

## 2018-04-13 PROCEDURE — 76942 ECHO GUIDE FOR BIOPSY: CPT | Performed by: ORTHOPAEDIC SURGERY

## 2018-04-13 PROCEDURE — 81003 URINALYSIS AUTO W/O SCOPE: CPT | Performed by: ORTHOPAEDIC SURGERY

## 2018-04-13 PROCEDURE — 76010010054 HC POST OP PAIN BLOCK: Performed by: ORTHOPAEDIC SURGERY

## 2018-04-13 PROCEDURE — 74011250636 HC RX REV CODE- 250/636

## 2018-04-13 PROCEDURE — 99221 1ST HOSP IP/OBS SF/LOW 40: CPT | Performed by: PHYSICAL MEDICINE & REHABILITATION

## 2018-04-13 PROCEDURE — 74011000250 HC RX REV CODE- 250: Performed by: ANESTHESIOLOGY

## 2018-04-13 PROCEDURE — 77030012935 HC DRSG AQUACEL BMS -B: Performed by: ORTHOPAEDIC SURGERY

## 2018-04-13 PROCEDURE — 82962 GLUCOSE BLOOD TEST: CPT

## 2018-04-13 PROCEDURE — 77030011208: Performed by: ORTHOPAEDIC SURGERY

## 2018-04-13 PROCEDURE — 77030034849: Performed by: ORTHOPAEDIC SURGERY

## 2018-04-13 PROCEDURE — 77030002912 HC SUT ETHBND J&J -A: Performed by: ORTHOPAEDIC SURGERY

## 2018-04-13 PROCEDURE — 77030036688 HC BLNKT CLD THER S2SG -B

## 2018-04-13 PROCEDURE — C1776 JOINT DEVICE (IMPLANTABLE): HCPCS | Performed by: ORTHOPAEDIC SURGERY

## 2018-04-13 PROCEDURE — 77030006789 HC BLD SAW OSC STRY -C: Performed by: ORTHOPAEDIC SURGERY

## 2018-04-13 PROCEDURE — 74011000258 HC RX REV CODE- 258: Performed by: ORTHOPAEDIC SURGERY

## 2018-04-13 PROCEDURE — 77030011640 HC PAD GRND REM COVD -A: Performed by: ORTHOPAEDIC SURGERY

## 2018-04-13 PROCEDURE — 74011250636 HC RX REV CODE- 250/636: Performed by: PHYSICIAN ASSISTANT

## 2018-04-13 PROCEDURE — 74011000250 HC RX REV CODE- 250

## 2018-04-13 PROCEDURE — 77030031139 HC SUT VCRL2 J&J -A: Performed by: ORTHOPAEDIC SURGERY

## 2018-04-13 PROCEDURE — 97162 PT EVAL MOD COMPLEX 30 MIN: CPT

## 2018-04-13 PROCEDURE — 77030032490 HC SLV COMPR SCD KNE COVD -B

## 2018-04-13 PROCEDURE — C1713 ANCHOR/SCREW BN/BN,TIS/BN: HCPCS | Performed by: ORTHOPAEDIC SURGERY

## 2018-04-13 PROCEDURE — 74011000302 HC RX REV CODE- 302: Performed by: ORTHOPAEDIC SURGERY

## 2018-04-13 PROCEDURE — 65270000029 HC RM PRIVATE

## 2018-04-13 PROCEDURE — 77030013727 HC IRR FAN PULSVC ZIMM -B: Performed by: ORTHOPAEDIC SURGERY

## 2018-04-13 PROCEDURE — 76060000035 HC ANESTHESIA 2 TO 2.5 HR: Performed by: ORTHOPAEDIC SURGERY

## 2018-04-13 PROCEDURE — 77030002966 HC SUT PDS J&J -A: Performed by: ORTHOPAEDIC SURGERY

## 2018-04-13 PROCEDURE — 77030035236 HC SUT PDS STRATFX BARB J&J -B: Performed by: ORTHOPAEDIC SURGERY

## 2018-04-13 PROCEDURE — 77030020143 HC AIRWY LARYN INTUB CGAS -A: Performed by: ANESTHESIOLOGY

## 2018-04-13 PROCEDURE — 76210000016 HC OR PH I REC 1 TO 1.5 HR: Performed by: ORTHOPAEDIC SURGERY

## 2018-04-13 PROCEDURE — 94760 N-INVAS EAR/PLS OXIMETRY 1: CPT

## 2018-04-13 PROCEDURE — 77030018836 HC SOL IRR NACL ICUM -A: Performed by: ORTHOPAEDIC SURGERY

## 2018-04-13 PROCEDURE — 94762 N-INVAS EAR/PLS OXIMTRY CONT: CPT

## 2018-04-13 PROCEDURE — 97165 OT EVAL LOW COMPLEX 30 MIN: CPT

## 2018-04-13 PROCEDURE — 74011250637 HC RX REV CODE- 250/637: Performed by: PHYSICIAN ASSISTANT

## 2018-04-13 PROCEDURE — 77030008467 HC STPLR SKN COVD -B: Performed by: ORTHOPAEDIC SURGERY

## 2018-04-13 PROCEDURE — 85018 HEMOGLOBIN: CPT | Performed by: PHYSICIAN ASSISTANT

## 2018-04-13 PROCEDURE — 74011250636 HC RX REV CODE- 250/636: Performed by: ORTHOPAEDIC SURGERY

## 2018-04-13 PROCEDURE — 76010000162 HC OR TIME 1.5 TO 2 HR INTENSV-TIER 1: Performed by: ORTHOPAEDIC SURGERY

## 2018-04-13 PROCEDURE — 77030019557 HC ELECTRD VES SEAL MEDT -F: Performed by: ORTHOPAEDIC SURGERY

## 2018-04-13 PROCEDURE — 87086 URINE CULTURE/COLONY COUNT: CPT | Performed by: ORTHOPAEDIC SURGERY

## 2018-04-13 PROCEDURE — 86580 TB INTRADERMAL TEST: CPT | Performed by: ORTHOPAEDIC SURGERY

## 2018-04-13 PROCEDURE — 74011250636 HC RX REV CODE- 250/636: Performed by: ANESTHESIOLOGY

## 2018-04-13 PROCEDURE — 36416 COLLJ CAPILLARY BLOOD SPEC: CPT | Performed by: PHYSICIAN ASSISTANT

## 2018-04-13 PROCEDURE — 74011000250 HC RX REV CODE- 250: Performed by: ORTHOPAEDIC SURGERY

## 2018-04-13 PROCEDURE — 74011250637 HC RX REV CODE- 250/637: Performed by: ANESTHESIOLOGY

## 2018-04-13 PROCEDURE — 86900 BLOOD TYPING SEROLOGIC ABO: CPT | Performed by: PHYSICIAN ASSISTANT

## 2018-04-13 PROCEDURE — 0SRD0J9 REPLACEMENT OF LEFT KNEE JOINT WITH SYNTHETIC SUBSTITUTE, CEMENTED, OPEN APPROACH: ICD-10-PCS | Performed by: ORTHOPAEDIC SURGERY

## 2018-04-13 PROCEDURE — 77030006720 HC BLD PAT RMR ZIMM -B: Performed by: ORTHOPAEDIC SURGERY

## 2018-04-13 PROCEDURE — 77010033678 HC OXYGEN DAILY

## 2018-04-13 PROCEDURE — 77030003602 HC NDL NRV BLK BBMI -B: Performed by: ANESTHESIOLOGY

## 2018-04-13 PROCEDURE — 77030020782 HC GWN BAIR PAWS FLX 3M -B: Performed by: ANESTHESIOLOGY

## 2018-04-13 DEVICE — GENESIS II PRIMARY FEMORAL LUGS
Type: IMPLANTABLE DEVICE | Site: KNEE | Status: FUNCTIONAL
Brand: GENESIS II

## 2018-04-13 DEVICE — LEGION POSTERIOR STABILIZED HIGH                                    FLEX HIGHLY CROSS LINKED                                    POLYETHYLENE SIZE 5-6 13MM
Type: IMPLANTABLE DEVICE | Site: KNEE | Status: FUNCTIONAL
Brand: LEGION

## 2018-04-13 DEVICE — (D)CEMENT BNE HV R 40GM -- DUPE USE ITEM 353850: Type: IMPLANTABLE DEVICE | Site: KNEE | Status: FUNCTIONAL

## 2018-04-13 DEVICE — LEGION POSTERIOR STABILIZED                                    OXINIUM FEMORAL SIZE 5 LEFT
Type: IMPLANTABLE DEVICE | Site: KNEE | Status: FUNCTIONAL
Brand: LEGION

## 2018-04-13 DEVICE — GENESIS II NON-POROUS TIBIAL                                    BASEPLATE SIZE 5 LEFT
Type: IMPLANTABLE DEVICE | Site: KNEE | Status: FUNCTIONAL
Brand: GENESIS II

## 2018-04-13 RX ORDER — LIDOCAINE HYDROCHLORIDE 10 MG/ML
0.1 INJECTION INFILTRATION; PERINEURAL AS NEEDED
Status: DISCONTINUED | OUTPATIENT
Start: 2018-04-13 | End: 2018-04-13 | Stop reason: HOSPADM

## 2018-04-13 RX ORDER — CEFAZOLIN SODIUM/WATER 2 G/20 ML
2 SYRINGE (ML) INTRAVENOUS ONCE
Status: COMPLETED | OUTPATIENT
Start: 2018-04-13 | End: 2018-04-13

## 2018-04-13 RX ORDER — UREA 10 %
800 LOTION (ML) TOPICAL DAILY
Status: DISCONTINUED | OUTPATIENT
Start: 2018-04-13 | End: 2018-04-13 | Stop reason: RX

## 2018-04-13 RX ORDER — DIPHENHYDRAMINE HCL 25 MG
25 CAPSULE ORAL
Status: DISCONTINUED | OUTPATIENT
Start: 2018-04-13 | End: 2018-04-16 | Stop reason: HOSPADM

## 2018-04-13 RX ORDER — FAMOTIDINE 20 MG/1
20 TABLET, FILM COATED ORAL ONCE
Status: COMPLETED | OUTPATIENT
Start: 2018-04-13 | End: 2018-04-13

## 2018-04-13 RX ORDER — ONDANSETRON 2 MG/ML
4 INJECTION INTRAMUSCULAR; INTRAVENOUS
Status: DISCONTINUED | OUTPATIENT
Start: 2018-04-13 | End: 2018-04-16 | Stop reason: HOSPADM

## 2018-04-13 RX ORDER — LIDOCAINE HYDROCHLORIDE 20 MG/ML
INJECTION, SOLUTION EPIDURAL; INFILTRATION; INTRACAUDAL; PERINEURAL AS NEEDED
Status: DISCONTINUED | OUTPATIENT
Start: 2018-04-13 | End: 2018-04-13 | Stop reason: HOSPADM

## 2018-04-13 RX ORDER — ROPIVACAINE HYDROCHLORIDE 2 MG/ML
INJECTION, SOLUTION EPIDURAL; INFILTRATION; PERINEURAL AS NEEDED
Status: DISCONTINUED | OUTPATIENT
Start: 2018-04-13 | End: 2018-04-13 | Stop reason: HOSPADM

## 2018-04-13 RX ORDER — CELECOXIB 200 MG/1
200 CAPSULE ORAL EVERY 12 HOURS
Status: DISCONTINUED | OUTPATIENT
Start: 2018-04-13 | End: 2018-04-16 | Stop reason: HOSPADM

## 2018-04-13 RX ORDER — FENTANYL CITRATE 50 UG/ML
100 INJECTION, SOLUTION INTRAMUSCULAR; INTRAVENOUS ONCE
Status: COMPLETED | OUTPATIENT
Start: 2018-04-13 | End: 2018-04-13

## 2018-04-13 RX ORDER — MIDAZOLAM HYDROCHLORIDE 1 MG/ML
2 INJECTION, SOLUTION INTRAMUSCULAR; INTRAVENOUS
Status: DISCONTINUED | OUTPATIENT
Start: 2018-04-13 | End: 2018-04-13 | Stop reason: HOSPADM

## 2018-04-13 RX ORDER — ACETAMINOPHEN 10 MG/ML
1000 INJECTION, SOLUTION INTRAVENOUS ONCE
Status: COMPLETED | OUTPATIENT
Start: 2018-04-13 | End: 2018-04-13

## 2018-04-13 RX ORDER — AMOXICILLIN 250 MG
2 CAPSULE ORAL DAILY
Status: DISCONTINUED | OUTPATIENT
Start: 2018-04-14 | End: 2018-04-16 | Stop reason: HOSPADM

## 2018-04-13 RX ORDER — POTASSIUM CHLORIDE 20 MEQ/1
20 TABLET, EXTENDED RELEASE ORAL DAILY
Status: DISCONTINUED | OUTPATIENT
Start: 2018-04-13 | End: 2018-04-16 | Stop reason: HOSPADM

## 2018-04-13 RX ORDER — MIDAZOLAM HYDROCHLORIDE 1 MG/ML
5 INJECTION, SOLUTION INTRAMUSCULAR; INTRAVENOUS ONCE
Status: COMPLETED | OUTPATIENT
Start: 2018-04-13 | End: 2018-04-13

## 2018-04-13 RX ORDER — SODIUM CHLORIDE 0.9 % (FLUSH) 0.9 %
5-10 SYRINGE (ML) INJECTION AS NEEDED
Status: DISCONTINUED | OUTPATIENT
Start: 2018-04-13 | End: 2018-04-16 | Stop reason: HOSPADM

## 2018-04-13 RX ORDER — FUROSEMIDE 20 MG/1
20 TABLET ORAL AS NEEDED
Status: DISCONTINUED | OUTPATIENT
Start: 2018-04-13 | End: 2018-04-16 | Stop reason: HOSPADM

## 2018-04-13 RX ORDER — HYDROMORPHONE HYDROCHLORIDE 2 MG/1
2 TABLET ORAL
Qty: 40 TAB | Refills: 0 | Status: SHIPPED | OUTPATIENT
Start: 2018-04-13 | End: 2018-08-03

## 2018-04-13 RX ORDER — ASPIRIN 81 MG/1
81 TABLET ORAL EVERY 12 HOURS
Status: DISCONTINUED | OUTPATIENT
Start: 2018-04-13 | End: 2018-04-16 | Stop reason: HOSPADM

## 2018-04-13 RX ORDER — ROPIVACAINE HYDROCHLORIDE 5 MG/ML
INJECTION, SOLUTION EPIDURAL; INFILTRATION; PERINEURAL AS NEEDED
Status: DISCONTINUED | OUTPATIENT
Start: 2018-04-13 | End: 2018-04-13 | Stop reason: HOSPADM

## 2018-04-13 RX ORDER — NEOMYCIN AND POLYMYXIN B SULFATES 40; 200000 MG/ML; [USP'U]/ML
SOLUTION IRRIGATION AS NEEDED
Status: DISCONTINUED | OUTPATIENT
Start: 2018-04-13 | End: 2018-04-13 | Stop reason: HOSPADM

## 2018-04-13 RX ORDER — LOSARTAN POTASSIUM 50 MG/1
100 TABLET ORAL
Status: DISCONTINUED | OUTPATIENT
Start: 2018-04-13 | End: 2018-04-16 | Stop reason: HOSPADM

## 2018-04-13 RX ORDER — HYDROCODONE BITARTRATE AND ACETAMINOPHEN 5; 325 MG/1; MG/1
2 TABLET ORAL AS NEEDED
Status: DISCONTINUED | OUTPATIENT
Start: 2018-04-13 | End: 2018-04-13 | Stop reason: HOSPADM

## 2018-04-13 RX ORDER — DEXAMETHASONE SODIUM PHOSPHATE 100 MG/10ML
10 INJECTION INTRAMUSCULAR; INTRAVENOUS ONCE
Status: ACTIVE | OUTPATIENT
Start: 2018-04-14 | End: 2018-04-15

## 2018-04-13 RX ORDER — PROPOFOL 10 MG/ML
INJECTION, EMULSION INTRAVENOUS AS NEEDED
Status: DISCONTINUED | OUTPATIENT
Start: 2018-04-13 | End: 2018-04-13 | Stop reason: HOSPADM

## 2018-04-13 RX ORDER — HYDROMORPHONE HYDROCHLORIDE 2 MG/1
2 TABLET ORAL
Status: DISCONTINUED | OUTPATIENT
Start: 2018-04-13 | End: 2018-04-16 | Stop reason: HOSPADM

## 2018-04-13 RX ORDER — ONDANSETRON 2 MG/ML
INJECTION INTRAMUSCULAR; INTRAVENOUS AS NEEDED
Status: DISCONTINUED | OUTPATIENT
Start: 2018-04-13 | End: 2018-04-13 | Stop reason: HOSPADM

## 2018-04-13 RX ORDER — HYDRALAZINE HYDROCHLORIDE 20 MG/ML
10 INJECTION INTRAMUSCULAR; INTRAVENOUS
Status: DISCONTINUED | OUTPATIENT
Start: 2018-04-13 | End: 2018-04-16 | Stop reason: HOSPADM

## 2018-04-13 RX ORDER — FENTANYL CITRATE 50 UG/ML
INJECTION, SOLUTION INTRAMUSCULAR; INTRAVENOUS AS NEEDED
Status: DISCONTINUED | OUTPATIENT
Start: 2018-04-13 | End: 2018-04-13 | Stop reason: HOSPADM

## 2018-04-13 RX ORDER — HYDROMORPHONE HYDROCHLORIDE 2 MG/ML
1 INJECTION, SOLUTION INTRAMUSCULAR; INTRAVENOUS; SUBCUTANEOUS
Status: DISCONTINUED | OUTPATIENT
Start: 2018-04-13 | End: 2018-04-16 | Stop reason: HOSPADM

## 2018-04-13 RX ORDER — ZINC GLUCONATE 10 MG
1 LOZENGE ORAL DAILY
Status: DISCONTINUED | OUTPATIENT
Start: 2018-04-13 | End: 2018-04-13 | Stop reason: RX

## 2018-04-13 RX ORDER — HYDROMORPHONE HYDROCHLORIDE 2 MG/ML
0.5 INJECTION, SOLUTION INTRAMUSCULAR; INTRAVENOUS; SUBCUTANEOUS
Status: DISCONTINUED | OUTPATIENT
Start: 2018-04-13 | End: 2018-04-13 | Stop reason: HOSPADM

## 2018-04-13 RX ORDER — ATENOLOL AND CHLORTHALIDONE TABLET 50; 25 MG/1; MG/1
1 TABLET ORAL DAILY
Status: DISCONTINUED | OUTPATIENT
Start: 2018-04-13 | End: 2018-04-13 | Stop reason: CLARIF

## 2018-04-13 RX ORDER — SODIUM CHLORIDE, SODIUM LACTATE, POTASSIUM CHLORIDE, CALCIUM CHLORIDE 600; 310; 30; 20 MG/100ML; MG/100ML; MG/100ML; MG/100ML
75 INJECTION, SOLUTION INTRAVENOUS CONTINUOUS
Status: DISCONTINUED | OUTPATIENT
Start: 2018-04-13 | End: 2018-04-13 | Stop reason: HOSPADM

## 2018-04-13 RX ORDER — FOLIC ACID 1 MG/1
1 TABLET ORAL DAILY
Status: DISCONTINUED | OUTPATIENT
Start: 2018-04-13 | End: 2018-04-16 | Stop reason: HOSPADM

## 2018-04-13 RX ORDER — DEXAMETHASONE SODIUM PHOSPHATE 4 MG/ML
INJECTION, SOLUTION INTRA-ARTICULAR; INTRALESIONAL; INTRAMUSCULAR; INTRAVENOUS; SOFT TISSUE AS NEEDED
Status: DISCONTINUED | OUTPATIENT
Start: 2018-04-13 | End: 2018-04-13 | Stop reason: HOSPADM

## 2018-04-13 RX ORDER — ASPIRIN 81 MG/1
81 TABLET ORAL EVERY 12 HOURS
Qty: 70 TAB | Refills: 0 | Status: SHIPPED | OUTPATIENT
Start: 2018-04-13 | End: 2018-05-18

## 2018-04-13 RX ORDER — SODIUM CHLORIDE 9 MG/ML
100 INJECTION, SOLUTION INTRAVENOUS CONTINUOUS
Status: DISPENSED | OUTPATIENT
Start: 2018-04-13 | End: 2018-04-14

## 2018-04-13 RX ORDER — CEFAZOLIN SODIUM/WATER 2 G/20 ML
2 SYRINGE (ML) INTRAVENOUS EVERY 8 HOURS
Status: COMPLETED | OUTPATIENT
Start: 2018-04-13 | End: 2018-04-14

## 2018-04-13 RX ORDER — NALOXONE HYDROCHLORIDE 0.4 MG/ML
.2-.4 INJECTION, SOLUTION INTRAMUSCULAR; INTRAVENOUS; SUBCUTANEOUS
Status: DISCONTINUED | OUTPATIENT
Start: 2018-04-13 | End: 2018-04-16 | Stop reason: HOSPADM

## 2018-04-13 RX ORDER — LANOLIN ALCOHOL/MO/W.PET/CERES
400 CREAM (GRAM) TOPICAL DAILY
Status: DISCONTINUED | OUTPATIENT
Start: 2018-04-13 | End: 2018-04-16 | Stop reason: HOSPADM

## 2018-04-13 RX ORDER — OXYCODONE HYDROCHLORIDE 5 MG/1
5 TABLET ORAL
Status: DISCONTINUED | OUTPATIENT
Start: 2018-04-13 | End: 2018-04-13 | Stop reason: HOSPADM

## 2018-04-13 RX ORDER — ACETAMINOPHEN 500 MG
1000 TABLET ORAL EVERY 6 HOURS
Status: DISCONTINUED | OUTPATIENT
Start: 2018-04-14 | End: 2018-04-16 | Stop reason: HOSPADM

## 2018-04-13 RX ORDER — SODIUM CHLORIDE 0.9 % (FLUSH) 0.9 %
5-10 SYRINGE (ML) INJECTION EVERY 8 HOURS
Status: DISCONTINUED | OUTPATIENT
Start: 2018-04-13 | End: 2018-04-16 | Stop reason: HOSPADM

## 2018-04-13 RX ADMIN — FENTANYL CITRATE 25 MCG: 50 INJECTION, SOLUTION INTRAMUSCULAR; INTRAVENOUS at 08:28

## 2018-04-13 RX ADMIN — MIDAZOLAM 2 MG: 1 INJECTION INTRAMUSCULAR; INTRAVENOUS at 07:17

## 2018-04-13 RX ADMIN — Medication 2 G: at 15:26

## 2018-04-13 RX ADMIN — LIDOCAINE HYDROCHLORIDE 100 MG: 20 INJECTION, SOLUTION EPIDURAL; INFILTRATION; INTRACAUDAL; PERINEURAL at 07:44

## 2018-04-13 RX ADMIN — DEXAMETHASONE SODIUM PHOSPHATE 10 MG: 4 INJECTION, SOLUTION INTRA-ARTICULAR; INTRALESIONAL; INTRAMUSCULAR; INTRAVENOUS; SOFT TISSUE at 07:53

## 2018-04-13 RX ADMIN — SODIUM CHLORIDE, SODIUM LACTATE, POTASSIUM CHLORIDE, AND CALCIUM CHLORIDE: 600; 310; 30; 20 INJECTION, SOLUTION INTRAVENOUS at 07:27

## 2018-04-13 RX ADMIN — FENTANYL CITRATE 25 MCG: 50 INJECTION, SOLUTION INTRAMUSCULAR; INTRAVENOUS at 08:08

## 2018-04-13 RX ADMIN — SODIUM CHLORIDE, SODIUM LACTATE, POTASSIUM CHLORIDE, AND CALCIUM CHLORIDE: 600; 310; 30; 20 INJECTION, SOLUTION INTRAVENOUS at 08:22

## 2018-04-13 RX ADMIN — ASPIRIN 81 MG: 81 TABLET, COATED ORAL at 20:24

## 2018-04-13 RX ADMIN — SODIUM CHLORIDE 100 ML/HR: 900 INJECTION, SOLUTION INTRAVENOUS at 20:23

## 2018-04-13 RX ADMIN — ROPIVACAINE HYDROCHLORIDE 30 ML: 2 INJECTION, SOLUTION EPIDURAL; INFILTRATION; PERINEURAL at 07:24

## 2018-04-13 RX ADMIN — SODIUM CHLORIDE 100 ML/HR: 900 INJECTION, SOLUTION INTRAVENOUS at 11:00

## 2018-04-13 RX ADMIN — LIDOCAINE HYDROCHLORIDE 0.1 ML: 10 INJECTION, SOLUTION INFILTRATION; PERINEURAL at 06:39

## 2018-04-13 RX ADMIN — SODIUM CHLORIDE, SODIUM LACTATE, POTASSIUM CHLORIDE, AND CALCIUM CHLORIDE 75 ML/HR: 600; 310; 30; 20 INJECTION, SOLUTION INTRAVENOUS at 06:40

## 2018-04-13 RX ADMIN — FENTANYL CITRATE 25 MCG: 50 INJECTION, SOLUTION INTRAMUSCULAR; INTRAVENOUS at 07:57

## 2018-04-13 RX ADMIN — ONDANSETRON 4 MG: 2 INJECTION INTRAMUSCULAR; INTRAVENOUS at 13:47

## 2018-04-13 RX ADMIN — FENTANYL CITRATE 50 MCG: 50 INJECTION INTRAMUSCULAR; INTRAVENOUS at 07:17

## 2018-04-13 RX ADMIN — FAMOTIDINE 20 MG: 20 TABLET ORAL at 06:43

## 2018-04-13 RX ADMIN — FENTANYL CITRATE 25 MCG: 50 INJECTION, SOLUTION INTRAMUSCULAR; INTRAVENOUS at 08:13

## 2018-04-13 RX ADMIN — PROPOFOL 200 MG: 10 INJECTION, EMULSION INTRAVENOUS at 07:44

## 2018-04-13 RX ADMIN — TUBERCULIN PURIFIED PROTEIN DERIVATIVE 5 UNITS: 5 INJECTION, SOLUTION INTRADERMAL at 06:39

## 2018-04-13 RX ADMIN — HYDROMORPHONE HYDROCHLORIDE 2 MG: 2 TABLET ORAL at 15:27

## 2018-04-13 RX ADMIN — DEXAMETHASONE SODIUM PHOSPHATE 5 MG: 4 INJECTION, SOLUTION INTRA-ARTICULAR; INTRALESIONAL; INTRAMUSCULAR; INTRAVENOUS; SOFT TISSUE at 07:24

## 2018-04-13 RX ADMIN — FENTANYL CITRATE 50 MCG: 50 INJECTION, SOLUTION INTRAMUSCULAR; INTRAVENOUS at 07:44

## 2018-04-13 RX ADMIN — ACETAMINOPHEN 1000 MG: 10 INJECTION, SOLUTION INTRAVENOUS at 17:25

## 2018-04-13 RX ADMIN — ONDANSETRON 4 MG: 2 INJECTION INTRAMUSCULAR; INTRAVENOUS at 07:57

## 2018-04-13 RX ADMIN — LOSARTAN POTASSIUM 100 MG: 50 TABLET ORAL at 20:25

## 2018-04-13 RX ADMIN — CELECOXIB 200 MG: 200 CAPSULE ORAL at 20:25

## 2018-04-13 RX ADMIN — Medication 2 G: at 07:38

## 2018-04-13 RX ADMIN — FENTANYL CITRATE 25 MCG: 50 INJECTION, SOLUTION INTRAMUSCULAR; INTRAVENOUS at 08:37

## 2018-04-13 RX ADMIN — HYDROMORPHONE HYDROCHLORIDE 2 MG: 2 TABLET ORAL at 20:27

## 2018-04-13 RX ADMIN — HYDROMORPHONE HYDROCHLORIDE 1 MG: 2 INJECTION, SOLUTION INTRAMUSCULAR; INTRAVENOUS; SUBCUTANEOUS at 11:42

## 2018-04-13 RX ADMIN — FENTANYL CITRATE 25 MCG: 50 INJECTION, SOLUTION INTRAMUSCULAR; INTRAVENOUS at 08:17

## 2018-04-13 RX ADMIN — HYDROMORPHONE HYDROCHLORIDE 0.5 MG: 2 INJECTION, SOLUTION INTRAMUSCULAR; INTRAVENOUS; SUBCUTANEOUS at 10:04

## 2018-04-13 NOTE — PROGRESS NOTES
04/13/18 1124   Oxygen Therapy   O2 Sat (%) 97 %   Pulse via Oximetry 79 beats per minute   O2 Device Nasal cannula   O2 Flow Rate (L/min) 2 l/min   FIO2 (%) 28 %   Patient still drowsy so I left patient on 02. IS at bedside. No shortness of breath or distress noted. [facility] notes reviewed- continuous SAT monitoring during hours of sleep; monitor # 14 at bedside.

## 2018-04-13 NOTE — PERIOP NOTES
TRANSFER - IN REPORT:    Verbal report received from Cheli Pérez 1726 on Yuval Foster  being received from [facility] for routine progression of care      Report consisted of patients Situation, Background, Assessment and   Recommendations(SBAR). Information from the following report(s) SBAR was reviewed with the receiving nurse. Opportunity for questions and clarification was provided. Assessment completed upon patients arrival to unit and care assumed.

## 2018-04-13 NOTE — PROGRESS NOTES
Problem: Mobility Impaired (Adult and Pediatric)  Goal: *Acute Goals and Plan of Care (Insert Text)  GOALS (1-4 days):  (1.)Ms. Marcina Nageotte will move from supine to sit and sit to supine  in bed with CONTACT GUARD ASSIST.  (2.)Ms. Marcina Nageotte will transfer from bed to chair and chair to bed with CONTACT GUARD ASSIST using the least restrictive device. (3.)Ms. Marcina Nageotte will ambulate with CONTACT GUARD ASSIST for 50-75 feet with the least restrictive device. (4.)Ms. Marcina Nageotte will increase left knee ROM to 5°-80°.  ________________________________________________________________________________________________    PHYSICAL THERAPY [facility] camp tKa: Initial Assessment, Treatment Day: Day of Assessment, PM 4/13/2018  INPATIENT: Hospital Day: 1  [place] / [place] / Product Type: Medicare /      NAME/AGE/GENDER: Cassie Moreno is a 65 y.o. female   PRIMARY DIAGNOSIS:  Primary osteoarthritis of left knee [M17.12]   Procedure(s) and Anesthesia Type:     * LEFT KNEE ARTHROPLASTY TOTAL / FNB / SMITH& NEPHEW / Carliss Feather - Spinal (Left)  ICD-10: Treatment Diagnosis:    · Pain in Left Knee (M25.562)  · Stiffness of Left Knee, Not elsewhere classified (M25.662)  · Difficulty in walking, Not elsewhere classified (R26.2)      ASSESSMENT:     Ms. Marcina Nageotte presents impaired strength & mobility s/p left TKA. Pt also had decreased stability during out of bed activity. This pt will benefit from follow up therapy to help restore safe function prior to returning home with caregiver. This pt requested rehab stay @ SNF to help with a smoother transition back home. This section established at most recent assessment   PROBLEM LIST (Impairments causing functional limitations):  1. Decreased Strength  2. Decreased ADL/Functional Activities  3. Decreased Transfer Abilities  4. Decreased Ambulation Ability/Technique  5. Decreased Balance  6. Increased Pain  7. Decreased Activity Tolerance  8.  Decreased Flexibility/Joint Mobility  9. Decreased [place] with Home Exercise Program   INTERVENTIONS PLANNED: (Benefits and precautions of physical therapy have been discussed with the patient.)  1. Bed Mobility  2. Gait Training  3. Home Exercise Program (HEP)  4. Therapeutic Exercise/Strengthening  5. Transfer Training  6. Range of Motion: active/assisted/passive  7. Therapeutic Activities  8. Group Therapy     TREATMENT PLAN: Frequency/Duration: Follow patient BID for duration of hospital stay to address above goals. Rehabilitation Potential For Stated Goals: Fair     RECOMMENDED REHABILITATION/EQUIPMENT: (at time of discharge pending progress): Continue Skilled Therapy and Rehab. HISTORY:   History of Present Injury/Illness (Reason for Referral): The patient has end stage arthritis of the left knee. The patient was evaluated and examined during a consultation prior to this office visit. There have been no changes to the patient's orthopedic condition since the initial consultation. The patient has failed previous conservative treatment for this condition including antiinflammatories , and lifestyle modifications. The necessity for joint replacement is present. The patient will be admitted the day of surgery for Procedure(s) (LRB):  LEFT KNEE ARTHROPLASTY TOTAL / FNB / OJEDA& NEPHEW (Left)    Past Medical History/Comorbidities:   Ms. 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  She also has no past medical history of Adverse effect of anesthesia; Aneurysm ([address] Arrhythmia; Asthma; Autoimmune disease ([address] CAD (coronary artery disease); Cancer ([address] Chronic kidney disease; Chronic obstructive pulmonary disease ([address] Chronic pain; Coagulation disorder ([address] Diabetes ([address] Difficult intubation; Endocarditis; GERD (gastroesophageal reflux disease); Heart failure ([address] Ill-defined condition; Liver disease; Malignant hyperthermia due to anesthesia; Nicotine vapor product user; Non-nicotine vapor product user; Pseudocholinesterase deficiency; Psychiatric disorder; PUD (peptic ulcer disease); Rheumatic fever; Seizures ([address] Stroke [facility]); Thromboembolus ([address] Thyroid disease; or Unspecified adverse effect of anesthesia. Ms. Hernandez Cole  has a past surgical history that includes hx cholecystectomy (2009); hx knee arthroscopy (Bilateral); pr breast surgery procedure unlisted (Right, 1968); hx hip replacement (Right, 05/10/2017); and hx knee replacement (Right, 08/2017). Social History/Living Environment:   Home Environment: Private residence  # Steps to Enter: 0  One/Two Story Residence: One story  Living Alone: No  Support Systems: Spouse/Significant Other/Partner  Patient Expects to be Discharged to[de-identified] Skilled nursing facility  Current DME Used/Available at Home: Commode, bedside, Hospital bed, Walker, rolling, Wheelchair  Prior Level of Function/Work/Activity:  Pt was needing SBA while using rolling walker short distances in home prior to this admission.    Number of Personal Factors/Comorbidities that affect the Plan of Care: 3+: HIGH COMPLEXITY   EXAMINATION:   Most Recent Physical Functioning:   Gross Assessment: Yes  Gross Assessment  AROM: Generally decreased, functional (right LE)  Strength: Generally decreased, functional (right LE)  Coordination: Generally decreased, functional (right LE)           LLE PROM  L Knee Flexion: 65 (~post op)  L Knee Extension: -25 (~post op)         Bed Mobility  Supine to Sit: Moderate assistance  Sit to Supine: Moderate assistance;Assist x2    Transfers  Sit to Stand: Minimum assistance;Assist x2  Stand to Sit: Minimum assistance;Assist x2  Bed to Chair:  (NT)    Balance  Sitting: Intact; Without support  Standing: Impaired; With support (walker)              Weight Bearing Status  Left Side Weight Bearing: As tolerated  Distance (ft): 5 Feet (ft)  Ambulation - Level of Assistance: Minimal assistance;Assist x2  Assistive Device: Walker, rolling  Speed/Keli: Shuffled; Slow  Stance: Left decreased  Gait Abnormalities: Antalgic;Decreased step clearance;Trunk sway increased        Braces/Orthotics: none    Left Knee Cold  Type: Cryocuff      Body Structures Involved:  1. Joints  2. Muscles Body Functions Affected:  1. Sensory/Pain  2. Movement Related Activities and Participation Affected:  1. General Tasks and Demands  2. Mobility   Number of elements that affect the Plan of Care: 4+: HIGH COMPLEXITY   CLINICAL PRESENTATION:   Presentation: Evolving clinical presentation with changing clinical characteristics: MODERATE COMPLEXITY   CLINICAL DECISION MAKIN [facility] Short Form  How much difficulty does the patient currently have. .. Unable A Lot A Little None   1. Turning over in bed (including adjusting bedclothes, sheets and blankets)? [] 1   [x] 2   [] 3   [] 4   2. Sitting down on and standing up from a chair with arms ( e.g., wheelchair, bedside commode, etc.)   [] 1   [] 2   [x] 3   [] 4   3. Moving from lying on back to sitting on the side of the bed? [] 1   [x] 2   [] 3   [] 4   How much help from another person does the patient currently need. .. Total A Lot A Little None   4. Moving to and from a bed to a chair (including a wheelchair)? [] 1   [] 2   [x] 3   [] 4   5. Need to walk in hospital room? [] 1   [] 2   [x] 3   [] 4   6. Climbing 3-5 steps with a railing?    [x] 1   [] 2   [] 3   [] 4   © 2007, Trustees of [address] [place] [id number], under license to [employer]. All rights reserved        Score:  Initial: 14 Most Recent: X (Date: -- )    Interpretation of Tool:  Represents activities that are increasingly more difficult (i.e. Bed mobility, Transfers, Gait). Score 24 23 22-20 19-15 14-10 9-7 6     Modifier CH CI CJ CK CL CM CN      ? Mobility - Walking and Moving Around:     - CURRENT STATUS: CL - 60%-79% impaired, limited or restricted    - GOAL STATUS: CK - 40%-59% impaired, limited or restricted    - D/C STATUS:  ---------------To be determined---------------  [place] / [place] / Product Type: Medicare /      Medical Necessity:     · Patient is expected to demonstrate progress in strength, range of motion, balance, coordination and functional technique to decrease assistance required with bed mobility, transfers & gait. Reason for Services/Other Comments:  · Patient continues to require skilled intervention due to pt unsafe with functional mobility.    Use of outcome tool(s) and clinical judgement create a POC that gives a: Questionable prediction of patient's progress: MODERATE COMPLEXITY            TREATMENT:   (In addition to Assessment/Re-Assessment sessions the following treatments were rendered)     Pre-treatment Symptoms/Complaints:  Severe fatigue  Pain: Initial: visual scale  Pain Intensity 1: 4  Pain Location 1: Knee  Pain Orientation 1: Left  Pain Intervention(s) 1: Cold pack, Repositioned  Post Session:  4/10     Assessment/Reassessment only, no treatment provided today       Date:   Date:   Date:     ACTIVITY/EXERCISE AM PM AM PM AM PM   GROUP THERAPY  []  []  []  []  []  []   Ankle Pumps         Quad Sets         Gluteal Sets         Hip ABd/ADduction         Straight Leg Raises         Knee Slides         Short Arc Quads         Long Arc Quads         Chair Slides                  B = bilateral; AA = active assistive; A = active; P = passive Treatment/Session Assessment:     Response to Treatment:  Tolerated fair    Education:  [] Home Exercises  [x] Fall Precautions  [] Hip Precautions [] D/C Instruction Review  [] Knee/Hip Prosthesis Review  [x] Walker Management/Safety [] Adaptive Equipment as Needed       Interdisciplinary Collaboration:   o Occupational Therapist  o Registered Nurse  o     After treatment position/precautions:   o Supine in bed  o Bed/Chair-wheels locked  o Bed in low position  o Caregiver at bedside  o Call light within reach  o RN notified  o Family at bedside    Compliance with Program/Exercises: Will assess as treatment progresses. Recommendations/Intent for next treatment session:  Treatment next visit will focus on increasing Ms. Sparrow's independence with bed mobility, transfers, gait training, strength/ROM exercises, modalities for pain, and patient education.       Total Treatment Duration:  PT Patient Time In/Time Out  Time In: 1355  Time Out: [address], PT

## 2018-04-13 NOTE — ANESTHESIA PREPROCEDURE EVALUATION
Anesthetic History   No history of anesthetic complications            Review of Systems / Medical History  Patient summary reviewed and pertinent labs reviewed    Pulmonary        Sleep apnea: No treatment           Neuro/Psych   Within defined limits           Cardiovascular    Hypertension: well controlled              Exercise tolerance[de-identified] Difficulty ambulating due to knee     GI/Hepatic/Renal  Within defined limits              Endo/Other        Obesity     Other Findings            Physical Exam    Airway  Mallampati: II  TM Distance: 4 - 6 cm  Neck ROM: normal range of motion   Mouth opening: Normal     Cardiovascular  Regular rate and rhythm,  S1 and S2 normal,  no murmur, click, rub, or gallop             Dental    Dentition: Full lower dentures and Full upper dentures     Pulmonary  Breath sounds clear to auscultation               Abdominal  GI exam deferred       Other Findings            Anesthetic Plan    ASA: 3  Anesthesia type: general      Post-op pain plan if not by surgeon: peripheral nerve block single    Induction: Intravenous  Anesthetic plan and risks discussed with: Patient

## 2018-04-13 NOTE — CONSULTS
HOSPITALIST H&P/CONSULT  NAME:  Trace Lau   Age:  65 y.o.  :   1940   MRN:   [id number]  PCP: Hubert Roland MD  Consulting MD:  Treatment Team: Attending Provider: Sharyle Mood., MD; Consulting Provider: Wesly Goldstein MD; Consulting Provider: Maria Luz Morales MD; Consulting Provider: Cas Hagan MD  HPI:   Patient is a 76yoF with PMH significant for HTN, HLD who is s/p L knee arthroplasty. She is still sleepy at time of my evaluation at bedside, but easily arouses.  and son are at bedside. Pt tells me that she feels fine. She reports some discomfort of left leg, but states that it is controlled and expected. She denies any headache, SOB, chest pain. I alerted her that her BPs were elevated, and she assures me that \"they always are, dear. \"  She reports that she takes her medications as prescribed, but knows that under stress and pain, her BPs are higher. She tells me that she was a little worried around the surgery, but feels good now. She anticipates that her BP will improve after she rests more.     Complete ROS done and is as stated in HPI or otherwise negative  Past Medical History:   Diagnosis Date    Anemia     history     BMI 35.0-35.9,adult 2018    Essential hypertension     on med for control     H/O echocardiogram 2017    EF 55-65%    H/O: pneumonia     2017    Hypercholesteremia     managed with medication     Hypomagnesemia     managed with supplement     Left ventricular diastolic dysfunction     per echo \"Grade 1 (mild)\"    Muscle weakness     generalized    Nausea & vomiting     nausea only     Osteoarthritis     Poor historian     Pressure ulcer of left buttock, stage 2     resolved per patient-previously seen by Dr. Kirk Sánchez at wound center      SOB (shortness of breath)     Status post right hip replacement 5/10/2017    Status post total left knee replacement 2018    Status post total right knee replacement 2017    Unable to ambulate     patient in wheelchair-can stand with assistance     Unsteadiness on feet       Past Surgical History:   Procedure Laterality Date    BREAST SURGERY PROCEDURE UNLISTED Right 1968    cyst removed    HX CHOLECYSTECTOMY  2009    HX HIP REPLACEMENT Right 05/10/2017    HX KNEE ARTHROSCOPY Bilateral     X2 to right; X1 to left    HX KNEE REPLACEMENT Right 08/2017      Prior to Admission Medications   Prescriptions Last Dose Informant Patient Reported? Taking? MULTIVITS-MIN/IRON/FA/LUTEIN (CENTRUM SILVER WOMEN PO) 4/6/2018  Yes No   Sig: Take 1 Tab by mouth daily. acetaminophen (TYLENOL) 325 mg tablet 4/13/2018 at Unknown time  Yes Yes   Sig: Take 325 mg by mouth every six (6) hours as needed. Takes 2 tablets as needed for pain; Take / use AM day of surgery  per anesthesia protocols. Indications: Arthritic Pain   aspirin delayed-release 81 mg tablet 4/8/2018  Yes No   Sig: Take 81 mg by mouth daily. Indications: myocardial infarction prevention   atenolol-chlorthalidone (TENORETIC) 50-25 mg per tablet 4/13/2018 at Unknown time  Yes Yes   Sig: Take 1 Tab by mouth daily. Take / use AM day of surgery  per anesthesia protocols. Indications: hypertension   atorvastatin (LIPITOR) 10 mg tablet 4/13/2018 at Unknown time  Yes Yes   Sig: Take 10 mg by mouth daily. Take / use AM day of surgery  per anesthesia protocols. Indications: hypercholesterolemia   folic acid 928 mcg tablet 4/6/2018  Yes No   Sig: Take 800 mcg by mouth daily. furosemide (LASIX) 20 mg tablet Unknown at Unknown time  Yes No   Sig: Take 20 mg by mouth as needed. Indications: Edema   losartan (COZAAR) 100 mg tablet 4/12/2018 at Unknown time  Yes Yes   Sig: Take 100 mg by mouth nightly. Indications: hypertension   magnesium 250 mg tab 4/6/2018  Yes No   Sig: Take 1 Tab by mouth daily. potassium chloride SR (KLOR-CON 10) 10 mEq tablet 4/13/2018 at Unknown time  Yes Yes   Sig: Take 20 mEq by mouth daily.  PCP started pt on this due to leg cramping; Take / use AM day of surgery  per anesthesia protocols. Indications: hypokalemia prevention      Facility-Administered Medications: None     Allergies   Allergen Reactions    Avelox [Moxifloxacin] Rash and Shortness of Breath    Other Plant, Animal, Environmental Nausea Only     Allergic to Perfume & Smoke. Social History   Substance Use Topics    Smoking status: Never Smoker    Smokeless tobacco: Never Used    Alcohol use No      Family History   Problem Relation Age of Onset    Heart Disease Mother     Hypertension Mother     Hypertension Father       Objective:     Visit Vitals    /83 (BP 1 Location: Left arm, BP Patient Position: At rest)    Pulse 75    Temp 97.3 °F (36.3 °C)    Resp 17    Ht 5' 9\" (1.753 m)    Wt 102.1 kg (225 lb)    SpO2 100%    BMI 33.23 kg/m2      Temp (24hrs), Av.8 °F (36.6 °C), Min:97.3 °F (36.3 °C), Max:98.4 °F (36.9 °C)    Oxygen Therapy  O2 Sat (%): 100 % (18 1515)  Pulse via Oximetry: 79 beats per minute (18 1124)  O2 Device: Nasal cannula (18 1515)  O2 Flow Rate (L/min): 2 l/min (18 1124)  FIO2 (%): 28 % (18 1124)  Physical Exam:  General:    Pleasant, cooperative, no distress, appears stated age. Head:   Normocephalic, without obvious abnormality, atraumatic. Nose:  Nares normal. No drainage or sinus tenderness. Lungs:   Clear to auscultation bilaterally. No Wheezing or Rhonchi. No rales. Heart:   Regular rate and rhythm,  no murmur, rub or gallop. Abdomen:   Soft, non-tender. Not distended. Bowel sounds normal.   Extremities: Left knee in immobilizer  Skin:     Texture, turgor normal. No rashes or lesions.   Not Jaundiced  Neurologic: Oriented x 3, no focal deficits   Data Review:   Recent Results (from the past 24 hour(s))   TYPE & SCREEN    Collection Time: 18  6:39 AM   Result Value Ref Range    Crossmatch Expiration 2018     ABO/Rh(D) Stacie Prettyick POSITIVE     Antibody screen NEG GLUCOSE, POC    Collection Time: 04/13/18  6:52 AM   Result Value Ref Range    Glucose (POC) 90 65 - 100 mg/dL   URINALYSIS W/ RFLX MICROSCOPIC    Collection Time: 04/13/18  7:50 AM   Result Value Ref Range    Color YELLOW      Appearance CLEAR      Specific gravity 1.015 1.001 - 1.023      pH (UA) 7.0 5.0 - 9.0      Protein NEGATIVE  NEG mg/dL    Glucose NEGATIVE  mg/dL    Ketone NEGATIVE  NEG mg/dL    Bilirubin NEGATIVE  NEG      Blood NEGATIVE  NEG      Urobilinogen 1.0 0.2 - 1.0 EU/dL    Nitrites NEGATIVE  NEG      Leukocyte Esterase NEGATIVE  NEG       Imaging /Procedures /Studies     Assessment and Plan: Active Hospital Problems    Diagnosis Date Noted    Status post total left knee replacement 04/13/2018    Hypertension 01/29/2015    Hyperlipidemia 01/29/2015       PLAN  S/P L knee arthroplasty  - Managed by primary Orthopedic team    HTN  - BP elevated since admission  - Restarted home meds  - Will add hydralazine for BP control  - Asymptomatic at this time    HLD  - Does not appear to be on a statin  - Will follow up with PCP as scheduled routinely          Thank you for this consultation request.  Patient is doing well with no complaints. Anticipate BP will improve with initiation of BP home meds. Added hydralazine for coverage. Asymptomatic. Please feel free to contact me if another medical need arises. Will sign-off for now.     Signed By: Richard Govea MD     April 13, 2018

## 2018-04-13 NOTE — H&P
The patient has end stage arthritis of the left knee. The patient was see and examined and there are no changes to the patient's orthopedic condition. They have tried conservative treatment for this condition; including antiinflammatories and lifestyle modifications and have failed. The necessity for the joint replacement is still present, and the H&P from the office is still current.  The patient will be admitted today for Procedure(s) (LRB):  LEFT KNEE ARTHROPLASTY TOTAL / FNB / SMITH& NEPHEW / Rachel Coppola (Left)

## 2018-04-13 NOTE — PERIOP NOTES
Betadine lavage:  17.5cc of betadine lot # [id number] , exp. Date : 09/19 ,  in 500cc of . 9NS Lot #[id number] , exp. Date : [id number].

## 2018-04-13 NOTE — DISCHARGE INSTRUCTIONS
[id number] [facility]   Patient Discharge Instructions    Brigette Henry / [id number] : 1940    Admitted 2018 Discharged: 2018     IF YOU HAVE ANY PROBLEMS ONCE YOU ARE AT HOME CALL THE FOLLOWING NUMBERS:   Main office number: [phone]      Medications    · The medications you are to continue on are listed on the medication reconciliation sheet. · Narcotic pain medications as well as supplemental iron can cause constipation. If this occurs try stopping the narcotic pain medication and/or the iron. · It is important that you take the medication exactly as they are prescribed. · Medications which increase your risk of blood clots are listed to stop for 5 weeks after surgery as well as medications or supplements which increase your risk of bleeding complications. · Keep your medication in the bottles provided by the pharmacist and keep a list of the medication names, dosages, and times to be taken in your wallet. · Do not take other medications without consulting your doctor. Important Information    Do NOT smoke as this will greatly increase your risk of infection! Resume your prehospital diet. If you have excessive nausea or vomitting call your doctor's office     Leg swelling and warmth is normal for 6 months after surgery. If you experience swelling in your leg elevate you leg while laying down with your toes above your heart. If you have sudden onset severe swelling with leg pain call our office. Use Del Hose stockings until we see you in the office for your follow up appointment. The stitches deep inside take approximately 6 months to dissolve. There will be sharp shooting, stinging and burning pain. This is normal and will resolve between 3-6 months after surgery. Difficulty sleeping is normal following total Knee and Hip replacement. You may try melatonin, an over-the-counter sleep aid or benadryl to help with sleep.  Most patients will resume sleeping through the night 8 weeks after surgery. Home Physical Therapy is arranged. Home Health will contact you within 48 hrs of discharge that you have chosen. If you have not received a call within this time frame please contact that provider you chose. You should be given this information before you leave the hospital.     You are at a risk for falls. Use the rolling walker when walking. Patients who have had a joint replacement should not drive if they are still taking narcotic pain mediation during the daytime hours. Most patients wean themselves off of pain medication within 2-5 weeks after surgery. When to Call the office    - If you have a temperature greater then 101  - Uncontrolled vomiting   - Loose control of your bladder or bowel function  - Are unable to bear any wieght   - Need a pain medication refill     Information obtained by :  I understand that if any problems occur once I am at home I am to contact my physician. I understand and acknowledge receipt of the instructions indicated above.                                                                                                                                            Physician's or R.N.'s Signature                                                                  Date/Time                                                                                                                                              Patient or Representative Signature                                                          Date/Time

## 2018-04-13 NOTE — ANESTHESIA POSTPROCEDURE EVALUATION
Post-Anesthesia Evaluation and Assessment    Patient: Chevy Sabillon MRN: [id number]  SSN: [id number]    YOB: 1940  Age: 65 y.o. Sex: female       Cardiovascular Function/Vital Signs  Visit Vitals    /81    Pulse 73    Temp 36.9 °C (98.4 °F)    Resp 15    Ht 5' 9\" (1.753 m)    Wt 102.1 kg (225 lb)    SpO2 94%    BMI 33.23 kg/m2       Patient is status post general anesthesia for Procedure(s):  LEFT KNEE ARTHROPLASTY TOTAL / FNB / SMITH& NEPHEW / Eulah Aris. Nausea/Vomiting: None    Postoperative hydration reviewed and adequate. Pain:  Pain Scale 1: Numeric (0 - 10) (04/13/18 1004)  Pain Intensity 1: 5 (04/13/18 1004)   Managed    Neurological Status:   Neuro (WDL): Within Defined Limits (04/13/18 9665)   At baseline    Mental Status and Level of Consciousness: Arousable    Pulmonary Status:   O2 Device: Nasal cannula (04/13/18 0933)   Adequate oxygenation and airway patent    Complications related to anesthesia: None    Post-anesthesia assessment completed.  No concerns    Signed By: Argentina Benitez., MD     April 13, 2018

## 2018-04-13 NOTE — CONSULTS
Physical Medicine & Rehabilitation Note-consult    Patient: Teena Umaña MRN: [id number]  SSN: [id number]    YOB: 1940  Age: 65 y.o. Sex: female      Admit Date: 4/13/2018  Admitting Physician: Arjun Reynolds MD    Medical Decision Making/Plan/Recommend:  Gait impairment s/p L TKA. Therapy progressing steadily, without unusual barriers to progress. Patient plans for SNF discharge. Best care option is admission to a sub acute rehab program at [facility]. Continue PT, OT for active/assisted/passive left TKA ROM, strengthening, mobility, transfers, gait training. Will follow progress. Chief Complaint : Gait dysfunction secondary to below.   Admit Diagnosis: Primary osteoarthritis of left knee [M17.12]  left total knee arthroplasty   4/13/2018  Pain  DVT risk  Osteoarthritis  Post op hemorrhagic anemia  Status post total right knee replacement  8/21/2017  Status post right hip replacement  5/10/2017  Acute Rehab Dx:  Gait impairment  Debility    Mobility and ambulation deficits  Self Care/ADL deficits    Medical Dx:  Past Medical History:   Diagnosis Date    Anemia     history     BMI 35.0-35.9,adult 4/9/2018    Essential hypertension     on med for control     H/O echocardiogram 01/05/2017    EF 55-65%    H/O: pneumonia     01/2017    Hypercholesteremia     managed with medication     Hypomagnesemia     managed with supplement     Left ventricular diastolic dysfunction     per echo \"Grade 1 (mild)\"    Muscle weakness     generalized    Nausea & vomiting     nausea only     Osteoarthritis     Poor historian     Pressure ulcer of left buttock, stage 2     resolved per patient-previously seen by Dr. Karina Amaya at wound center      SOB (shortness of breath)     Status post right hip replacement 5/10/2017    Status post total left knee replacement 4/13/2018    Status post total right knee replacement 8/21/2017    Unable to ambulate     patient in wheelchair-can stand with assistance     Unsteadiness on feet      Subjective:     Date of Evaluation:  April 13, 2018    HPI: Arnold Bajwa is a 65 y.o. female patient at [address] [place] [address] who was admitted on 4/13/2018  by Amil Meckel., MD with below mentioned medical history, is being seen for Physical Medicine and Rehabilitation consult. Arnold Bajwa presented with progressively worsening left knee pain when weight bearing, walking and with activity due to end stage DJD. She underwent a left total knee arthroplasty per Dr. Amil Meckel., MD on 4/13/2018. The patient's post operative course has been uncomplicated. Patient is to be WBAT LLE. Patient is participating and tolerating post op acute PT and OT well without major barriers. . She is limited by surgical knee pain, decreased ROM and strength. Arnold Bajwa is seen and examined today. Medical Records reviewed. Pt had prior R TKA, R ANMOL in 2017 with good results. She has been independent with ambulation, prior to admission, limited by left knee pain. Current Level of Function: bed mobility - mod  A x2, transfers - min A x2, decreased balance , ambulation -  5' with min assist x 2 using a RW . Prior Level of Function/Work/Activity:  Pt was needing SBA while using rolling walker short distances in home prior to this admission.       Family History   Problem Relation Age of Onset    Heart Disease Mother     Hypertension Mother     Hypertension Father       Social History   Substance Use Topics    Smoking status: Never Smoker    Smokeless tobacco: Never Used    Alcohol use No     Past Surgical History:   Procedure Laterality Date    BREAST SURGERY PROCEDURE UNLISTED Right 1968    cyst removed    HX CHOLECYSTECTOMY  2009    HX HIP REPLACEMENT Right 05/10/2017    HX KNEE ARTHROSCOPY Bilateral     X2 to right; X1 to left    HX KNEE REPLACEMENT Right 08/2017      Prior to Admission medications    Medication Sig Start Date End Date Taking? Authorizing Provider   aspirin delayed-release 81 mg tablet Take 1 Tab by mouth every twelve (12) hours every twelve (12) hours for 35 days. 4/13/18 5/18/18 Yes ALMA Bergeron   HYDROmorphone (DILAUDID) 2 mg tablet Take 1 Tab by mouth every four (4) hours as needed. Max Daily Amount: 12 mg. 4/13/18  Yes ALMA Bergeron   losartan (COZAAR) 100 mg tablet Take 100 mg by mouth nightly. Indications: hypertension   Yes Historical Provider   acetaminophen (TYLENOL) 325 mg tablet Take 325 mg by mouth every six (6) hours as needed. Takes 2 tablets as needed for pain; Take / use AM day of surgery  per anesthesia protocols. Indications: Arthritic Pain 3/13/17  Yes Historical Provider   atenolol-chlorthalidone (TENORETIC) 50-25 mg per tablet Take 1 Tab by mouth daily. Take / use AM day of surgery  per anesthesia protocols. Indications: hypertension   Yes Historical Provider   potassium chloride SR (KLOR-CON 10) 10 mEq tablet Take 20 mEq by mouth daily. PCP started pt on this due to leg cramping; Take / use AM day of surgery  per anesthesia protocols. Indications: hypokalemia prevention   Yes Historical Provider   atorvastatin (LIPITOR) 10 mg tablet Take 10 mg by mouth daily. Take / use AM day of surgery  per anesthesia protocols. Indications: hypercholesterolemia   Yes Historical Provider   furosemide (LASIX) 20 mg tablet Take 20 mg by mouth as needed. Indications: Edema    Historical Provider   aspirin delayed-release 81 mg tablet Take 81 mg by mouth daily. Indications: myocardial infarction prevention    Historical Provider   folic acid 644 mcg tablet Take 800 mcg by mouth daily. Historical Provider   magnesium 250 mg tab Take 1 Tab by mouth daily. Historical Provider   MULTIVITS-MIN/IRON/FA/LUTEIN (CENTRUM SILVER WOMEN PO) Take 1 Tab by mouth daily.     Historical Provider     Allergies   Allergen Reactions    Avelox [Moxifloxacin] Rash and Shortness of Breath    Other Plant, Animal, Environmental Nausea Only     Allergic to Perfume & Smoke. Review of Systems: +left knee pain, +antalgic gait. Denies chest pain, shortness of breath, cough, headache, visual problems, abdominal pain, dysurea, calf pain. Pertinent positives are as noted in the medical records and unremarkable otherwise. Objective:     Vitals:  Blood pressure 183/90, pulse 83, temperature 97.6 °F (36.4 °C), resp. rate 16, height 5' 9\" (1.753 m), weight 225 lb (102.1 kg), SpO2 97 %. Temp (24hrs), Av °F (36.7 °C), Min:97.6 °F (36.4 °C), Max:98.4 °F (36.9 °C)    BMI (calculated): 33.2 (18 0735)   Intake and Output:       Physical Exam:   General: Alert and age appropriately oriented. No acute cardio respiratory distress. HEENT: Normocephalic, no conjunctival pallor. Oral mucosa moist without cyanosis. No JVD. Lungs: Clear to auscultation bilaterally. Respiration even and unlabored   Heart: Regular rate and rhythm, S1, S2   No  Murmurs. Abdomen: Soft, non-tender, non-distended. Genitourinary: defered   Neuromuscular:      Grossly no focal motor deficits. Left knee extension strong  Left ankle dorsiflexion 5/5  Left ankle plantarflexion 5/5  No sensory deficits distally BLE to soft touch. Skin/extremity: Non tender calves BLE. No rashes, no marginal erythema.                                                                                          Labs/Studies:  Recent Results (from the past 72 hour(s))   TYPE & SCREEN    Collection Time: 18  6:39 AM   Result Value Ref Range    Crossmatch Expiration 2018     ABO/Rh(D) Padmaja Dewey POSITIVE     Antibody screen NEG    GLUCOSE, POC    Collection Time: 18  6:52 AM   Result Value Ref Range    Glucose (POC) 90 65 - 100 mg/dL   URINALYSIS W/ RFLX MICROSCOPIC    Collection Time: 18  7:50 AM   Result Value Ref Range    Color YELLOW      Appearance CLEAR      Specific gravity 1.015 1.001 - 1.023      pH (UA) 7.0 5.0 - 9.0      Protein NEGATIVE  NEG mg/dL    Glucose NEGATIVE  mg/dL    Ketone NEGATIVE  NEG mg/dL    Bilirubin NEGATIVE  NEG      Blood NEGATIVE  NEG      Urobilinogen 1.0 0.2 - 1.0 EU/dL    Nitrites NEGATIVE  NEG      Leukocyte Esterase NEGATIVE  NEG         Functional Assessment:  Reviewed participation and progress in therapies  Gross Assessment  AROM: Generally decreased, functional (right LE) (04/13/18 1400)  Strength: Generally decreased, functional (right LE) (04/13/18 1400)  Coordination: Generally decreased, functional (right LE) (04/13/18 1400)   Bed Mobility  Supine to Sit: Moderate assistance (04/13/18 1400)  Sit to Supine: Moderate assistance;Assist x2 (04/13/18 1400)   Balance  Sitting: Intact; Without support (04/13/18 1400)  Standing: Impaired; With support (walker) (04/13/18 1400)               Bed/Mat Mobility  Supine to Sit: Moderate assistance (04/13/18 1400)  Sit to Supine: Moderate assistance;Assist x2 (04/13/18 1400)  Sit to Stand: Minimum assistance;Assist x2 (04/13/18 1400)  Bed to Chair:  (NT) (04/13/18 1400)     Ambulation:  Gait  Speed/Keli: Shuffled; Slow (04/13/18 1400)  Stance: Left decreased (04/13/18 1400)  Gait Abnormalities: Antalgic;Decreased step clearance;Trunk sway increased (04/13/18 1400)  Ambulation - Level of Assistance: Minimal assistance;Assist x2 (04/13/18 1400)  Distance (ft): 5 Feet (ft) (04/13/18 1400)  Assistive Device: Walker, rolling (04/13/18 1400)    Impression/Plan:     Principal Problem:    Status post total left knee replacement (4/13/2018)    Active Problems:    Hypertension (1/29/2015)      Hyperlipidemia (1/29/2015)      MOOK (obstructive sleep apnea) (1/30/2015)      CKD (chronic kidney disease) stage 3, GFR 30-59 ml/min (8/21/2017)        Current Facility-Administered Medications   Medication Dose Route Frequency Provider Last Rate Last Dose    tuberculin injection 5 Units  5 Units IntraDERMal ONCE Dio Yao MD   5 Units at 04/13/18 0639    furosemide (LASIX) tablet 20 mg  20 mg Oral PRN ALMA Navarrete        losartan (COZAAR) tablet 100 mg  100 mg Oral QHS Urvashiella Chavez, [address]        potassium chloride (K-DUR, KLOR-CON) SR tablet 20 mEq  20 mEq Oral DAILY Urvashiazalia Chavez, [address]        0.9% sodium chloride infusion  100 mL/hr IntraVENous CONTINUOUS ALMA Navarrete 100 mL/hr at 04/13/18 1100 100 mL/hr at 04/13/18 1100    sodium chloride (NS) flush 5-10 mL  5-10 mL IntraVENous Q8H [facility] [place], [address]        sodium chloride (NS) flush 5-10 mL  5-10 mL IntraVENous PRN UrvashiALMA Mcdonough        ceFAZolin (ANCEF) 2 g/20 mL in sterile water IV syringe  2 g IntraVENous Q8H UrvashiALMA Mcdonough        acetaminophen (OFIRMEV) infusion 1,000 mg  1,000 mg IntraVENous ONCE Urvashiella Chavez [address]        [START ON 4/14/2018] acetaminophen (TYLENOL) tablet 1,000 mg  1,000 mg Oral Q6H [facility], [address]        celecoxib (CELEBREX) capsule 200 mg  200 mg Oral Q12H Urvashiella Chavez, [address]        HYDROmorphone (DILAUDID) tablet 2 mg  2 mg Oral Q4H PRN UrvashiALMA Mcdonough        HYDROmorphone (PF) (DILAUDID) injection 1 mg  1 mg IntraVENous Q3H PRN ALMA Navarrete   1 mg at 04/13/18 1142    naloxone [facility]) injection 0.2-0.4 mg  0.2-0.4 mg IntraVENous Q10MIN PRN ALMA Navarrete        [START ON 4/14/2018] dexamethasone (DECADRON) injection 10 mg  10 mg IntraVENous ONCE Urvashi [place], [address]        ondansetron [facility]) injection 4 mg  4 mg IntraVENous Q4H PRN UrvashiALMA Mcdonough   4 mg at 04/13/18 1347    diphenhydrAMINE (BENADRYL) capsule 25 mg  25 mg Oral Q4H PRN Urvashiella Chavez, [address]        [START ON 4/14/2018] senna-docusate (PERICOLACE) 8.6-50 mg per tablet 2 Tab  2 Tab Oral DAILY Urvashi Chavez, [address]        aspirin delayed-release tablet 81 mg  81 mg Oral Q12H ALMA Navarrete        atenolol/chlorthalidone (TENORETIC) 50/25 mg   Oral DAILY Lanre Escobar MD        folic acid (FOLVITE) tablet 1 mg  1 mg Oral DAILY Lanre Escobar MD        magnesium oxide (MAG-OX) tablet 400 mg  400 mg Oral DAILY Driss Mesa MD            Recommendations: Recommend sub acute rehab at [facility]. Continue Acute Rehab Program. PT, OT  to focus on  gait training, transfer training, balance activities, ROM and strengthening exercises. Will follow along with you for PM&R issues and provide you follow up. Will follow with SW/ /Admissions Coordinators regarding insurance approvals and acceptance. Rehabilitation Management/ Medical Management: 1. Devices:Walkers, Type: Rolling Walker  2. Consult:Rehab team including PT, OT,  and . 3. Disposition Rehab-discussed with patient. 4. Thigh-high or knee-high DOUGLAS's when out of bed. 5. DVT Prophylaxis - aspirin 81mg bid x 35days. 6. Incentive spirometer Q1H while awake  7. Post op hemorrhagic anemia-  monitor. 8. Activity: WBAT LLE  9. Planned Labs: CBC,BMP  10. Pain Control: start scheduled tylenol, Celebrex and  PRN meds. 11. Wound Care: Keep left TKA wound clean and dry and reinforce dressing PRN. May remove Aquacel 1 week post op ad replace with new one. Remove staples 12-14 post surgery, when incision appears appropriately closed and apply benzoin and 1/2\" steristrips. Follow up with ORTHO per instructions. Thank you for the opportunity to participate in the care of this patient.     Signed By: Arjun Stewart MD     April 13, 2018

## 2018-04-13 NOTE — ANESTHESIA PROCEDURE NOTES
Peripheral Block    Start time: 4/13/2018 7:18 AM  End time: 4/13/2018 7:24 AM  Performed by: Chin Almaguer  Authorized by: Timothy ALANIS       Pre-procedure: Indications: at surgeon's request, post-op pain management and procedure for pain    Preanesthetic Checklist: patient identified, risks and benefits discussed, site marked, timeout performed, anesthesia consent given and patient being monitored    Timeout Time: 07:17          Block Type:   Block Type:   Adductor canal  Laterality:  Left  Monitoring:  Standard ASA monitoring, responsive to questions, continuous pulse ox, oxygen, frequent vital sign checks and heart rate  Injection Technique:  Single shot  Procedures: ultrasound guided    Patient Position: prone  Prep: chlorhexidine    Location:  Mid thigh  Needle Type:  Stimuplex  Needle Gauge:  22 G  Needle Localization:  Ultrasound guidance  Medication Injected:  0.2%  ropivacaine  Adds:  Epi 1:200K (Decadron 5 mg)  Volume (mL):  30    Assessment:  Number of attempts:  1  Injection Assessment:  Incremental injection every 5 mL, no paresthesia, ultrasound image on chart, local visualized surrounding nerve on ultrasound, negative aspiration for blood and no intravascular symptoms  Patient tolerance:  Patient tolerated the procedure well with no immediate complications

## 2018-04-13 NOTE — PERIOP NOTES
TRANSFER - OUT REPORT:    Verbal report given to Ann Ivey RN on Holly Esters  being transferred to [facility] for routine progression of care       Report consisted of patients Situation, Background, Assessment and   Recommendations(SBAR). Information from the following report(s) Kardex, Intake/Output and Recent Results was reviewed with the receiving nurse. Lines:   Peripheral IV 04/13/18 Right Hand (Active)   Site Assessment Clean, dry, & intact 4/13/2018  6:36 AM   Phlebitis Assessment 0 4/13/2018  6:36 AM   Infiltration Assessment 0 4/13/2018  6:36 AM   Dressing Status Clean, dry, & intact 4/13/2018  6:36 AM   Dressing Type Transparent;Tape 4/13/2018  6:36 AM   Hub Color/Line Status Pink;Patent; Infusing 4/13/2018  6:36 AM   Action Taken Blood drawn 4/13/2018  6:36 AM        Opportunity for questions and clarification was provided.       Patient transported with:   [facility]ocr

## 2018-04-13 NOTE — PROGRESS NOTES
Problem: Self Care Deficits Care Plan (Adult)  Goal: *Acute Goals and Plan of Care (Insert Text)  GOALS:   DISCHARGE GOALS (in preparation for going home/rehab):  3 days  1. Ms. Tasha Carlson will perform one lower body dressing activity with minimal assistance required to demonstrate improved functional mobility and safety. 2.  Ms. Tasha Carlson will perform one lower body bathing activity with minimal assistance required to demonstrate improved functional mobility and safety. 3.  Ms. Tasha Carlson will perform toileting/toilet transfer with contact guard assistance to demonstrate improved functional mobility and safety. 4.  Ms. Tasha Carlson will perform shower transfer with contact guard assistance to demonstrate improved functional mobility and safety. JOINT CAMP OCCUPATIONAL THERAPY TKA: Initial Assessment 4/13/2018  INPATIENT: Hospital Day: 1  [place] / [place] / Product Type: Medicare /      NAME/AGE/GENDER: Rachelle Barry is a 65 y.o. female   PRIMARY DIAGNOSIS:  Primary osteoarthritis of left knee [M17.12]   Procedure(s) and Anesthesia Type:     * LEFT KNEE ARTHROPLASTY TOTAL / FNB / OJEDA& NEPHEW / Husam Hedges - Spinal (Left)  ICD-10: Treatment Diagnosis:    · Pain in Left Knee (M25.562)  · Stiffness of Left Knee, Not elsewhere classified (Y15.118)      ASSESSMENT:     Ms. Tasha Carlson is s/p left TKA and presents with decreased weight bearing on left LE and decreased independence with functional mobility and activities of daily living. Patient would benefit from skilled Occupational Therapy to maximize independence and safety with self-care task and functional mobility. Pt would also benefit from education on adaptive equipment and safety precautions in preparation for going home or for recommendations for post-hospital rehab program.  Patient plans for further rehab at rehab facility in [facility]. OT reviewed therapy schedule and plan of care with patient.   Patient was able to transfer and preform self care skills as charted below. Patient instructed to call for assistance when needing to get up from the bed and all needs in reach. Patient verbalized understanding of call light. This section established at most recent assessment   PROBLEM LIST (Impairments causing functional limitations):  1. Decreased Strength  2. Decreased ADL/Functional Activities  3. Decreased Transfer Abilities  4. Increased Pain  5. Increased Fatigue  6. Decreased Flexibility/Joint Mobility  7. Decreased Knowledge of Precautions   INTERVENTIONS PLANNED: (Benefits and precautions of occupational therapy have been discussed with the patient.)  1. Activities of daily living training  2. Adaptive equipment training  3. Balance training  4. Clothing management  5. Donning&doffing training  6. Theraputic activity     TREATMENT PLAN: Frequency/Duration: Follow patient 1-2 times to address above goals. Rehabilitation Potential For Stated Goals: Good     RECOMMENDED REHABILITATION/EQUIPMENT: (at time of discharge pending progress): Continue Skilled Therapy and Rehab. OCCUPATIONAL PROFILE AND HISTORY:   History of Present Injury/Illness (Reason for Referral): Pt presents this date s/p (left) TKA. Past Medical History/Comorbidities:   Ms. 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 She also has no past medical history of Adverse effect of anesthesia; Aneurysm ([address] Arrhythmia; Asthma;  Autoimmune disease ([facility] CAD (coronary artery disease); Cancer ([facility] Chronic kidney disease; Chronic obstructive pulmonary disease ([facility] Chronic pain; Coagulation disorder ([facility] Diabetes ([facility] Difficult intubation; Endocarditis; GERD (gastroesophageal reflux disease); Heart failure ([facility] Ill-defined condition; Liver disease; Malignant hyperthermia due to anesthesia; Nicotine vapor product user; Non-nicotine vapor product user; Pseudocholinesterase deficiency; Psychiatric disorder; PUD (peptic ulcer disease); Rheumatic fever; Seizures ([facility] Stroke [facility]); Thromboembolus ([facility] Thyroid disease; or Unspecified adverse effect of anesthesia. Ms. Tawny Nolasco  has a past surgical history that includes hx cholecystectomy (2009); hx knee arthroscopy (Bilateral); pr breast surgery procedure unlisted (Right, 1968); hx hip replacement (Right, 05/10/2017); and hx knee replacement (Right, 08/2017). Social History/Living Environment:   Home Environment: Private residence  # Steps to Enter: 0  One/Two Story Residence: One story  Living Alone: No  Support Systems: Spouse/Significant Other/Partner  Patient Expects to be Discharged to[de-identified] Skilled nursing facility  Current DME Used/Available at Home: [address] bed, Walker, rolling, Wheelchair  Prior Level of Function/Work/Activity:  Pt reports she uses a walker for short distances and a wheelchair for long distances. She has a tub transfer bench      Number of Personal Factors/Comorbidities that affect the Plan of Care: Brief history (0):  LOW COMPLEXITY   ASSESSMENT OF OCCUPATIONAL PERFORMANCE[de-identified]   Most Recent Physical Functioning:   Balance  Sitting: Intact; Without support  Standing: Impaired; With support (walker)       Gross Assessment: Yes  Gross Assessment  AROM: Generally decreased, functional (right LE)  Strength: Generally decreased, functional (right LE)  Coordination: Generally decreased, functional (right LE)          LLE Assessment  LLE Assessment (WDL): Exception to WDL  LLE PROM  L Knee Flexion: 65 (~post op)  L Knee Extension: -25 (~post op) Coordination  Fine Motor Skills-Upper: Left Intact; Right Intact  Gross Motor Skills-Upper: Left Impaired;Right Impaired (pt with bilateral shoulder stiffness )         Mental Status  Neurologic State: Alert  Orientation Level: Oriented X4  Cognition: Appropriate decision making  Perception: Appears intact  Perseveration: No perseveration noted  Safety/Judgement: Awareness of environment          RLE Assessment  RLE Assessment (WDL): Within defined limits     Basic ADLs (From Assessment) Complex ADLs (From Assessment)   Basic ADL  Feeding: Independent  Oral Facial Hygiene/Grooming: Supervision  Bathing: Moderate assistance  Upper Body Dressing: Supervision  Lower Body Dressing: Maximum assistance  Toileting: Maximum assistance     Grooming/Bathing/Dressing Activities of Daily Living     Cognitive Retraining  Safety/Judgement: Awareness of environment                 Functional Transfers  Toilet Transfer : Moderate assistance  Shower Transfer: Moderate assistance     Bed/Mat Mobility  Supine to Sit: Moderate assistance  Sit to Supine: Moderate assistance;Assist x2  Sit to Stand: Minimum assistance;Assist x2  Bed to Chair:  (NT)         Physical Skills Involved:  1. Range of Motion  2. Balance  3. Strength Cognitive Skills Affected (resulting in the inability to perform in a timely and safe manner): 1. none Psychosocial Skills Affected:  1. Environmental Adaptation   Number of elements that affect the Plan of Care: 3-5:  MODERATE COMPLEXITY   CLINICAL DECISION MAKIN [facility] [address] [address] AM-PAC 6 Clicks   Daily Activity Inpatient Short Form  How much help from another person does the patient currently need. .. Total A Lot A Little None   1. Putting on and taking off regular lower body clothing? [] 1   [x] 2   [] 3   [] 4   2. Bathing (including washing, rinsing, drying)? [] 1   [x] 2   [] 3   [] 4   3.   Toileting, which includes using toilet, bedpan or urinal?   [] 1   [x] 2   [] 3   [] 4   4. Putting on and taking off regular upper body clothing? [] 1   [] 2   [] 3   [x] 4   5. Taking care of personal grooming such as brushing teeth? [] 1   [] 2   [] 3   [x] 4   6. Eating meals? [] 1   [] 2   [] 3   [x] 4   © 2007, Trustees of [address] [place] [address] [address] [address], under license to [facility]. All rights reserved     Score:  Initial: 18 Most Recent: X (Date: -- )    Interpretation of Tool:  Represents activities that are increasingly more difficult (i.e. Bed mobility, Transfers, Gait). Score 24 23 22-20 19-15 14-10 9-7 6     Modifier CH CI CJ CK CL CM CN      ? Self Care:     - CURRENT STATUS: CK - 40%-59% impaired, limited or restricted    - GOAL STATUS: CJ - 20%-39% impaired, limited or restricted    - D/C STATUS:  ---------------To be determined---------------  [place] / [place] / Product Type: Medicare /      Medical Necessity:     · Patient is expected to demonstrate progress in range of motion, balance and functional technique to increase independence with self care. Reason for Services/Other Comments:  · Patient would benefit from skilled Occupational Therapy to maximize independence and safety with self-care task and functional mobility. .   Use of outcome tool(s) and clinical judgement create a POC that gives a: LOW COMPLEXITY            TREATMENT:   (In addition to Assessment/Re-Assessment sessions the following treatments were rendered)     Pre-treatment Symptoms/Complaints:  No complaint of pain   Pain: Initial:     3 Post Session:  3     Assessment/Reassessment only, no treatment provided today    Treatment/Session Assessment:     Response to Treatment:  Pt up to edge of bed tolerated well.     Education:  [] Home Exercises  [x] Fall Precautions  [] Hip Precautions [] Going Home Video  [x] Knee/Hip Prosthesis Review  [x] Walker Management/Safety [x] Adaptive Equipment as Needed Interdisciplinary Collaboration:   o Physical Therapist  o Occupational Therapist  o Registered Nurse    After treatment position/precautions:   o Supine in bed  o Bed/Chair-wheels locked  o Call light within reach  o RN notified  o Family at bedside     Compliance with Program/Exercises: compliant all of the time. Recommendations/Intent for next treatment session:  Treatment next visit will focus on increasing Ms. Sparrow's independence with bed mobility, transfers, self care, functional mobility, modalities for pain, and patient education.       Total Treatment Duration:  OT Patient Time In/Time Out  Time In: 1405  Time Out: [address] [place] [address], OT

## 2018-04-13 NOTE — PERIOP NOTES
TRANSFER - IN REPORT:    Verbal report received from Marko Ortiz, RN (name) on [facility]  being received from joint [place] (unit) for routine progression of care      Report consisted of patients Situation, Background, Assessment and   Recommendations(SBAR). Information from the following report(s) SBAR, Kardex, Intake/Output, MAR and Recent Results was reviewed with the receiving nurse. Opportunity for questions and clarification was provided.

## 2018-04-13 NOTE — PHYSICIAN ADVISORY
Letter of Determination: Inpatient Status Appropriate    This patient was originally hospitalized as Inpatient Status on 4/13/2018 for scheduled left total knee arthroplasty. This patient is appropriate for Inpatient Admission in accordance with CMS regulation Section 43 .3. Specifically, patient's stay is expected to be more than Two Midnights and was medically necessary. The patient's stay was medically necessary based on age > 72, hypertension, hypertension, left ventricular diastolic dysfunction, and history of pressure ulcers. Consistent with CMS guidelines, patient meets for inpatient status. It is our recommendation that this patient's hospitalization status should be INPATIENT status.      The final decision regarding the patient's hospitalization status depends on the attending physician's judgement.     Franco Zhou MD, JOSÉ MIGUEL,   Physician Marky Chavez.

## 2018-04-13 NOTE — OP NOTES
[facility]  Total Knee Arthroplasty  [place]   : 1940  Medical Record [id number]  Pre-operative Diagnosis:  Primary osteoarthritis of left knee [M17.12]  Post-operative Diagnosis: Status post total left knee replacement  Location: [address] [place] [address]  Surgeon: Su Rubinstein, MD  Assistant: Iram Youngblood PA-C    Anesthesia: Spinal and FNB    Procedure: Procedure(s) (LRB):  LEFT KNEE ARTHROPLASTY TOTAL / FNB / OJEDA& NEPHEW / Roselia Peer (Left)    The complexity of the total joint surgery requires the use of a first assistant for positioning, retraction and expertise in closure. Tourniquet Time: 0 minutes  EBL: 250cc  Findings: severe degenerative arthritis, patellar osteophytes, posterior femoral osteophytes   BMI: Body mass index is 33.23 kg/(m^2). Brenda Cano was brought to the operating room and positioned on the operating table. She was anethestized with anesthesia. A jeong catheter was placed preoperatively and IV antibiotics was administered. Prior to the incision being made a timeout was called identifying the patient, procedure ,operative side and surgeon. .The operative leg was prepped and draped in the usual sterile manner. An anterior longitudinal incision was accomplished just medial to the tibial tubercle and extending approximal 6 centimeters proximal to the superior pole of the patella. A medial parapatellar capsular incision was performed. The medial capsular flap was elevated around to the insertion of the semimembranous tendon. The patella was everted and the knee flexed and externally rotated. The medial and external menisci were excised. The lateral half of the fat pad excised and the patella femoral ligament was released. The anterior cruciate ligament was resected and the posterior cruciate ligament was substituted. Using extramedullary instrumentation, the tibial cut was accomplished with appropriate posterior slope. Approxiamately 9mm of bone was removed from the high side of the tibia. The distal femur was next addressed. A drill hole was made above the intracondolar notch. Using appropriate intramedullary instrumentation,a five degree valgus distal cut was accomplished. The femur was sized. The anterior and posterior cuts were then made about the distal femur. The osteophytes were removed from the tibial and femoral surfaces. The flexion and extension gaps were assessed with the appropriate spacer blocks. Additional surgical procedures included: none. The flexion and extension gaps were deemed appropriately balanced. The appropriate cutting blocks were then utilized to perform the anterior chamfer, posterior chamfer and notch cuts, with appropriate lateral tranlation accomplished for the patellofemoral groove. The tibia baseplate was sized. The tibial base plate was pinned into place with the appropriate external rotation and stem site prepared. A preliminary range of motion was accomplished with  trial components. The patient was able to obtain full extension as well as appropriate flexion. The patient's ligaments were stable in flexion and extension to medial and lateral stressing and the alignment was through the appropriate mechanical axis. The patella was then everted. The bone was resected to accomodate the three peg  patella button. A trial reduction revealed appropriate tracking through the patellofemoral groove with no lateral retinacular release being accomplished. All trial components were removed. The knee was irrigated. There were no femoral deficiencies. There were no tibial deficiencies. No augmentation was utilized. Two packages of cement were mixed and the permanent Tibial and Femoral components were cemented into place. The patella component was then  cemented in place.     Teetee Ibarra knee was placed through range of motion and noted to be stable as mentioned above with the trail components. The wound was dry, therefore no drain was used. The operative knee was injected with 60cc of Naropin, 10 cc's of morphine and 1 cc of 30mg of Toradol. The knee was then soaked with a diluted betadine solution for approximately 3 min. This was then thoroughly irrigated. The capsular layer was closed using a #1 PDS suture. The knee joint was then injected with transexamic acid. The subcutaneous layers were closed using 2-0 Stratafix suture. Finally the skin was closed using 3-0 Vicryl and skin staples, which were applied in occlusive fashion and sterile bandage applied. An Iceman cryo pad was applied on the operative leg. Sponge count and needle counts were correct. Ligia Wagner left the operating room     Implants:   Implant Name Type Inv. Item Serial No.  Lot No. LRB No. Used   CEMENT BNE HV R 40GM -- PALACOS R [id number] - [id number]  CEMENT BNE HV R 40GM -- PALACOS R [id number] [place] [address] [id number] Left 2   LUG FEM FIX PATRICIA BRIJESH II -- 2/BX - [id number]  LUG FEM FIX PATRICIA BRIJESH II -- 2/BX [id number] [employer] [id number] Left 1   35MM.  7.5MM THICK RESURFACING PATELLAR COMPONENT   [id number] [facility] [employer] [id number] Left 1   FEM POST OXIN LEGION SZ5 LT -- [facility] BRIJESH II - [id number]  FEM POST OXIN LEGION SZ5 LT -- [facility] BRIJESH II [id number] [employer] [id number] Left 1   BASEPLT FEM ZAIN NP 5 LT -- BRIJESH II - [id number]  BASEPLT FEM ZAIN NP 5 LT -- BRIJESH II [id number] [facility] [employer] [id number] Left 1   INSERT HI-FLX PS XLPE 5-6 13MM -- LEGION - [id number]   INSERT HI-FLX PS XLPE 5-6 13MM -- LE[employer]ION [id number] [facility] [employer] [id number] Left 1           Signed By: Liya Sethi MD  4/13/2018,  9:35 AM

## 2018-04-13 NOTE — PERIOP NOTES
TRANSFER - OUT REPORT:    Verbal report given to receiving nurse Ted(name) on David Guerrero  being transferred to [facility](unit) for routine progression of care       Report consisted of patients Situation, Background, Assessment and   Recommendations(SBAR). Information from the following report(s) OR Summary, Procedure Summary, Intake/Output and MAR was reviewed with the receiving nurse. Opportunity for questions and clarification was provided.       Patient transported with:   O2 @ 3 liters  Tech

## 2018-04-13 NOTE — ROUTINE PROCESS
TRANSFER - IN REPORT:    Verbal report received from Ward RN(name) on Francisca Abraham  being received from [facility]) for routine post - op      Report consisted of patients Situation, Background, Assessment and   Recommendations(SBAR). Information from the following report(s) SBAR, Kardex, Procedure Summary, Intake/Output, MAR and Recent Results was reviewed with the receiving nurse. Opportunity for questions and clarification was provided. Assessment completed upon patients arrival to unit and care assumed.

## 2018-04-14 LAB
ANION GAP SERPL CALC-SCNC: 10 MMOL/L (ref 7–16)
BUN SERPL-MCNC: 27 MG/DL (ref 8–23)
CALCIUM SERPL-MCNC: 8 MG/DL (ref 8.3–10.4)
CHLORIDE SERPL-SCNC: 105 MMOL/L (ref 98–107)
CO2 SERPL-SCNC: 25 MMOL/L (ref 21–32)
CREAT SERPL-MCNC: 1.28 MG/DL (ref 0.6–1)
GLUCOSE SERPL-MCNC: 139 MG/DL (ref 65–100)
HGB BLD-MCNC: 10 G/DL (ref 11.7–15.4)
MM INDURATION POC: 0 MM (ref 0–5)
POTASSIUM SERPL-SCNC: 3.9 MMOL/L (ref 3.5–5.1)
PPD POC: NEGATIVE NEGATIVE
SODIUM SERPL-SCNC: 140 MMOL/L (ref 136–145)

## 2018-04-14 PROCEDURE — 74011250637 HC RX REV CODE- 250/637: Performed by: PHYSICIAN ASSISTANT

## 2018-04-14 PROCEDURE — 85018 HEMOGLOBIN: CPT | Performed by: PHYSICIAN ASSISTANT

## 2018-04-14 PROCEDURE — 97150 GROUP THERAPEUTIC PROCEDURES: CPT

## 2018-04-14 PROCEDURE — 97116 GAIT TRAINING THERAPY: CPT

## 2018-04-14 PROCEDURE — 97535 SELF CARE MNGMENT TRAINING: CPT

## 2018-04-14 PROCEDURE — 65270000029 HC RM PRIVATE

## 2018-04-14 PROCEDURE — 80048 BASIC METABOLIC PNL TOTAL CA: CPT | Performed by: PHYSICIAN ASSISTANT

## 2018-04-14 PROCEDURE — 74011250636 HC RX REV CODE- 250/636: Performed by: PHYSICIAN ASSISTANT

## 2018-04-14 PROCEDURE — 36415 COLL VENOUS BLD VENIPUNCTURE: CPT | Performed by: PHYSICIAN ASSISTANT

## 2018-04-14 PROCEDURE — 97110 THERAPEUTIC EXERCISES: CPT

## 2018-04-14 PROCEDURE — 74011250637 HC RX REV CODE- 250/637: Performed by: ORTHOPAEDIC SURGERY

## 2018-04-14 RX ADMIN — Medication 400 MG: at 09:00

## 2018-04-14 RX ADMIN — SENNOSIDES AND DOCUSATE SODIUM 2 TABLET: 8.6; 5 TABLET ORAL at 08:17

## 2018-04-14 RX ADMIN — ASPIRIN 81 MG: 81 TABLET, COATED ORAL at 20:37

## 2018-04-14 RX ADMIN — ACETAMINOPHEN 1000 MG: 500 TABLET, FILM COATED ORAL at 14:29

## 2018-04-14 RX ADMIN — HYDROMORPHONE HYDROCHLORIDE 2 MG: 2 TABLET ORAL at 06:19

## 2018-04-14 RX ADMIN — Medication 5 ML: at 20:37

## 2018-04-14 RX ADMIN — HYDROMORPHONE HYDROCHLORIDE 2 MG: 2 TABLET ORAL at 18:32

## 2018-04-14 RX ADMIN — Medication 2 G: at 00:24

## 2018-04-14 RX ADMIN — ACETAMINOPHEN 1000 MG: 500 TABLET, FILM COATED ORAL at 06:19

## 2018-04-14 RX ADMIN — ACETAMINOPHEN 1000 MG: 500 TABLET, FILM COATED ORAL at 00:25

## 2018-04-14 RX ADMIN — CELECOXIB 200 MG: 200 CAPSULE ORAL at 08:17

## 2018-04-14 RX ADMIN — LOSARTAN POTASSIUM 100 MG: 50 TABLET ORAL at 20:38

## 2018-04-14 RX ADMIN — CELECOXIB 200 MG: 200 CAPSULE ORAL at 20:38

## 2018-04-14 RX ADMIN — ACETAMINOPHEN 1000 MG: 500 TABLET, FILM COATED ORAL at 18:32

## 2018-04-14 RX ADMIN — FOLIC ACID 1 MG: 1 TABLET ORAL at 08:17

## 2018-04-14 RX ADMIN — POTASSIUM CHLORIDE 20 MEQ: 20 TABLET, EXTENDED RELEASE ORAL at 08:17

## 2018-04-14 RX ADMIN — ASPIRIN 81 MG: 81 TABLET, COATED ORAL at 08:17

## 2018-04-14 RX ADMIN — HYDROMORPHONE HYDROCHLORIDE 2 MG: 2 TABLET ORAL at 10:02

## 2018-04-14 RX ADMIN — HYDROMORPHONE HYDROCHLORIDE 2 MG: 2 TABLET ORAL at 14:29

## 2018-04-14 RX ADMIN — CHLORTHALIDONE: 25 TABLET ORAL at 08:17

## 2018-04-14 NOTE — PROGRESS NOTES
Patient resting quietly, alert and oriented, watching tv. Left knee dressing c/d/i, jeong with clear, yellow urine draining. Patient encouraged to use incentive spirometer. IV patent infusing fluids as ordered. Fresh ice placed in iceman. Neurovascular and peripheral vascular checks WNL. Bed low and locked position. Call light within reach. Patient instructed to call for assistance, verbalizes understanding. Nursing assessment complete.

## 2018-04-14 NOTE — PROGRESS NOTES
Care Management Interventions  Transition of Care Consult (CM Consult): Discharge Planning  Current Support Network: Lives with Spouse  Plan discussed with Pt/Family/Caregiver: Yes  Freedom of Choice Offered: Yes  Discharge Location  Discharge Placement: Skilled nursing facility (referral to [facility] in [address] [place] [address])

## 2018-04-14 NOTE — PROGRESS NOTES
Problem: Mobility Impaired (Adult and Pediatric)  Goal: *Acute Goals and Plan of Care (Insert Text)  GOALS (1-4 days):  (1.)Ms. Yulisa Dodd will move from supine to sit and sit to supine  in bed with CONTACT GUARD ASSIST.  (2.)Ms. Yulisa Dodd will transfer from bed to chair and chair to bed with CONTACT GUARD ASSIST using the least restrictive device. (3.)Ms. Yulisa Dodd will ambulate with CONTACT GUARD ASSIST for 50-75 feet with the least restrictive device. (4.)Ms. Yulisa Dodd will increase left knee ROM to 5°-80°.  ________________________________________________________________________________________________    PHYSICAL THERAPY Joint camp tKa: Daily Note, PM 4/14/2018  INPATIENT: Hospital Day: 2  [place] / Product Type: Medicare /      NAME/AGE/GENDER: Emmett Clarke is a 65 y.o. female   PRIMARY DIAGNOSIS:  Primary osteoarthritis of left knee [M17.12]   Procedure(s) and Anesthesia Type:     * LEFT KNEE ARTHROPLASTY TOTAL / FNB / OJEDA& NEPHEW / Bobby Aydin - Spinal (Left)  ICD-10: Treatment Diagnosis:    · Pain in Left Knee (M25.562)  · Stiffness of Left Knee, Not elsewhere classified (M25.662)  · Difficulty in walking, Not elsewhere classified (R26.2)      ASSESSMENT:     Ms. Yulisa Dodd presents impaired strength & mobility s/p left TKA. Pt also had decreased stability during out of bed activity. This pt will benefit from follow up therapy to help restore safe function prior to returning home with caregiver. This pt requested rehab stay @ SNF to help with a smoother transition back home. 4/14 am she is supine upon arrival.  She performs exercises without any problems. She has good quad contraction and can do a SLR without any help. She increase her distance using RW with Min A and no LOB. She is planing on going to rehab like she did last time. She will sit up for a while and then come to group. 4/14 pm she is moving great and doing well with TK exercises.   She will continue to work toward her goals. This section established at most recent assessment   PROBLEM LIST (Impairments causing functional limitations):  1. Decreased Strength  2. Decreased ADL/Functional Activities  3. Decreased Transfer Abilities  4. Decreased Ambulation Ability/Technique  5. Decreased Balance  6. Increased Pain  7. Decreased Activity Tolerance  8. Decreased Flexibility/Joint Mobility  9. Decreased [place] with Home Exercise Program   INTERVENTIONS PLANNED: (Benefits and precautions of physical therapy have been discussed with the patient.)  1. Bed Mobility  2. Gait Training  3. Home Exercise Program (HEP)  4. Therapeutic Exercise/Strengthening  5. Transfer Training  6. Range of Motion: active/assisted/passive  7. Therapeutic Activities  8. Group Therapy     TREATMENT PLAN: Frequency/Duration: Follow patient BID for duration of hospital stay to address above goals. Rehabilitation Potential For Stated Goals: Fair     RECOMMENDED REHABILITATION/EQUIPMENT: (at time of discharge pending progress): Continue Skilled Therapy and Rehab. HISTORY:   History of Present Injury/Illness (Reason for Referral): The patient has end stage arthritis of the left knee. The patient was evaluated and examined during a consultation prior to this office visit. There have been no changes to the patient's orthopedic condition since the initial consultation. The patient has failed previous conservative treatment for this condition including antiinflammatories , and lifestyle modifications. The necessity for joint replacement is present. The patient will be admitted the day of surgery for Procedure(s) (LRB):  LEFT KNEE ARTHROPLASTY TOTAL / FNB / OJEDA& NEPHEW (Left)    Past Medical History/Comorbidities:   Ms. 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 She also has no past medical history of Adverse effect of anesthesia; Aneurysm ([address] Arrhythmia; Asthma; Autoimmune disease ([address] CAD (coronary artery disease); Cancer ([address] Chronic kidney disease; Chronic obstructive pulmonary disease ([address] Chronic pain; Coagulation disorder ([address] Diabetes ([address] Difficult intubation; Endocarditis; GERD (gastroesophageal reflux disease); Heart failure ([address] Ill-defined condition; Liver disease; Malignant hyperthermia due to anesthesia; Nicotine vapor product user; Non-nicotine vapor product user; Pseudocholinesterase deficiency; Psychiatric disorder; PUD (peptic ulcer disease); Rheumatic fever; Seizures ([address] Stroke [facility]); Thromboembolus ([address] Thyroid disease; or Unspecified adverse effect of anesthesia. Ms. Fady Donohue  has a past surgical history that includes hx cholecystectomy (2009); hx knee arthroscopy (Bilateral); pr breast surgery procedure unlisted (Right, 1968); hx hip replacement (Right, 05/10/2017); and hx knee replacement (Right, 08/2017). Social History/Living Environment:   Home Environment: Private residence  # Steps to Enter: 0  One/Two Story Residence: One story  Living Alone: No  Support Systems: Spouse/Significant Other/Partner  Patient Expects to be Discharged to[de-identified] Skilled nursing facility  Current DME Used/Available at Home: Commode, bedside, Hospital bed, Walker, rolling, Wheelchair  Prior Level of Function/Work/Activity:  Pt was needing SBA while using rolling walker short distances in home prior to this admission.    Number of Personal Factors/Comorbidities that affect the Plan of Care: 3+: HIGH COMPLEXITY   EXAMINATION:   Most Recent Physical Functioning:                  LLE PROM  L Knee Flexion: 109  L Knee Extension: 10         Bed Mobility  Supine to Sit: Minimum assistance  Sit to Supine:  (left up in chair)    Transfers  Sit to Stand: Contact guard assistance  Stand to Sit: Contact guard assistance    Balance  Sitting: Intact  Standing: With support              Weight Bearing Status  Left Side Weight Bearing: As tolerated  Distance (ft): 50 Feet (ft)  Ambulation - Level of Assistance: Contact guard assistance  Assistive Device: Walker, rolling  Speed/Keli: Shuffled; Slow  Stance: Left decreased  Gait Abnormalities: Antalgic;Decreased step clearance        Braces/Orthotics: none    Left Knee Cold  Type: Cryocuff      Body Structures Involved:  1. Joints  2. Muscles Body Functions Affected:  1. Sensory/Pain  2. Movement Related Activities and Participation Affected:  1. General Tasks and Demands  2. Mobility   Number of elements that affect the Plan of Care: 4+: HIGH COMPLEXITY   CLINICAL PRESENTATION:   Presentation: Evolving clinical presentation with changing clinical characteristics: MODERATE COMPLEXITY   CLINICAL DECISION MAKIN [facility] Inpatient Short Form  How much difficulty does the patient currently have. .. Unable A Lot A Little None   1. Turning over in bed (including adjusting bedclothes, sheets and blankets)? [] 1   [x] 2   [] 3   [] 4   2. Sitting down on and standing up from a chair with arms ( e.g., wheelchair, bedside commode, etc.)   [] 1   [] 2   [x] 3   [] 4   3. Moving from lying on back to sitting on the side of the bed? [] 1   [x] 2   [] 3   [] 4   How much help from another person does the patient currently need. .. Total A Lot A Little None   4. Moving to and from a bed to a chair (including a wheelchair)? [] 1   [] 2   [x] 3   [] 4   5. Need to walk in hospital room? [] 1   [] 2   [x] 3   [] 4   6.   Climbing 3-5 steps with a railing? [x] 1   [] 2   [] 3   [] 4   © 2007, Trustees of [address] [place] [address] [address] [address], under license to [employer]. All rights reserved        Score:  Initial: 14 Most Recent: X (Date: -- )    Interpretation of Tool:  Represents activities that are increasingly more difficult (i.e. Bed mobility, Transfers, Gait). Score 24 23 22-20 19-15 14-10 9-7 6     Modifier CH CI CJ CK CL CM CN      ? Mobility - Walking and Moving Around:     - CURRENT STATUS: CL - 60%-79% impaired, limited or restricted    - GOAL STATUS: CK - 40%-59% impaired, limited or restricted    - D/C STATUS:  ---------------To be determined---------------  [place] / Product Type: Medicare /      Medical Necessity:     · Patient is expected to demonstrate progress in strength, range of motion, balance, coordination and functional technique to decrease assistance required with bed mobility, transfers & gait. Reason for Services/Other Comments:  · Patient continues to require skilled intervention due to pt unsafe with functional mobility. Use of outcome tool(s) and clinical judgement create a POC that gives a: Questionable prediction of patient's progress: MODERATE COMPLEXITY            TREATMENT:   (In addition to Assessment/Re-Assessment sessions the following treatments were rendered)     Pre-treatment Symptoms/Complaints:  Severe fatigue  Pain: Initial: visual scale  Pain Intensity 1: 2 (just sore after therapy)  Post Session:       Gait Training (15 Minutes):  Gait training to improve and/or restore physical functioning as related to mobility. Ambulated 50 Feet (ft) with Contact guard assistance using a Walker, rolling and minimal   related to their knee position and motion to promote proper body alignment. Therapeutic Exercise: (45 Minutes (group therapy)):  Exercises per grid below to improve strength. Required minimal verbal cues to promote proper body alignment.   Progressed range as indicated. Date:  4/14   Date:   Date:     ACTIVITY/EXERCISE AM PM AM PM AM PM   GROUP THERAPY  []  [x]  []  []  []  []   Ankle Pumps 15 15       Quad Sets 15 15       Gluteal Sets 15 15       Hip ABd/ADduction 15 15       Straight Leg Raises 15 15       Knee Slides 15 15       Short Arc Quads 15 15       Long Arc Quads         Chair Slides  15                B = bilateral; AA = active assistive; A = active; P = passive      Treatment/Session Assessment:     Response to Treatment:  Tolerated group  Therapy well    Education:  [] Home Exercises  [x] Fall Precautions  [] Hip Precautions [] D/C Instruction Review  [x] Knee/Hip Prosthesis Review  [x] Walker Management/Safety [] Adaptive Equipment as Needed       Interdisciplinary Collaboration:   o Registered Nurse    After treatment position/precautions:   o Up in chair  o Bed/Chair-wheels locked  o Bed in low position  o Caregiver at bedside  o Call light within reach  o RN notified  o Family at bedside    Compliance with Program/Exercises: Will assess as treatment progresses. Recommendations/Intent for next treatment session:  Treatment next visit will focus on increasing Ms. Sparrow's independence with bed mobility, transfers, gait training, strength/ROM exercises, modalities for pain, and patient education.       Total Treatment Duration:  PT Patient Time In/Time Out  Time In: 1300  Time Out: 920 Sonya Smith PTA

## 2018-04-14 NOTE — PROGRESS NOTES
Problem: Mobility Impaired (Adult and Pediatric)  Goal: *Acute Goals and Plan of Care (Insert Text)  GOALS (1-4 days):  (1.)Ms. Tanesha Ruiz will move from supine to sit and sit to supine  in bed with CONTACT GUARD ASSIST.  (2.)Ms. Tanesha Ruiz will transfer from bed to chair and chair to bed with CONTACT GUARD ASSIST using the least restrictive device. (3.)Ms. Tanesha Ruiz will ambulate with CONTACT GUARD ASSIST for 50-75 feet with the least restrictive device. (4.)Ms. Tanesha Ruiz will increase left knee ROM to 5°-80°.  ________________________________________________________________________________________________    PHYSICAL THERAPY Joint camp tKa: Daily Note, AM 4/14/2018  INPATIENT: Hospital Day: 2  [place]: [place] / Product Type: Medicare /      NAME/AGE/GENDER: Jon Velasco is a 65 y.o. female   PRIMARY DIAGNOSIS:  Primary osteoarthritis of left knee [M17.12]   Procedure(s) and Anesthesia Type:     * LEFT KNEE ARTHROPLASTY TOTAL / FNB / SMITH& NEPHEW / Samira Josefina - Spinal (Left)  ICD-10: Treatment Diagnosis:    · Pain in Left Knee (M25.562)  · Stiffness of Left Knee, Not elsewhere classified (M25.662)  · Difficulty in walking, Not elsewhere classified (R26.2)      ASSESSMENT:     Ms. Tanesha Ruiz presents impaired strength & mobility s/p left TKA. Pt also had decreased stability during out of bed activity. This pt will benefit from follow up therapy to help restore safe function prior to returning home with caregiver. This pt requested rehab stay @ SNF to help with a smoother transition back home. 4/14 am she is supine upon arrival.  She performs exercises without any problems. She has good quad contraction and can do a SLR without any help. She increase her distance using RW with Min A and no LOB. She is planing on going to rehab like she did last time. She will sit up for a while and then come to group.     This section established at most recent assessment   PROBLEM LIST (Impairments causing functional limitations):  1. Decreased Strength  2. Decreased ADL/Functional Activities  3. Decreased Transfer Abilities  4. Decreased Ambulation Ability/Technique  5. Decreased Balance  6. Increased Pain  7. Decreased Activity Tolerance  8. Decreased Flexibility/Joint Mobility  9. Decreased [place] with Home Exercise Program   INTERVENTIONS PLANNED: (Benefits and precautions of physical therapy have been discussed with the patient.)  1. Bed Mobility  2. Gait Training  3. Home Exercise Program (HEP)  4. Therapeutic Exercise/Strengthening  5. Transfer Training  6. Range of Motion: active/assisted/passive  7. Therapeutic Activities  8. Group Therapy     TREATMENT PLAN: Frequency/Duration: Follow patient BID for duration of hospital stay to address above goals. Rehabilitation Potential For Stated Goals: Fair     RECOMMENDED REHABILITATION/EQUIPMENT: (at time of discharge pending progress): Continue Skilled Therapy and Rehab. HISTORY:   History of Present Injury/Illness (Reason for Referral): The patient has end stage arthritis of the left knee. The patient was evaluated and examined during a consultation prior to this office visit. There have been no changes to the patient's orthopedic condition since the initial consultation. The patient has failed previous conservative treatment for this condition including antiinflammatories , and lifestyle modifications. The necessity for joint replacement is present. The patient will be admitted the day of surgery for Procedure(s) (LRB):  LEFT KNEE ARTHROPLASTY TOTAL / FNB / OJEDA& NEPHEW (Left)    Past Medical History/Comorbidities:   Ms. 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 She also has no past medical history of Adverse effect of anesthesia; Aneurysm ([address] Arrhythmia; Asthma; Autoimmune disease ([address] CAD (coronary artery disease); Cancer ([address] Chronic kidney disease; Chronic obstructive pulmonary disease ([address] Chronic pain; Coagulation disorder ([address] Diabetes ([address] Difficult intubation; Endocarditis; GERD (gastroesophageal reflux disease); Heart failure ([address] Ill-defined condition; Liver disease; Malignant hyperthermia due to anesthesia; Nicotine vapor product user; Non-nicotine vapor product user; Pseudocholinesterase deficiency; Psychiatric disorder; PUD (peptic ulcer disease); Rheumatic fever; Seizures ([address] Stroke [facility]); Thromboembolus ([address] Thyroid disease; or Unspecified adverse effect of anesthesia. Ms. Gertrudis Ruiz  has a past surgical history that includes hx cholecystectomy (2009); hx knee arthroscopy (Bilateral); pr breast surgery procedure unlisted (Right, 1968); hx hip replacement (Right, 05/10/2017); and hx knee replacement (Right, 08/2017). Social History/Living Environment:   Home Environment: Private residence  # Steps to Enter: 0  One/Two Story Residence: One story  Living Alone: No  Support Systems: Spouse/Significant Other/Partner  Patient Expects to be Discharged to[de-identified] Skilled nursing facility  Current DME Used/Available at Home: Commode, bedside, Hospital bed, Walker, rolling, Wheelchair  Prior Level of Function/Work/Activity:  Pt was needing SBA while using rolling walker short distances in home prior to this admission.    Number of Personal Factors/Comorbidities that affect the Plan of Care: 3+: HIGH COMPLEXITY   EXAMINATION:   Most Recent Physical Functioning:                            Bed Mobility  Supine to Sit: Minimum assistance  Sit to Supine:  (left up in chair)    Transfers  Sit to Stand: Minimum assistance  Stand to Sit: Minimum assistance                   Weight Bearing Status  Left Side Weight Bearing: As tolerated  Distance (ft): 30 Feet (ft)  Ambulation - Level of Assistance: Minimal assistance  Assistive Device: Walker, rolling  Speed/Keli: Shuffled; Slow  Stance: Left decreased  Gait Abnormalities: Antalgic;Decreased step clearance        Braces/Orthotics: none    Left Knee Cold  Type: Cryocuff      Body Structures Involved:  1. Joints  2. Muscles Body Functions Affected:  1. Sensory/Pain  2. Movement Related Activities and Participation Affected:  1. General Tasks and Demands  2. Mobility   Number of elements that affect the Plan of Care: 4+: HIGH COMPLEXITY   CLINICAL PRESENTATION:   Presentation: Evolving clinical presentation with changing clinical characteristics: MODERATE COMPLEXITY   CLINICAL DECISION MAKIN [facility] Mobility Inpatient Short Form  How much difficulty does the patient currently have. .. Unable A Lot A Little None   1. Turning over in bed (including adjusting bedclothes, sheets and blankets)? [] 1   [x] 2   [] 3   [] 4   2. Sitting down on and standing up from a chair with arms ( e.g., wheelchair, bedside commode, etc.)   [] 1   [] 2   [x] 3   [] 4   3. Moving from lying on back to sitting on the side of the bed? [] 1   [x] 2   [] 3   [] 4   How much help from another person does the patient currently need. .. Total A Lot A Little None   4. Moving to and from a bed to a chair (including a wheelchair)? [] 1   [] 2   [x] 3   [] 4   5. Need to walk in hospital room? [] 1   [] 2   [x] 3   [] 4   6. Climbing 3-5 steps with a railing? [x] 1   [] 2   [] 3   [] 4   © , Trustees of [facility] [employer], under license to [employer].  All rights reserved        Score:  Initial: 14 Most Recent: X (Date: -- )    Interpretation of Tool: Represents activities that are increasingly more difficult (i.e. Bed mobility, Transfers, Gait). Score 24 23 22-20 19-15 14-10 9-7 6     Modifier CH CI CJ CK CL CM CN      ? Mobility - Walking and Moving Around:     - CURRENT STATUS: CL - 60%-79% impaired, limited or restricted    - GOAL STATUS: CK - 40%-59% impaired, limited or restricted    - D/C STATUS:  ---------------To be determined---------------  [place] / Product Type: Medicare /      Medical Necessity:     · Patient is expected to demonstrate progress in strength, range of motion, balance, coordination and functional technique to decrease assistance required with bed mobility, transfers & gait. Reason for Services/Other Comments:  · Patient continues to require skilled intervention due to pt unsafe with functional mobility. Use of outcome tool(s) and clinical judgement create a POC that gives a: Questionable prediction of patient's progress: MODERATE COMPLEXITY            TREATMENT:   (In addition to Assessment/Re-Assessment sessions the following treatments were rendered)     Pre-treatment Symptoms/Complaints:  Severe fatigue  Pain: Initial: visual scale  Pain Intensity 1: 2 (just sore after therapy)  Post Session:       Gait Training (15 Minutes):  Gait training to improve and/or restore physical functioning as related to mobility. Ambulated 30 Feet (ft) with Minimal assistance using a Walker, rolling and minimal   related to their knee position and motion to promote proper body alignment. Therapeutic Exercise: (15 Minutes):  Exercises per grid below to improve strength. Required minimal verbal cues to promote proper body alignment. Progressed range as indicated.           Date:  4/14   Date:   Date:     ACTIVITY/EXERCISE AM PM AM PM AM PM   GROUP THERAPY  []  []  []  []  []  []   Ankle Pumps 15        Quad Sets 15        Gluteal Sets 15        Hip ABd/ADduction 15        Straight Leg Raises 15        Knee Slides 15        Short Arc Quads 15        Long Arc Quads         Chair Slides                  B = bilateral; AA = active assistive; A = active; P = passive      Treatment/Session Assessment:     Response to Treatment:  Tolerated session well    Education:  [] Home Exercises  [x] Fall Precautions  [] Hip Precautions [] D/C Instruction Review  [] Knee/Hip Prosthesis Review  [x] Walker Management/Safety [] Adaptive Equipment as Needed       Interdisciplinary Collaboration:   o Registered Nurse    After treatment position/precautions:   o Up in chair  o Bed/Chair-wheels locked  o Bed in low position  o Caregiver at bedside  o Call light within reach  o RN notified  o Family at bedside    Compliance with Program/Exercises: Will assess as treatment progresses. Recommendations/Intent for next treatment session:  Treatment next visit will focus on increasing Ms. Sparrow's independence with bed mobility, transfers, gait training, strength/ROM exercises, modalities for pain, and patient education.       Total Treatment Duration:  PT Patient Time In/Time Out  Time In: 0700  Time Out: 0730    Dolly Smith, PTA

## 2018-04-14 NOTE — PROGRESS NOTES
Problem: Self Care Deficits Care Plan (Adult)  Goal: *Acute Goals and Plan of Care (Insert Text)  GOALS:   DISCHARGE GOALS (in preparation for going home/rehab):  3 days  1. Ms. Tosin Mathew will perform one lower body dressing activity with minimal assistance required to demonstrate improved functional mobility and safety. GOAL MET 4/14/2018  2. Ms. Tosin Mathew will perform one lower body bathing activity with minimal assistance required to demonstrate improved functional mobility and safety. GOAL MET 4/14/2018  3. Ms. Tosin Mathew will perform toileting/toilet transfer with contact guard assistance to demonstrate improved functional mobility and safety. 4.  Ms. Tosin Mathew will perform shower transfer with contact guard assistance to demonstrate improved functional mobility and safety. GOAL MET 4/14/2018    JOINT CAMP OCCUPATIONAL THERAPY TKA: Daily Note, Treatment Day: 1st and AM 4/14/2018  INPATIENT: Hospital Day: 2  [place] / [place] / Product Type: Medicare /      NAME/AGE/GENDER: Malcolm Franz is a 65 y.o. female   PRIMARY DIAGNOSIS:  Primary osteoarthritis of left knee [M17.12]   Procedure(s) and Anesthesia Type:     * LEFT KNEE ARTHROPLASTY TOTAL / FNB / OJEDA& NEPHEW / Mirna Civatte - Spinal (Left)  ICD-10: Treatment Diagnosis:    · Pain in Left Knee (M25.562)  · Stiffness of Left Knee, Not elsewhere classified (H19.285)      ASSESSMENT:      Ms. Tosin Mathew is s/p L TKA and presents with decreased weight bearing on L LE and decreased independence with functional mobility and activities of daily living. Patient completed shower and dressing as charter below in ADL grid and is ambulating with rolling walker with supervision . Patient has met 3/4 goals and plans to return home with good family support. Family able to provide patient with appropriate level of assistance at this time. OT reviewed safety precautions throughout session and therapy schedule for the remainder of today.   Patient instructed to call for assistance when needing to get up from recliner and all needs in reach. Patient verbalized understanding of call light. This section established at most recent assessment   PROBLEM LIST (Impairments causing functional limitations):  1. Decreased Strength  2. Decreased ADL/Functional Activities  3. Decreased Transfer Abilities  4. Increased Pain  5. Increased Fatigue  6. Decreased Flexibility/Joint Mobility  7. Decreased Knowledge of Precautions   INTERVENTIONS PLANNED: (Benefits and precautions of occupational therapy have been discussed with the patient.)  1. Activities of daily living training  2. Adaptive equipment training  3. Balance training  4. Clothing management  5. Donning&doffing training  6. Theraputic activity     TREATMENT PLAN: Frequency/Duration: Follow patient 1-2 times to address above goals. Rehabilitation Potential For Stated Goals: Good     RECOMMENDED REHABILITATION/EQUIPMENT: (at time of discharge pending progress): Continue Skilled Therapy and Rehab. OCCUPATIONAL PROFILE AND HISTORY:   History of Present Injury/Illness (Reason for Referral): Pt presents this date s/p (left) TKA. Past Medical History/Comorbidities:   Ms. 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 She also has no past medical history of Adverse effect of anesthesia; Aneurysm ([address] Arrhythmia; Asthma; Autoimmune disease ([address] CAD (coronary artery disease);  Cancer ([facility] Chronic kidney disease; Chronic obstructive pulmonary disease ([facility] Chronic pain; Coagulation disorder ([facility] Diabetes ([facility] Difficult intubation; Endocarditis; GERD (gastroesophageal reflux disease); Heart failure ([facility] Ill-defined condition; Liver disease; Malignant hyperthermia due to anesthesia; Nicotine vapor product user; Non-nicotine vapor product user; Pseudocholinesterase deficiency; Psychiatric disorder; PUD (peptic ulcer disease); Rheumatic fever; Seizures ([facility] [address] [facility] Stroke [facility]); Thromboembolus ([facility] Thyroid disease; or Unspecified adverse effect of anesthesia. Ms. Tawny Nolasco  has a past surgical history that includes hx cholecystectomy (2009); hx knee arthroscopy (Bilateral); pr breast surgery procedure unlisted (Right, 1968); hx hip replacement (Right, 05/10/2017); and hx knee replacement (Right, 08/2017). Social History/Living Environment:   Home Environment: Private residence  # Steps to Enter: 0  One/Two Story Residence: One story  Living Alone: No  Support Systems: Spouse/Significant Other/Partner  Patient Expects to be Discharged to[de-identified] Skilled nursing facility  Current DME Used/Available at Home: [address] bed, Walker, rolling, Wheelchair  Prior Level of Function/Work/Activity:  Pt reports she uses a walker for short distances and a wheelchair for long distances. She has a tub transfer bench      Number of Personal Factors/Comorbidities that affect the Plan of Care: Brief history (0):  LOW COMPLEXITY   ASSESSMENT OF OCCUPATIONAL PERFORMANCE[de-identified]   Most Recent Physical Functioning:   Balance  Sitting: Intact  Standing: With support                              Mental Status  Neurologic State: Alert  Orientation Level: Oriented X4  Cognition: Appropriate decision making; Appropriate for age attention/concentration  Perception: Appears intact  Perseveration: No perseveration noted  Safety/Judgement: Fall prevention                Basic ADLs (From Assessment) Complex ADLs (From Assessment)   Basic ADL  Feeding: Independent  Oral Facial Hygiene/Grooming: Supervision  Bathing: Moderate assistance  Type of Bath: Full, Shower, Chlorhexidine (CHG)  Upper Body Dressing: Supervision  Lower Body Dressing: Maximum assistance  Toileting: Maximum assistance     Grooming/Bathing/Dressing Activities of Daily Living     Cognitive Retraining  Safety/Judgement: Fall prevention   Upper Body Bathing  Bathing Assistance: Minimum assistance  Position Performed: Seated in chair  Adaptive Equipment: Shower chair     Lower Body Bathing  Bathing Assistance: Minimum assistance  Perineal  : Independent  Lower Body : Minimum assistance  Adaptive Equipment: Shower chair     Upper Body Dressing Assistance  Dressing Assistance: Minimum assistance  Pullover Shirt: Minimum assistance Functional Transfers  Shower Transfer: Supervision   Lower Body Dressing Assistance  Dressing Assistance: Minimum assistance  Underpants: Minimum assistance  Pants With Elastic Waist: Minimum assistance  Socks: Minimum assistance  Cues: Doff;Don;Physical assistance Bed/Mat Mobility  Supine to Sit: Minimum assistance  Sit to Supine:  (left up in chair)  Sit to Stand: Supervision         Physical Skills Involved:  1. Range of Motion  2. Balance  3. Strength Cognitive Skills Affected (resulting in the inability to perform in a timely and safe manner): 1. none Psychosocial Skills Affected:  1. Environmental Adaptation   Number of elements that affect the Plan of Care: 3-5:  MODERATE COMPLEXITY   CLINICAL DECISION MAKIN [facility] [address] [address] AM-PAC 6 Clicks   Daily Activity Inpatient Short Form  How much help from another person does the patient currently need. .. Total A Lot A Little None   1. Putting on and taking off regular lower body clothing? [] 1   [] 2   [x] 3   [] 4   2. Bathing (including washing, rinsing, drying)? [] 1   [] 2   [] 3   [x] 4   3.   Toileting, which includes using toilet, bedpan or urinal?   [] 1   [] 2   [x] 3 [] 4   4. Putting on and taking off regular upper body clothing? [] 1   [] 2   [] 3   [x] 4   5. Taking care of personal grooming such as brushing teeth? [] 1   [] 2   [] 3   [x] 4   6. Eating meals? [] 1   [] 2   [] 3   [x] 4   © 2007, Trustees of [address] [place] [address] [address] [address], under license to [employer]. All rights reserved     Score:  Initial: 18 Most Recent: 22 (Date: 4/22/18 )    Interpretation of Tool:  Represents activities that are increasingly more difficult (i.e. Bed mobility, Transfers, Gait). Score 24 23 22-20 19-15 14-10 9-7 6     Modifier CH CI CJ CK CL CM CN      ? Self Care:     - CURRENT STATUS: CK - 40%-59% impaired, limited or restricted    - GOAL STATUS: CJ - 20%-39% impaired, limited or restricted    - D/C STATUS:  ---------------To be determined---------------  [place] / Product Type: Medicare /                      TREATMENT:   (In addition to Assessment/Re-Assessment sessions the following treatments were rendered)     Pre-treatment Symptoms/Complaints:  No complaint of pain   Pain: Initial:     3 Post Session:  3     Self Care: (29): Procedure(s) (per grid) utilized to improve and/or restore self-care/home management as related to dressing and bathing. Required min verbal and manual cueing to facilitate activities of daily living skills. Treatment/Session Assessment:     Response to Treatment:  Pt up to edge of bed tolerated well.     Education:  [] Home Exercises  [x] Fall Precautions  [] Hip Precautions [] Going Home Video  [x] Knee/Hip Prosthesis Review  [x] Walker Management/Safety [x] Adaptive Equipment as Needed       Interdisciplinary Collaboration:   o Physical Therapy Assistant  o Certified Occupational Therapy Assistant  o Registered Nurse    After treatment position/precautions:   o Up in chair  o Bed/Chair-wheels locked  o Call light within reach  o RN notified  o Family at bedside     Compliance with Program/Exercises: compliant all of the time. Recommendations/Intent for next treatment session:  Pt progressing with Occupational Therapy and has met 3/4 goals. Patient plans for  continue therapy services at a short term rehabilitation facility. Discharge acute care Occupational Therapy services.       Total Treatment Duration: 29  OT Patient Time In/Time Out  Time In: 9130  Time Out: [address] [facility] [place] [facility]

## 2018-04-14 NOTE — PROGRESS NOTES
04/13/18 2200   Oxygen Therapy   O2 Sat (%) 97 %   Pulse via Oximetry 76 beats per minute   O2 Device Nasal cannula   O2 Flow Rate (L/min) 2 l/min   Patient on 2L NC. Placed on continuous oximetry (#14). Alarms set and data cleared. No distress noted at this time.

## 2018-04-14 NOTE — PROGRESS NOTES
2018         Post Op day: 1 Day Post-Op     Admit Date: 2018  Admit Diagnosis: Primary osteoarthritis of left knee [M17.12]        Subjective: Doing well, No complaints, No SOB, No Chest Pain, No Nausea or Vomiting     Objective:   Vital Signs are Stable, No Acute Distress, Alert and Oriented, Dressing is Dry,  Neurovascular exam is normal.     Assessment / Plan :  Patient Active Problem List   Diagnosis Code    Preop respiratory exam Z01.811    Hypertension I10    Hyperlipidemia E78.5    Rheumatoid arthritis(714.0) M06.9    MOOK (obstructive sleep apnea) G47.33    Elevated serum creatinine R79.89    Status post right hip replacement Z96.641    Status post total right knee replacement Z96.651    CKD (chronic kidney disease) stage 3, GFR 30-59 ml/min N18.3    BMI 35.0-35.9,adult Z68.35    Status post total left knee replacement Z96.652    Patient Vitals for the past 8 hrs:   BP Temp Pulse Resp SpO2   18 0442 116/67 97.5 °F (36.4 °C) 77 18 95 %   18 0021 107/65 97.7 °F (36.5 °C) 65 17 98 %    Temp (24hrs), Av.7 °F (36.5 °C), Min:97.3 °F (36.3 °C), Max:98.4 °F (36.9 °C)    Body mass index is 33.23 kg/(m^2).     Lab Results   Component Value Date/Time    HGB 10.0 (L) 2018 04:28 AM      Pt seen by and discussed with Supervising Physician   Continue PT  Planning for rehab placement       Signed By: ALMA Ash

## 2018-04-15 LAB
BACTERIA SPEC CULT: NORMAL
HGB BLD-MCNC: 10.2 G/DL (ref 11.7–15.4)
SERVICE CMNT-IMP: NORMAL

## 2018-04-15 PROCEDURE — 97116 GAIT TRAINING THERAPY: CPT

## 2018-04-15 PROCEDURE — 74011250637 HC RX REV CODE- 250/637: Performed by: ORTHOPAEDIC SURGERY

## 2018-04-15 PROCEDURE — 74011250637 HC RX REV CODE- 250/637: Performed by: FAMILY MEDICINE

## 2018-04-15 PROCEDURE — 65270000029 HC RM PRIVATE

## 2018-04-15 PROCEDURE — 85018 HEMOGLOBIN: CPT | Performed by: PHYSICIAN ASSISTANT

## 2018-04-15 PROCEDURE — 36415 COLL VENOUS BLD VENIPUNCTURE: CPT | Performed by: PHYSICIAN ASSISTANT

## 2018-04-15 PROCEDURE — 74011250637 HC RX REV CODE- 250/637: Performed by: PHYSICIAN ASSISTANT

## 2018-04-15 PROCEDURE — 97110 THERAPEUTIC EXERCISES: CPT

## 2018-04-15 PROCEDURE — 97150 GROUP THERAPEUTIC PROCEDURES: CPT

## 2018-04-15 RX ORDER — SIMETHICONE 80 MG
80 TABLET,CHEWABLE ORAL
Status: DISCONTINUED | OUTPATIENT
Start: 2018-04-15 | End: 2018-04-16 | Stop reason: HOSPADM

## 2018-04-15 RX ORDER — CALCIUM CARBONATE 200(500)MG
400 TABLET,CHEWABLE ORAL
Status: CANCELLED | OUTPATIENT
Start: 2018-04-15

## 2018-04-15 RX ORDER — CALCIUM CARBONATE 200(500)MG
1000 TABLET,CHEWABLE ORAL ONCE
Status: COMPLETED | OUTPATIENT
Start: 2018-04-15 | End: 2018-04-15

## 2018-04-15 RX ADMIN — ACETAMINOPHEN 1000 MG: 500 TABLET, FILM COATED ORAL at 17:35

## 2018-04-15 RX ADMIN — ASPIRIN 81 MG: 81 TABLET, COATED ORAL at 20:42

## 2018-04-15 RX ADMIN — HYDROMORPHONE HYDROCHLORIDE 2 MG: 2 TABLET ORAL at 08:51

## 2018-04-15 RX ADMIN — CELECOXIB 200 MG: 200 CAPSULE ORAL at 21:28

## 2018-04-15 RX ADMIN — FOLIC ACID 1 MG: 1 TABLET ORAL at 08:51

## 2018-04-15 RX ADMIN — SIMETHICONE CHEW TAB 80 MG 80 MG: 80 TABLET ORAL at 20:42

## 2018-04-15 RX ADMIN — ACETAMINOPHEN 1000 MG: 500 TABLET, FILM COATED ORAL at 13:36

## 2018-04-15 RX ADMIN — ACETAMINOPHEN 1000 MG: 500 TABLET, FILM COATED ORAL at 05:06

## 2018-04-15 RX ADMIN — SENNOSIDES AND DOCUSATE SODIUM 2 TABLET: 8.6; 5 TABLET ORAL at 08:50

## 2018-04-15 RX ADMIN — POTASSIUM CHLORIDE 20 MEQ: 20 TABLET, EXTENDED RELEASE ORAL at 08:50

## 2018-04-15 RX ADMIN — CELECOXIB 200 MG: 200 CAPSULE ORAL at 08:50

## 2018-04-15 RX ADMIN — Medication 5 ML: at 05:07

## 2018-04-15 RX ADMIN — LOSARTAN POTASSIUM 100 MG: 50 TABLET ORAL at 21:28

## 2018-04-15 RX ADMIN — Medication 400 MG: at 08:50

## 2018-04-15 RX ADMIN — Medication 10 ML: at 21:29

## 2018-04-15 RX ADMIN — HYDROMORPHONE HYDROCHLORIDE 2 MG: 2 TABLET ORAL at 23:35

## 2018-04-15 RX ADMIN — CALCIUM CARBONATE 1000 MG: 500 TABLET, CHEWABLE ORAL at 20:41

## 2018-04-15 RX ADMIN — HYDROMORPHONE HYDROCHLORIDE 2 MG: 2 TABLET ORAL at 02:06

## 2018-04-15 RX ADMIN — ASPIRIN 81 MG: 81 TABLET, COATED ORAL at 08:50

## 2018-04-15 RX ADMIN — CHLORTHALIDONE: 25 TABLET ORAL at 08:51

## 2018-04-15 RX ADMIN — HYDROMORPHONE HYDROCHLORIDE 2 MG: 2 TABLET ORAL at 13:33

## 2018-04-15 RX ADMIN — ACETAMINOPHEN 1000 MG: 500 TABLET, FILM COATED ORAL at 23:35

## 2018-04-15 NOTE — PROGRESS NOTES
Up to the bathroom for needs,,had another BM,voided also,,,ambulated back in bed,walker use,,positioned in bed for comfort,,dressing on left knee intact,clean and dry with iceman om,,dorsiflexing both feet well,,very conversant. .. Larraine Piles Larraine Piles

## 2018-04-15 NOTE — PROGRESS NOTES
Patient resting quietly in recliner, alert and oriented, no distress noted. Left knee dressing c/d/i, voiding clear yellow urine, ambulates with walker. Patient assisted to bathroom to void and back to bed. Fresh ice placed in iceman. Neurovascular and peripheral vascular checks WNL. Bed low and locked position. Call light within reach. Patient instructed to call for assistance, verbalizes understanding. Nursing assessment complete.

## 2018-04-15 NOTE — PROGRESS NOTES
Problem: Mobility Impaired (Adult and Pediatric)  Goal: *Acute Goals and Plan of Care (Insert Text)  GOALS (1-4 days):  (1.)Ms. Osorio Mcrae will move from supine to sit and sit to supine  in bed with CONTACT GUARD ASSIST.4/15  (2.)Ms. Osorio Mcrae will transfer from bed to chair and chair to bed with CONTACT GUARD ASSIST using the least restrictive device. 4/15  (3.)Ms. Osorio Mcrae will ambulate with CONTACT GUARD ASSIST for 50-75 feet with the least restrictive device. (4.)Ms. Osorio Mcrae will increase left knee ROM to 5°-80°. Flex 4/15  ________________________________________________________________________________________________    PHYSICAL THERAPY Joint camp tKa: Daily Note, PM 4/15/2018  INPATIENT: Hospital Day: 3  [place] / Product Type: Medicare /      NAME/AGE/GENDER: Darshan Rodriguez is a 65 y.o. female   PRIMARY DIAGNOSIS:  Primary osteoarthritis of left knee [M17.12]   Procedure(s) and Anesthesia Type:     * LEFT KNEE ARTHROPLASTY TOTAL / FNB / SMITH& NEPHEW / [place] - Spinal (Left)  ICD-10: Treatment Diagnosis:    · Pain in Left Knee (M25.562)  · Stiffness of Left Knee, Not elsewhere classified (M25.662)  · Difficulty in walking, Not elsewhere classified (R26.2)      ASSESSMENT:     Ms. Osorio Mcrae presents impaired strength & mobility s/p left TKA. Pt also had decreased stability during out of bed activity. This pt will benefit from follow up therapy to help restore safe function prior to returning home with caregiver. This pt requested rehab stay @ SNF to help with a smoother transition back home. 4/14 am she is supine upon arrival.  She performs exercises without any problems. She has good quad contraction and can do a SLR without any help. She increase her distance using RW with Min A and no LOB. She is planing on going to rehab like she did last time. She will sit up for a while and then come to group. 4/14 pm she is moving great and doing well with TK exercises. She will continue to work toward her goals. 4/15 am doing well with gait and exercises. She is waiting to go to rehab tomorrow. 14 pm she continue to make progress with gait and exercises. This section established at most recent assessment   PROBLEM LIST (Impairments causing functional limitations):  1. Decreased Strength  2. Decreased ADL/Functional Activities  3. Decreased Transfer Abilities  4. Decreased Ambulation Ability/Technique  5. Decreased Balance  6. Increased Pain  7. Decreased Activity Tolerance  8. Decreased Flexibility/Joint Mobility  9. Decreased [place] with Home Exercise Program   INTERVENTIONS PLANNED: (Benefits and precautions of physical therapy have been discussed with the patient.)  1. Bed Mobility  2. Gait Training  3. Home Exercise Program (HEP)  4. Therapeutic Exercise/Strengthening  5. Transfer Training  6. Range of Motion: active/assisted/passive  7. Therapeutic Activities  8. Group Therapy     TREATMENT PLAN: Frequency/Duration: Follow patient BID for duration of hospital stay to address above goals. Rehabilitation Potential For Stated Goals: Fair     RECOMMENDED REHABILITATION/EQUIPMENT: (at time of discharge pending progress): Continue Skilled Therapy and Rehab. HISTORY:   History of Present Injury/Illness (Reason for Referral): The patient has end stage arthritis of the left knee. The patient was evaluated and examined during a consultation prior to this office visit. There have been no changes to the patient's orthopedic condition since the initial consultation. The patient has failed previous conservative treatment for this condition including antiinflammatories , and lifestyle modifications. The necessity for joint replacement is present.  The patient will be admitted the day of surgery for Procedure(s) (LRB):  LEFT KNEE ARTHROPLASTY TOTAL / FNB / OJEDA& NEPHEW (Left)    Past Medical History/Comorbidities:   Ms. 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 She also has no past medical history of Adverse effect of anesthesia; Aneurysm ([address] Arrhythmia; Asthma; Autoimmune disease ([address] CAD (coronary artery disease); Cancer ([address] Chronic kidney disease; Chronic obstructive pulmonary disease ([address] Chronic pain; Coagulation disorder ([address] Diabetes ([address] Difficult intubation; Endocarditis; GERD (gastroesophageal reflux disease); Heart failure ([address] Ill-defined condition; Liver disease; Malignant hyperthermia due to anesthesia; Nicotine vapor product user; Non-nicotine vapor product user; Pseudocholinesterase deficiency; Psychiatric disorder; PUD (peptic ulcer disease); Rheumatic fever; Seizures ([address] Stroke [facility]); Thromboembolus ([address] Thyroid disease; or Unspecified adverse effect of anesthesia. Ms. Felecia Willams  has a past surgical history that includes hx cholecystectomy (2009); hx knee arthroscopy (Bilateral); pr breast surgery procedure unlisted (Right, 1968); hx hip replacement (Right, 05/10/2017); and hx knee replacement (Right, 08/2017).   Social History/Living Environment:   Home Environment: Private residence  # Steps to Enter: 0  One/Two Story Residence: One story  Living Alone: No  Support Systems: Spouse/Significant Other/Partner  Patient Expects to be Discharged to[de-identified] Skilled nursing facility  Current DME Used/Available at Home: [address] bed, Walker, rolling, Wheelchair  Prior Level of Function/Work/Activity:  Pt was needing SBA while using rolling walker short distances in home prior to this admission. Number of Personal Factors/Comorbidities that affect the Plan of Care: 3+: HIGH COMPLEXITY   EXAMINATION:   Most Recent Physical Functioning:                  LLE PROM  L Knee Flexion: 100  L Knee Extension: 7              Transfers  Sit to Stand: Contact guard assistance  Stand to Sit: Contact guard assistance                   Weight Bearing Status  Left Side Weight Bearing: As tolerated  Distance (ft): 80 Feet (ft)  Ambulation - Level of Assistance: Contact guard assistance  Assistive Device: Walker, rolling  Speed/Keli: Shuffled; Slow  Stance: Left decreased  Gait Abnormalities: Antalgic;Decreased step clearance        Braces/Orthotics: none    Left Knee Cold  Type: Cryocuff      Body Structures Involved:  1. Joints  2. Muscles Body Functions Affected:  1. Sensory/Pain  2. Movement Related Activities and Participation Affected:  1. General Tasks and Demands  2. Mobility   Number of elements that affect the Plan of Care: 4+: HIGH COMPLEXITY   CLINICAL PRESENTATION:   Presentation: Evolving clinical presentation with changing clinical characteristics: MODERATE COMPLEXITY   CLINICAL DECISION MAKIN [facility] Mobility Inpatient Short Form  How much difficulty does the patient currently have. .. Unable A Lot A Little None   1. Turning over in bed (including adjusting bedclothes, sheets and blankets)? [] 1   [x] 2   [] 3   [] 4   2. Sitting down on and standing up from a chair with arms ( e.g., wheelchair, bedside commode, etc.)   [] 1   [] 2   [x] 3   [] 4   3. Moving from lying on back to sitting on the side of the bed? [] 1   [x] 2   [] 3   [] 4   How much help from another person does the patient currently need. .. Total A Lot A Little None   4. Moving to and from a bed to a chair (including a wheelchair)? [] 1   [] 2   [x] 3   [] 4   5. Need to walk in hospital room?    [] 1   [] 2 [x] 3   [] 4   6. Climbing 3-5 steps with a railing? [x] 1   [] 2   [] 3   [] 4   © 2007, Trustees of [address] [place] [address] [address] [address], under license to [employer]. All rights reserved        Score:  Initial: 14 Most Recent: X (Date: -- )    Interpretation of Tool:  Represents activities that are increasingly more difficult (i.e. Bed mobility, Transfers, Gait). Score 24 23 22-20 19-15 14-10 9-7 6     Modifier CH CI CJ CK CL CM CN      ? Mobility - Walking and Moving Around:     - CURRENT STATUS: CL - 60%-79% impaired, limited or restricted    - GOAL STATUS: CK - 40%-59% impaired, limited or restricted    - D/C STATUS:  ---------------To be determined---------------  [place] / Product Type: Medicare /      Medical Necessity:     · Patient is expected to demonstrate progress in strength, range of motion, balance, coordination and functional technique to decrease assistance required with bed mobility, transfers & gait. Reason for Services/Other Comments:  · Patient continues to require skilled intervention due to pt unsafe with functional mobility. Use of outcome tool(s) and clinical judgement create a POC that gives a: Questionable prediction of patient's progress: MODERATE COMPLEXITY            TREATMENT:   (In addition to Assessment/Re-Assessment sessions the following treatments were rendered)     Pre-treatment Symptoms/Complaints:  Severe fatigue  Pain: Initial: visual scale  Pain Intensity 1: 0  Post Session:       Gait Training (15 Minutes):  Gait training to improve and/or restore physical functioning as related to mobility. Ambulated 80 Feet (ft) with Contact guard assistance using a Walker, rolling and minimal   related to their knee position and motion to promote proper body alignment. Therapeutic Exercise: (15 Minutes):  Exercises per grid below to improve strength. Required minimal verbal cues to promote proper body alignment.   Progressed range as indicated. Date:  4/14   Date:  4/15   Date:     ACTIVITY/EXERCISE AM PM AM PM AM PM   GROUP THERAPY  []  [x]  [x]  []  []  []   Ankle Pumps 15 15 20 20     Quad Sets 15 15 20 20     Gluteal Sets 15 15 20 20     Hip ABd/ADduction 15 15 20 20     Straight Leg Raises 15 15 20 20     Knee Slides 15 15 20 20     Short Arc Quads 15 15 20 20     Long Arc Quads         Chair Slides  15 20 20              B = bilateral; AA = active assistive; A = active; P = passive      Treatment/Session Assessment:     Response to Treatment:  Tolerated therapy well    Education:  [] Home Exercises  [x] Fall Precautions  [] Hip Precautions [] D/C Instruction Review  [x] Knee/Hip Prosthesis Review  [x] Walker Management/Safety [] Adaptive Equipment as Needed       Interdisciplinary Collaboration:   o Registered Nurse    After treatment position/precautions:   o Up in chair  o Bed/Chair-wheels locked  o Bed in low position  o Caregiver at bedside  o Call light within reach  o RN notified  o Family at bedside    Compliance with Program/Exercises: Will assess as treatment progresses. Recommendations/Intent for next treatment session:  Treatment next visit will focus on increasing Ms. Sparrow's independence with bed mobility, transfers, gait training, strength/ROM exercises, modalities for pain, and patient education.       Total Treatment Duration:  PT Patient Time In/Time Out  Time In: 1330  Time Out: 920 Sonya Smith PTA

## 2018-04-15 NOTE — PROGRESS NOTES
Problem: Mobility Impaired (Adult and Pediatric)  Goal: *Acute Goals and Plan of Care (Insert Text)  GOALS (1-4 days):  (1.)Ms. Lilibeth Oden will move from supine to sit and sit to supine  in bed with CONTACT GUARD ASSIST.4/15  (2.)Ms. Lilibeth Oden will transfer from bed to chair and chair to bed with CONTACT GUARD ASSIST using the least restrictive device. 4/15  (3.)Ms. Lilibeth Oden will ambulate with CONTACT GUARD ASSIST for 50-75 feet with the least restrictive device. (4.)Ms. Lilibeth Oden will increase left knee ROM to 5°-80°. Flex 4/15  ________________________________________________________________________________________________    PHYSICAL THERAPY Joint camp tKa: Daily Note, AM 4/15/2018  INPATIENT: Hospital Day: 3  [place]: [place] / Product Type: Medicare /      NAME/AGE/GENDER: Gabby Landers is a 65 y.o. female   PRIMARY DIAGNOSIS:  Primary osteoarthritis of left knee [M17.12]   Procedure(s) and Anesthesia Type:     * LEFT KNEE ARTHROPLASTY TOTAL / FNB / SMITH& NEPHEW / [facility] - Spinal (Left)  ICD-10: Treatment Diagnosis:    · Pain in Left Knee (M25.562)  · Stiffness of Left Knee, Not elsewhere classified (M25.662)  · Difficulty in walking, Not elsewhere classified (R26.2)      ASSESSMENT:     Ms. Lilibeth Oden presents impaired strength & mobility s/p left TKA. Pt also had decreased stability during out of bed activity. This pt will benefit from follow up therapy to help restore safe function prior to returning home with caregiver. This pt requested rehab stay @ SNF to help with a smoother transition back home. 4/14 am she is supine upon arrival.  She performs exercises without any problems. She has good quad contraction and can do a SLR without any help. She increase her distance using RW with Min A and no LOB. She is planing on going to rehab like she did last time. She will sit up for a while and then come to group. 4/14 pm she is moving great and doing well with TK exercises. She will continue to work toward her goals. 4/15 am doing well with gait and exercises. She is waiting to go to rehab tomorrow. This section established at most recent assessment   PROBLEM LIST (Impairments causing functional limitations):  1. Decreased Strength  2. Decreased ADL/Functional Activities  3. Decreased Transfer Abilities  4. Decreased Ambulation Ability/Technique  5. Decreased Balance  6. Increased Pain  7. Decreased Activity Tolerance  8. Decreased Flexibility/Joint Mobility  9. Decreased [place] with Home Exercise Program   INTERVENTIONS PLANNED: (Benefits and precautions of physical therapy have been discussed with the patient.)  1. Bed Mobility  2. Gait Training  3. Home Exercise Program (HEP)  4. Therapeutic Exercise/Strengthening  5. Transfer Training  6. Range of Motion: active/assisted/passive  7. Therapeutic Activities  8. Group Therapy     TREATMENT PLAN: Frequency/Duration: Follow patient BID for duration of hospital stay to address above goals. Rehabilitation Potential For Stated Goals: Fair     RECOMMENDED REHABILITATION/EQUIPMENT: (at time of discharge pending progress): Continue Skilled Therapy and Rehab. HISTORY:   History of Present Injury/Illness (Reason for Referral): The patient has end stage arthritis of the left knee. The patient was evaluated and examined during a consultation prior to this office visit. There have been no changes to the patient's orthopedic condition since the initial consultation. The patient has failed previous conservative treatment for this condition including antiinflammatories , and lifestyle modifications. The necessity for joint replacement is present. The patient will be admitted the day of surgery for Procedure(s) (LRB):  LEFT KNEE ARTHROPLASTY TOTAL / FNB / OJEDA& NEPHEW (Left)    Past Medical History/Comorbidities:   Ms. 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 She also has no past medical history of Adverse effect of anesthesia; Aneurysm ([address] Arrhythmia; Asthma; Autoimmune disease ([address] CAD (coronary artery disease); Cancer ([address] Chronic kidney disease; Chronic obstructive pulmonary disease ([address] Chronic pain; Coagulation disorder ([address] Diabetes ([address] Difficult intubation; Endocarditis; GERD (gastroesophageal reflux disease); Heart failure ([address] Ill-defined condition; Liver disease; Malignant hyperthermia due to anesthesia; Nicotine vapor product user; Non-nicotine vapor product user; Pseudocholinesterase deficiency; Psychiatric disorder; PUD (peptic ulcer disease); Rheumatic fever; Seizures ([facility] Stroke [facility]); Thromboembolus ([address] Thyroid disease; or Unspecified adverse effect of anesthesia. Ms. Deo Arias  has a past surgical history that includes hx cholecystectomy (2009); hx knee arthroscopy (Bilateral); pr breast surgery procedure unlisted (Right, 1968); hx hip replacement (Right, 05/10/2017); and hx knee replacement (Right, 08/2017).   Social History/Living Environment:   Home Environment: Private residence  # Steps to Enter: 0  One/Two Story Residence: One story  Living Alone: No  Support Systems: Spouse/Significant Other/Partner  Patient Expects to be Discharged to[de-identified] Skilled nursing facility  Current DME Used/Available at Home: [employer], bedside, Hospital bed, Walker, rolling, Wheelchair  Prior Level of Function/Work/Activity:  Pt was needing SBA while using rolling walker short distances in home prior to this admission. Number of Personal Factors/Comorbidities that affect the Plan of Care: 3+: HIGH COMPLEXITY   EXAMINATION:   Most Recent Physical Functioning:                  LLE PROM  L Knee Flexion: 100  L Knee Extension: 7              Transfers  Sit to Stand: Contact guard assistance  Stand to Sit: Contact guard assistance                   Weight Bearing Status  Left Side Weight Bearing: As tolerated  Distance (ft): 200 Feet (ft)  Ambulation - Level of Assistance: Contact guard assistance  Assistive Device: Walker, rolling  Speed/Keli: Shuffled; Slow  Stance: Left decreased  Gait Abnormalities: Antalgic;Decreased step clearance        Braces/Orthotics: none    Left Knee Cold  Type: Cryocuff      Body Structures Involved:  1. Joints  2. Muscles Body Functions Affected:  1. Sensory/Pain  2. Movement Related Activities and Participation Affected:  1. General Tasks and Demands  2. Mobility   Number of elements that affect the Plan of Care: 4+: HIGH COMPLEXITY   CLINICAL PRESENTATION:   Presentation: Evolving clinical presentation with changing clinical characteristics: MODERATE COMPLEXITY   CLINICAL DECISION MAKIN [facility] Mobility Inpatient Short Form  How much difficulty does the patient currently have. .. Unable A Lot A Little None   1. Turning over in bed (including adjusting bedclothes, sheets and blankets)? [] 1   [x] 2   [] 3   [] 4   2. Sitting down on and standing up from a chair with arms ( e.g., wheelchair, bedside commode, etc.)   [] 1   [] 2   [x] 3   [] 4   3. Moving from lying on back to sitting on the side of the bed? [] 1   [x] 2   [] 3   [] 4   How much help from another person does the patient currently need. .. Total A Lot A Little None   4. Moving to and from a bed to a chair (including a wheelchair)? [] 1   [] 2   [x] 3   [] 4   5. Need to walk in hospital room? [] 1   [] 2   [x] 3   [] 4   6. Climbing 3-5 steps with a railing?    [x] 1 [] 2   [] 3   [] 4   © 2007, Trustees of [address] [place] [address] [address] [address], under license to [employer]. All rights reserved        Score:  Initial: 14 Most Recent: X (Date: -- )    Interpretation of Tool:  Represents activities that are increasingly more difficult (i.e. Bed mobility, Transfers, Gait). Score 24 23 22-20 19-15 14-10 9-7 6     Modifier CH CI CJ CK CL CM CN      ? Mobility - Walking and Moving Around:     - CURRENT STATUS: CL - 60%-79% impaired, limited or restricted    - GOAL STATUS: CK - 40%-59% impaired, limited or restricted    - D/C STATUS:  ---------------To be determined---------------  [place] / Product Type: Medicare /      Medical Necessity:     · Patient is expected to demonstrate progress in strength, range of motion, balance, coordination and functional technique to decrease assistance required with bed mobility, transfers & gait. Reason for Services/Other Comments:  · Patient continues to require skilled intervention due to pt unsafe with functional mobility. Use of outcome tool(s) and clinical judgement create a POC that gives a: Questionable prediction of patient's progress: MODERATE COMPLEXITY            TREATMENT:   (In addition to Assessment/Re-Assessment sessions the following treatments were rendered)     Pre-treatment Symptoms/Complaints:  Severe fatigue  Pain: Initial: visual scale  Pain Intensity 1: 0 (0/10 after therapy)  Post Session:       Gait Training (15 Minutes):  Gait training to improve and/or restore physical functioning as related to mobility. Ambulated 200 Feet (ft) with Contact guard assistance using a Walker, rolling and minimal   related to their knee position and motion to promote proper body alignment. Therapeutic Exercise: (45 Minutes (group therapy)):  Exercises per grid below to improve strength. Required minimal verbal cues to promote proper body alignment. Progressed range as indicated. Date:  4/14   Date:  4/15   Date:     ACTIVITY/EXERCISE AM PM AM PM AM PM   GROUP THERAPY  []  [x]  [x]  []  []  []   Ankle Pumps 15 15 20      Quad Sets 15 15 20      Gluteal Sets 15 15 20      Hip ABd/ADduction 15 15 20      Straight Leg Raises 15 15 20      Knee Slides 15 15 20      Short Arc Quads 15 15 20      Long Arc Quads         Chair Slides  15 20               B = bilateral; AA = active assistive; A = active; P = passive      Treatment/Session Assessment:     Response to Treatment:  Tolerated session well    Education:  [] Home Exercises  [x] Fall Precautions  [] Hip Precautions [] D/C Instruction Review  [x] Knee/Hip Prosthesis Review  [x] Walker Management/Safety [] Adaptive Equipment as Needed       Interdisciplinary Collaboration:   o Registered Nurse    After treatment position/precautions:   o Up in chair  o Bed/Chair-wheels locked  o Bed in low position  o Caregiver at bedside  o Call light within reach  o RN notified  o Family at bedside    Compliance with Program/Exercises: Will assess as treatment progresses. Recommendations/Intent for next treatment session:  Treatment next visit will focus on increasing Ms. Sparrow's independence with bed mobility, transfers, gait training, strength/ROM exercises, modalities for pain, and patient education.       Total Treatment Duration:  PT Patient Time In/Time Out  Time In: 1030  Time Out: [place] [address] SUSAN Smith

## 2018-04-15 NOTE — PROGRESS NOTES
Medicated with dilaudid 2 mg PO for c/o pain, see MAR. Patient assisted to bathroom to urinate and back to bed.

## 2018-04-15 NOTE — PROGRESS NOTES
April 15, 2018         Post Op day: 2 Days Post-Op     Admit Date: 2018  Admit Diagnosis: Primary osteoarthritis of left knee [M17.12]        Subjective: Doing well, No complaints, No SOB, No Chest Pain, No Nausea or Vomiting     Objective:   Vital Signs are Stable, No Acute Distress, Alert and Oriented, Dressing is Dry,  Neurovascular exam is normal.     Assessment / Plan :  Patient Active Problem List   Diagnosis Code    Preop respiratory exam Z01.811    Hypertension I10    Hyperlipidemia E78.5    Rheumatoid arthritis(714.0) M06.9    MOOK (obstructive sleep apnea) G47.33    Elevated serum creatinine R79.89    Status post right hip replacement Z96.641    Status post total right knee replacement Z96.651    CKD (chronic kidney disease) stage 3, GFR 30-59 ml/min N18.3    BMI 35.0-35.9,adult Z68.35    Status post total left knee replacement A04.062    Patient Vitals for the past 8 hrs:   BP Temp Pulse Resp SpO2   04/15/18 0208 156/61 97.5 °F (36.4 °C) 71 18 97 %    Temp (24hrs), Av.6 °F (36.4 °C), Min:97.5 °F (36.4 °C), Max:97.8 °F (36.6 °C)    Body mass index is 33.23 kg/(m^2).     Lab Results   Component Value Date/Time    HGB 10.2 (L) 04/15/2018 04:17 AM      Pt seen by and discussed with [address] [place] [address] for rehab likely tomorrow       Signed By: ALMA Benitez

## 2018-04-16 VITALS
BODY MASS INDEX: 33.33 KG/M2 | SYSTOLIC BLOOD PRESSURE: 159 MMHG | WEIGHT: 225 LBS | HEIGHT: 69 IN | DIASTOLIC BLOOD PRESSURE: 64 MMHG | TEMPERATURE: 97.7 F | OXYGEN SATURATION: 96 % | HEART RATE: 79 BPM | RESPIRATION RATE: 18 BRPM

## 2018-04-16 LAB
HGB BLD-MCNC: 9.7 G/DL (ref 11.7–15.4)
MM INDURATION POC: 0 MM (ref 0–5)
PPD POC: NORMAL NEGATIVE

## 2018-04-16 PROCEDURE — 36415 COLL VENOUS BLD VENIPUNCTURE: CPT | Performed by: PHYSICIAN ASSISTANT

## 2018-04-16 PROCEDURE — 99239 HOSP IP/OBS DSCHRG MGMT >30: CPT | Performed by: PHYSICAL MEDICINE & REHABILITATION

## 2018-04-16 PROCEDURE — 74011250637 HC RX REV CODE- 250/637: Performed by: PHYSICIAN ASSISTANT

## 2018-04-16 PROCEDURE — 97150 GROUP THERAPEUTIC PROCEDURES: CPT

## 2018-04-16 PROCEDURE — 77030012935 HC DRSG AQUACEL BMS -B

## 2018-04-16 PROCEDURE — 97116 GAIT TRAINING THERAPY: CPT

## 2018-04-16 PROCEDURE — 74011250637 HC RX REV CODE- 250/637: Performed by: ORTHOPAEDIC SURGERY

## 2018-04-16 PROCEDURE — 85018 HEMOGLOBIN: CPT | Performed by: PHYSICIAN ASSISTANT

## 2018-04-16 RX ADMIN — HYDROMORPHONE HYDROCHLORIDE 2 MG: 2 TABLET ORAL at 12:35

## 2018-04-16 RX ADMIN — CHLORTHALIDONE: 25 TABLET ORAL at 08:32

## 2018-04-16 RX ADMIN — FOLIC ACID 1 MG: 1 TABLET ORAL at 08:33

## 2018-04-16 RX ADMIN — CELECOXIB 200 MG: 200 CAPSULE ORAL at 12:34

## 2018-04-16 RX ADMIN — CELECOXIB 200 MG: 200 CAPSULE ORAL at 12:35

## 2018-04-16 RX ADMIN — HYDROMORPHONE HYDROCHLORIDE 2 MG: 2 TABLET ORAL at 08:32

## 2018-04-16 RX ADMIN — ACETAMINOPHEN 1000 MG: 500 TABLET, FILM COATED ORAL at 05:01

## 2018-04-16 RX ADMIN — ASPIRIN 81 MG: 81 TABLET, COATED ORAL at 08:33

## 2018-04-16 RX ADMIN — Medication 400 MG: at 08:33

## 2018-04-16 RX ADMIN — HYDROMORPHONE HYDROCHLORIDE 2 MG: 2 TABLET ORAL at 05:01

## 2018-04-16 RX ADMIN — POTASSIUM CHLORIDE 20 MEQ: 20 TABLET, EXTENDED RELEASE ORAL at 08:33

## 2018-04-16 RX ADMIN — ACETAMINOPHEN 500 MG: 500 TABLET, FILM COATED ORAL at 12:34

## 2018-04-16 NOTE — ROUTINE PROCESS
Phone report given to YNES Camara on Goldy Gomez who is being transferred to the [address] [place] [address] today for routine progression of care. ... Report on Situation,Background,Assessment,and Recommendation (SBAR). .... The following report also provided. ...... Or summary,,,Intake and Output,,,MAR's,,,Lab. result. ... Opportunity for questions and for clarifications also provided to receiving nurse. ..... Nadeem Solitario

## 2018-04-16 NOTE — ROUTINE PROCESS
Shift assessment completed. Pt is alert & oriented x4. Able to verbalize needs. Resting quietly with no distress noted. Dressing to left knee is clean, dry and intact. Susanna Quitter in place. Neurovascular and peripheral vascular checks WNL. Incentive Spirometry at bedside. Patient encouraged to use hourly x 10 repetitions. Patient voiding clear yellow urine without difficulty. Pain is being managed with Dilaudid with patient tolerating well. Bed low and locked. Call light within reach. Patient instructed to call for assistance. Pt verbalizes understanding. Will monitor.

## 2018-04-16 NOTE — PROGRESS NOTES
Up to the bathroom without any assistance,,ambulated with walker use,, in to see patient,,assessment completed,,dorsiflexing feet well,,Aqua becky dressing intact,clean and dry with iceman on. .. Moraima Daily Moraima Daily Moraima Daily

## 2018-04-16 NOTE — PROGRESS NOTES
2018         Post Op day: 3 Days Post-OpProcedure(s) (LRB):  LEFT KNEE ARTHROPLASTY TOTAL / FNB / SMITH& NEPHEW / APPROVED (Left)      Admit Date: 2018  Admit Diagnosis: Primary osteoarthritis of left knee [M17.12]    LAB:    Recent Results (from the past 24 hour(s))   HEMOGLOBIN    Collection Time: 18  5:15 AM   Result Value Ref Range    HGB 9.7 (L) 11.7 - 15.4 g/dL     Vital Signs:    Patient Vitals for the past 8 hrs:   BP Temp Pulse Resp SpO2   18 0516 169/75 98.1 °F (36.7 °C) 76 16 94 %   04/15/18 2335 162/77 97.5 °F (36.4 °C) 78 16 93 %     Temp (24hrs), Av.8 °F (36.6 °C), Min:97.5 °F (36.4 °C), Max:98.1 °F (36.7 °C)    Body mass index is 33.23 kg/(m^2). Pain Control:   Pain Assessment  Pain Scale 1: Numeric (0 - 10)  Pain Intensity 1: 3  Pain Onset 1: post op  Pain Location 1: Knee  Pain Orientation 1: Left  Pain Description 1: Intermittent  Pain Intervention(s) 1: Medication (see MAR)    Subjective: Doing well, No complaints, No SOB, No Chest Pain, No Nausea or Vomitting     Objective: Vital Signs are Stable, No Acute Distress, Alert and Oriented, Dressing is Dry,  Neurovascular exam is normal.       PT/OT:            Assistive Device: Walker (comment)           LLE PROM  L Knee Flexion: 100  L Knee Extension: 7    Weight Bearing Status: WBAT    Meds:  [unfilled]  [unfilled]  [unfilled]    Assessment:   Patient Active Problem List   Diagnosis Code    Preop respiratory exam Z01.811    Hypertension I10    Hyperlipidemia E78.5    Rheumatoid arthritis(714.0) M06.9    MOOK (obstructive sleep apnea) G47.33    Elevated serum creatinine R79.89    Status post right hip replacement Z96.641    Status post total right knee replacement Z96.651    CKD (chronic kidney disease) stage 3, GFR 30-59 ml/min N18.3    BMI 35.0-35.9,adult Z68.35    Status post total left knee replacement Z96.652             Plan: Continue Physical Therapy, Monitor  Hbg, rehab today.         Signed By: Leida Gastelum MD

## 2018-04-16 NOTE — PROGRESS NOTES
Discharged per wheel chair,stable condition,,accompanied by her male friend,who is taking her to [facility].today for routine progression of care. ...... Ever Headley

## 2018-04-16 NOTE — PROGRESS NOTES
Rec'd message from Magy Furnish that [address] [place] [address] has accepted patient for admission today 4-16. Patient transferred to [address] [place] [address] by car. RN given # to call report. Orders faxed prior to transfer.   Nina Carpio

## 2018-04-16 NOTE — PROGRESS NOTES
Problem: Mobility Impaired (Adult and Pediatric)  Goal: *Acute Goals and Plan of Care (Insert Text)  GOALS (1-4 days):  (1.)Ms. Lion Cagle will move from supine to sit and sit to supine  in bed with CONTACT GUARD ASSIST.4/15  (2.)Ms. Lion Cagle will transfer from bed to chair and chair to bed with CONTACT GUARD ASSIST using the least restrictive device. 4/15  (3.)Ms. Lion Cagle will ambulate with CONTACT GUARD ASSIST for 50-75 feet with the least restrictive device. (4.)Ms. Lion Cagle will increase left knee ROM to 5°-80°. Flex 4/15  ________________________________________________________________________________________________    PHYSICAL THERAPY [facility] camp tKa: Daily Note, PM 4/16/2018  INPATIENT: Hospital Day: 4  [place] / [place] / Product Type: Medicare /      NAME/AGE/GENDER: Ligia Edward is a 65 y.o. female   PRIMARY DIAGNOSIS:  Primary osteoarthritis of left knee [M17.12]   Procedure(s) and Anesthesia Type:     * LEFT KNEE ARTHROPLASTY TOTAL / FNB / OJEDA& NEPHEW / Caprice Nicks - Spinal (Left)  ICD-10: Treatment Diagnosis:    · Pain in Left Knee (M25.562)  · Stiffness of Left Knee, Not elsewhere classified (M25.662)  · Difficulty in walking, Not elsewhere classified (R26.2)      ASSESSMENT:     Ms. Lion Cagle presents impaired strength & mobility s/p left TKA. Pt also had decreased stability during out of bed activity. This pt will benefit from follow up therapy to help restore safe function prior to returning home with caregiver. This pt requested rehab stay @ SNF to help with a smoother transition back home. 4/14 am she is supine upon arrival.  She performs exercises without any problems. She has good quad contraction and can do a SLR without any help. She increase her distance using RW with Min A and no LOB. She is planing on going to rehab like she did last time. She will sit up for a while and then come to group. 4/14 pm she is moving great and doing well with TK exercises. She will continue to work toward her goals. 4/15 am doing well with gait and exercises. She is waiting to go to rehab tomorrow. 14 pm she continue to make progress with gait and exercises. 4/16 am Pt progressing with ambulation. This section established at most recent assessment   PROBLEM LIST (Impairments causing functional limitations):  1. Decreased Strength  2. Decreased ADL/Functional Activities  3. Decreased Transfer Abilities  4. Decreased Ambulation Ability/Technique  5. Decreased Balance  6. Increased Pain  7. Decreased Activity Tolerance  8. Decreased Flexibility/Joint Mobility  9. Decreased [place] with Home Exercise Program   INTERVENTIONS PLANNED: (Benefits and precautions of physical therapy have been discussed with the patient.)  1. Bed Mobility  2. Gait Training  3. Home Exercise Program (HEP)  4. Therapeutic Exercise/Strengthening  5. Transfer Training  6. Range of Motion: active/assisted/passive  7. Therapeutic Activities  8. Group Therapy     TREATMENT PLAN: Frequency/Duration: Follow patient BID for duration of hospital stay to address above goals. Rehabilitation Potential For Stated Goals: Fair     RECOMMENDED REHABILITATION/EQUIPMENT: (at time of discharge pending progress): Continue Skilled Therapy and Rehab. HISTORY:   History of Present Injury/Illness (Reason for Referral): The patient has end stage arthritis of the left knee. The patient was evaluated and examined during a consultation prior to this office visit. There have been no changes to the patient's orthopedic condition since the initial consultation. The patient has failed previous conservative treatment for this condition including antiinflammatories , and lifestyle modifications. The necessity for joint replacement is present.  The patient will be admitted the day of surgery for Procedure(s) (LRB):  LEFT KNEE ARTHROPLASTY TOTAL / FNB / OJEDA& NEPHEW (Left)    Past Medical History/Comorbidities:   Ms. 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 She also has no past medical history of Adverse effect of anesthesia; Aneurysm ([address] Arrhythmia; Asthma; Autoimmune disease ([address] CAD (coronary artery disease); Cancer ([address] Chronic kidney disease; Chronic obstructive pulmonary disease ([address] Chronic pain; Coagulation disorder ([address] Diabetes ([address] Difficult intubation; Endocarditis; GERD (gastroesophageal reflux disease); Heart failure ([address] Ill-defined condition; Liver disease; Malignant hyperthermia due to anesthesia; Nicotine vapor product user; Non-nicotine vapor product user; Pseudocholinesterase deficiency; Psychiatric disorder; PUD (peptic ulcer disease); Rheumatic fever; Seizures ([address] Stroke [facility]); Thromboembolus ([address] Thyroid disease; or Unspecified adverse effect of anesthesia. Ms. Yulisa Dodd  has a past surgical history that includes hx cholecystectomy (2009); hx knee arthroscopy (Bilateral); pr breast surgery procedure unlisted (Right, 1968); hx hip replacement (Right, 05/10/2017); and hx knee replacement (Right, 08/2017).   Social History/Living Environment:   Home Environment: Private residence  # Steps to Enter: 0  One/Two Story Residence: One story  Living Alone: No  Support Systems: Spouse/Significant Other/Partner  Patient Expects to be Discharged to[de-identified] Skilled nursing facility  Current DME Used/Available at Home: [address] bed, Walker, rolling, Wheelchair  Prior Level of Function/Work/Activity:  Pt was needing SBA while using rolling walker short distances in home prior to this admission. Number of Personal Factors/Comorbidities that affect the Plan of Care: 3+: HIGH COMPLEXITY   EXAMINATION:   Most Recent Physical Functioning:                  LLE PROM  L Knee Flexion: 103  L Knee Extension: 8         Bed Mobility  Supine to Sit: Contact guard assistance    Transfers  Sit to Stand: Contact guard assistance  Stand to Sit: Contact guard assistance    Balance  Sitting: Intact  Standing: Pull to stand; With support              Weight Bearing Status  Left Side Weight Bearing: As tolerated  Distance (ft): 234 Feet (ft)  Ambulation - Level of Assistance: Contact guard assistance  Assistive Device: Walker, rolling  Gait Abnormalities: Antalgic        Braces/Orthotics: none           Body Structures Involved:  1. Joints  2. Muscles Body Functions Affected:  1. Sensory/Pain  2. Movement Related Activities and Participation Affected:  1. General Tasks and Demands  2. Mobility   Number of elements that affect the Plan of Care: 4+: HIGH COMPLEXITY   CLINICAL PRESENTATION:   Presentation: Evolving clinical presentation with changing clinical characteristics: MODERATE COMPLEXITY   CLINICAL DECISION MAKIN [facility] Inpatient Short Form  How much difficulty does the patient currently have. .. Unable A Lot A Little None   1. Turning over in bed (including adjusting bedclothes, sheets and blankets)? [] 1   [x] 2   [] 3   [] 4   2. Sitting down on and standing up from a chair with arms ( e.g., wheelchair, bedside commode, etc.)   [] 1   [] 2   [x] 3   [] 4   3. Moving from lying on back to sitting on the side of the bed? [] 1   [x] 2   [] 3   [] 4   How much help from another person does the patient currently need. .. Total A Lot A Little None   4. Moving to and from a bed to a chair (including a wheelchair)? [] 1   [] 2   [x] 3   [] 4   5. Need to walk in hospital room? [] 1   [] 2   [x] 3   [] 4   6. Climbing 3-5 steps with a railing? [x] 1   [] 2   [] 3   [] 4   © 2007, Trustees of [address] [place] [address] [address] [address], under license to [employer]. All rights reserved        Score:  Initial: 14 Most Recent: X (Date: -- )    Interpretation of Tool:  Represents activities that are increasingly more difficult (i.e. Bed mobility, Transfers, Gait). Score 24 23 22-20 19-15 14-10 9-7 6     Modifier CH CI CJ CK CL CM CN      ? Mobility - Walking and Moving Around:     - CURRENT STATUS: CL - 60%-79% impaired, limited or restricted    - GOAL STATUS: CK - 40%-59% impaired, limited or restricted    - D/C STATUS:  ---------------To be determined---------------  [place] / [place] / Product Type: Medicare /      Medical Necessity:     · Patient is expected to demonstrate progress in strength, range of motion, balance, coordination and functional technique to decrease assistance required with bed mobility, transfers & gait. Reason for Services/Other Comments:  · Patient continues to require skilled intervention due to pt unsafe with functional mobility. Use of outcome tool(s) and clinical judgement create a POC that gives a: Questionable prediction of patient's progress: MODERATE COMPLEXITY            TREATMENT:   (In addition to Assessment/Re-Assessment sessions the following treatments were rendered)     Pre-treatment Symptoms/Complaints:  Pt progressing with ambulation. Pain: Initial: visual scale  Pain Intensity 1: 1  Pain Location 1: Knee  Pain Orientation 1: Left  Post Session:       Gait Training ( 15 minutes):  Gait training to improve and/or restore physical functioning as related to mobility. Ambulated 234 Feet (ft) with Contact guard assistance using a Walker, rolling and minimal   related to their knee position and motion to promote proper body alignment.   Therapeutic Exercise: ( 45 minutes--group):  Exercises per grid below to improve strength. Required minimal verbal cues to promote proper body alignment. Progressed range as indicated. Date:  4/14   Date:  4/15   Date:  4/16   ACTIVITY/EXERCISE AM PM AM PM AM PM   GROUP THERAPY  []  [x]  [x]  []  [x]  []   Ankle Pumps 15 15 20 20 25    Quad Sets 15 15 20 20 25    Gluteal Sets 15 15 20 20 25    Hip ABd/ADduction 15 15 20 20 25    Straight Leg Raises 15 15 20 20 25    Knee Slides 15 15 20 20 25    Short Arc Quads 15 15 20 20 25    Long Arc Quads         Chair Slides  15 20 20 25             B = bilateral; AA = active assistive; A = active; P = passive      Treatment/Session Assessment:     Response to Treatment:  Pt progressing with ambulation. Education:  [] Home Exercises  [x] Fall Precautions  [] Hip Precautions [] D/C Instruction Review  [x] Knee/Hip Prosthesis Review  [x] Walker Management/Safety [] Adaptive Equipment as Needed       Interdisciplinary Collaboration:   o Physical Therapist  o Registered Nurse    After treatment position/precautions:   o Up in chair  o Bed/Chair-wheels locked  o Bed in low position  o Caregiver at bedside  o Call light within reach  o RN notified  o Family at bedside    Compliance with Program/Exercises: Will assess as treatment progresses. Recommendations/Intent for next treatment session:  Treatment next visit will focus on increasing Ms. Sparrow's independence with bed mobility, transfers, gait training, strength/ROM exercises, modalities for pain, and patient education.       Total Treatment Duration:  PT Patient Time In/Time Out  Time In: 1030  Time Out: Marky Burnette PT

## 2018-04-16 NOTE — DISCHARGE SUMMARY
REHABILITATION DISCHARGE SUMMARY     Patient: Alcon Mooney MRN: [id number]  SSN: [id number]    YOB: 1940  Age: 65 y.o. Sex: female      Date: 4/16/2018  Admit Date: 4/13/2018  Discharge Date: 4/16/2018    Admitting Physician: Ela Moon MD   Primary Care Physician: Maritza No MD     Admission Condition: good    Chief Complaint : Gait dysfunction secondary to below. Admit Diagnosis: Primary osteoarthritis of left knee [M17.12]  left total knee arthroplasty   4/13/2018  Pain  DVT risk  Osteoarthritis  Post op hemorrhagic anemia  Status post total right knee replacement  8/21/2017  Status post right hip replacement  5/10/2017  Acute Rehab Dx:  Gait impairment  Debility    Mobility and ambulation deficits  Self Care/ADL deficits       HPI: Alcon Mooney is a 65 y.o. female patient at [address] [place] [address] who was admitted on 4/13/2018  by Ela Moon MD with below mentioned medical history, is being seen for Physical Medicine and Rehabilitation. Alcon Mooney presented with progressively worsening left knee pain when weight bearing, walking and with activity due to end stage DJD. She underwent a left total knee arthroplasty per Dr. Ela Moon MD on 4/13/2018. The patient's post operative course has been uncomplicated. Patient is to be WBAT LLE. Patient is participating and tolerating post op acute PT and OT well without major barriers. . She is limited by surgical knee pain, decreased ROM and strength. Alcon Mooney is seen and examined today. Medical Records reviewed. Pt had prior R TKA, R ANMOL in 2017 with good results.   She has been independent with ambulation, prior to admission, limited by left knee pain.        Rehabiitation Course:   Functional  Level On Admission: Walk: Moderate [place]  Functional Level At Discharge: Walk: Contact Guard Assist  Home Architecture: Home Situation  Home Environment: Private residence (04/13/18 1400)  # Steps to Enter: 0 (04/13/18 1400)  One/Two Story Residence: One story (04/13/18 1400)  Living Alone: No (04/13/18 1400)  Support Systems: Spouse/Significant Other/Partner (04/13/18 1400)  Patient Expects to be Discharged to[de-identified] Skilled nursing facility (04/13/18 1400)  Current DME Used/Available at Home: Commode, bedside; Hospital bed;Walker, rolling; Wheelchair (04/13/18 1400)     Past Medical History:   Diagnosis Date    Anemia     history     BMI 35.0-35.9,adult 4/9/2018    Essential hypertension     on med for control     H/O echocardiogram 01/05/2017    EF 55-65%    H/O: pneumonia     01/2017    Hypercholesteremia     managed with medication     Hypomagnesemia     managed with supplement     Left ventricular diastolic dysfunction     per echo \"Grade 1 (mild)\"    Muscle weakness     generalized    Nausea & vomiting     nausea only     Osteoarthritis     Poor historian     Pressure ulcer of left buttock, stage 2     resolved per patient-previously seen by Dr. Sebastian Melo at wound center      SOB (shortness of breath)     Status post right hip replacement 5/10/2017    Status post total left knee replacement 4/13/2018    Status post total right knee replacement 8/21/2017    Unable to ambulate     patient in wheelchair-can stand with assistance     Unsteadiness on feet       Past Surgical History:   Procedure Laterality Date    BREAST SURGERY PROCEDURE UNLISTED Right 1968    cyst removed    HX CHOLECYSTECTOMY  2009    HX HIP REPLACEMENT Right 05/10/2017    HX KNEE ARTHROSCOPY Bilateral     X2 to right; X1 to left    HX KNEE REPLACEMENT Right 08/2017      Family History   Problem Relation Age of Onset    Heart Disease Mother     Hypertension Mother     Hypertension Father       Social History   Substance Use Topics    Smoking status: Never Smoker    Smokeless tobacco: Never Used    Alcohol use No       Allergies   Allergen Reactions    Avelox [Moxifloxacin] Rash and Shortness of Breath  Other Plant, Animal, Environmental Nausea Only     Allergic to Perfume & Smoke. Prior to Admission medications    Medication Sig Start Date End Date Taking? Authorizing Provider   aspirin delayed-release 81 mg tablet Take 1 Tab by mouth every twelve (12) hours every twelve (12) hours for 35 days. 4/13/18 5/18/18 Yes ALMA Llamas   HYDROmorphone (DILAUDID) 2 mg tablet Take 1 Tab by mouth every four (4) hours as needed. Max Daily Amount: 12 mg. 4/13/18  Yes ALMA Llamas   losartan (COZAAR) 100 mg tablet Take 100 mg by mouth nightly. Indications: hypertension   Yes Historical Provider   acetaminophen (TYLENOL) 325 mg tablet Take 325 mg by mouth every six (6) hours as needed. Takes 2 tablets as needed for pain; Take / use AM day of surgery  per anesthesia protocols. Indications: Arthritic Pain 3/13/17  Yes Historical Provider   atenolol-chlorthalidone (TENORETIC) 50-25 mg per tablet Take 1 Tab by mouth daily. Take / use AM day of surgery  per anesthesia protocols. Indications: hypertension   Yes Historical Provider   potassium chloride SR (KLOR-CON 10) 10 mEq tablet Take 20 mEq by mouth daily. PCP started pt on this due to leg cramping; Take / use AM day of surgery  per anesthesia protocols. Indications: hypokalemia prevention   Yes Historical Provider   atorvastatin (LIPITOR) 10 mg tablet Take 10 mg by mouth daily. Take / use AM day of surgery  per anesthesia protocols. Indications: hypercholesterolemia   Yes Historical Provider   furosemide (LASIX) 20 mg tablet Take 20 mg by mouth as needed. Indications: Edema    Historical Provider   aspirin delayed-release 81 mg tablet Take 81 mg by mouth daily. Indications: myocardial infarction prevention    Historical Provider   folic acid 492 mcg tablet Take 800 mcg by mouth daily. Historical Provider   magnesium 250 mg tab Take 1 Tab by mouth daily.     Historical Provider   MULTIVITS-MIN/IRON/FA/LUTEIN (CENTRUM SILVER WOMEN PO) Take 1 Tab by mouth daily.     Historical Provider       Current Medications:  Current Facility-Administered Medications   Medication Dose Route Frequency Provider Last Rate Last Dose    simethicone (MYLICON) tablet 80 mg  80 mg Oral QID PRN Dilcia Sierra MD   80 mg at 04/15/18 2042    furosemide (LASIX) tablet 20 mg  20 mg Oral PRN ALMA Andrews        losartan (COZAAR) tablet 100 mg  100 mg Oral QHS ALMA Andrews   100 mg at 04/15/18 2128    potassium chloride (K-DUR, KLOR-CON) SR tablet 20 mEq  20 mEq Oral DAILY Safia Miller PA   20 mEq at 04/16/18 3095    sodium chloride (NS) flush 5-10 mL  5-10 mL IntraVENous Q8H Safia Miller [place]   10 mL at 04/15/18 2129    sodium chloride (NS) flush 5-10 mL  5-10 mL IntraVENous PRN ALMA Andrews   5 mL at 04/15/18 0507    acetaminophen (TYLENOL) tablet 1,000 mg  1,000 mg Oral Q6H Safia Miller PA   1,000 mg at 04/16/18 0501    celecoxib (CELEBREX) capsule 200 mg  200 mg Oral Q12H Safia Miller PA   200 mg at 04/15/18 2128    HYDROmorphone (DILAUDID) tablet 2 mg  2 mg Oral Q4H PRN ALMA Adnrews   2 mg at 04/16/18 1390    HYDROmorphone (PF) (DILAUDID) injection 1 mg  1 mg IntraVENous Q3H PRN ALMA Andrews   1 mg at 04/13/18 1142    naloxone [facility]) injection 0.2-0.4 mg  0.2-0.4 mg IntraVENous Q10MIN PRN ALMA Andrews        ondansetron [facility]) injection 4 mg  4 mg IntraVENous Q4H PRN ALMA Andrews   4 mg at 04/13/18 1347    diphenhydrAMINE (BENADRYL) capsule 25 mg  25 mg Oral Q4H PRN ALMA Andrews        senna-docusate (PERICOLACE) 8.6-50 mg per tablet 2 Tab  2 Tab Oral DAILY ALMA Andrews   2 Tab at 04/15/18 0850    aspirin delayed-release tablet 81 mg  81 mg Oral Q12H Viral Andrews   81 mg at 04/16/18 3137    atenolol/chlorthalidone (TENORETIC) 50/25 mg   Oral DAILY Roberto Hamilton MD        folic acid [facility]) tablet 1 mg  1 mg Oral DAILY Roberto Hamliton MD   1 mg at 04/16/18 3574    magnesium oxide (MAG-OX) tablet 400 mg  400 mg Oral DAILY Mayra Khoury MD   400 mg at 04/16/18 3628    hydrALAZINE (APRESOLINE) 20 mg/mL injection 10 mg  10 mg IntraVENous Q6H PRN Elaine Krause MD            Review of Systems: Denies chest pain, shortness of breath, cough, headache, visual problems, abdominal pain, dysurea, calf pain. Pertinent positives are as noted in the medical records and unremarkable otherwise. Vital Signs: Patient Vitals for the past 8 hrs:   BP Temp Pulse Resp SpO2   04/16/18 0749 159/64 97.7 °F (36.5 °C) 79 18 96 %   04/16/18 0516 169/75 98.1 °F (36.7 °C) 76 16 94 %        Physical Exam:   General: Alert and age appropriately oriented. No acute cardio respiratory distress. HEENT: Normocephalic. Oral mucosa moist without cyanosis. Lungs: Clear to auscultation  bilaterally. Respiration even and unlabored   Heart: Regular rate and rhythm, S1, S2   No  murmurs, clicks, rub or gallops   Abdomen: Soft, non-tender, nondistended. Bowel sounds present   Genitourinary: defered   Neuromuscular:      Grossly no focal neurological deficits noted. L Ankle dorsiflexion 5/5   L Ankle plantarflexion 5/5  R Ankle dorsiflexion 5/5   R Ankle plantarflexion 5/5  No sensory deficits. Skin/extremity: No rashes, no erythema. Calves soft. Wound covered.      Skin Incision(s)/Wound(s):        Lab Review:   Recent Results (from the past 72 hour(s))   HEMOGLOBIN    Collection Time: 04/13/18  7:57 PM   Result Value Ref Range    HGB 11.3 (L) 11.7 - 15.4 g/dL   HEMOGLOBIN    Collection Time: 04/14/18  4:28 AM   Result Value Ref Range    HGB 10.0 (L) 11.7 - 37.2 g/dL   METABOLIC PANEL, BASIC    Collection Time: 04/14/18  4:28 AM   Result Value Ref Range    Sodium 140 136 - 145 mmol/L    Potassium 3.9 3.5 - 5.1 mmol/L    Chloride 105 98 - 107 mmol/L    CO2 25 21 - 32 mmol/L    Anion gap 10 7 - 16 mmol/L    Glucose 139 (H) 65 - 100 mg/dL    BUN 27 (H) 8 - 23 MG/DL    Creatinine 1.28 (H) 0.6 - 1.0 MG/DL    GFR est AA 52 (L) >60 ml/min/1.73m2    GFR est non-AA 43 (L) >60 ml/min/1.73m2    Calcium 8.0 (L) 8.3 - 10.4 MG/DL   PLEASE READ & DOCUMENT PPD TEST IN 24 HRS    Collection Time: 04/14/18  9:00 AM   Result Value Ref Range    PPD Negative Negative    mm Induration 0 mm   HEMOGLOBIN    Collection Time: 04/15/18  4:17 AM   Result Value Ref Range    HGB 10.2 (L) 11.7 - 15.4 g/dL   HEMOGLOBIN    Collection Time: 04/16/18  5:15 AM   Result Value Ref Range    HGB 9.7 (L) 11.7 - 15.4 g/dL       PT Initial  PT Most Recent   AROM: Generally decreased, functional (right LE) (04/13/18 1400) Generally decreased, functional (right LE) (04/13/18 1400)         Strength: Generally decreased, functional (right LE) (04/13/18 1400) Generally decreased, functional (right LE) (04/13/18 1400)   Coordination: Generally decreased, functional (right LE) (04/13/18 1400) Generally decreased, functional (right LE) (04/13/18 1400)                     Distance (ft): 5 Feet (ft) (04/13/18 1400) 80 Feet (ft) (04/15/18 1400)   Assistive Device: Walker, rolling (04/13/18 1400) Walker, rolling (04/15/18 1400)       OT Initial OT Most Recent                                               ST Initial ST Most Recent                        Principal Problem:    Status post total left knee replacement (4/13/2018)    Active Problems:    Hypertension (1/29/2015)      Hyperlipidemia (1/29/2015)          Condition on discharge from [facility] to SNF:  good  Rehabilitation  potential : Good   Goals in Rehab : Patient to reach maximal rehabilitation potential in functional mobility,ambulation and ADL/ self care skills ; to improve strength and endurance  Plan / Disposition :STR-Rehab  as discussed with patient/ family and the Case management/ Social service team  Expected Length Of Stay : Depending on pace of progress in mobility and self care in PT and OT ,therapies    Discharge Instructions:  Rehabilitation Management/ Medical Management: 1. Devices:Walkers, Type: Rolling Walker  2. Consult:Rehab team including PT, OT,  and . 3. Disposition Rehab-discussed with patient. 4. Thigh-high or knee-high DOUGLAS's when out of bed. 5. DVT Prophylaxis - aspirin 81mg bid x 30days. Please notify surgeon at [address] [place] [address] if DVT diagnosed. 6. Incentive spirometer Q1H while awake  7. Post op hemorrhagic anemia- monitor. 8. Activity: WBAT LLE  9. Planned Labs: CBC,BMP  10. Pain Control: fair control. Continue scheduled tylenol, celebrex and  PRN meds. Continue current management. 11. Wound Care: May remove Aquacel 1 week post op and replace with Tegaderm and sterile gauze dressing. Keep wound clean and dry and reinforce dressing PRN. Remove staples 12-14 post surgery, when incision appears appropriately closed and apply benzoin and 1/2\" steristrips. 12. In case of complications: Please notify surgeon at [address] [place] [address] if suspect infection, cellulitis, wound dehiscence or instrument failure, and if considering starting antibiotic. Follow up with ORTHO per instructions. [address] [place] [address] - [phone]        Transfer Medications:            HYDROmorphone (DILAUDID) tablet 2 mg 1 Tab  Ordered Dose: 2mg Route: Oral Frequency: EVERY 3 HOURS  as needed for pain      acetaminophen (TYLENOL) tablet 1,000 mg Ordered Dose: 1,000 mg Route: Oral Frequency: EVERY 8 HOURS x 1 week then change to prn. HYDROcodone-acetaminophen (NORCO)  mg tablet 1 Tab  Ordered Dose: 10/325 mg Route: Oral Frequency: EVERY 3 HOURS  as needed for pain       celecoxib (CELEBREX) capsule 200 mg Ordered Dose: 200mg Route: Oral Frequency: EVERY 12 HOURS x 30 days. senna-docusate (PERICOLACE) 8.6-50 mg per tablet 2 Tab Ordered Dose: 2 Tab Route: Oral Frequency: DAILY       Current Discharge Medication List      START taking these medications    Details   HYDROmorphone (DILAUDID) 2 mg tablet Take 1 Tab by mouth every four (4) hours as needed.  Max Daily Amount: 12 mg.      Associated Diagnoses: Status post total left knee replacement         CONTINUE these medications which have CHANGED    Details   aspirin delayed-release 81 mg tablet Take 1 Tab by mouth every twelve (12) hours every twelve (12) hours for 35 days. Associated Diagnoses: Status post total left knee replacement         CONTINUE these medications which have NOT CHANGED    Details   losartan (COZAAR) 100 mg tablet Take 100 mg by mouth nightly. Indications: hypertension      acetaminophen (TYLENOL) 325 mg tablet Take 325 mg by mouth every six (6) hours as needed. Takes 2 tablets as needed for pain  Indications: Arthritic Pain      atenolol-chlorthalidone (TENORETIC) 50-25 mg per tablet Take 1 Tab by mouth daily. Indications: hypertension      potassium chloride SR (KLOR-CON 10) 10 mEq tablet Take 20 mEq by mouth daily. PCP started pt on this due to leg cramping; Indications: hypokalemia prevention      atorvastatin (LIPITOR) 10 mg tablet Take 10 mg by mouth daily. Indications: hypercholesterolemia      furosemide (LASIX) 20 mg tablet Take 20 mg by mouth as needed. Indications: Edema      folic acid 181 mcg tablet Take 800 mcg by mouth daily. magnesium 250 mg tab Take 1 Tab by mouth daily. MULTIVITS-MIN/IRON/FA/LUTEIN (CENTRUM SILVER WOMEN PO) Take 1 Tab by mouth daily. O.K. Admit to sub acute rehab today. Discharge time: 35 minutes.     Signed By: Yoanna Barry MD     April 16, 2018

## 2018-07-11 ENCOUNTER — HOSPITAL ENCOUNTER (OUTPATIENT)
Dept: SURGERY | Age: 78
Discharge: HOME | End: 2018-07-11
Payer: MEDICARE

## 2018-07-11 VITALS
WEIGHT: 225 LBS | SYSTOLIC BLOOD PRESSURE: 99 MMHG | OXYGEN SATURATION: 95 % | HEART RATE: 77 BPM | TEMPERATURE: 97.7 F | DIASTOLIC BLOOD PRESSURE: 43 MMHG | BODY MASS INDEX: 33.33 KG/M2 | RESPIRATION RATE: 16 BRPM | HEIGHT: 69 IN

## 2018-07-11 PROBLEM — Z91.14 NONCOMPLIANCE WITH CPAP TREATMENT: Status: ACTIVE | Noted: 2018-07-11

## 2018-07-11 LAB
ANION GAP SERPL CALC-SCNC: 7 MMOL/L (ref 7–16)
APTT PPP: 26.5 SEC (ref 23.2–35.3)
ATRIAL RATE: 73 BPM
BACTERIA SPEC CULT: NORMAL
BASOPHILS # BLD: 0 K/UL (ref 0–0.2)
BASOPHILS NFR BLD: 0 % (ref 0–2)
BUN SERPL-MCNC: 28 MG/DL (ref 8–23)
CALCIUM SERPL-MCNC: 9.8 MG/DL (ref 8.3–10.4)
CALCULATED P AXIS, ECG09: 69 DEGREES
CALCULATED R AXIS, ECG10: -31 DEGREES
CALCULATED T AXIS, ECG11: 77 DEGREES
CHLORIDE SERPL-SCNC: 102 MMOL/L (ref 98–107)
CO2 SERPL-SCNC: 29 MMOL/L (ref 21–32)
CREAT SERPL-MCNC: 1.41 MG/DL (ref 0.6–1)
DIAGNOSIS, 93000: NORMAL
DIFFERENTIAL METHOD BLD: ABNORMAL
EOSINOPHIL # BLD: 0.2 K/UL (ref 0–0.8)
EOSINOPHIL NFR BLD: 2 % (ref 0.5–7.8)
ERYTHROCYTE [DISTWIDTH] IN BLOOD BY AUTOMATED COUNT: 14.5 % (ref 11.9–14.6)
GLUCOSE SERPL-MCNC: 111 MG/DL (ref 65–100)
HCT VFR BLD AUTO: 36.1 % (ref 35.8–46.3)
HGB BLD-MCNC: 11.3 G/DL (ref 11.7–15.4)
IMM GRANULOCYTES # BLD: 0 K/UL (ref 0–0.5)
IMM GRANULOCYTES NFR BLD AUTO: 0 % (ref 0–5)
INR PPP: 1
LYMPHOCYTES # BLD: 2.4 K/UL (ref 0.5–4.6)
LYMPHOCYTES NFR BLD: 20 % (ref 13–44)
MCH RBC QN AUTO: 25.7 PG (ref 26.1–32.9)
MCHC RBC AUTO-ENTMCNC: 31.3 G/DL (ref 31.4–35)
MCV RBC AUTO: 82 FL (ref 79.6–97.8)
MONOCYTES # BLD: 0.9 K/UL (ref 0.1–1.3)
MONOCYTES NFR BLD: 8 % (ref 4–12)
NEUTS SEG # BLD: 8.4 K/UL (ref 1.7–8.2)
NEUTS SEG NFR BLD: 70 % (ref 43–78)
P-R INTERVAL, ECG05: 178 MS
PLATELET # BLD AUTO: 340 K/UL (ref 150–450)
PMV BLD AUTO: 9.6 FL (ref 10.8–14.1)
POTASSIUM SERPL-SCNC: 3.9 MMOL/L (ref 3.5–5.1)
PROTHROMBIN TIME: 13.3 SEC (ref 11.5–14.5)
Q-T INTERVAL, ECG07: 408 MS
QRS DURATION, ECG06: 84 MS
QTC CALCULATION (BEZET), ECG08: 449 MS
RBC # BLD AUTO: 4.4 M/UL (ref 4.05–5.25)
SERVICE CMNT-IMP: NORMAL
SODIUM SERPL-SCNC: 138 MMOL/L (ref 136–145)
VENTRICULAR RATE, ECG03: 73 BPM
WBC # BLD AUTO: 12 K/UL (ref 4.3–11.1)

## 2018-07-11 PROCEDURE — 93005 ELECTROCARDIOGRAM TRACING: CPT | Performed by: ANESTHESIOLOGY

## 2018-07-11 PROCEDURE — 85730 THROMBOPLASTIN TIME PARTIAL: CPT | Performed by: PHYSICIAN ASSISTANT

## 2018-07-11 PROCEDURE — 85610 PROTHROMBIN TIME: CPT | Performed by: PHYSICIAN ASSISTANT

## 2018-07-11 PROCEDURE — 87641 MR-STAPH DNA AMP PROBE: CPT | Performed by: PHYSICIAN ASSISTANT

## 2018-07-11 PROCEDURE — 85025 COMPLETE CBC W/AUTO DIFF WBC: CPT | Performed by: PHYSICIAN ASSISTANT

## 2018-07-11 PROCEDURE — 80048 BASIC METABOLIC PNL TOTAL CA: CPT | Performed by: PHYSICIAN ASSISTANT

## 2018-07-11 RX ORDER — ASPIRIN 81 MG/1
81 TABLET ORAL DAILY
COMMUNITY
End: 2018-08-03

## 2018-07-11 NOTE — PERIOP NOTES
Patient verified name, , and surgery as listed in [facility]. Type 3 surgery, PAT walk in assessment complete. Labs per surgeon: cbc, bmp, PT, PTT, MRSA nasal swab; results pending  Labs per anesthesia protocol: no additional labs needed. EKG:Done today- abnormal; will have anesthesia review. EKG from 2017 printed and placed on chart for comparison. Pt arrived in a wheelchair states she is unable to stand to obtain a weight or to obtain a urine specimen. Left voicemail notifying Gina Medellin at Dr. Dany Alvarez office that pt states she is unable to stand for a urine specimen today at [facility]. Hibiclens and instructions to return bottle on DOS given per hospital policy. Nasal Swab collected per MD order and instructions for Mupirocin nasal ointment if required. Patient provided with handouts including Guide to Surgery, Pain Management, Hand Hygiene, Blood Transfusion Education, and [facility] Brochure. Patient answered medical/surgical history questions at their best of ability. All prior to admission medications documented in [facility]. Original medication prescription bottle not visualized during patient appointment. Patient instructed to hold all vitamins 7 days prior to surgery and NSAIDS 5 days prior to surgery. Medications to be held: vitamins and supplements. Patient instructed to continue previous medications as prescribed prior to surgery and to take the following medications the day of surgery according to anesthesia guidelines with a small sip of water: atenolol-chlorthalidone, lipitor and aspirin. Patient teach back successful and patient demonstrates knowledge of instruction.

## 2018-07-11 NOTE — PERIOP NOTES
WBC 12.0. All other lab results within anesthesia guidelines. Lab results sent to PCP per surgeon's request. Lab work sent to Dr. Moura Links office due to WBC of 12.0. Left voicemail for Lakhwinder Duran NP informing of elevated WBC. Recent Results (from the past 12 hour(s))   EKG, 12 LEAD, INITIAL    Collection Time: 07/11/18 11:27 AM   Result Value Ref Range    Ventricular Rate 73 BPM    Atrial Rate 73 BPM    P-R Interval 178 ms    QRS Duration 84 ms    Q-T Interval 408 ms    QTC Calculation (Bezet) 449 ms    Calculated P Axis 69 degrees    Calculated R Axis -31 degrees    Calculated T Axis 77 degrees    Diagnosis       Normal sinus rhythm  Left axis deviation  Anterior infarct (cited on or before 17-APR-2017)  Abnormal ECG  When compared with ECG of 17-APR-2017 12:45,  No significant change was found  Confirmed by Lawyer Wiley ([id number]) on 7/11/2018 1:34:15 PM     CBC WITH AUTOMATED DIFF    Collection Time: 07/11/18 12:29 PM   Result Value Ref Range    WBC 12.0 (H) 4.3 - 11.1 K/uL    RBC 4.40 4.05 - 5.25 M/uL    HGB 11.3 (L) 11.7 - 15.4 g/dL    HCT 36.1 35.8 - 46.3 %    MCV 82.0 79.6 - 97.8 FL    MCH 25.7 (L) 26.1 - 32.9 PG    MCHC 31.3 (L) 31.4 - 35.0 g/dL    RDW 14.5 11.9 - 14.6 %    PLATELET 240 501 - 734 K/uL    MPV 9.6 (L) 10.8 - 14.1 FL    DF AUTOMATED      NEUTROPHILS 70 43 - 78 %    LYMPHOCYTES 20 13 - 44 %    MONOCYTES 8 4.0 - 12.0 %    EOSINOPHILS 2 0.5 - 7.8 %    BASOPHILS 0 0.0 - 2.0 %    IMMATURE GRANULOCYTES 0 0.0 - 5.0 %    ABS. NEUTROPHILS 8.4 (H) 1.7 - 8.2 K/UL    ABS. LYMPHOCYTES 2.4 0.5 - 4.6 K/UL    ABS. MONOCYTES 0.9 0.1 - 1.3 K/UL    ABS. EOSINOPHILS 0.2 0.0 - 0.8 K/UL    ABS. BASOPHILS 0.0 0.0 - 0.2 K/UL    ABS. IMM.  GRANS. 0.0 0.0 - 0.5 K/UL   PROTHROMBIN TIME + INR    Collection Time: 07/11/18 12:29 PM   Result Value Ref Range    Prothrombin time 13.3 11.5 - 14.5 sec    INR 1.0     PTT    Collection Time: 07/11/18 12:29 PM   Result Value Ref Range    aPTT 26.5 23.2 - 35.3 SEC METABOLIC PANEL, BASIC    Collection Time: 07/11/18 12:29 PM   Result Value Ref Range    Sodium 138 136 - 145 mmol/L    Potassium 3.9 3.5 - 5.1 mmol/L    Chloride 102 98 - 107 mmol/L    CO2 29 21 - 32 mmol/L    Anion gap 7 7 - 16 mmol/L    Glucose 111 (H) 65 - 100 mg/dL    BUN 28 (H) 8 - 23 MG/DL    Creatinine 1.41 (H) 0.6 - 1.0 MG/DL    GFR est AA 46 (L) >60 ml/min/1.73m2    GFR est non-AA 38 (L) >60 ml/min/1.73m2    Calcium 9.8 8.3 - 10.4 MG/DL

## 2018-07-11 NOTE — ADVANCED PRACTICE NURSE
Total Joint Surgery Preoperative Chart Review      Patient ID:  Franco Granda  [id number]  24 y.o.  1940  Surgeon: Dr. Dylan Yang  Date of Surgery: 7/16/2018  Procedure: Total Left Hip Arthroplasty  Primary Care Physician: Ella Castaneda [phone]  Specialty Physician(s):      Subjective:   Franco Granda is a 65 y.o. WHITE OR  female who presents for preoperative evaluation for Total Left Hip arthroplasty. This is a preoperative chart review note based on data collected by the nurse at the surgical Pre-Assessment visit.     Past Medical History:   Diagnosis Date    Anemia     history     BMI 35.0-35.9,adult 4/9/2018    Essential hypertension     on med for control     H/O echocardiogram 01/05/2017    EF 55-65%    H/O: pneumonia     01/2017    Hypercholesteremia     managed with medication     Hypomagnesemia     managed with supplement     Left ventricular diastolic dysfunction     per echo \"Grade 1 (mild)\"    Muscle weakness     generalized    Nausea & vomiting     nausea only     Osteoarthritis     Poor historian     Pressure ulcer of left buttock, stage 2     resolved per patient-previously seen by Dr. Rios George at wound center      SOB (shortness of breath)     Denies at present, states over 2 yrs ago     Status post right hip replacement 5/10/2017    Status post total left knee replacement 4/13/2018    Status post total right knee replacement 8/21/2017    Unable to ambulate     patient in wheelchair-can stand with assistance     Unsteadiness on feet       Past Surgical History:   Procedure Laterality Date    BREAST SURGERY PROCEDURE UNLISTED Right 1968    cyst removed    HX CHOLECYSTECTOMY  2009    HX HIP REPLACEMENT Right 05/10/2017    HX KNEE ARTHROSCOPY Bilateral     X2 to right; X1 to left    HX KNEE REPLACEMENT Right 08/2017     Family History   Problem Relation Age of Onset    Heart Disease Mother     Hypertension Mother     Hypertension Father       Social History   Substance Use Topics    Smoking status: Never Smoker    Smokeless tobacco: Never Used    Alcohol use No       Prior to Admission medications    Medication Sig Start Date End Date Taking? Authorizing Provider   aspirin delayed-release 81 mg tablet Take 81 mg by mouth daily. Yes Historical Provider   losartan (COZAAR) 100 mg tablet Take 100 mg by mouth daily. Indications: hypertension   Yes Historical Provider   acetaminophen (TYLENOL) 325 mg tablet Take 325 mg by mouth every six (6) hours as needed. Takes 2 tablets as needed for pain; Take / use AM day of surgery  per anesthesia protocols. Indications: Arthritic Pain 3/13/17  Yes Historical Provider   atenolol-chlorthalidone (TENORETIC) 50-25 mg per tablet Take 1 Tab by mouth daily. Take / use AM day of surgery  per anesthesia protocols. Indications: hypertension   Yes Historical Provider   folic acid 095 mcg tablet Take 800 mcg by mouth daily. Yes Historical Provider   magnesium 250 mg tab Take 1 Tab by mouth daily. Yes Historical Provider   potassium chloride SR (KLOR-CON 10) 10 mEq tablet Take 20 mEq by mouth daily. PCP started pt on this due to leg cramping; Take / use AM day of surgery  per anesthesia protocols. Indications: hypokalemia prevention   Yes Historical Provider   atorvastatin (LIPITOR) 10 mg tablet Take 10 mg by mouth daily. Take / use AM day of surgery  per anesthesia protocols. Indications: hypercholesterolemia   Yes Historical Provider   MULTIVITS-MIN/IRON/FA/LUTEIN (CENTRUM SILVER WOMEN PO) Take 1 Tab by mouth daily. Yes Historical Provider   HYDROmorphone (DILAUDID) 2 mg tablet Take 1 Tab by mouth every four (4) hours as needed. Max Daily Amount: 12 mg. 4/13/18   ALMA Doshi     Allergies   Allergen Reactions    Avelox [Moxifloxacin] Rash and Shortness of Breath    Other Plant, Animal, Environmental Nausea Only     Allergic to Perfume & Smoke.           Objective:     Physical Exam:   Patient Vitals for the past 24 hrs:   Temp Pulse Resp BP SpO2   07/11/18 1151 97.7 °F (36.5 °C) 77 16 99/43 95 %       ECG:    EKG Results     Procedure 720 Value Units Date/Time    EKG, 12 LEAD, INITIAL [[id number]] Collected:  07/11/18 1127    Order Status:  Completed Updated:  07/11/18 1257     Ventricular Rate 73 BPM      Atrial Rate 73 BPM      P-R Interval 178 ms      QRS Duration 84 ms      Q-T Interval 408 ms      QTC Calculation (Bezet) 449 ms      Calculated P Axis 69 degrees      Calculated R Axis -31 degrees      Calculated T Axis 77 degrees      Diagnosis --     Normal sinus rhythm  Left axis deviation  Anterior infarct (cited on or before 17-APR-2017)  Abnormal ECG  When compared with ECG of 17-APR-2017 12:45,  No significant change was found            Data Review:   Labs:   Recent Results (from the past 24 hour(s))   EKG, 12 LEAD, INITIAL    Collection Time: 07/11/18 11:27 AM   Result Value Ref Range    Ventricular Rate 73 BPM    Atrial Rate 73 BPM    P-R Interval 178 ms    QRS Duration 84 ms    Q-T Interval 408 ms    QTC Calculation (Bezet) 449 ms    Calculated P Axis 69 degrees    Calculated R Axis -31 degrees    Calculated T Axis 77 degrees    Diagnosis       Normal sinus rhythm  Left axis deviation  Anterior infarct (cited on or before 17-APR-2017)  Abnormal ECG  When compared with ECG of 17-APR-2017 12:45,  No significant change was found     CBC WITH AUTOMATED DIFF    Collection Time: 07/11/18 12:29 PM   Result Value Ref Range    WBC 12.0 (H) 4.3 - 11.1 K/uL    RBC 4.40 4.05 - 5.25 M/uL    HGB 11.3 (L) 11.7 - 15.4 g/dL    HCT 36.1 35.8 - 46.3 %    MCV 82.0 79.6 - 97.8 FL    MCH 25.7 (L) 26.1 - 32.9 PG    MCHC 31.3 (L) 31.4 - 35.0 g/dL    RDW 14.5 11.9 - 14.6 %    PLATELET 350 189 - 948 K/uL    MPV 9.6 (L) 10.8 - 14.1 FL    DF AUTOMATED      NEUTROPHILS 70 43 - 78 %    LYMPHOCYTES 20 13 - 44 %    MONOCYTES 8 4.0 - 12.0 %    EOSINOPHILS 2 0.5 - 7.8 %    BASOPHILS 0 0.0 - 2.0 %    IMMATURE GRANULOCYTES 0 0.0 - 5.0 % ABS. NEUTROPHILS 8.4 (H) 1.7 - 8.2 K/UL    ABS. LYMPHOCYTES 2.4 0.5 - 4.6 K/UL    ABS. MONOCYTES 0.9 0.1 - 1.3 K/UL    ABS. EOSINOPHILS 0.2 0.0 - 0.8 K/UL    ABS. BASOPHILS 0.0 0.0 - 0.2 K/UL    ABS. IMM. GRANS. 0.0 0.0 - 0.5 K/UL   PROTHROMBIN TIME + INR    Collection Time: 07/11/18 12:29 PM   Result Value Ref Range    Prothrombin time 13.3 11.5 - 14.5 sec    INR 1.0     PTT    Collection Time: 07/11/18 12:29 PM   Result Value Ref Range    aPTT 26.5 23.2 - 23.6 SEC   METABOLIC PANEL, BASIC    Collection Time: 07/11/18 12:29 PM   Result Value Ref Range    Sodium 138 136 - 145 mmol/L    Potassium 3.9 3.5 - 5.1 mmol/L    Chloride 102 98 - 107 mmol/L    CO2 29 21 - 32 mmol/L    Anion gap 7 7 - 16 mmol/L    Glucose 111 (H) 65 - 100 mg/dL    BUN 28 (H) 8 - 23 MG/DL    Creatinine 1.41 (H) 0.6 - 1.0 MG/DL    GFR est AA 46 (L) >60 ml/min/1.73m2    GFR est non-AA 38 (L) >60 ml/min/1.73m2    Calcium 9.8 8.3 - 10.4 MG/DL         Problem List:  )  Patient Active Problem List   Diagnosis Code    Preop respiratory exam Z01.811    Hypertension I10    Hyperlipidemia E78.5    Rheumatoid arthritis(714.0) M06.9    MOOK (obstructive sleep apnea) G47.33    Elevated serum creatinine R79.89    Status post right hip replacement Z96.641    Status post total right knee replacement Z96.651    CKD (chronic kidney disease) stage 3, GFR 30-59 ml/min N18.3    BMI 35.0-35.9,adult Z68.35    Status post total left knee replacement Z96.652    Noncompliance with CPAP treatment Z91.14       Total Joint Surgery Pre-Assessment Recommendations: The patient is not compliant with wearing CPAP. Recommend oxygen saturation monitoring during hospitalization and oxygen at 3 liter via [facility]. Renal protocol initiated. The patient's chart will be flagged renal risk. Renal RisK Alerts:  1. Caution with use of muscle relaxants and sedatives with reduced renal function  2. Caution with total amount of narcotics used   3.  Avoid morphine if patient has reduced renal function due to accumulations of the highly active metabolite, morphine-6-glucuronide, which can lead to sedation and respiratory depression  4. Avoid nephrotoxic drugs such as MA inhibitors  5. Consider volume status monitoring in addition to [place]  6.  Ensure hand-off to hospitalist for appropriate perioperative IV fluid management            Signed By: MEME MaeC    July 11, 2018

## 2018-07-12 NOTE — PERIOP NOTES
7/11/2018  7:08 PM - Yohan, Lab In [facility]   Component Results   Component Value Flag Ref Range Units Status   Special Requests: NASAL     Final   Culture result:      Final   SA target not detected.                                 A MRSA NEGATIVE, SA NEGATIVE test result does not preclude MRSA or SA nasal colonization.

## 2018-07-13 NOTE — PERIOP NOTES
Anesthesia ( ) reviewed chart. Asks if pt's functional status affects breathing? Orthopnea? Dizzy, s/sx fluid retention? If no, then pt ok to proceed with surgery. If yes to any of these then report findings to Dr. Mel Echeverria. This RN asked pre assessment RN (Pablo Julian.) if pt presented with any of the above s/sx. Per Jeremy Dempsey, pt without SOB, denies dizziness. No change in breathing. No s/sx fluid retention. Pt with recent previous surgeries without pulmonary/cardiac complications. Per Dr. Mel Echeverria, pt ok to proceed with surgery.

## 2018-07-30 ENCOUNTER — HOSPITAL ENCOUNTER (OUTPATIENT)
Dept: SURGERY | Age: 78
Discharge: HOME | End: 2018-07-30

## 2018-07-30 NOTE — PERIOP NOTES
Per pt prior surgery cancelled due to WBC elevation. Pt states she was seen by Dr. Karen Lanza to evaluate. Pt states labs were drawn and were normal. Left voicemail with medical records at Dr. May Bills office requesting last office note and most recent WBC be faxed to [phone]. Pt states there have been no changes to her medical history or her medications since previous PAT appointment on 7/11/18. Pt instructed to follow all previous instructions given to her on 7/11/18. Pt voiced an understanding.

## 2018-07-31 ENCOUNTER — ANESTHESIA EVENT (OUTPATIENT)
Dept: SURGERY | Age: 78
DRG: 470 | End: 2018-07-31
Payer: MEDICARE

## 2018-07-31 NOTE — PERIOP NOTES
Received from . Amadeo David MD echo (7/17/18) and stress (7/16/18) - both within anesthesia protocols for surgery. Chart to 3rd ortho.

## 2018-07-31 NOTE — PERIOP NOTES
7/11/2018  7:08 PM - Yohan, Lab In [facility] [employer] [facility] Anywhere   Component Results   Component Value Flag Ref Range Units Status   Special Requests: NASAL     Final   Culture result:      Final   SA target not detected.                                 A MRSA NEGATIVE, SA NEGATIVE test result does not preclude MRSA or SA nasal colonization.

## 2018-07-31 NOTE — PERIOP NOTES
Received from PCP - Jennie Montana MD - office note (7/20/18) and labs (cbc 7/14/18, 7/26/18) and card clearance note (7/25/18). Card note reads \"CBC was normal with white cell count returning back to normal\" and \"She has been cleared for hip surgery for low risk. \"  Noted mention stress and echo results (both within anesthesia protocols) but does not mention date of stress and echo. Call to Dr. Beverley Cheema office ([phone]) requesting stress and echo reports be faxed to [phone] for review.

## 2018-07-31 NOTE — H&P
[address] [place] [address] Pre Operative History and Physical Exam 
 
Patient ID: 
Aura Pompa [id number] 
67 y.o. 
1940 Today: July 31, 2018 Assessment: 1. Arthritis of the left hip Plan: 1. Proceed with scheduled Procedure(s) (LRB): LEFT HIP ARTHROPLASTY TOTAL / Ray Jaz (Left) CC: Left hip pain HPI:   The patient has end stage arthritis of the left hip. The patient was evaluated and examined during a consultation prior to this office visit. There have been no changes to the patient's orthopedic condition since the initial consultation. The patient has failed previous conservative treatment for this condition including antiinflammatories , and lifestyle modifications. The necessity for joint replacement is present. The patient will be admitted the day of surgery for Procedure(s) (LRB): LEFT HIP ARTHROPLASTY TOTAL / Ray Jaz (Left) Past Medical/Surgical History: 
Past Medical History:  
Diagnosis Date  Anemia   
 history  BMI 35.0-35.9,adult 4/9/2018  Essential hypertension   
 on med for control  H/O echocardiogram 01/05/2017 EF 55-65%  H/O: pneumonia   
 01/2017  Hypercholesteremia   
 managed with medication  Hypomagnesemia   
 managed with supplement  Left ventricular diastolic dysfunction   
 per echo \"Grade 1 (mild)\"  Muscle weakness   
 generalized  Nausea & vomiting   
 nausea only  Osteoarthritis  Poor historian  Pressure ulcer of left buttock, stage 2   
 resolved per patient-previously seen by Dr. Mary Anne Owens at wound center  SOB (shortness of breath) Denies at present, states over 2 yrs ago  Status post right hip replacement 5/10/2017  Status post total left knee replacement 4/13/2018  Status post total right knee replacement 8/21/2017  Unable to ambulate   
 patient in wheelchair-can stand with assistance  Unsteadiness on feet Past Surgical History:  
Procedure Laterality Date  BREAST SURGERY PROCEDURE UNLISTED Right 1968  
 cyst removed  HX CHOLECYSTECTOMY  2009  HX HIP REPLACEMENT Right 05/10/2017  HX KNEE ARTHROSCOPY Bilateral   
 X2 to right; X1 to left  HX KNEE REPLACEMENT Right 08/2017 Allergies: Allergies Allergen Reactions  Avelox [Moxifloxacin] Rash and Shortness of Breath  Other Plant, Animal, Environmental Nausea Only Allergic to Perfume & Smoke. Physical Exam:  
General: NAD, Alert, Oriented, Appears their stated age HEENT: NC/AT, PERRL Skin: No rashes, lesions or wounds seen Psych: normal affect Heart: Regular Rate, Rhythm Lungs: unlabored respirations, normal breath sounds Abdomen: Soft and non-distended Ortho: Pain with limited ROM of the left hip Neuro: no focal defects, sensation is equal bilaterally Lymph: no lymphadenopathy Meds:  
No current facility-administered medications for this encounter. Current Outpatient Prescriptions Medication Sig  
 aspirin delayed-release 81 mg tablet Take 81 mg by mouth daily.  HYDROmorphone (DILAUDID) 2 mg tablet Take 1 Tab by mouth every four (4) hours as needed. Max Daily Amount: 12 mg.  
 losartan (COZAAR) 100 mg tablet Take 100 mg by mouth daily. Indications: hypertension  acetaminophen (TYLENOL) 325 mg tablet Take 325 mg by mouth every six (6) hours as needed. Takes 2 tablets as needed for pain; Take / use AM day of surgery  per anesthesia protocols. Indications: Arthritic Pain  atenolol-chlorthalidone (TENORETIC) 50-25 mg per tablet Take 1 Tab by mouth daily. Take / use AM day of surgery  per anesthesia protocols. Indications: hypertension  folic acid 892 mcg tablet Take 800 mcg by mouth daily.  magnesium 250 mg tab Take 1 Tab by mouth daily.  potassium chloride SR (KLOR-CON 10) 10 mEq tablet Take 20 mEq by mouth daily. PCP started pt on this due to leg cramping;  Take / use AM day of surgery  per anesthesia protocols. Indications: hypokalemia prevention  atorvastatin (LIPITOR) 10 mg tablet Take 10 mg by mouth daily. Take / use AM day of surgery  per anesthesia protocols. Indications: hypercholesterolemia  MULTIVITS-MIN/IRON/FA/LUTEIN (CENTRUM SILVER WOMEN PO) Take 1 Tab by mouth daily. Labs: Hospital Outpatient Visit on 07/11/2018 Component Date Value Ref Range Status  WBC 07/11/2018 12.0* 4.3 - 11.1 K/uL Final  
 RBC 07/11/2018 4.40  4.05 - 5.25 M/uL Final  
 HGB 07/11/2018 11.3* 11.7 - 15.4 g/dL Final  
 HCT 07/11/2018 36.1  35.8 - 46.3 % Final  
 MCV 07/11/2018 82.0  79.6 - 97.8 FL Final  
 MCH 07/11/2018 25.7* 26.1 - 32.9 PG Final  
 MCHC 07/11/2018 31.3* 31.4 - 35.0 g/dL Final  
 RDW 07/11/2018 14.5  11.9 - 14.6 % Final  
 PLATELET 12/90/2344 819  150 - 450 K/uL Final  
 MPV 07/11/2018 9.6* 10.8 - 14.1 FL Final  
 DF 07/11/2018 AUTOMATED    Final  
 NEUTROPHILS 07/11/2018 70  43 - 78 % Final  
 LYMPHOCYTES 07/11/2018 20  13 - 44 % Final  
 MONOCYTES 07/11/2018 8  4.0 - 12.0 % Final  
 EOSINOPHILS 07/11/2018 2  0.5 - 7.8 % Final  
 BASOPHILS 07/11/2018 0  0.0 - 2.0 % Final  
 IMMATURE GRANULOCYTES 07/11/2018 0  0.0 - 5.0 % Final  
 ABS. NEUTROPHILS 07/11/2018 8.4* 1.7 - 8.2 K/UL Final  
 ABS. LYMPHOCYTES 07/11/2018 2.4  0.5 - 4.6 K/UL Final  
 ABS. MONOCYTES 07/11/2018 0.9  0.1 - 1.3 K/UL Final  
 ABS. EOSINOPHILS 07/11/2018 0.2  0.0 - 0.8 K/UL Final  
 ABS. BASOPHILS 07/11/2018 0.0  0.0 - 0.2 K/UL Final  
 ABS. IMM. GRANS. 07/11/2018 0.0  0.0 - 0.5 K/UL Final  
 Prothrombin time 07/11/2018 13.3  11.5 - 14.5 sec Final  
 INR 07/11/2018 1.0    Final  
 Comment: Suggested therapeutic INR range: 
Venous thrombosis and embolus  2.0-3.0 Prosthetic heart valve         2.5-3.5 
** Note new reference range and method ** 
  
 aPTT 07/11/2018 26.5  23.2 - 35.3 SEC Final  
 Comment: Heparin Therapeutic Range = 74 - 123 seconds In addition to factor deficiency, monitoring heparin therapy, etc., evaluation of a prolonged aPTT result should include consideration of preanalytic variables such as heparin flush contamination, specimen integrity issues, etc. 
** Note new reference range and method **  Sodium 07/11/2018 138  136 - 145 mmol/L Final  
 Potassium 07/11/2018 3.9  3.5 - 5.1 mmol/L Final  
 Chloride 07/11/2018 102  98 - 107 mmol/L Final  
 CO2 07/11/2018 29  21 - 32 mmol/L Final  
 Anion gap 07/11/2018 7  7 - 16 mmol/L Final  
 Glucose 07/11/2018 111* 65 - 100 mg/dL Final  
 Comment: 47 - 60 mg/dl Consistent with, but not fully diagnostic of hypoglycemia. 101 - 125 mg/dl Impaired fasting glucose/consistent with pre-diabetes mellitus 
> 126 mg/dl Fasting glucose consistent with overt diabetes mellitus  BUN 07/11/2018 28* 8 - 23 MG/DL Final  
 Creatinine 07/11/2018 1.41* 0.6 - 1.0 MG/DL Final  
 GFR est AA 07/11/2018 46* >60 ml/min/1.73m2 Final  
 GFR est non-AA 07/11/2018 38* >60 ml/min/1.73m2 Final  
 Comment: (NOTE) Estimated GFR is calculated using the Modification of Diet in Renal  
Disease (MDRD) Study equation, reported for both  Americans [facility]) and non- Americans (GFRNA), and normalized to 1.73m2  
body surface area. The physician must decide which value applies to  
the patient. The MDRD study equation should only be used in  
individuals age 25 or older. It has not been validated for the  
following: pregnant women, patients with serious comorbid conditions,  
or on certain medications, or persons with extremes of body size,  
muscle mass, or nutritional status.  Calcium 07/11/2018 9.8  8.3 - 10.4 MG/DL Final  
 Special Requests: 07/11/2018 NASAL    Final  
 Culture result: 07/11/2018 SA target not detected. A MRSA NEGATIVE, SA NEGATIVE test result does not preclude MRSA or SA nasal colonization.     Final  
 Ventricular Rate 07/11/2018 73  BPM Final  
 Atrial Rate 07/11/2018 73  BPM Final  
 P-R Interval 07/11/2018 178  ms Final  
 QRS Duration 07/11/2018 84  ms Final  
 Q-T Interval 07/11/2018 408  ms Final  
 QTC Calculation (Bezet) 07/11/2018 449  ms Final  
 Calculated P Axis 07/11/2018 69  degrees Final  
 Calculated R Axis 07/11/2018 -31  degrees Final  
 Calculated T Axis 07/11/2018 77  degrees Final  
 Diagnosis 07/11/2018    Final  
                 Value:Normal sinus rhythm Left axis deviation Anterior infarct (cited on or before 17-APR-2017) Abnormal ECG When compared with ECG of 17-APR-2017 12:45, No significant change was found Confirmed by Jalyn Nance ([id number]) on 7/11/2018 1:34:15 PM 
  
 
 
 
 
 
 
 
Patient Active Problem List  
Diagnosis Code  Preop respiratory exam Z01.811  
 Hypertension I10  
 Hyperlipidemia E78.5  Rheumatoid arthritis(714.0) M06.9  MOOK (obstructive sleep apnea) G47.33  
 Elevated serum creatinine R79.89  Status post right hip replacement Z96.641  Status post total right knee replacement Z96.651  CKD (chronic kidney disease) stage 3, GFR 30-59 ml/min N18.3  BMI 35.0-35.9,adult Z68.35  
 Status post total left knee replacement Z96.652  Noncompliance with CPAP treatment Z91.14 Signed By: ALMA Pierre 
July 31, 2018

## 2018-08-01 ENCOUNTER — ANESTHESIA (OUTPATIENT)
Dept: SURGERY | Age: 78
DRG: 470 | End: 2018-08-01
Payer: MEDICARE

## 2018-08-01 ENCOUNTER — APPOINTMENT (OUTPATIENT)
Dept: GENERAL RADIOLOGY | Age: 78
DRG: 470 | End: 2018-08-01
Attending: ORTHOPAEDIC SURGERY
Payer: MEDICARE

## 2018-08-01 ENCOUNTER — HOSPITAL ENCOUNTER (INPATIENT)
Age: 78
LOS: 2 days | Discharge: HOME HEALTH | DRG: 470 | End: 2018-08-03
Attending: ORTHOPAEDIC SURGERY | Admitting: ORTHOPAEDIC SURGERY
Payer: MEDICARE

## 2018-08-01 DIAGNOSIS — M16.12 PRIMARY OSTEOARTHRITIS OF LEFT HIP: ICD-10-CM

## 2018-08-01 DIAGNOSIS — Z96.642 STATUS POST LEFT HIP REPLACEMENT: Primary | ICD-10-CM

## 2018-08-01 DIAGNOSIS — N18.30 CKD (CHRONIC KIDNEY DISEASE) STAGE 3, GFR 30-59 ML/MIN (HCC): Chronic | ICD-10-CM

## 2018-08-01 LAB
ABO + RH BLD: NORMAL
BLOOD GROUP ANTIBODIES SERPL: NORMAL
GLUCOSE BLD STRIP.AUTO-MCNC: 118 MG/DL (ref 65–100)
HGB BLD-MCNC: 10.7 G/DL (ref 11.7–15.4)
SPECIMEN EXP DATE BLD: NORMAL

## 2018-08-01 PROCEDURE — 77030002966 HC SUT PDS J&J -A: Performed by: ORTHOPAEDIC SURGERY

## 2018-08-01 PROCEDURE — 77030019908 HC STETH ESOPH SIMS -A: Performed by: ANESTHESIOLOGY

## 2018-08-01 PROCEDURE — 77030008459 HC STPLR SKN COOP -B: Performed by: ORTHOPAEDIC SURGERY

## 2018-08-01 PROCEDURE — C1776 JOINT DEVICE (IMPLANTABLE): HCPCS | Performed by: ORTHOPAEDIC SURGERY

## 2018-08-01 PROCEDURE — 77030013727 HC IRR FAN PULSVC ZIMM -B: Performed by: ORTHOPAEDIC SURGERY

## 2018-08-01 PROCEDURE — 77030031139 HC SUT VCRL2 J&J -A: Performed by: ORTHOPAEDIC SURGERY

## 2018-08-01 PROCEDURE — 36415 COLL VENOUS BLD VENIPUNCTURE: CPT | Performed by: PHYSICIAN ASSISTANT

## 2018-08-01 PROCEDURE — 77030008477 HC STYL SATN SLP COVD -A: Performed by: ANESTHESIOLOGY

## 2018-08-01 PROCEDURE — 77030018547 HC SUT ETHBND1 J&J -B: Performed by: ORTHOPAEDIC SURGERY

## 2018-08-01 PROCEDURE — 85018 HEMOGLOBIN: CPT | Performed by: PHYSICIAN ASSISTANT

## 2018-08-01 PROCEDURE — 74011000258 HC RX REV CODE- 258: Performed by: ORTHOPAEDIC SURGERY

## 2018-08-01 PROCEDURE — 74011250636 HC RX REV CODE- 250/636: Performed by: PHYSICIAN ASSISTANT

## 2018-08-01 PROCEDURE — 76010000171 HC OR TIME 2 TO 2.5 HR INTENSV-TIER 1: Performed by: ORTHOPAEDIC SURGERY

## 2018-08-01 PROCEDURE — 74011000250 HC RX REV CODE- 250

## 2018-08-01 PROCEDURE — 74011000250 HC RX REV CODE- 250: Performed by: ORTHOPAEDIC SURGERY

## 2018-08-01 PROCEDURE — 74011000302 HC RX REV CODE- 302: Performed by: ORTHOPAEDIC SURGERY

## 2018-08-01 PROCEDURE — 94762 N-INVAS EAR/PLS OXIMTRY CONT: CPT

## 2018-08-01 PROCEDURE — 77030012935 HC DRSG AQUACEL BMS -B: Performed by: ORTHOPAEDIC SURGERY

## 2018-08-01 PROCEDURE — 77030011640 HC PAD GRND REM COVD -A: Performed by: ORTHOPAEDIC SURGERY

## 2018-08-01 PROCEDURE — 86900 BLOOD TYPING SEROLOGIC ABO: CPT | Performed by: PHYSICIAN ASSISTANT

## 2018-08-01 PROCEDURE — 77030008703 HC TU ET UNCUF COVD -A: Performed by: ANESTHESIOLOGY

## 2018-08-01 PROCEDURE — 74011250636 HC RX REV CODE- 250/636

## 2018-08-01 PROCEDURE — 74011250637 HC RX REV CODE- 250/637: Performed by: PHYSICIAN ASSISTANT

## 2018-08-01 PROCEDURE — 65270000029 HC RM PRIVATE

## 2018-08-01 PROCEDURE — 0SRB0JA REPLACEMENT OF LEFT HIP JOINT WITH SYNTHETIC SUBSTITUTE, UNCEMENTED, OPEN APPROACH: ICD-10-PCS | Performed by: ORTHOPAEDIC SURGERY

## 2018-08-01 PROCEDURE — 82962 GLUCOSE BLOOD TEST: CPT

## 2018-08-01 PROCEDURE — 74011250636 HC RX REV CODE- 250/636: Performed by: ANESTHESIOLOGY

## 2018-08-01 PROCEDURE — 94760 N-INVAS EAR/PLS OXIMETRY 1: CPT

## 2018-08-01 PROCEDURE — 74011250636 HC RX REV CODE- 250/636: Performed by: ORTHOPAEDIC SURGERY

## 2018-08-01 PROCEDURE — 86580 TB INTRADERMAL TEST: CPT | Performed by: ORTHOPAEDIC SURGERY

## 2018-08-01 PROCEDURE — 77030006777 HC BLD SAW OSC CNMD -B: Performed by: ORTHOPAEDIC SURGERY

## 2018-08-01 PROCEDURE — 76060000035 HC ANESTHESIA 2 TO 2.5 HR: Performed by: ORTHOPAEDIC SURGERY

## 2018-08-01 PROCEDURE — 77030018836 HC SOL IRR NACL ICUM -A: Performed by: ORTHOPAEDIC SURGERY

## 2018-08-01 PROCEDURE — 76210000016 HC OR PH I REC 1 TO 1.5 HR: Performed by: ORTHOPAEDIC SURGERY

## 2018-08-01 PROCEDURE — 99221 1ST HOSP IP/OBS SF/LOW 40: CPT | Performed by: PHYSICAL MEDICINE & REHABILITATION

## 2018-08-01 PROCEDURE — 77030019557 HC ELECTRD VES SEAL MEDT -F: Performed by: ORTHOPAEDIC SURGERY

## 2018-08-01 PROCEDURE — 77030019940 HC BLNKT HYPOTHRM STRY -B: Performed by: ANESTHESIOLOGY

## 2018-08-01 PROCEDURE — 77030008467 HC STPLR SKN COVD -B: Performed by: ORTHOPAEDIC SURGERY

## 2018-08-01 PROCEDURE — 77030034849: Performed by: ORTHOPAEDIC SURGERY

## 2018-08-01 PROCEDURE — 72170 X-RAY EXAM OF PELVIS: CPT

## 2018-08-01 PROCEDURE — 74011000250 HC RX REV CODE- 250: Performed by: ANESTHESIOLOGY

## 2018-08-01 DEVICE — CUP ACET DIA58MM 12 H HIP TI GRIPTION VIP TAPR DOME REV: Type: IMPLANTABLE DEVICE | Site: HIP | Status: FUNCTIONAL

## 2018-08-01 DEVICE — HEAD FEM DIA36MM +5MM OFFSET 12/14 TAPR HIP CERAMIC BIOLOX: Type: IMPLANTABLE DEVICE | Site: HIP | Status: FUNCTIONAL

## 2018-08-01 DEVICE — STEM FEM SZ 14 L140MM NK L38.5MM 42MM STD OFFSET 135DEG HIP: Type: IMPLANTABLE DEVICE | Site: HIP | Status: FUNCTIONAL

## 2018-08-01 DEVICE — ELIMINATOR H APEX FOR 48-60MM PINN HIP SHELL: Type: IMPLANTABLE DEVICE | Site: HIP | Status: FUNCTIONAL

## 2018-08-01 DEVICE — LINER ACET OD58MM ID36MM LIP ALTRX PINN: Type: IMPLANTABLE DEVICE | Site: HIP | Status: FUNCTIONAL

## 2018-08-01 RX ORDER — CEFAZOLIN SODIUM/WATER 2 G/20 ML
2 SYRINGE (ML) INTRAVENOUS EVERY 8 HOURS
Status: COMPLETED | OUTPATIENT
Start: 2018-08-01 | End: 2018-08-02

## 2018-08-01 RX ORDER — GLYCOPYRROLATE 0.2 MG/ML
INJECTION INTRAMUSCULAR; INTRAVENOUS AS NEEDED
Status: DISCONTINUED | OUTPATIENT
Start: 2018-08-01 | End: 2018-08-01 | Stop reason: HOSPADM

## 2018-08-01 RX ORDER — POTASSIUM CHLORIDE 750 MG/1
20 TABLET, EXTENDED RELEASE ORAL DAILY
Status: DISCONTINUED | OUTPATIENT
Start: 2018-08-02 | End: 2018-08-03 | Stop reason: HOSPADM

## 2018-08-01 RX ORDER — LIDOCAINE HYDROCHLORIDE 20 MG/ML
INJECTION, SOLUTION EPIDURAL; INFILTRATION; INTRACAUDAL; PERINEURAL AS NEEDED
Status: DISCONTINUED | OUTPATIENT
Start: 2018-08-01 | End: 2018-08-01 | Stop reason: HOSPADM

## 2018-08-01 RX ORDER — NALOXONE HYDROCHLORIDE 0.4 MG/ML
0.1 INJECTION, SOLUTION INTRAMUSCULAR; INTRAVENOUS; SUBCUTANEOUS
Status: DISCONTINUED | OUTPATIENT
Start: 2018-08-01 | End: 2018-08-01 | Stop reason: HOSPADM

## 2018-08-01 RX ORDER — ROPIVACAINE HYDROCHLORIDE 2 MG/ML
INJECTION, SOLUTION EPIDURAL; INFILTRATION; PERINEURAL AS NEEDED
Status: DISCONTINUED | OUTPATIENT
Start: 2018-08-01 | End: 2018-08-01 | Stop reason: HOSPADM

## 2018-08-01 RX ORDER — ACETAMINOPHEN 500 MG
1000 TABLET ORAL EVERY 6 HOURS
Status: DISCONTINUED | OUTPATIENT
Start: 2018-08-02 | End: 2018-08-03 | Stop reason: HOSPADM

## 2018-08-01 RX ORDER — SODIUM CHLORIDE 0.9 % (FLUSH) 0.9 %
5-10 SYRINGE (ML) INJECTION EVERY 8 HOURS
Status: DISCONTINUED | OUTPATIENT
Start: 2018-08-01 | End: 2018-08-03 | Stop reason: HOSPADM

## 2018-08-01 RX ORDER — ASPIRIN 81 MG/1
81 TABLET ORAL EVERY 12 HOURS
Status: DISCONTINUED | OUTPATIENT
Start: 2018-08-01 | End: 2018-08-03 | Stop reason: HOSPADM

## 2018-08-01 RX ORDER — AMOXICILLIN 250 MG
2 CAPSULE ORAL DAILY
Status: DISCONTINUED | OUTPATIENT
Start: 2018-08-02 | End: 2018-08-03 | Stop reason: HOSPADM

## 2018-08-01 RX ORDER — CEFAZOLIN SODIUM/WATER 2 G/20 ML
2 SYRINGE (ML) INTRAVENOUS ONCE
Status: COMPLETED | OUTPATIENT
Start: 2018-08-01 | End: 2018-08-01

## 2018-08-01 RX ORDER — HYDROMORPHONE HYDROCHLORIDE 2 MG/1
2 TABLET ORAL
Qty: 40 TAB | Refills: 0 | Status: SHIPPED | OUTPATIENT
Start: 2018-08-01

## 2018-08-01 RX ORDER — SODIUM CHLORIDE 0.9 % (FLUSH) 0.9 %
5-10 SYRINGE (ML) INJECTION AS NEEDED
Status: DISCONTINUED | OUTPATIENT
Start: 2018-08-01 | End: 2018-08-03 | Stop reason: HOSPADM

## 2018-08-01 RX ORDER — DEXAMETHASONE SODIUM PHOSPHATE 4 MG/ML
INJECTION, SOLUTION INTRA-ARTICULAR; INTRALESIONAL; INTRAMUSCULAR; INTRAVENOUS; SOFT TISSUE AS NEEDED
Status: DISCONTINUED | OUTPATIENT
Start: 2018-08-01 | End: 2018-08-01 | Stop reason: HOSPADM

## 2018-08-01 RX ORDER — NEOMYCIN AND POLYMYXIN B SULFATES 40; 200000 MG/ML; [USP'U]/ML
SOLUTION IRRIGATION AS NEEDED
Status: DISCONTINUED | OUTPATIENT
Start: 2018-08-01 | End: 2018-08-01 | Stop reason: HOSPADM

## 2018-08-01 RX ORDER — FOLIC ACID 1 MG/1
1 TABLET ORAL DAILY
Status: DISCONTINUED | OUTPATIENT
Start: 2018-08-02 | End: 2018-08-03 | Stop reason: HOSPADM

## 2018-08-01 RX ORDER — ASPIRIN 81 MG/1
81 TABLET ORAL 2 TIMES DAILY
Qty: 70 TAB | Refills: 0 | Status: SHIPPED | OUTPATIENT
Start: 2018-08-01 | End: 2018-09-05

## 2018-08-01 RX ORDER — ASPIRIN 81 MG/1
81 TABLET ORAL 2 TIMES DAILY
Qty: 70 TAB | Refills: 0 | Status: SHIPPED | OUTPATIENT
Start: 2018-08-01 | End: 2018-08-01

## 2018-08-01 RX ORDER — HYDROMORPHONE HYDROCHLORIDE 2 MG/1
2 TABLET ORAL
Status: DISCONTINUED | OUTPATIENT
Start: 2018-08-02 | End: 2018-08-03 | Stop reason: HOSPADM

## 2018-08-01 RX ORDER — ACETAMINOPHEN 10 MG/ML
1000 INJECTION, SOLUTION INTRAVENOUS ONCE
Status: COMPLETED | OUTPATIENT
Start: 2018-08-01 | End: 2018-08-01

## 2018-08-01 RX ORDER — ASPIRIN 81 MG/1
81 TABLET ORAL DAILY
Qty: 35 TAB | Refills: 0 | Status: SHIPPED | OUTPATIENT
Start: 2018-08-01 | End: 2018-08-01

## 2018-08-01 RX ORDER — EPHEDRINE SULFATE 50 MG/ML
INJECTION, SOLUTION INTRAVENOUS AS NEEDED
Status: DISCONTINUED | OUTPATIENT
Start: 2018-08-01 | End: 2018-08-01 | Stop reason: HOSPADM

## 2018-08-01 RX ORDER — DEXAMETHASONE SODIUM PHOSPHATE 100 MG/10ML
10 INJECTION INTRAMUSCULAR; INTRAVENOUS ONCE
Status: ACTIVE | OUTPATIENT
Start: 2018-08-02 | End: 2018-08-03

## 2018-08-01 RX ORDER — SODIUM CHLORIDE, SODIUM LACTATE, POTASSIUM CHLORIDE, CALCIUM CHLORIDE 600; 310; 30; 20 MG/100ML; MG/100ML; MG/100ML; MG/100ML
75 INJECTION, SOLUTION INTRAVENOUS CONTINUOUS
Status: DISCONTINUED | OUTPATIENT
Start: 2018-08-01 | End: 2018-08-01 | Stop reason: HOSPADM

## 2018-08-01 RX ORDER — OXYCODONE HYDROCHLORIDE 5 MG/1
5 TABLET ORAL
Status: DISCONTINUED | OUTPATIENT
Start: 2018-08-01 | End: 2018-08-01 | Stop reason: HOSPADM

## 2018-08-01 RX ORDER — HYDROMORPHONE HYDROCHLORIDE 2 MG/ML
INJECTION, SOLUTION INTRAMUSCULAR; INTRAVENOUS; SUBCUTANEOUS AS NEEDED
Status: DISCONTINUED | OUTPATIENT
Start: 2018-08-01 | End: 2018-08-01 | Stop reason: HOSPADM

## 2018-08-01 RX ORDER — ONDANSETRON 2 MG/ML
INJECTION INTRAMUSCULAR; INTRAVENOUS AS NEEDED
Status: DISCONTINUED | OUTPATIENT
Start: 2018-08-01 | End: 2018-08-01 | Stop reason: HOSPADM

## 2018-08-01 RX ORDER — LIDOCAINE HYDROCHLORIDE 10 MG/ML
0.1 INJECTION INFILTRATION; PERINEURAL AS NEEDED
Status: DISCONTINUED | OUTPATIENT
Start: 2018-08-01 | End: 2018-08-01 | Stop reason: HOSPADM

## 2018-08-01 RX ORDER — NALOXONE HYDROCHLORIDE 0.4 MG/ML
.2-.4 INJECTION, SOLUTION INTRAMUSCULAR; INTRAVENOUS; SUBCUTANEOUS
Status: DISCONTINUED | OUTPATIENT
Start: 2018-08-01 | End: 2018-08-03 | Stop reason: HOSPADM

## 2018-08-01 RX ORDER — LANOLIN ALCOHOL/MO/W.PET/CERES
200 CREAM (GRAM) TOPICAL DAILY
Status: DISCONTINUED | OUTPATIENT
Start: 2018-08-02 | End: 2018-08-03 | Stop reason: HOSPADM

## 2018-08-01 RX ORDER — OXYCODONE HYDROCHLORIDE 5 MG/1
10 TABLET ORAL
Status: DISCONTINUED | OUTPATIENT
Start: 2018-08-01 | End: 2018-08-01 | Stop reason: HOSPADM

## 2018-08-01 RX ORDER — ROCURONIUM BROMIDE 10 MG/ML
INJECTION, SOLUTION INTRAVENOUS AS NEEDED
Status: DISCONTINUED | OUTPATIENT
Start: 2018-08-01 | End: 2018-08-01 | Stop reason: HOSPADM

## 2018-08-01 RX ORDER — FENTANYL CITRATE 50 UG/ML
INJECTION, SOLUTION INTRAMUSCULAR; INTRAVENOUS AS NEEDED
Status: DISCONTINUED | OUTPATIENT
Start: 2018-08-01 | End: 2018-08-01 | Stop reason: HOSPADM

## 2018-08-01 RX ORDER — PROPOFOL 10 MG/ML
INJECTION, EMULSION INTRAVENOUS AS NEEDED
Status: DISCONTINUED | OUTPATIENT
Start: 2018-08-01 | End: 2018-08-01 | Stop reason: HOSPADM

## 2018-08-01 RX ORDER — HYDROMORPHONE HYDROCHLORIDE 2 MG/ML
1 INJECTION, SOLUTION INTRAMUSCULAR; INTRAVENOUS; SUBCUTANEOUS
Status: DISCONTINUED | OUTPATIENT
Start: 2018-08-01 | End: 2018-08-03 | Stop reason: HOSPADM

## 2018-08-01 RX ORDER — FLUMAZENIL 0.1 MG/ML
0.2 INJECTION INTRAVENOUS AS NEEDED
Status: DISCONTINUED | OUTPATIENT
Start: 2018-08-01 | End: 2018-08-01 | Stop reason: HOSPADM

## 2018-08-01 RX ORDER — SODIUM CHLORIDE 9 MG/ML
100 INJECTION, SOLUTION INTRAVENOUS CONTINUOUS
Status: DISPENSED | OUTPATIENT
Start: 2018-08-01 | End: 2018-08-03

## 2018-08-01 RX ORDER — ATENOLOL AND CHLORTHALIDONE TABLET 50; 25 MG/1; MG/1
1 TABLET ORAL DAILY
Status: DISCONTINUED | OUTPATIENT
Start: 2018-08-02 | End: 2018-08-01 | Stop reason: SDUPTHER

## 2018-08-01 RX ORDER — ONDANSETRON 2 MG/ML
4 INJECTION INTRAMUSCULAR; INTRAVENOUS
Status: DISCONTINUED | OUTPATIENT
Start: 2018-08-01 | End: 2018-08-03 | Stop reason: HOSPADM

## 2018-08-01 RX ORDER — HYDROMORPHONE HYDROCHLORIDE 2 MG/ML
0.5 INJECTION, SOLUTION INTRAMUSCULAR; INTRAVENOUS; SUBCUTANEOUS
Status: DISCONTINUED | OUTPATIENT
Start: 2018-08-01 | End: 2018-08-01 | Stop reason: HOSPADM

## 2018-08-01 RX ORDER — DIPHENHYDRAMINE HCL 25 MG
25 CAPSULE ORAL
Status: DISCONTINUED | OUTPATIENT
Start: 2018-08-01 | End: 2018-08-03 | Stop reason: HOSPADM

## 2018-08-01 RX ORDER — DIPHENHYDRAMINE HYDROCHLORIDE 50 MG/ML
12.5 INJECTION, SOLUTION INTRAMUSCULAR; INTRAVENOUS
Status: DISCONTINUED | OUTPATIENT
Start: 2018-08-01 | End: 2018-08-01 | Stop reason: HOSPADM

## 2018-08-01 RX ORDER — CELECOXIB 200 MG/1
200 CAPSULE ORAL EVERY 12 HOURS
Status: DISCONTINUED | OUTPATIENT
Start: 2018-08-01 | End: 2018-08-03 | Stop reason: HOSPADM

## 2018-08-01 RX ORDER — ACETAMINOPHEN 10 MG/ML
INJECTION, SOLUTION INTRAVENOUS AS NEEDED
Status: DISCONTINUED | OUTPATIENT
Start: 2018-08-01 | End: 2018-08-01 | Stop reason: HOSPADM

## 2018-08-01 RX ORDER — HYDROMORPHONE HYDROCHLORIDE 2 MG/1
2 TABLET ORAL
Status: DISCONTINUED | OUTPATIENT
Start: 2018-08-01 | End: 2018-08-01

## 2018-08-01 RX ORDER — NEOSTIGMINE METHYLSULFATE 1 MG/ML
INJECTION INTRAVENOUS AS NEEDED
Status: DISCONTINUED | OUTPATIENT
Start: 2018-08-01 | End: 2018-08-01 | Stop reason: HOSPADM

## 2018-08-01 RX ORDER — LOSARTAN POTASSIUM 50 MG/1
100 TABLET ORAL DAILY
Status: DISCONTINUED | OUTPATIENT
Start: 2018-08-02 | End: 2018-08-03 | Stop reason: HOSPADM

## 2018-08-01 RX ADMIN — SODIUM CHLORIDE, SODIUM LACTATE, POTASSIUM CHLORIDE, AND CALCIUM CHLORIDE: 600; 310; 30; 20 INJECTION, SOLUTION INTRAVENOUS at 11:05

## 2018-08-01 RX ADMIN — HYDROMORPHONE HYDROCHLORIDE 0.2 MG: 2 INJECTION, SOLUTION INTRAMUSCULAR; INTRAVENOUS; SUBCUTANEOUS at 12:12

## 2018-08-01 RX ADMIN — HYDROMORPHONE HYDROCHLORIDE 0.5 MG: 2 INJECTION, SOLUTION INTRAMUSCULAR; INTRAVENOUS; SUBCUTANEOUS at 13:25

## 2018-08-01 RX ADMIN — ONDANSETRON 4 MG: 2 INJECTION INTRAMUSCULAR; INTRAVENOUS at 11:32

## 2018-08-01 RX ADMIN — PROPOFOL 200 MG: 10 INJECTION, EMULSION INTRAVENOUS at 11:11

## 2018-08-01 RX ADMIN — ASPIRIN 81 MG: 81 TABLET, DELAYED RELEASE ORAL at 21:06

## 2018-08-01 RX ADMIN — GLYCOPYRROLATE 0.2 MG: 0.2 INJECTION INTRAMUSCULAR; INTRAVENOUS at 12:54

## 2018-08-01 RX ADMIN — NEOSTIGMINE METHYLSULFATE 1 MG: 1 INJECTION INTRAVENOUS at 12:54

## 2018-08-01 RX ADMIN — ROCURONIUM BROMIDE 40 MG: 10 INJECTION, SOLUTION INTRAVENOUS at 11:11

## 2018-08-01 RX ADMIN — ACETAMINOPHEN 1000 MG: 10 INJECTION, SOLUTION INTRAVENOUS at 12:14

## 2018-08-01 RX ADMIN — HYDROMORPHONE HYDROCHLORIDE 0.5 MG: 2 INJECTION, SOLUTION INTRAMUSCULAR; INTRAVENOUS; SUBCUTANEOUS at 13:36

## 2018-08-01 RX ADMIN — DEXAMETHASONE SODIUM PHOSPHATE 10 MG: 4 INJECTION, SOLUTION INTRA-ARTICULAR; INTRALESIONAL; INTRAMUSCULAR; INTRAVENOUS; SOFT TISSUE at 11:32

## 2018-08-01 RX ADMIN — FENTANYL CITRATE 50 MCG: 50 INJECTION, SOLUTION INTRAMUSCULAR; INTRAVENOUS at 11:07

## 2018-08-01 RX ADMIN — ONDANSETRON 4 MG: 2 INJECTION, SOLUTION INTRAMUSCULAR; INTRAVENOUS at 15:40

## 2018-08-01 RX ADMIN — EPHEDRINE SULFATE 5 MG: 50 INJECTION, SOLUTION INTRAVENOUS at 12:18

## 2018-08-01 RX ADMIN — EPHEDRINE SULFATE 10 MG: 50 INJECTION, SOLUTION INTRAVENOUS at 12:02

## 2018-08-01 RX ADMIN — TUBERCULIN PURIFIED PROTEIN DERIVATIVE 5 UNITS: 5 INJECTION INTRADERMAL at 08:00

## 2018-08-01 RX ADMIN — Medication 2 G: at 18:10

## 2018-08-01 RX ADMIN — NEOSTIGMINE METHYLSULFATE 1 MG: 1 INJECTION INTRAVENOUS at 12:57

## 2018-08-01 RX ADMIN — EPHEDRINE SULFATE 10 MG: 50 INJECTION, SOLUTION INTRAVENOUS at 12:28

## 2018-08-01 RX ADMIN — LIDOCAINE HYDROCHLORIDE 80 MG: 20 INJECTION, SOLUTION EPIDURAL; INFILTRATION; INTRACAUDAL; PERINEURAL at 11:11

## 2018-08-01 RX ADMIN — SODIUM CHLORIDE, SODIUM LACTATE, POTASSIUM CHLORIDE, AND CALCIUM CHLORIDE: 600; 310; 30; 20 INJECTION, SOLUTION INTRAVENOUS at 11:16

## 2018-08-01 RX ADMIN — SODIUM CHLORIDE 100 ML/HR: 900 INJECTION, SOLUTION INTRAVENOUS at 15:41

## 2018-08-01 RX ADMIN — Medication 2 G: at 11:17

## 2018-08-01 RX ADMIN — LIDOCAINE HYDROCHLORIDE 0.1 ML: 10 INJECTION, SOLUTION INFILTRATION; PERINEURAL at 08:42

## 2018-08-01 RX ADMIN — HYDROMORPHONE HYDROCHLORIDE 0.2 MG: 2 INJECTION, SOLUTION INTRAMUSCULAR; INTRAVENOUS; SUBCUTANEOUS at 12:51

## 2018-08-01 RX ADMIN — SODIUM CHLORIDE, SODIUM LACTATE, POTASSIUM CHLORIDE, AND CALCIUM CHLORIDE 75 ML/HR: 600; 310; 30; 20 INJECTION, SOLUTION INTRAVENOUS at 08:42

## 2018-08-01 RX ADMIN — FENTANYL CITRATE 50 MCG: 50 INJECTION, SOLUTION INTRAMUSCULAR; INTRAVENOUS at 11:11

## 2018-08-01 RX ADMIN — GLYCOPYRROLATE 0.2 MG: 0.2 INJECTION INTRAMUSCULAR; INTRAVENOUS at 12:57

## 2018-08-01 RX ADMIN — ACETAMINOPHEN 1000 MG: 10 INJECTION, SOLUTION INTRAVENOUS at 18:10

## 2018-08-01 RX ADMIN — HYDROMORPHONE HYDROCHLORIDE 2 MG: 2 TABLET ORAL at 21:07

## 2018-08-01 RX ADMIN — HYDROMORPHONE HYDROCHLORIDE 0.4 MG: 2 INJECTION, SOLUTION INTRAMUSCULAR; INTRAVENOUS; SUBCUTANEOUS at 11:39

## 2018-08-01 NOTE — PERIOP NOTES
Teach back method used with patient concerning hibiclens wash, TB screening, incentive spirometer(1500 preop), and pain management goals.  Patient and family were provided with home discharge needs list.

## 2018-08-01 NOTE — PROGRESS NOTES
TRANSFER - IN REPORT: 
 
Verbal report received from Miguel Garcia RN on Demetrio Munguia  being received from PACU for routine post - op Report consisted of patients Situation, Background, Assessment and  
Recommendations(SBAR). Information from the following report(s) SBAR, Intake/Output and MAR was reviewed with the receiving nurse. Opportunity for questions and clarification was provided. Assessment completed upon patients arrival to unit and care assumed. Oriented to room, bed controls, and how to order meals. No complaints. Aquacel dry and intact to L hip. SCDs to LEs. Yellow gripper socks to feet and instructed not to get up without staff to assist. Significant other at bedside. Has IS. Says she will probably sleep the rest of the day.

## 2018-08-01 NOTE — PROGRESS NOTES
08/01/18 1600 Oxygen Therapy O2 Sat (%) 93 % Pulse via Oximetry 73 beats per minute O2 Device Room air Patient still groggy. Spoke with family and asked them to encourage patient to do 10 breaths every hour while awake. No shortness of breath or distress noted. BS are clear b/l. [facility] notes reviewed- continuous SAT monitoring ordered during hours of sleep; monitor #21 at bedside.

## 2018-08-01 NOTE — H&P
The patient has end stage arthritis of the left hip. The patient was see and examined and there are no changes to the patient's orthopedic condition. They have tried conservative treatment for this condition; including antiinflammatories and lifestyle modifications and have failed. The necessity for the joint replacement is still present, and the H&P from the office is still current. The patient will be admitted today for Procedure(s) (LRB): LEFT HIP ARTHROPLASTY TOTAL / Rex Pat (Left) .

## 2018-08-01 NOTE — ANESTHESIA POSTPROCEDURE EVALUATION
Post-Anesthesia Evaluation and Assessment Patient: Noris Henao MRN: [id number]  SSN: [id number] YOB: 1940  Age: 65 y.o. Sex: female Cardiovascular Function/Vital Signs Visit Vitals  /83 (BP 1 Location: Left arm, BP Patient Position: At rest)  Pulse 77  Temp 36.8 °C (98.2 °F)  Resp 16  
 Ht 5' 9\" (1.753 m)  Wt 102.1 kg (225 lb)  SpO2 95%  BMI 33.23 kg/m2 Patient is status post general anesthesia for Procedure(s): LEFT HIP ARTHROPLASTY TOTAL / Rico Willingham. Nausea/Vomiting: None Postoperative hydration reviewed and adequate. Pain: 
Pain Scale 1: Visual (08/01/18 1343) Pain Intensity 1: 0 (08/01/18 1343) Managed Neurological Status:  
Neuro (WDL): Exceptions to WDL (08/01/18 1313) Neuro Neurologic State: Sleeping (08/01/18 1313) LUE Motor Response: Purposeful (08/01/18 1313) LLE Motor Response: Weak (08/01/18 1313) RUE Motor Response: Purposeful (08/01/18 1313) RLE Motor Response: Weak (08/01/18 1313) At baseline Mental Status and Level of Consciousness: Arousable Pulmonary Status:  
O2 Device: Nasal cannula (08/01/18 1343) Adequate oxygenation and airway patent Complications related to anesthesia: None Post-anesthesia assessment completed. No concerns Signed By: Mele Man MD   
 August 1, 2018

## 2018-08-01 NOTE — CONSULTS
Physical Medicine & Rehabilitation Note-consult    Patient: Alec Porras MRN: [id number]  SSN: [id number]    YOB: 1940  Age: 65 y.o. Sex: female      Admit Date: 8/1/2018  Admitting Physician: Juanita Syed MD    Medical Decision Making/Plan/Recommend: Gait impairment and functional deficits following left total hip arthroplasty. Post op patient with no unusual barriers. PT/ OT to resume as tolerated to focus on LLE strengthening, mobility, transfers, and gait training. Will follow progress. Patient will plan for home discharge with [facility] PT.    Chief Complaint : Gait dysfunction secondary to below.   Admit Diagnosis: Unilateral primary osteoarthritis, left hip [M16.12]  left  total hip arthroplasty  8/1/2018  Pain  DVT risk  Post op hemorrhagic anemia  Essential hypertension  Status post right hip replacement 5/10/2017  Status post total right knee replacement  8/21/2017  Status post total left knee replacement 4/13/2018  Acute Rehab Dx:  Gait impairment  Debility    deconditioning  Mobility and ambulation deficits  Self Care/ADL deficits    Medical Dx:  Past Medical History:   Diagnosis Date    Anemia     history     BMI 35.0-35.9,adult 4/9/2018    Essential hypertension     on med for control     H/O echocardiogram 01/05/2017    EF 55-65%    H/O: pneumonia     01/2017    Hypercholesteremia     managed with medication     Hypomagnesemia     managed with supplement     Left ventricular diastolic dysfunction     per echo \"Grade 1 (mild)\"    Muscle weakness     generalized    Nausea & vomiting     nausea only     Osteoarthritis     Poor historian     Pressure ulcer of left buttock, stage 2     resolved per patient-previously seen by Dr. Erica Isaacs at wound center      SOB (shortness of breath)     Denies at present, states over 2 yrs ago     Status post right hip replacement 5/10/2017    Status post total left knee replacement 4/13/2018    Status post total right knee replacement 8/21/2017    Unable to ambulate     patient in wheelchair-can stand with assistance     Unsteadiness on feet      Subjective:       HPI: Elenita Anne is a 65 y.o. female patient at [address] [place] [address] who was admitted on 8/1/2018  by Lance White MD with below mentioned medical history, is being seen for Physical Medicine and Rehabilitation consult. Elenita Anne had painful left hip due to severe DJD, that has been refractory to conservative management. The symptoms were aggravated by weight bearing, walking and activity, limiting daily function. patient underwent a left ANMOL per Dr. Lance White MD on 8/1/2018. Patient is to be WBAT LLE. Hip precautions are to be followed strictly. Patient is limited post operatively due to right hip pain, decreased ROM and strength. Elenita Anne is seen and examined today. Medical Records reviewed. She had recent L TKA in April, 2018 with good result. Patient has been functionally independent prior to admission. Previous Functional Level-   independent    Present level of function  -  bed mobility - min A, transfers - min A, decreased balance , ambulation -  NT    Family History   Problem Relation Age of Onset    Heart Disease Mother     Hypertension Mother     Hypertension Father       Social History   Substance Use Topics    Smoking status: Never Smoker    Smokeless tobacco: Never Used    Alcohol use No     Past Surgical History:   Procedure Laterality Date    BREAST SURGERY PROCEDURE UNLISTED Right 1968    cyst removed    HX CHOLECYSTECTOMY  2009    HX HIP REPLACEMENT Right 05/10/2017    HX KNEE ARTHROSCOPY Bilateral     X2 to right; X1 to left    HX KNEE REPLACEMENT Right 08/2017      Prior to Admission medications    Medication Sig Start Date End Date Taking? Authorizing Provider   HYDROmorphone (DILAUDID) 2 mg tablet Take 1 Tab by mouth every four (4) hours as needed for Pain.  Max Daily Amount: 8 mg. 8/1/18 Yes Nunu , NP   aspirin delayed-release 81 mg tablet Take 1 Tab by mouth two (2) times a day for 35 days. Indications: Take for 5 weeks post op to prevent DVT 8/1/18 9/5/18 Yes Nunu , NP   aspirin delayed-release 81 mg tablet Take 81 mg by mouth daily. Yes Historical Provider   losartan (COZAAR) 100 mg tablet Take 100 mg by mouth daily. Indications: hypertension   Yes Historical Provider   acetaminophen (TYLENOL) 325 mg tablet Take 325 mg by mouth every six (6) hours as needed. Takes 2 tablets as needed for pain; Take / use AM day of surgery  per anesthesia protocols. Indications: Arthritic Pain 3/13/17  Yes Historical Provider   atenolol-chlorthalidone (TENORETIC) 50-25 mg per tablet Take 1 Tab by mouth daily. Take / use AM day of surgery  per anesthesia protocols. Indications: hypertension   Yes Historical Provider   potassium chloride SR (KLOR-CON 10) 10 mEq tablet Take 20 mEq by mouth daily. PCP started pt on this due to leg cramping; Take / use AM day of surgery  per anesthesia protocols. Indications: hypokalemia prevention   Yes Historical Provider   atorvastatin (LIPITOR) 10 mg tablet Take 10 mg by mouth daily. Take / use AM day of surgery  per anesthesia protocols. Indications: hypercholesterolemia   Yes Historical Provider   HYDROmorphone (DILAUDID) 2 mg tablet Take 1 Tab by mouth every four (4) hours as needed. Max Daily Amount: 12 mg. 4/13/18   ALMA Valentin   folic acid 753 mcg tablet Take 800 mcg by mouth daily. Historical Provider   magnesium 250 mg tab Take 1 Tab by mouth daily. Historical Provider   MULTIVITS-MIN/IRON/FA/LUTEIN (CENTRUM SILVER WOMEN PO) Take 1 Tab by mouth daily. Historical Provider     Allergies   Allergen Reactions    Avelox [Moxifloxacin] Rash and Shortness of Breath    Other Plant, Animal, Environmental Nausea Only     Allergic to Perfume & Smoke. Review of Systems: + knee pain, +antalgic gait.  Denies chest pain, shortness of breath, cough, headache, visual problems, abdominal pain, dysurea, calf pain. Pertinent positives are as noted in the medical records and unremarkable otherwise. Objective:     Vitals:  Blood pressure 174/83, pulse 78, temperature 97.5 °F (36.4 °C), resp. rate 16, height 5' 9\" (1.753 m), weight 225 lb (102.1 kg), SpO2 93 %, not currently breastfeeding. Temp (24hrs), Av.9 °F (36.6 °C), Min:97.5 °F (36.4 °C), Max:98.2 °F (36.8 °C)    BMI (calculated): 33.2 (18 1108)   Intake and Output:       Physical Exam:   General: Alert and age appropriately oriented. No acute cardio respiratory distress. HEENT: Normocephalic, no conjunctival pallor, no scleral icterus  Oral mucosa moist without cyanosis   Lungs: Clear to auscultation bilaterally. Respiration even and unlabored   Heart: Regular rate and rhythm, S1, S2   No  murmurs, clicks, rub or gallops   Abdomen: Soft, non-tender, non-distended. Genitourinary: defered   Neuromuscular:      Grossly no focal motor deficits. Left knee extension strong. Left ankle dorsiflexion 5/5  Left ankle plantarflexion 5/5  No sensory deficits distally BLE to soft touch. Skin/extremity: Calves non tender BLE. No rashes, no marginal erythema.                                                                                          Labs/Studies:  Recent Results (from the past 72 hour(s))   GLUCOSE, POC    Collection Time: 18  8:45 AM   Result Value Ref Range    Glucose (POC) 118 (H) 65 - 100 mg/dL   TYPE & SCREEN    Collection Time: 18  9:17 AM   Result Value Ref Range    Crossmatch Expiration 2018     ABO/Rh(D) O POSITIVE     Antibody screen NEG        Functional Assessment:  Reviewed participation and progress in therapies  Requires assist    Ambulation:   NT    Impression/Plan:     Principal Problem:    Status post left hip replacement (2018)    Active Problems:    Osteoarthritis of left hip (2018)        Current Facility-Administered Medications   Medication Dose Route Frequency Provider Last Rate Last Dose    tuberculin injection 5 Units  5 Units IntraDERMal ONCE Marquez Fisher MD   5 Units at 08/01/18 0800    [START ON 3/0/6061] folic acid (FOLVITE) tablet 1 mg  1 mg Oral DAILY [place], [place]        [START ON 8/2/2018] losartan (COZAAR) tablet 100 mg  100 mg Oral DAILY [place], [place]        [START ON 8/2/2018] magnesium oxide (MAG-OX) tablet 200 mg  200 mg Oral DAILY [place], [place]        [START ON 8/2/2018] potassium chloride (KLOR-CON) tablet 20 mEq  20 mEq Oral DAILY [place], [place]        0.9% sodium chloride infusion  100 mL/hr IntraVENous CONTINUOUS Teresita Munoz  mL/hr at 08/01/18 1541 100 mL/hr at 08/01/18 1541    sodium chloride (NS) flush 5-10 mL  5-10 mL IntraVENous Q8H [place], [place]        sodium chloride (NS) flush 5-10 mL  5-10 mL IntraVENous PRN Teresita Munoz PA        ceFAZolin (ANCEF) 2 g/20 mL in sterile water IV syringe  2 g IntraVENous Q8H Teresita Gay PA        acetaminophen (OFIRMEV) infusion 1,000 mg  1,000 mg IntraVENous ONCE [place], [place]        [START ON 8/2/2018] acetaminophen (TYLENOL) tablet 1,000 mg  1,000 mg Oral Q6H [place], [place]        celecoxib (CELEBREX) capsule 200 mg  200 mg Oral Q12H [place], [place]        HYDROmorphone (DILAUDID) tablet 2 mg  2 mg Oral Q4H PRN ALMA [place]        HYDROmorphone (PF) (DILAUDID) injection 1 mg  1 mg IntraVENous Q3H PRN ALMA [place]        naloxone [facility]) injection 0.2-0.4 mg  0.2-0.4 mg IntraVENous Q10MIN PRN ALMA [place]        [START ON 8/2/2018] dexamethasone (DECADRON) injection 10 mg  10 mg IntraVENous ONCE [place], [place]        ondansetron [facility]) injection 4 mg  4 mg IntraVENous Q4H PRN ALMA [place]   4 mg at 08/01/18 1540    diphenhydrAMINE (BENADRYL) capsule 25 mg  25 mg Oral Q4H PRN ALMA [place]        [START ON 8/2/2018] senna-docusate (PERICOLACE) 8.6-50 mg per tablet 2 Tab  2 Tab Oral DAILY Magdiel [place], [place]        aspirin delayed-release tablet 81 mg  81 mg Oral Q12H Magdiel [place], [place]        [START ON 8/2/2018] atenolol/chlorthalidone (TENORETIC) 50/25 mg   Oral DAILY Kristina Vargas MD            Recommendations: Recommend home therapy, HH PT. Continue Acute Rehab Program. Continue gait training, transfer training, balance activities. Coordination of rehab/medical care. Counseling of Physical Medicine & Rehab care issues management. Monitoring and management of rehab conditions per the plan of care/orders. Rehabilitation Management/ Medical Management: 1. Devices:Walkers, Type: Rolling Walker  2. Consult:Rehab team including PT, OT,  and . 3. Disposition Rehab-discussed with patient. 4. Thigh-high or knee-high DOUGLAS's when out of bed. 5. DVT Prophylaxis - aspirin 81mg bid x 30days. 6. Incentive spirometer Q1H while awake  7. Post op hemorrhagic anemia- monitor. 8. Activity: WBAT LLE, total hip precautions. 9. Planned Labs: CBC,BMP  10. Pain Control:    Continue scheduled tylenol, celebrex and  PRN meds. 11.Wound Care: Keep left TKA wound clean and dry and reinforce dressing PRN. May remove Aquacel 1 week post op ad replace with new one. Remove staples 12-14 post surgery, when incision appears appropriately closed and apply benzoin and 1/2\" steristrips. Follow up with ORTHO per instructions. Thank you for the opportunity to participate in the care of this patient.     Signed By: Daryn Pires MD

## 2018-08-01 NOTE — PROGRESS NOTES
PT/OT note:  Checked on patient multiple times after surgery for therapy assessments. On last attempt, patient adamantly declined getting up secondary to feeling sick to her stomach. Agreed to check back on patient tomorrow.    
 
Bolivar Morales, PT

## 2018-08-01 NOTE — PERIOP NOTES
TRANSFER - IN REPORT: 
 
Verbal report received from [address] [place] [address] on Alisson Shown  being received from ortho (unit) for routine progression of care Report consisted of patients Situation, Background, Assessment and  
Recommendations(SBAR). Information from the following report(s) SBAR, Procedure Summary, Intake/Output, MAR and Recent Results was reviewed with the receiving nurse. Opportunity for questions and clarification was provided. Assessment completed upon patients arrival to unit and care assumed.

## 2018-08-01 NOTE — ANESTHESIA PREPROCEDURE EVALUATION
Anesthetic History No history of anesthetic complications Review of Systems / Medical History Patient summary reviewed and pertinent labs reviewed Pulmonary Sleep apnea: No treatment Neuro/Psych Within defined limits Cardiovascular Hypertension: well controlled Exercise tolerance[de-identified] Difficulty ambulating due to knee GI/Hepatic/Renal 
Within defined limits Endo/Other Obesity Other Findings Physical Exam 
 
Airway Mallampati: II 
TM Distance: 4 - 6 cm Neck ROM: normal range of motion Mouth opening: Normal 
 
 Cardiovascular Regular rate and rhythm,  S1 and S2 normal,  no murmur, click, rub, or gallop Dental 
 
Dentition: Full lower dentures and Full upper dentures Pulmonary Breath sounds clear to auscultation Abdominal 
GI exam deferred Other Findings Anesthetic Plan ASA: 3 Patient did not consent to regional anesthesiaAnesthesia type: general 
 
 
Post-op pain plan if not by surgeon: peripheral nerve block single Induction: Intravenous Anesthetic plan and risks discussed with: Patient Pt had failed SAB with R ANMOL. She has requested GA with her 2 previous TKA's and now requests GA for L ANMOL.

## 2018-08-01 NOTE — IP AVS SNAPSHOT
[address] 
[phone] Patient: Brigette Bear MRN: [id number] :1940 About your hospitalization You were admitted on:  2018 You last received care in the:  [facility] 1 You were discharged on:  August 3, 2018 Why you were hospitalized Your primary diagnosis was:  Status Post Left Hip Replacement Your diagnoses also included:  Osteoarthritis Of Left Hip Follow-up Information Follow up With Details Comments Contact Info Pop Sparks MD  As needed [address] \[facility]\""[facility] [address] [facility] [place] Reid [address] 
[phone] Lila Reilly MD  As scheduled by office [address] [address] 
[phone] [address]  Will contact you within 48 hrs [facility] [address] [address] 
[phone] Discharge Orders Procedure Order Date Status Priority Quantity Spec Type Associated Dx CALL YOUR DOCTOR For: Temperature greater than 100.4., Persistant nausea and vomiting., Severe uncontrolled pain. , Redness, tenderness, or signs of infection. , Difficulty breathing, headache, or visual disturbances. , Hives, Extreme fatigue. 18 Normal Routine 1  Status post left hip replacement [[id number]] Questions: For:  Temperature greater than 100.4. For:  Persistant nausea and vomiting. For:  Severe uncontrolled pain. For:  Redness, tenderness, or signs of infection. For:  Difficulty breathing, headache, or visual disturbances. For:  Jaden Ginger For:  Extreme fatigue. DIET REGULAR No added salt 18 Normal Routine 1  Status post left hip replacement [[id number]] Questions: Additional options:  No added salt DRESSING, CHANGE SPECIFY 18 Normal Routine 1  Status post left hip replacement [[id number]] Comments:  If the dressing is saturated please notify the office. If staples are present these should be removed 10-14 days postop and steri strips applied. This should be done under sterile technique. REFERRAL TO HOME HEALTH 08/01/18 0939 Normal Routine 1  Status post left hip replacement [[id number]] REFERRAL TO PHYSICAL THERAPY 08/01/18 0939 Normal Routine 1  Status post left hip replacement [[id number]] A check simi indicates which time of day the medication should be taken. My Medications CHANGE how you take these medications Instructions Each Dose to Equal  
 Morning Noon Evening Bedtime  
 aspirin delayed-release 81 mg tablet What changed:  when to take this Your next dose is: Today Take 1 Tab by mouth two (2) times a day for 35 days. Indications: Take for 5 weeks post op to prevent DVT 81 mg HYDROmorphone 2 mg tablet Commonly known as:  DILAUDID What changed:  reasons to take this Your next dose is: Today @ 12:30 pm, if needed Take 1 Tab by mouth every four (4) hours as needed for Pain. Max Daily Amount: 8 mg.  
 2 mg CONTINUE taking these medications Instructions Each Dose to Equal  
 Morning Noon Evening Bedtime  
 acetaminophen 325 mg tablet Commonly known as:  TYLENOL Your next dose is: Today Take 325 mg by mouth every six (6) hours as needed. Takes 2 tablets as needed for pain; Take / use AM day of surgery  per anesthesia protocols. Indications: Arthritic Pain 325 mg  
    
   
   
  
   
  
 atenolol-chlorthalidone 50-25 mg per tablet Commonly known as:  Fayrene Punch Your next dose is:  Tomorrow Take 1 Tab by mouth daily. Take / use AM day of surgery  per anesthesia protocols. Indications: hypertension 1 Tab  
    
  
   
   
   
  
 atorvastatin 10 mg tablet Commonly known as:  LIPITOR Your next dose is: Today Take 10 mg by mouth daily. Take / use AM day of surgery  per anesthesia protocols. Indications: hypercholesterolemia 10 mg CENTRUM SILVER WOMEN PO Your next dose is:  Tomorrow Take 1 Tab by mouth daily. 1 Tab  
    
  
   
   
   
  
 folic acid 782 mcg tablet Your next dose is:  Tomorrow Take 800 mcg by mouth daily. 800 mcg  
    
  
   
   
   
  
 losartan 100 mg tablet Commonly known as:  COZAAR Your next dose is:  Tomorrow Take 100 mg by mouth daily. Indications: hypertension 100 mg  
    
  
   
   
   
  
 magnesium 250 mg Tab Your next dose is:  Tomorrow Take 1 Tab by mouth daily. 1 Tab  
    
  
   
   
   
  
 potassium chloride SR 10 mEq tablet Commonly known as:  KLOR-CON 10 Your next dose is:  Tomorrow Take 20 mEq by mouth daily. PCP started pt on this due to leg cramping; Take / use AM day of surgery  per anesthesia protocols. Indications: hypokalemia prevention 20 mEq Where to Get Your Medications Information on where to get these meds will be given to you by the nurse or doctor. ! Ask your nurse or doctor about these medications  
  aspirin delayed-release 81 mg tablet HYDROmorphone 2 mg tablet Opioid Education Prescription Opioids: What You Need to Know: 
 
Prescription opioids can be used to help relieve moderate-to-severe pain and are often prescribed following a surgery or injury, or for certain health conditions. These medications can be an important part of treatment but also come with serious risks. Opioids are strong pain medicines. Examples include hydrocodone, oxycodone, fentanyl, and morphine. Heroin is an example of an illegal opioid. It is important to work with your health care provider to make sure you are getting the safest, most effective care. WHAT ARE THE RISKS AND SIDE EFFECTS OF OPIOID USE? Prescription opioids carry serious risks of addiction and overdose, especially with prolonged use. An opioid overdose, often marked by slow breathing, can cause sudden death. The use of prescription opioids can have a number of side effects as well, even when taken as directed. · Tolerance-meaning you might need to take more of a medication for the same pain relief · Physical dependence-meaning you have symptoms of withdrawal when the medication is stopped. Withdrawal symptoms can include nausea, sweating, chills, diarrhea, stomach cramps, and muscle aches. Withdrawal can last up to several weeks, depending on which drug you took and how long you took it. · Increased sensitivity to pain · Constipation · Nausea, vomiting, and dry mouth · Sleepiness and dizziness · Confusion · Depression · Low levels of testosterone that can result in lower sex drive, energy, and strength · Itching and sweating RISKS ARE GREATER WITH:      
· History of drug misuse, substance use disorder, or overdose · Mental health conditions (such as depression or anxiety) · Sleep apnea · Older age (72 years or older) · Pregnancy Avoid alcohol while taking prescription opioids. Also, unless specifically advised by your health care provider, medications to avoid include: · Benzodiazepines (such as Xanax or Valium) · Muscle relaxants (such as Soma or Flexeril) · Hypnotics (such as Ambien or Lunesta) · Other prescription opioids KNOW YOUR OPTIONS Talk to your health care provider about ways to manage your pain that don't involve prescription opioids. Some of these options may actually work better and have fewer risks and side effects. Options may include: 
· Pain relievers such as acetaminophen, ibuprofen, and naproxen · Some medications that are also used for depression or seizures · Physical therapy and exercise · Counseling to help patients learn how to cope better with triggers of pain and stress. · Application of heat or cold compress · Massage therapy · Relaxation techniques Be Informed Make sure you know the name of your medication, how much and how often to take it, and its potential risks & side effects. IF YOU ARE PRESCRIBED OPIOIDS FOR PAIN: 
· Never take opioids in greater amounts or more often than prescribed. Remember the goal is not to be pain-free but to manage your pain at a tolerable level. · Follow up with your primary care provider to: · Work together to create a plan on how to manage your pain. · Talk about ways to help manage your pain that don't involve prescription opioids. · Talk about any and all concerns and side effects. · Help prevent misuse and abuse. · Never sell or share prescription opioids · Help prevent misuse and abuse. · Store prescription opioids in a secure place and out of reach of others (this may include visitors, children, friends, and family). · Safely dispose of unused/unwanted prescription opioids: Find your community drug take-back program or your pharmacy mail-back program, or flush them down the toilet, following guidance from the Food and Drug Administration (www.fda.gov/Drugs/ResourcesForYou). · Visit www.cdc.gov/drugoverdose to learn about the risks of opioid abuse and overdose. · If you believe you may be struggling with addiction, tell your health care provider and ask for guidance or call [address] [place] [address] at [phone]. Discharge Instructions [employer] Patient Discharge Instructions Clementeen Siemens / [id number] : 1940 Admitted 2018 Discharged: 2018 IF YOU HAVE ANY PROBLEMS ONCE YOU ARE AT HOME CALL THE FOLLOWING NUMBERS:  
Main office number: [phone] Medications · The medications you are to continue on are listed on the medication reconciliation sheet. · Narcotic pain medications as well as supplemental iron can cause constipation. If this occurs try stopping the narcotic pain medication and/or the iron. · It is important that you take the medication exactly as they are prescribed. · Medications which increase your risk of blood clots are listed to stop for 5 weeks after surgery as well as medications or supplements which increase your risk of bleeding complications. · Keep your medication in the bottles provided by the pharmacist and keep a list of the medication names, dosages, and times to be taken in your wallet. · Do not take other medications without consulting your doctor. Important Information Do NOT smoke as this will greatly increase your risk of infection! Resume your prehospital diet. If you have excessive nausea or vomitting call your doctor's office Leg swelling and warmth is normal for 6 months after surgery. If you experience swelling in your leg elevate you leg while laying down with your toes above your heart. If you have sudden onset severe swelling with leg pain call our office. The stitches deep inside take approximately 6 months to dissolve. There will be sharp shooting, stinging and burning pain. This is normal and will resolve between 3-6 months after surgery. Difficulty sleeping is normal following total Knee and Hip replacement. You may try melatonin, an over-the-counter sleep aid or benadryl to help with sleep. Most patients will resume sleeping through the night 8 weeks after surgery. Home Physical Therapy is arranged. Home Health will contact you within 48 hrs of discharge that you have chosen. If you have not received a call within this time frame please contact that provider you chose. You should be given this information before you leave the hospital.  
 
You are at a risk for falls. Use the rolling walker when walking.   
 
Patients who have had a joint replacement should not drive if they are still taking narcotic pain mediation during the daytime hours. Most patients wean themselves off of pain medication within 2-5 weeks after surgery. When to Call the office - If you have a temperature greater then 101 
- Uncontrolled vomiting - Loose control of your bladder or bowel function - Are unable to bear any weight  
- Need a pain medication refill DISCHARGE SUMMARY from Nurse The following personal items collected during your admission are returned to you:  
Dental Appliance: Dental Appliances: Uppers, Lowers, With patient Vision:   na 
Hearing Aid:   na 
Jewelry: Jewelry: None Clothing: Clothing: At bedside Other Valuables: Other Valuables: Joselyn Guajardo (son to take purse) Valuables sent to safe:   na 
 
PATIENT INSTRUCTIONS: 
 
After general anesthesia or intravenous sedation, for 24 hours or while taking prescription Narcotics: · Limit your activities · Do not drive and operate hazardous machinery · Do not make important personal or business decisions · Do  not drink alcoholic beverages · If you have not urinated within 8 hours after discharge, please contact your surgeon on call. Report the following to your surgeon: 
· Excessive pain, swelling, redness or odor of or around the surgical area · Temperature over 101 · Nausea and vomiting lasting longer than 4 hours or if unable to take medications · Any signs of decreased circulation or nerve impairment to extremity: change in color, persistent  numbness, tingling, coldness or increase pain · Follow hip precautions @ all times! · Any questions, call office @ [phone] Keep scheduled follow up appointment. If need to change, call office @ [phone]. *  Please give a list of your current medications to your Primary Care Provider. *  Please update this list whenever your medications are discontinued, doses are 
    changed, or new medications (including over-the-counter products) are added. *  Please carry medication information at all times in case of emergency situations. Hip Replacement Surgery (Posterior): What to Expect at [facility] Your Recovery Hip replacement surgery replaces the worn parts of your hip joint. When you leave the hospital, you will probably be walking with crutches or a walker. You may be able to climb a few stairs and get in and out of bed and chairs. But you will need someone to help you at home for the next few weeks or until you have more energy and can move around better. If there is no one to help you at home, you may go to a rehabilitation center or long-term care center. You will go home with a bandage and stitches, staples, tissue glue, or tape strips. You can remove the bandage when your doctor tells you to. If you have stitches or staples, your doctor will remove them 10 days to 3 weeks after your surgery. Glue or tape strips will fall off on their own over time. You may still have some mild pain, and the area may be swollen for 3 to 4 months after surgery. Your doctor will give you medicine for the pain. You will continue the rehabilitation program (rehab) you started in the hospital. The better you do with your rehab exercises, the sooner you will get your strength and movement back. Most people are able to return to work 4 weeks to 4 months after surgery. This care sheet gives you a general idea about how long it will take for you to recover. But each person recovers at a different pace. Follow the steps below to get better as quickly as possible. How can you care for yourself at home? Activity 
  · Your doctor may not want your affected leg to cross the center of your body toward the other leg. If so, your therapist may suggest these ideas: ¨ Do not cross your legs. ¨ Be very careful as you get in or out of bed or a car, so your leg does not cross that imaginary line in the middle of your body.   · Rest when you feel tired. You may take a nap, but do not stay in bed all day.  
  · Work with your physical therapist to learn the best way to exercise. You may be able to take frequent, short walks using crutches or a walker. You will probably have to use crutches or a walker for at least 4 to 6 weeks.  
  · Your doctor may advise you to stay away from activities that put stress on the joint. This includes sports such as tennis, football, and jogging.  
  · Try not to sit for too long at one time. You will feel less stiff if you take a short walk about every hour. When you sit, use chairs with arms, and do not sit in low chairs.  
  · Do not bend over more than 90 degrees (like the angle in a letter \"L\").  
  · Sleep on your back with your legs slightly apart or on your side with a pillow between your knees for about 6 weeks or as your doctor tells you. Do not sleep on your stomach or affected leg.  
  · Ask your doctor when you can drive again.  
  · Most people are able to return to work 4 weeks to 4 months after surgery.  
  · Ask your doctor when it is okay for you to have sex. Diet 
  · By the time you leave the hospital, you will probably be eating your normal diet. If your stomach is upset, try bland, low-fat foods like plain rice, broiled chicken, toast, and yogurt. Your doctor may recommend that you take iron and vitamin supplements.  
  · Drink plenty of fluids (unless your doctor tells you not to).   · Eat healthy foods, and watch your portion sizes. Try to stay at your ideal weight. Too much weight puts more stress on your new hip joint.  
  · You may notice that your bowel movements are not regular right after your surgery. This is common. Try to avoid constipation and straining with bowel movements. You may want to take a fiber supplement every day. If you have not had a bowel movement after a couple of days, ask your doctor about taking a mild laxative. Medicines   · Your doctor will tell you if and when you can restart your medicines. He or she will also give you instructions about taking any new medicines.  
  · If you take blood thinners, such as warfarin (Coumadin), clopidogrel (Plavix), or aspirin, be sure to talk to your doctor. He or she will tell you if and when to start taking those medicines again. Make sure that you understand exactly what your doctor wants you to do.  
  · Your doctor may give you a blood-thinning medicine to prevent blood clots. If you take a blood thinner, be sure you get instructions about how to take your medicine safely. Blood thinners can cause serious bleeding problems. This medicine could be in pill form or as a shot (injection). If a shot is necessary, your doctor will tell you how to do this.  
  · Be safe with medicines. Take pain medicines exactly as directed. ¨ If the doctor gave you a prescription medicine for pain, take it as prescribed. ¨ If you are not taking a prescription pain medicine, ask your doctor if you can take an over-the-counter medicine.  
  · If you think your pain medicine is making you sick to your stomach: 
¨ Take your medicine after meals (unless your doctor has told you not to). ¨ Ask your doctor for a different pain medicine.  
  · If your doctor prescribed antibiotics, take them as directed. Do not stop taking them just because you feel better. You need to take the full course of antibiotics. Incision care 
  · If your doctor told you how to care for your cut (incision), follow your doctor's instructions. You will have a dressing over the cut. A dressing helps the incision heal and protects it. Your doctor will tell you how to take care of this.  
  · If you did not get instructions, follow this general advice: ¨ If you have strips of tape on the cut the doctor made, leave the tape on for a week or until it falls off.  
¨ If you have stitches or staples, your doctor will tell you when to come back to have them removed. ¨ If you have skin adhesive on the cut, leave it on until it falls off. Skin adhesive is also called glue or liquid stitches. ¨ Change the bandage every day. ¨ Wash the area daily with warm water, and pat it dry. Don't use hydrogen peroxide or alcohol. They can slow healing. ¨ You may cover the area with a gauze bandage if it oozes fluid or rubs against clothing. ¨ You may shower 24 to 48 hours after surgery. Pat the incision dry. Don't swim or take a bath for the first 2 weeks, or until your doctor tells you it is okay. Exercise 
  · Your rehab program will include a number of exercises to do. Always do them as your therapist tells you. Ice and elevation 
  · For pain, put ice or a cold pack on the area for 10 to 20 minutes at a time. Put a thin cloth between the ice and your skin.  
  · Your ankle may swell for about 3 months. Prop up your ankle when you ice it or anytime you sit or lie down. Try to keep it above the level of your heart. This will help reduce swelling. Other instructions 
 Continue to wear your support stockings as your doctor says. These help to prevent blood clots. The length of time that you will have to wear them depends on your activity level and the amount of swelling you have. Most people wear these stockings for 4 to 6 weeks after surgery. 
 Preventing falls is also very important. To prevent falls: 
  · Arrange furniture so that you will not trip on it.  
  · Get rid of throw rugs, and move electrical cords out of the way.  
  · Walk only in areas with plenty of light.  
  · Put grab bars in showers and bathtubs.  
  · Avoid icy or snowy sidewalks.  
  · Wear shoes with sturdy, flat soles. Follow-up care is a key part of your treatment and safety. Be sure to make and go to all appointments, and call your doctor if you are having problems. It's also a good idea to know your test results and keep a list of the medicines you take. When should you call for help? Call 911 anytime you think you may need emergency care. For example, call if: 
  · You passed out (lost consciousness).  
  · You have severe trouble breathing.  
  · You have sudden chest pain and shortness of breath, or you cough up blood.  
 Call your doctor now or seek immediate medical care if: 
  · You have signs that your hip may be dislocated, including: ¨ Severe pain and not being able to stand. ¨ A crooked leg that looks like your hip is out of position. ¨ Not being able to bend or straighten your leg.  
  · Your leg or foot is cool or pale or changes color.  
  · You cannot feel or move your leg.  
  · You have signs of a blood clot, such as: 
¨ Pain in your calf, back of the knee, thigh, or groin. ¨ Redness and swelling in your leg or groin.  
  · Your incision comes open and begins to bleed, or the bleeding increases.  
  · You feel like your heart is racing or beating irregularly.  
  · You have signs of infection, such as: 
¨ Increased pain, swelling, warmth, or redness. ¨ Red streaks leading from the incision. ¨ Pus draining from the incision. ¨ A fever.  
 Watch closely for changes in your health, and be sure to contact your doctor if: 
  · You do not have a bowel movement after taking a laxative.  
  · You do not get better as expected. Where can you learn more? Go to http://ada-melecio.info/. Enter R032 in the search box to learn more about \"Hip Replacement Surgery (Posterior): What to Expect at Home. \" Current as of: November 29, 2017 Content Version: 11.7 © 6155-6042 [employer]. Care instructions adapted under license by [employer] (which disclaims liability or warranty for this information). If you have questions about a medical condition or this instruction, always ask your healthcare professional. [address] any warranty or liability for your use of this information. These are general instructions for a healthy lifestyle: No smoking/ No tobacco products/ Avoid exposure to second hand smoke Surgeon General's Warning:  Quitting smoking now greatly reduces serious risk to your health. Obesity, smoking, and sedentary lifestyle greatly increases your risk for illness A healthy diet, regular physical exercise & weight monitoring are important for maintaining a healthy lifestyle You may be retaining fluid if you have a history of heart failure or if you experience any of the following symptoms:  Weight gain of 3 pounds or more overnight or 5 pounds in a week, increased swelling in our hands or feet or shortness of breath while lying flat in bed. Please call your doctor as soon as you notice any of these symptoms; do not wait until your next office visit. Recognize signs and symptoms of STROKE: 
 
F-face looks uneven A-arms unable to move or move even S-speech slurred or non-existent T-time-call 911 as soon as signs and symptoms begin-DO NOT go Back to bed or wait to see if you get better-TIME IS BRAIN. The discharge information has been reviewed with the patient. The patient verbalized understanding. Information obtained by : 
I understand that if any problems occur once I am at home I am to contact my physician. I understand and acknowledge receipt of the instructions indicated above. Physician's or R.N.'s Signature                                                                  Date/Time Patient or Representative Signature                                                          Date/Time Introducing [facility]! Carol Vidal introduces [employer] patient portal. Now you can access parts of your medical record, email your doctor's office, and request medication refills online. 1. In your internet browser, go to [email or web address] 2. Click on the First Time User? Click Here link in the Sign In box. You will see the New Member Sign Up page. 3. Enter your [employer] Access Code exactly as it appears below. You will not need to use this code after youve completed the sign-up process. If you do not sign up before the expiration date, you must request a new code. · [employer] Access Code: [email or web address] Expires: 8/29/2018  4:12 AM 
 
4. Enter the last four digits of your Social Security Number (xxxx) and Date of Birth (mm/dd/yyyy) as indicated and click Submit. You will be taken to the next sign-up page. 5. Create a Break[employer] Studios ID. This will be your [employer] login ID and cannot be changed, so think of one that is secure and easy to remember. 6. Create a [employer] password. You can change your password at any time. 7. Enter your Password Reset Question and Answer. This can be used at a later time if you forget your password. 8. Enter your e-mail address. You will receive e-mail notification when new information is available in [address] 9. Click Sign Up. You can now view and download portions of your medical record. 10. Click the Download Summary menu link to download a portable copy of your medical information. If you have questions, please visit the Frequently Asked Questions section of the [employer] website. Remember, [employer] is NOT to be used for urgent needs. For medical emergencies, dial 911. Now available from your iPhone and Android! Introducing See Jimenez As a [facility] patient, I wanted to make you aware of our electronic visit tool called See Jimenez. [facility] 24/7 allows you to connect within minutes with a medical provider 24 hours a day, seven days a week via a mobile device or tablet or logging into a secure website from your computer. You can access [facility] from anywhere in the [place]. A virtual visit might be right for you when you have a simple condition and feel like you just dont want to get out of bed, or cant get away from work for an appointment, when your regular [facility] provider is not available (evenings, weekends or holidays), or when youre out of town and need minor care. Electronic visits cost only $49 and if the [facility] More Alive Than You 24/FoneSense provider determines a prescription is needed to treat your condition, one can be electronically transmitted to a nearby pharmacy*. Please take a moment to enroll today if you have not already done so. The enrollment process is free and takes just a few minutes. To enroll, please download the Fleksy bob to your tablet or phone, or visit [email or web address] to enroll on your computer. And, as an [address] [place] [address] patient with a [employer] account, the results of your visits will be scanned into your electronic medical record and your primary care provider will be able to view the scanned results. We urge you to continue to see your regular [facility] provider for your ongoing medical care. And while your primary care provider may not be the one available when you seek a See Jimenez virtual visit, the peace of mind you get from getting a real diagnosis real time can be priceless. For more information on See Rickyadilenefin, view our Frequently Asked Questions (FAQs) at [email or web address]. Sincerely, 
 
Padilla Islas MD 
Chief Medical Officer Rubén [place] *:  certain medications cannot be prescribed via See Avistabuck Providers Seen During Your Hospitalization Provider Specialty Primary office phone Екатерина Sinclair MD Orthopedic Surgery [phone] Immunizations Administered for This Admission Name Date  
 TB Skin Test (PPD) Intradermal  Deferred (),  Deferred (), 8/1/2018 Your Primary Care Physician (PCP) Primary Care Physician Office Phone Office Fax Shy Tapia [phone] You are allergic to the following Allergen Reactions Avelox (Moxifloxacin) Rash Shortness of Breath Other Plant, Animal, Environmental Nausea Only Allergic to Perfume & Smoke. Recent Documentation Height Weight Breastfeeding? BMI OB Status Smoking Status 1.753 m 102.1 kg No 33.23 kg/m2 Postmenopausal Never Smoker Emergency Contacts Name Discharge Info Relation Home Work [facility] DISCHARGE CAREGIVER [3] Son [22]   [phone] Manuel Barnes DISCHARGE CAREGIVER [3] Boyfriend [17]   [phone] Patient Belongings The following personal items are in your possession at time of discharge: 
  Dental Appliances: Uppers, Lowers, With patient         Home Medications: Kept at bedside   Jewelry: None  Clothing: At bedside    Other Valuables: Sondra Lopezene (son to take purse) Please provide this summary of care documentation to your next provider. Signatures-by signing, you are acknowledging that this After Visit Summary has been reviewed with you and you have received a copy. Patient Signature:  ____________________________________________________________ Date:  ____________________________________________________________  
  
[facility] Provider Signature:  ____________________________________________________________ Date:  ____________________________________________________________

## 2018-08-01 NOTE — OP NOTES
Total Hip Procedure Note    Minal Levin,  [id number],  1940    Pre-operative Diagnosis:  Unilateral primary osteoarthritis, left hip [M16.12]  Post-operative Diagnosis: Osteoarthritis of left hip    Procedure: Procedure(s) (LRB):  LEFT HIP ARTHROPLASTY TOTAL / Suann Krystal (Left)  Location: [address] [place] [address]  Surgeon: Marco Antonio Sol MD  Assistant: Darryle Beach    Anesthesia: Spinal  Findings: severe degenerative arthritis, acetabular osteophytes and bone spurs around the femoral head. EBL: 300cc  BMI: Body mass index is 33.23 kg/(m^2). Procedure: The complexity of total joint surgery requires the use of a first assistant for positioning, retraction and expertise in closure. Minal Levin was brought to the operating room and positioned on the operating table. Anesthesia was administered. A jeong catheter was placed preoperatively and IV antibiotics were administered. A time out identifying the patient, procedure, operative side and surgeon was administered and charted by the OR Nurse. The patient was positioned on the contralateral decubitus position. The limb was prepped and draped in the usual sterile fashion. The Posterior approach was utilized to expose the hip. The incision was carried through the subcutaneous tissue and underlying fascia with hemostasis obtained using the bovie cauterization. A Charmley retractor was inserted. The short external rotators were divided at their insertion. The sciatic nerve was palpated and identified. Next, a T-shaped capsular incision was accomplished and femoral head was dislocated. The neck was osteotomized in the appropriate position just above the lesser trochanteric region. The neck cut was measured. Acetabular retractors were placed and the appropriate capsulotomy performed. Soft tissue was removed from the acetabulum. The acetabulum was sequentially reamed. Using trial components the acetabular component was sized.  The acetabular component was impacted at approximately 40 degrees horizontal tilt and 20 degrees anteversion. No screws were used to fixate the cup. The real polyethylene liner was impacted into position. Utilizing the femoral retractor, the canal was prepared with appropriate lateralization and reamed with the starter reamer. The canal was then broached progressively to accommodate the DePuy stem. The broach was positioned with the anatomic femoral anteversion. A calcar planar was utilized. Trials were preformed using various neck length until the most suitable one was determined and found to be stable to flexion greater than 90 degrees and on internal and external rotation. Limb lengths were thought to be equilibrated and with appropriate stability as mentioned above. All trial components were removed. The cementless permanent stem was impacted into place. A trial reduction was performed and the above neck length was selected. The permanent ceramic femoral head was impacted into place. The hip was reduced and the stability was as mentioned as above. Copious irrigation was accomplished about the surgical site. The sciatic nerve was palpated and noted to be intact. The capsule was repaired with an absorbable suture and the external rotators were reattached with a #5 Mersilene. The operative hip was injected with 60 cc of Neuropin, 1cc of 10 mg of morphine, and 1 cc of 30 mg of Toradol. The Hip was then soaked with a diluted betadine solution for approximately Min and then thoroughly irrigated. A #1 PDS suture was used to re-approximate the fascia. Then, 1 gram (100 mg/ml) of Transexamic Acid was injected into the joint space. An insorb stapler with absorbable sutures were used to approximate the subcutaneous layers. Staples were applied in an occlusive fashion and a sterile bandage was placed. The patient was then rolled to a supine position. The sponge, needle and instrument counts were correct.  The patient tolerated the procedure without difficulty and left the operating room in satisfactory condition. Implants:   Implant Name Type Inv.  Item Serial No.  Lot No. LRB No. Used   HEAD FEM S-ROM 36MM +5MM NK -- BIOLOX DELTA - [id number]  HEAD FEM S-ROM 36MM +5MM NK -- BIOLOX DELTA [id number] [facility] ORTHOPEDICS [id number] Left 1   STEM FEM CORAIL STD COL SZ 14 --  - [id number]  STEM FEM CORAIL STD COL SZ 14 --  [id number] [facility] [id number] Left 1                            Signed By: Irma Garrison MD  8/1/2018,  12:29 PM

## 2018-08-01 NOTE — PERIOP NOTES
TRANSFER - OUT REPORT: 
 
Verbal report given to Arline<RN on Aura Pompa  being transferred to [facility]) for routine progression of care Report consisted of patients Situation, Background, Assessment and  
Recommendations(SBAR). Information from the following report(s) Kardex, MAR and Recent Results was reviewed with the receiving nurse. Lines:    
 
Opportunity for questions and clarification was provided. Patient transported with: 
 Comic Reply

## 2018-08-01 NOTE — PROGRESS NOTES
Spiritual Care initial visit made by Yanely Placely. Prayer and support given. Vee swanson. FRANCESCO Bustillo Div / Bereavement Coordinator

## 2018-08-01 NOTE — PERIOP NOTES
TRANSFER - OUT REPORT: 
 
Verbal report given to [facility]  on Saad Castillo  being transferred to [facility] for routine post - op Report consisted of patients Situation, Background, Assessment and  
Recommendations(SBAR). Information from the following report(s) SBAR was reviewed with the receiving nurse. Opportunity for questions and clarification was provided. Patient transported with: 
 O2 @ 2 liters Tech

## 2018-08-02 ENCOUNTER — HOME HEALTH ADMISSION (OUTPATIENT)
Dept: HOME HEALTH SERVICES | Facility: HOME HEALTH | Age: 78
End: 2018-08-02
Payer: MEDICARE

## 2018-08-02 LAB
ANION GAP SERPL CALC-SCNC: 13 MMOL/L (ref 7–16)
BUN SERPL-MCNC: 27 MG/DL (ref 8–23)
CALCIUM SERPL-MCNC: 8.3 MG/DL (ref 8.3–10.4)
CHLORIDE SERPL-SCNC: 105 MMOL/L (ref 98–107)
CO2 SERPL-SCNC: 25 MMOL/L (ref 21–32)
CREAT SERPL-MCNC: 1.37 MG/DL (ref 0.6–1)
GLUCOSE SERPL-MCNC: 135 MG/DL (ref 65–100)
HGB BLD-MCNC: 9.8 G/DL (ref 11.7–15.4)
MM INDURATION POC: 0 MM (ref 0–5)
POTASSIUM SERPL-SCNC: 4.1 MMOL/L (ref 3.5–5.1)
PPD POC: NORMAL NEGATIVE
SODIUM SERPL-SCNC: 143 MMOL/L (ref 136–145)

## 2018-08-02 PROCEDURE — 97165 OT EVAL LOW COMPLEX 30 MIN: CPT

## 2018-08-02 PROCEDURE — 74011250637 HC RX REV CODE- 250/637: Performed by: ORTHOPAEDIC SURGERY

## 2018-08-02 PROCEDURE — 97110 THERAPEUTIC EXERCISES: CPT

## 2018-08-02 PROCEDURE — 80048 BASIC METABOLIC PNL TOTAL CA: CPT

## 2018-08-02 PROCEDURE — 74011250636 HC RX REV CODE- 250/636: Performed by: PHYSICIAN ASSISTANT

## 2018-08-02 PROCEDURE — 97116 GAIT TRAINING THERAPY: CPT

## 2018-08-02 PROCEDURE — 97150 GROUP THERAPEUTIC PROCEDURES: CPT

## 2018-08-02 PROCEDURE — 97161 PT EVAL LOW COMPLEX 20 MIN: CPT

## 2018-08-02 PROCEDURE — 97535 SELF CARE MNGMENT TRAINING: CPT

## 2018-08-02 PROCEDURE — 65270000029 HC RM PRIVATE

## 2018-08-02 PROCEDURE — 77010033678 HC OXYGEN DAILY

## 2018-08-02 PROCEDURE — 36415 COLL VENOUS BLD VENIPUNCTURE: CPT

## 2018-08-02 PROCEDURE — 85018 HEMOGLOBIN: CPT

## 2018-08-02 PROCEDURE — 94760 N-INVAS EAR/PLS OXIMETRY 1: CPT

## 2018-08-02 PROCEDURE — 74011250637 HC RX REV CODE- 250/637: Performed by: PHYSICIAN ASSISTANT

## 2018-08-02 RX ADMIN — POTASSIUM CHLORIDE 20 MEQ: 10 TABLET, EXTENDED RELEASE ORAL at 09:28

## 2018-08-02 RX ADMIN — ASPIRIN 81 MG: 81 TABLET, DELAYED RELEASE ORAL at 22:13

## 2018-08-02 RX ADMIN — Medication 10 ML: at 22:10

## 2018-08-02 RX ADMIN — ACETAMINOPHEN 1000 MG: 500 TABLET, FILM COATED ORAL at 12:02

## 2018-08-02 RX ADMIN — CELECOXIB 200 MG: 200 CAPSULE ORAL at 22:13

## 2018-08-02 RX ADMIN — ACETAMINOPHEN 1000 MG: 500 TABLET, FILM COATED ORAL at 17:46

## 2018-08-02 RX ADMIN — MAGNESIUM OXIDE TAB 400 MG (241.3 MG ELEMENTAL MG) 200 MG: 400 (241.3 MG) TAB at 09:26

## 2018-08-02 RX ADMIN — CELECOXIB 200 MG: 200 CAPSULE ORAL at 09:25

## 2018-08-02 RX ADMIN — LOSARTAN POTASSIUM 100 MG: 50 TABLET ORAL at 09:28

## 2018-08-02 RX ADMIN — HYDROMORPHONE HYDROCHLORIDE 2 MG: 2 TABLET ORAL at 05:41

## 2018-08-02 RX ADMIN — ACETAMINOPHEN 1000 MG: 500 TABLET, FILM COATED ORAL at 00:21

## 2018-08-02 RX ADMIN — ACETAMINOPHEN 1000 MG: 500 TABLET, FILM COATED ORAL at 00:11

## 2018-08-02 RX ADMIN — SENNOSIDES AND DOCUSATE SODIUM 2 TABLET: 8.6; 5 TABLET ORAL at 09:27

## 2018-08-02 RX ADMIN — CHLORTHALIDONE: 25 TABLET ORAL at 09:27

## 2018-08-02 RX ADMIN — HYDROMORPHONE HYDROCHLORIDE 2 MG: 2 TABLET ORAL at 17:46

## 2018-08-02 RX ADMIN — FOLIC ACID 1 MG: 1 TABLET ORAL at 09:28

## 2018-08-02 RX ADMIN — HYDROMORPHONE HYDROCHLORIDE 2 MG: 2 TABLET ORAL at 00:20

## 2018-08-02 RX ADMIN — ASPIRIN 81 MG: 81 TABLET, DELAYED RELEASE ORAL at 09:28

## 2018-08-02 RX ADMIN — HYDROMORPHONE HYDROCHLORIDE 2 MG: 2 TABLET ORAL at 12:02

## 2018-08-02 RX ADMIN — HYDROMORPHONE HYDROCHLORIDE 2 MG: 2 TABLET ORAL at 22:13

## 2018-08-02 RX ADMIN — ACETAMINOPHEN 1000 MG: 500 TABLET, FILM COATED ORAL at 05:41

## 2018-08-02 RX ADMIN — Medication 2 G: at 02:38

## 2018-08-02 NOTE — PROGRESS NOTES
Resting comfortably,dsng D/I. Still partially numb. Skin warm and pink,capillary refill rapid. SCDs on bilaterally. Iceman cooling system in use. Denies needs at present. Call light within reach.

## 2018-08-02 NOTE — PROGRESS NOTES
Problem: Self Care Deficits Care Plan (Adult) Goal: *Acute Goals and Plan of Care (Insert Text) GOALS:  
DISCHARGE GOALS (in preparation for going home/rehab):  3 days 1. Ms. Marck Feldman will perform one lower body dressing activity with minimal assistance with adaptive equipment to demonstrate improved functional mobility and safety. 2.  Ms. Marck Feldman will perform one lower body bathing activity with minimal  assistance with adaptive equipment to demonstrate improved functional mobility and safety. 3.  Ms. Marck Feldman will perform toileting/toilet transfer with contact guard assistance with adaptive equipment to demonstrate improved functional mobility and safety. 4.  Ms. Marck Feldman will perform shower transfer with contact guard assistance with adaptive equipment to demonstrate improved functional mobility and safety. 5.  Ms. Marck Feldman will state ANMOL precautions with two verbal cues to demonstrate improved functional mobility and safety. JOINT CAMP OCCUPATIONAL THERAPY ANMOL: Initial Assessment, Daily Note and Treatment Day: Day of Assessment 8/2/2018 INPATIENT: Hospital Day: 2 [place] / Product Type: Medicare /  
  
NAME/AGE/GENDER: Saad Castillo is a 65 y.o. female PRIMARY DIAGNOSIS:  Unilateral primary osteoarthritis, left hip [M16.12] Procedure(s) and Anesthesia Type: 
   * LEFT HIP ARTHROPLASTY TOTAL / DEPUY - Spinal (Left) ICD-10: Treatment Diagnosis:  
 · Pain in left hip (M25.552) · Stiffness of Left Hip, Not elsewhere classified (M25.652) ASSESSMENT:  
 
Ms. Marck Feldman is s/p left ANMOL and presents with decreased weight bearing on left LE and decreased independence with functional mobility and activities of daily living as compared to prior level of function and safety. Patient would benefit from skilled Occupational Therapy to maximize independence and safety with self-care task and functional mobility.   Pt would also benefit from education on lower body adaptive equipment and hip precautions post-surgery in preparation for going home. Patient declined shower and sponge bath but completed dressing and toileting as charter below in ADL grid and is ambulating with rolling walker and stand by assist. Patient did well but is very slow. Patient instructed to call for assistance when needing to get up from recliner and all needs in reach. Patient verbalized understanding of call light. This section established at most recent assessment PROBLEM LIST (Impairments causing functional limitations): 1. Decreased Strength 2. Decreased ADL/Functional Activities 3. Decreased Transfer Abilities 4. Increased Pain 5. Increased Fatigue 6. Decreased Flexibility/Joint Mobility 7. Decreased Knowledge of Precautions INTERVENTIONS PLANNED: (Benefits and precautions of occupational therapy have been discussed with the patient.) 1. Activities of daily living training 2. Adaptive equipment training 3. Balance training 4. Clothing management 5. Donning&doffing training 6. Theraputic activity TREATMENT PLAN: Frequency/Duration: Follow patient 1-3 times to address above goals. Rehabilitation Potential For Stated Goals: Good RECOMMENDED REHABILITATION/EQUIPMENT: (at time of discharge pending progress): Continue Skilled Therapy and Home Health: Physical Therapy. OCCUPATIONAL PROFILE AND HISTORY:  
History of Present Injury/Illness (Reason for Referral): Pt presents this date s/p (left) ANMOL. Past Medical History/Comorbidities: Ms. 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 She also has no past medical history of Adverse effect of anesthesia; Aneurysm ([address] Arrhythmia; Asthma; Autoimmune disease ([address] CAD (coronary artery disease); Cancer ([address] Chronic kidney disease; Chronic obstructive pulmonary disease ([address] Chronic pain; Coagulation disorder ([address] Diabetes ([address] Difficult intubation; Endocarditis; GERD (gastroesophageal reflux disease); Heart failure ([address] Ill-defined condition; Liver disease; Malignant hyperthermia due to anesthesia; Nicotine vapor product user; Non-nicotine vapor product user; Pseudocholinesterase deficiency; Psychiatric disorder; PUD (peptic ulcer disease); Rheumatic fever; Seizures ([facility] Stroke [facility]); Thromboembolus ([facility] [address] [facility] Thyroid disease; or Unspecified adverse effect of anesthesia. Ms. Carolina Doe  has a past surgical history that includes hx cholecystectomy (2009); hx knee arthroscopy (Bilateral); pr breast surgery procedure unlisted (Right, 1968); hx hip replacement (Right, 05/10/2017); and hx knee replacement (Right, 08/2017). Social History/Living Environment:  
Home Environment: Private residence Wheelchair Ramp: Yes Support Systems: Child(opal), Spouse/Significant Other/Partner Patient Expects to be Discharged to[de-identified] Private residence Prior Level of Function/Work/Activity: 
Independent with walker and sponge bathes with help from boyfriend. Number of Personal Factors/Comorbidities that affect the Plan of Care: Brief history (0):  LOW COMPLEXITY ASSESSMENT OF OCCUPATIONAL PERFORMANCE[de-identified]  
Most Recent Physical Functioning:  
Balance Sitting: Intact Standing: Intact; With support Gross Assessment: Yes Gross Assessment AROM: Generally decreased, functional (right LE) Strength: Generally decreased, functional (right LE) LLE Assessment LLE Assessment (WDL): Exception to WDL LLE AROM 
L Hip Flexion: 90 L Hip ABduction: 10 L Knee Extension: 0 Coordination Fine Motor Skills-Upper: Left Intact; Right Intact Gross Motor Skills-Upper: Left Intact; Right Intact Mental Status Neurologic State: Alert Orientation Level: Oriented to situation Cognition: Appropriate for age attention/concentration Perception: Appears intact Perseveration: No perseveration noted Safety/Judgement: Awareness of environment Basic ADLs (From Assessment) Complex ADLs (From Assessment) Basic ADL Feeding: Independent Oral Facial Hygiene/Grooming: Supervision (seated in recliner) Bathing: Moderate assistance Upper Body Dressing: Supervision Lower Body Dressing: Moderate assistance Toileting: Minimum assistance Grooming/Bathing/Dressing Activities of Daily Living Cognitive Retraining Safety/Judgement: Awareness of environment Functional Transfers Toilet Transfer : Stand-by assistance (elevated seat, walker grab bars) Shower Transfer:  (pt declined shower will attempt tomorrrow) Bed/Mat Mobility Supine to Sit: Moderate assistance Sit to Stand: Minimum assistance Bed to Chair: Minimum assistance Physical Skills Involved: 1. Range of Motion 2. Balance 3. Strength Cognitive Skills Affected (resulting in the inability to perform in a timely and safe manner): 1. none Psychosocial Skills Affected: 1. Environmental Adaptation Number of elements that affect the Plan of Care: 3-5:  MODERATE COMPLEXITY CLINICAL DECISION MAKIN [facility] [address] [address] AM-PAC 6 Clicks Daily Activity Inpatient Short Form How much help from another person does the patient currently need. .. Total A Lot A Little None 1. Putting on and taking off regular lower body clothing? [] 1   [x] 2   [] 3   [] 4  
2. Bathing (including washing, rinsing, drying)? [] 1   [x] 2   [] 3   [] 4  
3.   Toileting, which includes using toilet, bedpan or urinal?   [] 1   [] 2 [x] 3   [] 4  
4. Putting on and taking off regular upper body clothing? [] 1   [] 2   [] 3   [x] 4  
5. Taking care of personal grooming such as brushing teeth? [] 1   [] 2   [] 3   [x] 4  
6. Eating meals? [] 1   [] 2   [] 3   [x] 4  
© 2007, Trustees of [address] [place] [address] [address] [address], under license to [employer]. All rights reserved Score:  Initial: 19 Most Recent: X (Date: -- ) Interpretation of Tool:  Represents activities that are increasingly more difficult (i.e. Bed mobility, Transfers, Gait). Score 24 23 22-20 19-15 14-10 9-7 6 Modifier CH CI CJ CK CL CM CN   
 
? Self Care:  
  - CURRENT STATUS: CK - 40%-59% impaired, limited or restricted  - GOAL STATUS: CJ - 20%-39% impaired, limited or restricted  - D/C STATUS:  ---------------To be determined--------------- [place] / [place] / Product Type: Medicare /   
 
Medical Necessity:    
· Patient is expected to demonstrate progress in range of motion, balance and functional technique to increase independence with self care. Reason for Services/Other Comments: 
· Patient  would benefit from skilled Occupational Therapy to maximize independence and safety with self-care task and functional mobility. .  
Use of outcome tool(s) and clinical judgement create a POC that gives a: LOW COMPLEXITY  
  
 
 
 
TREATMENT:  
(In addition to Assessment/Re-Assessment sessions the following treatments were rendered) Pre-treatment Symptoms/Complaints:  Pt without complaint of pain 
Pain: Initial:  
Pain Intensity 1: 0  Post Session:  0 Assessment/Reassessment only, no treatment provided today Treatment/Session Assessment:   
 Response to Treatment:  Pt up to chair then to toilet completed toileting and dressing tolerated well. Education: 
[] Home Exercises [x] Fall Precautions [x] Hip Precautions [] Going Home Video 
[] Knee/Hip Prosthesis Review [x] Walker Management/Safety [x] Adaptive Equipment as Needed Interdisciplinary Collaboration:  
o Physical Therapist 
o Occupational Therapist 
o Registered Nurse After treatment position/precautions:  
o Up in chair 
o Bed/Chair-wheels locked 
o Call light within reach 
o RN notified 
o Family at bedside Compliance with Program/Exercises: compliant all of the time. Recommendations/Intent for next treatment session:  Treatment next visit will focus on increasing Ms. Sparrow's independence with bed mobility, transfers, self care, functional mobility, modalities for pain, and patient education. Total Treatment Duration: OT Patient Time In/Time Out Time In: 4392 Time Out: 2412 Mallory Mathew OT

## 2018-08-02 NOTE — PROGRESS NOTES
Care Management Interventions Mode of Transport at Discharge: Other (see comment) Transition of Care Consult (CM Consult): Home Health, Discharge Planning [facility] - INPATIENT: Yes Physical Therapy Consult: Yes Occupational Therapy Consult: Yes Current Support Network: Lives with Spouse, Own Home Confirm Follow Up Transport: Family Plan discussed with Pt/Family/Caregiver: Yes Freedom of Choice Offered: Yes Discharge Location Discharge Placement: Home with home health NUNO spoke with pt & friend at bedside. Pt is s/p L ANMOL. Pt plans to d/c home with family support and HH. Pt has all needed DME. Pt requested S HH but only wants specific RN and PT.  NUNO spoke with Hemalatha Miranda at [facility] who states the RN is no longer an employee and the PT will be on vacation till 8-15. NUNO spoke with pt again who has decided to use [facility] as she has also had them in the past.  NUNO made referral to Samantha with [facility].

## 2018-08-02 NOTE — PROGRESS NOTES
Post op interview conducted following left total hip arthroplasty dated 8/1/18, no anesthetic complications noted

## 2018-08-02 NOTE — PROGRESS NOTES
2018 Post Op day: 1 Day Post-OpProcedure(s) (LRB): LEFT HIP ARTHROPLASTY TOTAL / Dawsonell Kolton (Left) Admit Date: 2018 Admit Diagnosis: Unilateral primary osteoarthritis, left hip [M16.12] LAB:   
Recent Results (from the past 24 hour(s)) GLUCOSE, POC Collection Time: 18  8:45 AM  
Result Value Ref Range Glucose (POC) 118 (H) 65 - 100 mg/dL TYPE & SCREEN Collection Time: 18  9:17 AM  
Result Value Ref Range Crossmatch Expiration 2018 ABO/Rh(D) O POSITIVE Antibody screen NEG   
HEMOGLOBIN Collection Time: 18  7:46 PM  
Result Value Ref Range HGB 10.7 (L) 11.7 - 15.4 g/dL HEMOGLOBIN Collection Time: 18  4:49 AM  
Result Value Ref Range HGB 9.8 (L) 11.7 - 15.4 g/dL METABOLIC PANEL, BASIC Collection Time: 18  4:49 AM  
Result Value Ref Range Sodium 143 136 - 145 mmol/L Potassium 4.1 3.5 - 5.1 mmol/L Chloride 105 98 - 107 mmol/L  
 CO2 25 21 - 32 mmol/L Anion gap 13 7 - 16 mmol/L Glucose 135 (H) 65 - 100 mg/dL BUN 27 (H) 8 - 23 MG/DL Creatinine 1.37 (H) 0.6 - 1.0 MG/DL  
 GFR est AA 48 (L) >60 ml/min/1.73m2 GFR est non-AA 40 (L) >60 ml/min/1.73m2 Calcium 8.3 8.3 - 10.4 MG/DL Vital Signs:   
Patient Vitals for the past 8 hrs: 
 BP Temp Pulse Resp SpO2  
18 0004 144/75 97.4 °F (36.3 °C) 89 18 96 % Temp (24hrs), Av.8 °F (36.6 °C), Min:97.4 °F (36.3 °C), Max:98.2 °F (36.8 °C) Body mass index is 33.23 kg/(m^2). Pain Control:  
Pain Assessment Pain Scale 1: Numeric (0 - 10) Pain Intensity 1: 4 Pain Onset 1: not sure Pain Location 1: Hip Pain Orientation 1: Left Pain Description 1: Aching Pain Intervention(s) 1: Medication (see MAR) Subjective: Doing well, No complaints, No SOB, No Chest Pain, No Nausea or Vomitting Objective: Vital Signs are Stable, No Acute Distress, Alert and Oriented, Dressing is Dry,  Neurovascular exam is normal. 
  
 
PT/OT:  
  
  
 Assistive Device: Walker (comment) Weight Bearing Status: WBAT Meds: 
[unfilled] [unfilled] [unfilled] Assessment:  
Patient Active Problem List  
Diagnosis Code  Preop respiratory exam Z01.811  
 Hypertension I10  
 Hyperlipidemia E78.5  Rheumatoid arthritis(714.0) M06.9  MOOK (obstructive sleep apnea) G47.33  
 Elevated serum creatinine R79.89  Status post right hip replacement Z96.641  Status post total right knee replacement Z96.651  CKD (chronic kidney disease) stage 3, GFR 30-59 ml/min N18.3  BMI 35.0-35.9,adult Z68.35  
 Status post total left knee replacement Z96.652  Noncompliance with CPAP treatment Z91.14  
 Osteoarthritis of left hip M16.12  Status post left hip replacement C69.528 Plan: Continue Physical Therapy, Monitor  Hbg, D/C to home if stable Signed By: Monica Sutton NP

## 2018-08-02 NOTE — PROGRESS NOTES
Sitting in recliner eating. Medicated for pain with dilaudid po and tylenol. NV checks remain WDL. Going to group therapy at 1300.

## 2018-08-02 NOTE — PROGRESS NOTES
[facility] - INPATIENT Face to Face Encounter Patients Name: Demetrio Munguia    YOB: 1940 Ordering Physician:   Jocelyne Haas Primary Diagnosis: Unilateral primary osteoarthritis, left hip [M16.12] Date of Face to Face:   8/2/2018 Face to Face Encounter findings are related to primary reason for home care:   yes. 1. I certify that the patient needs intermittent care as follows: physical therapy: strengthening and gait/stair training 2. I certify that this patient is homebound, that is: 1) patient requires the use of a walker device, special transportation, or assistance of another to leave the home; or 2) patient's condition makes leaving the home medically contraindicated; and 3) patient has a normal inability to leave the home and leaving the home requires considerable and taxing effort. Patient may leave the home for infrequent and short duration for medical reasons, and occasional absences for non-medical reasons. Homebound status is due to the following functional limitations: Patient currently under activity restrictions secondary to recent surgical procedure, this hinders their ability to safely leave the home. 3. I certify that this patient is under my care and that I, or a nurse practitioner or [phone], or clinical nurse specialist, or certified nurse midwife, working with me, had a Face-to-Face Encounter that meets the physician Face-to-Face Encounter requirements. The following are the clinical findings from the [address] [place] [address] encounter that support the need for skilled services and is a summary of the encounter:   See Progress Notes See attached progess note FAVIOLA Gonzalez 
8/2/2018 THE FOLLOWING TO BE COMPLETED BY THE COMMUNITY PHYSICIAN: 
 
I concur with the findings described above from the [facility] encounter that this patient is homebound and in need of a skilled service.  
 
Certifying Physician: _____________________________________ Printed Certifying Physician Name: _____________________________________ Date: _________________

## 2018-08-02 NOTE — PROGRESS NOTES
Problem: Mobility Impaired (Adult and Pediatric) Goal: *Acute Goals and Plan of Care (Insert Text) GOALS (1-4 days): 
(1.)Ms. Magnolia Quiroz will move from supine to sit and sit to supine  in bed with STAND BY ASSIST.   
(2.)Ms. Magnolia Quiroz will transfer from bed to chair and chair to bed with STAND BY ASSIST using the least restrictive device. (3.)Ms. Magnolia Quiroz will ambulate with STAND BY ASSIST for 100 feet with the least restrictive device. (4.)Ms. Magnolia Quiroz will ambulate up/down 2 steps with bilateral  railing with MINIMAL ASSIST with no device. (5.)Ms. Magnolia Quiroz will state/observe PATRICIA precautions with 0 verbal cues. ________________________________________________________________________________________________ PHYSICAL THERAPY [facility] Patricia: Initial Assessment, AM 8/2/2018 INPATIENT: Hospital Day: 2 [place] / [place] / Product Type: Medicare /  
  
NAME/AGE/GENDER: Ben Contreras is a 65 y.o. female PRIMARY DIAGNOSIS:  Unilateral primary osteoarthritis, left hip [M16.12] Procedure(s) and Anesthesia Type: 
   * LEFT HIP ARTHROPLASTY TOTAL / DEPUY - Spinal (Left) ICD-10: Treatment Diagnosis:  
 · Pain in left hip (M25.552) · Stiffness of Left Hip, Not elsewhere classified (M25.652) · Difficulty in walking, Not elsewhere classified (R26.2) ASSESSMENT:  
 
Ms. Magnolia Quiroz presents with limited rom and strength of left LE as well as decreased functional mobility and gait s/p left patricia. She plans to go home with HHPT. This section established at most recent assessment PROBLEM LIST (Impairments causing functional limitations): 1. Decreased Strength 2. Decreased ADL/Functional Activities 3. Decreased Transfer Abilities 4. Decreased Ambulation Ability/Technique 5. Decreased Balance 6. Increased Pain 7. Decreased Activity Tolerance 8. Decreased Flexibility/Joint Mobility 9.  Decreased [place] with Home Exercise Program 
 INTERVENTIONS PLANNED: (Benefits and precautions of physical therapy have been discussed with the patient.) 1. Bed Mobility 2. Gait Training 3. Home Exercise Program (HEP) 4. Therapeutic Exercise/Strengthening 5. Transfer Training 6. Range of Motion: active/assisted/passive 7. Therapeutic Activities 8. Group Therapy TREATMENT PLAN: Frequency/Duration: Follow patient BID for duration of hospital stay to address above goals. Rehabilitation Potential For Stated Goals: Good RECOMMENDED REHABILITATION/EQUIPMENT: (at time of discharge pending progress): Continue Skilled Therapy and Home Health: Physical Therapy. HISTORY:  
History of Present Injury/Illness (Reason for Referral): S/p left patricia 
Past Medical History/Comorbidities: Ms. Monica Ayala  has a past medical history of Anemia; BMI 35.0-35.9,adult (4/9/2018); Essential hypertension; H/O echocardiogram (01/05/2017); H/O: pneumonia; Hypercholesteremia; Hypomagnesemia; Left ventricular diastolic dysfunction; Muscle weakness; Nausea & vomiting; Osteoarthritis; Poor historian; Pressure ulcer of left buttock, stage 2; SOB (shortness of breath); Status post right hip replacement (5/10/2017); Status post total left knee replacement (4/13/2018); Status post total right knee replacement (8/21/2017); Unable to ambulate; and Unsteadiness on feet. She also has no past medical history of Adverse effect of anesthesia; Aneurysm ([address] Arrhythmia; Asthma; Autoimmune disease ([address] CAD (coronary artery disease); Cancer ([address] Chronic kidney disease; Chronic obstructive pulmonary disease ([address] Chronic pain; Coagulation disorder ([address] Diabetes ([address] Difficult intubation; Endocarditis; GERD (gastroesophageal reflux disease); Heart failure ([address] Ill-defined condition; Liver disease; Malignant hyperthermia due to anesthesia; Nicotine vapor product user; Non-nicotine vapor product user; Pseudocholinesterase deficiency; Psychiatric disorder; PUD (peptic ulcer disease);  Rheumatic fever; Seizures ([address] Stroke [facility]); Thromboembolus ([address] Thyroid disease; or Unspecified adverse effect of anesthesia. Ms. Montserrat Pascal  has a past surgical history that includes hx cholecystectomy (2009); hx knee arthroscopy (Bilateral); pr breast surgery procedure unlisted (Right, 1968); hx hip replacement (Right, 05/10/2017); and hx knee replacement (Right, 08/2017). Social History/Living Environment:  
Home Environment: Private residence Wheelchair Ramp: Yes Support Systems: Child(opal), Spouse/Significant Other/Partner Prior Level of Function/Work/Activity: 
Independent using RW  
Number of Personal Factors/Comorbidities that affect the Plan of Care: 3+: HIGH COMPLEXITY EXAMINATION:  
Most Recent Physical Functioning:  
Gross Assessment: Yes Gross Assessment AROM: Generally decreased, functional (right LE) Strength: Generally decreased, functional (right LE) LLE AROM 
L Hip Flexion: 90 L Hip ABduction: 10 L Knee Extension: 0 Bed Mobility Supine to Sit: Moderate assistance Transfers Sit to Stand: Minimum assistance Stand to Sit: Minimum assistance Bed to Chair: Minimum assistance Balance Sitting: Intact Standing: Pull to stand; With support    Posture Posture (WDL): Exceptions to [facility] Posture Assessment: Rounded shoulders Weight Bearing Status Left Side Weight Bearing: As tolerated Distance (ft): 15 Feet (ft) Ambulation - Level of Assistance: Minimal assistance;Contact guard assistance Assistive Device: Walker, rolling Speed/Keli: Delayed Step Length: Left shortened;Right shortened Stance: Left decreased Gait Abnormalities: Antalgic Interventions: Verbal cues; Tactile cues; Safety awareness training;Manual cues Braces/Orthotics:  
 
   
  
Body Structures Involved: 1. Bones 2. Joints 3. Muscles 4. Ligaments Body Functions Affected: 1. Movement Related Activities and Participation Affected: 1. Mobility Number of elements that affect the Plan of Care: 3: MODERATE COMPLEXITY CLINICAL PRESENTATION:  
Presentation: Stable and uncomplicated: LOW COMPLEXITY CLINICAL DECISION MAKIN[address] [place] [address] [address] [id number] AM-PAC 6 Clicks Basic Mobility Inpatient Short Form How much difficulty does the patient currently have. .. Unable A Lot A Little None 1. Turning over in bed (including adjusting bedclothes, sheets and blankets)? [] 1   [] 2   [x] 3   [] 4  
2. Sitting down on and standing up from a chair with arms ( e.g., wheelchair, bedside commode, etc.)   [] 1   [] 2   [x] 3   [] 4  
3. Moving from lying on back to sitting on the side of the bed? [] 1   [] 2   [x] 3   [] 4 How much help from another person does the patient currently need. .. Total A Lot A Little None 4. Moving to and from a bed to a chair (including a wheelchair)? [] 1   [] 2   [x] 3   [] 4  
5. Need to walk in hospital room? [] 1   [] 2   [x] 3   [] 4  
6. Climbing 3-5 steps with a railing? [] 1   [x] 2   [] 3   [] 4  
© 2007, Trustees of [address] [place] [address] [address] [id number], under license to [employer]. All rights reserved Score:  Initial: 17 Most Recent: X (Date: -- ) Interpretation of Tool:  Represents activities that are increasingly more difficult (i.e. Bed mobility, Transfers, Gait). Score 24 23 22-20 19-15 14-10 9-7 6 Modifier CH CI CJ CK CL CM CN   
 
? Mobility - Walking and Moving Around:  
  - CURRENT STATUS: CK - 40%-59% impaired, limited or restricted  - GOAL STATUS: CJ - 20%-39% impaired, limited or restricted  - D/C STATUS:  ---------------To be determined--------------- [place] / [place] / Product Type: Medicare /   
 
Medical Necessity:    
· Patient is expected to demonstrate progress in strength, range of motion and balance to decrease assistance required with theraputic exercises and functional mobility.  
Reason for Services/Other Comments: 
· Patient continues to require present interventions due to patient's inability to perform theraputic exercises and functional mobility independently. Use of outcome tool(s) and clinical judgement create a POC that gives a: Clear prediction of patient's progress: LOW COMPLEXITY  
  
 
 
 
TREATMENT:  
(In addition to Assessment/Re-Assessment sessions the following treatments were rendered) Pre-treatment Symptoms/Complaints:  Hip sore Pain: Initial:  
   Post Session:  3 Therapeutic Exercise: (10 Minutes):  Exercises per grid below to improve mobility and strength. Required minimal visual, verbal and manual cues to promote proper body alignment, promote proper body posture and promote proper body mechanics. Progressed range and repetitions as indicated. Date: 
8/2 Date: 
 Date: 
  
ACTIVITY/EXERCISE AM PM AM PM AM PM  
GROUP THERAPY  []  []  []  []  []  [] Ankle Pumps 15a Quad Sets 10a Gluteal Sets 10a Hip ABd/ADduction 10aa Straight Leg Raises Knee Slides 10aa Short Arc [employer] [place] [address] Chair Slides B = bilateral; AA = active assistive; A = active; P = passive Treatment/Session Assessment:   
 Response to Treatment:  Pt. Did well. Education: 
[x] Home Exercises [x] Fall Precautions [x] Hip Precautions [] D/C Instruction Review [x] Knee/Hip Prosthesis Review [x] Walker Management/Safety [] Adaptive Equipment as Needed Interdisciplinary Collaboration:  
o Occupational Therapist 
o Registered Nurse After treatment position/precautions:  
o Up in chair 
o Bed/Chair-wheels locked 
o Bed in low position 
o Call light within reach Compliance with Program/Exercises: Will assess as treatment progresses. No questions. Recommendations/Intent for next treatment session:  Treatment next visit will focus on increasing Ms. Sparrow's independence with bed mobility, transfers, gait training, strength/ROM exercises, modalities for pain, and patient education.    
 
Total Treatment Duration: PT Patient Time In/Time Out Time In: 0820 Time Out: 0158 Geovanny Barrett, PT

## 2018-08-02 NOTE — PROGRESS NOTES
Problem: Mobility Impaired (Adult and Pediatric) Goal: *Acute Goals and Plan of Care (Insert Text) GOALS (1-4 days): 
(1.)Ms. Gamal Gallo will move from supine to sit and sit to supine  in bed with STAND BY ASSIST.   
(2.)Ms. Gamal Gallo will transfer from bed to chair and chair to bed with STAND BY ASSIST using the least restrictive device. (3.)Ms. Gamal Gallo will ambulate with STAND BY ASSIST for 100 feet with the least restrictive device. (4.)Ms. Gamal Gallo will ambulate up/down 2 steps with bilateral  railing with MINIMAL ASSIST with no device. (5.)Ms. Gamal Gallo will state/observe PATRICIA precautions with 0 verbal cues. ________________________________________________________________________________________________ PHYSICAL THERAPY [facility] Patricia: Daily Note, PM 8/2/2018 INPATIENT: Hospital Day: 2 [place] / [place] / Product Type: Medicare /  
  
NAME/AGE/GENDER: Aura Pompa is a 65 y.o. female PRIMARY DIAGNOSIS:  Unilateral primary osteoarthritis, left hip [M16.12] Procedure(s) and Anesthesia Type: 
   * LEFT HIP ARTHROPLASTY TOTAL / DEPUY - Spinal (Left) ICD-10: Treatment Diagnosis:  
 · Pain in left hip (M25.552) · Stiffness of Left Hip, Not elsewhere classified (M25.652) · Difficulty in walking, Not elsewhere classified (R26.2) ASSESSMENT:  
 
Ms. Gamal Gallo presents with limited rom and strength of left LE as well as decreased functional mobility and gait s/p left patricia. She plans to go home with HHPT. Pt. Doing well this afternoon. She ambulated further with walker this pm.  She did patricia exercises. Reviewed patricia precautions. No questions. Plans to go home tomorrow. This section established at most recent assessment PROBLEM LIST (Impairments causing functional limitations): 1. Decreased Strength 2. Decreased ADL/Functional Activities 3. Decreased Transfer Abilities 4. Decreased Ambulation Ability/Technique 5. Decreased Balance 6.  Increased Pain 
7. Decreased Activity Tolerance 8. Decreased Flexibility/Joint Mobility 9. Decreased [place] with Home Exercise Program 
 INTERVENTIONS PLANNED: (Benefits and precautions of physical therapy have been discussed with the patient.) 1. Bed Mobility 2. Gait Training 3. Home Exercise Program (HEP) 4. Therapeutic Exercise/Strengthening 5. Transfer Training 6. Range of Motion: active/assisted/passive 7. Therapeutic Activities 8. Group Therapy TREATMENT PLAN: Frequency/Duration: Follow patient BID for duration of hospital stay to address above goals. Rehabilitation Potential For Stated Goals: Good RECOMMENDED REHABILITATION/EQUIPMENT: (at time of discharge pending progress): Continue Skilled Therapy and Home Health: Physical Therapy. HISTORY:  
History of Present Injury/Illness (Reason for Referral): S/p left patricia 
Past Medical History/Comorbidities: Ms. 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 She also has no past medical history of Adverse effect of anesthesia; Aneurysm ([address] Arrhythmia; Asthma; Autoimmune disease ([address] CAD (coronary artery disease); Cancer ([address] Chronic kidney disease; Chronic obstructive pulmonary disease ([address] Chronic pain; Coagulation disorder ([address] Diabetes ([address] Difficult intubation; Endocarditis; GERD (gastroesophageal reflux disease); Heart failure ([address]  Ill-defined condition; Liver disease; Malignant hyperthermia due to anesthesia; Nicotine vapor product user; Non-nicotine vapor product user; Pseudocholinesterase deficiency; Psychiatric disorder; PUD (peptic ulcer disease); Rheumatic fever; Seizures ([facility] Stroke [facility]); Thromboembolus ([facility] [address] [facility] Thyroid disease; or Unspecified adverse effect of anesthesia. Ms. Aris Marinelli  has a past surgical history that includes hx cholecystectomy (2009); hx knee arthroscopy (Bilateral); pr breast surgery procedure unlisted (Right, 1968); hx hip replacement (Right, 05/10/2017); and hx knee replacement (Right, 08/2017). Social History/Living Environment:  
Home Environment: Private residence Wheelchair Ramp: Yes Support Systems: Child(opal), Spouse/Significant Other/Partner Patient Expects to be Discharged to[de-identified] Private residence Prior Level of Function/Work/Activity: 
Independent using RW  
Number of Personal Factors/Comorbidities that affect the Plan of Care: 3+: HIGH COMPLEXITY EXAMINATION:  
Most Recent Physical Functioning:  
Gross Assessment: Yes Gross Assessment AROM: Generally decreased, functional (right LE) Strength: Generally decreased, functional (right LE) LLE AROM 
L Hip Flexion: 90 L Hip ABduction: 10 L Knee Extension: 0 Bed Mobility Supine to Sit: Moderate assistance Transfers Sit to Stand: Contact guard assistance Stand to Sit: Contact guard assistance Bed to Chair: Minimum assistance Balance Sitting: Intact Standing: Pull to stand; With support    Posture Posture (WDL): Exceptions to [facility] Posture Assessment: Rounded shoulders Weight Bearing Status Left Side Weight Bearing: As tolerated Distance (ft): 130 Feet (ft) Ambulation - Level of Assistance: Contact guard assistance Assistive Device: Walker, rolling Speed/Keli: Delayed Step Length: Left shortened;Right shortened Stance: Left decreased Gait Abnormalities: Antalgic Interventions: Safety awareness training;Verbal cues Braces/Orthotics:  
 
Left Hip Cold Type: Cold/ice pack Body Structures Involved: 1. Bones 2. Joints 3. Muscles 4. Ligaments Body Functions Affected: 1. Movement Related Activities and Participation Affected: 1. Mobility Number of elements that affect the Plan of Care: 3: MODERATE COMPLEXITY CLINICAL PRESENTATION:  
Presentation: Stable and uncomplicated: LOW COMPLEXITY CLINICAL DECISION MAKING:  
[facility] AM-PAC 6 Clicks Basic Mobility Inpatient Short Form How much difficulty does the patient currently have. .. Unable A Lot A Little None 1. Turning over in bed (including adjusting bedclothes, sheets and blankets)? [] 1   [] 2   [x] 3   [] 4  
2. Sitting down on and standing up from a chair with arms ( e.g., wheelchair, bedside commode, etc.)   [] 1   [] 2   [x] 3   [] 4  
3. Moving from lying on back to sitting on the side of the bed? [] 1   [] 2   [x] 3   [] 4 How much help from another person does the patient currently need. .. Total A Lot A Little None 4. Moving to and from a bed to a chair (including a wheelchair)? [] 1   [] 2   [x] 3   [] 4  
5. Need to walk in hospital room? [] 1   [] 2   [x] 3   [] 4  
6. Climbing 3-5 steps with a railing? [] 1   [x] 2   [] 3   [] 4  
© 2007, Trustees of [facility], under license to [employer]. All rights reserved Score:  Initial: 17 Most Recent: X (Date: -- ) Interpretation of Tool:  Represents activities that are increasingly more difficult (i.e. Bed mobility, Transfers, Gait). Score 24 23 22-20 19-15 14-10 9-7 6 Modifier CH CI CJ CK CL CM CN   
 
? Mobility - Walking and Moving Around:  
  - CURRENT STATUS: CK - 40%-59% impaired, limited or restricted  - GOAL STATUS: CJ - 20%-39% impaired, limited or restricted  - D/C STATUS:  ---------------To be determined--------------- [place] / [place] / Product Type: Medicare /   
 
Medical Necessity:    
· Patient is expected to demonstrate progress in strength, range of motion and balance to decrease assistance required with theraputic exercises and functional mobility. Reason for Services/Other Comments: 
· Patient continues to require present interventions due to patient's inability to perform theraputic exercises and functional mobility independently. Use of outcome tool(s) and clinical judgement create a POC that gives a: Clear prediction of patient's progress: LOW COMPLEXITY  
  
 
 
 
TREATMENT:  
(In addition to Assessment/Re-Assessment sessions the following treatments were rendered) Pre-treatment Symptoms/Complaints:  Hip sore Pain: Initial:  
   Post Session:  5 Therapeutic Exercise: (45 Minutes):  Exercises per grid below to improve mobility and strength. Required minimal visual, verbal and manual cues to promote proper body alignment, promote proper body posture and promote proper body mechanics. Progressed range and repetitions as indicated. Gait Training (15 Minutes):  Gait training to improve and/or restore physical functioning as related to mobility, strength and balance. Ambulated 130 Feet (ft) with Contact guard assistance using a Walker, rolling and minimal Safety awareness training;Verbal cues related to their stance phase to promote proper body alignment, promote proper body posture and promote proper body mechanics. Date: 
8/2 Date: 
 Date: 
  
ACTIVITY/EXERCISE AM PM AM PM AM PM  
GROUP THERAPY  []  [x]  []  []  []  [] Ankle Pumps 15a 15a Quad Sets 10a 15a Gluteal Sets 10a 15a Hip ABd/ADduction 10aa 15a Straight Leg Raises Knee Slides 10aa 15a Short Arc Quads  15a Long Arc Quads  15a Chair Slides B = bilateral; AA = active assistive; A = active; P = passive Treatment/Session Assessment:   
 Response to Treatment:  Pt. Making progress Education: 
[x] Home Exercises [x] Fall Precautions [x] Hip Precautions [x] D/C Instruction Review [x] Knee/Hip Prosthesis Review [x] Walker Management/Safety [] Adaptive Equipment as Needed Interdisciplinary Collaboration:  
o Physical Therapist 
o Physical Therapy Assistant 
o Rehabilitation Attendant After treatment position/precautions:  
o Up in chair 
o Bed/Chair-wheels locked 
o Bed in low position 
o Call light within reach Compliance with Program/Exercises: Will assess as treatment progresses. No questions. Recommendations/Intent for next treatment session:  Treatment next visit will focus on increasing Ms. Sparrow's independence with bed mobility, transfers, gait training, strength/ROM exercises, modalities for pain, and patient education. Total Treatment Duration: PT Patient Time In/Time Out Time In: 1300 Time Out: 1400 Yahaira Davila PT

## 2018-08-02 NOTE — PROGRESS NOTES
Remains in recliner. Medicated again for pain with dilaudid po and tylenol. NV checks remain WDL. No further changes.

## 2018-08-02 NOTE — PROGRESS NOTES
08/01/18 2039 Oxygen Therapy O2 Sat (%) 99 % Pulse via Oximetry 71 beats per minute O2 Device Room air Pt connected to c/s monitor    21 . History deleted. Alarms on and functioning- set per protocol. Pt working on IS, encourage to keep practicing. No distress noted at this time.

## 2018-08-03 VITALS
HEIGHT: 69 IN | OXYGEN SATURATION: 99 % | TEMPERATURE: 97.9 F | RESPIRATION RATE: 16 BRPM | WEIGHT: 225 LBS | SYSTOLIC BLOOD PRESSURE: 146 MMHG | DIASTOLIC BLOOD PRESSURE: 78 MMHG | HEART RATE: 78 BPM | BODY MASS INDEX: 33.33 KG/M2

## 2018-08-03 LAB
HGB BLD-MCNC: 9.1 G/DL (ref 11.7–15.4)
MM INDURATION POC: 0 MM (ref 0–5)
PPD POC: NORMAL NEGATIVE

## 2018-08-03 PROCEDURE — 74011250637 HC RX REV CODE- 250/637: Performed by: ORTHOPAEDIC SURGERY

## 2018-08-03 PROCEDURE — 74011250637 HC RX REV CODE- 250/637: Performed by: PHYSICIAN ASSISTANT

## 2018-08-03 PROCEDURE — 97535 SELF CARE MNGMENT TRAINING: CPT

## 2018-08-03 PROCEDURE — 36415 COLL VENOUS BLD VENIPUNCTURE: CPT

## 2018-08-03 PROCEDURE — 85018 HEMOGLOBIN: CPT

## 2018-08-03 PROCEDURE — 97116 GAIT TRAINING THERAPY: CPT

## 2018-08-03 PROCEDURE — 97150 GROUP THERAPEUTIC PROCEDURES: CPT

## 2018-08-03 RX ADMIN — CELECOXIB 200 MG: 200 CAPSULE ORAL at 08:28

## 2018-08-03 RX ADMIN — POTASSIUM CHLORIDE 20 MEQ: 10 TABLET, EXTENDED RELEASE ORAL at 08:29

## 2018-08-03 RX ADMIN — ASPIRIN 81 MG: 81 TABLET, DELAYED RELEASE ORAL at 08:29

## 2018-08-03 RX ADMIN — ACETAMINOPHEN 1000 MG: 500 TABLET, FILM COATED ORAL at 08:29

## 2018-08-03 RX ADMIN — CHLORTHALIDONE: 25 TABLET ORAL at 08:28

## 2018-08-03 RX ADMIN — SENNOSIDES AND DOCUSATE SODIUM 2 TABLET: 8.6; 5 TABLET ORAL at 08:28

## 2018-08-03 RX ADMIN — FOLIC ACID 1 MG: 1 TABLET ORAL at 08:29

## 2018-08-03 RX ADMIN — MAGNESIUM OXIDE TAB 400 MG (241.3 MG ELEMENTAL MG) 200 MG: 400 (241.3 MG) TAB at 08:28

## 2018-08-03 RX ADMIN — LOSARTAN POTASSIUM 100 MG: 50 TABLET ORAL at 08:28

## 2018-08-03 RX ADMIN — HYDROMORPHONE HYDROCHLORIDE 2 MG: 2 TABLET ORAL at 08:28

## 2018-08-03 NOTE — DISCHARGE INSTRUCTIONS
[facility]   Patient Discharge Instructions    Lalit Peterson / [id number] : 1940    Admitted 2018 Discharged: 2018     IF YOU HAVE ANY PROBLEMS ONCE YOU ARE AT HOME CALL THE FOLLOWING NUMBERS:   Main office number: [phone]      Medications    · The medications you are to continue on are listed on the medication reconciliation sheet. · Narcotic pain medications as well as supplemental iron can cause constipation. If this occurs try stopping the narcotic pain medication and/or the iron. · It is important that you take the medication exactly as they are prescribed. · Medications which increase your risk of blood clots are listed to stop for 5 weeks after surgery as well as medications or supplements which increase your risk of bleeding complications. · Keep your medication in the bottles provided by the pharmacist and keep a list of the medication names, dosages, and times to be taken in your wallet. · Do not take other medications without consulting your doctor. Important Information    Do NOT smoke as this will greatly increase your risk of infection! Resume your prehospital diet. If you have excessive nausea or vomitting call your doctor's office     Leg swelling and warmth is normal for 6 months after surgery. If you experience swelling in your leg elevate you leg while laying down with your toes above your heart. If you have sudden onset severe swelling with leg pain call our office. The stitches deep inside take approximately 6 months to dissolve. There will be sharp shooting, stinging and burning pain. This is normal and will resolve between 3-6 months after surgery. Difficulty sleeping is normal following total Knee and Hip replacement. You may try melatonin, an over-the-counter sleep aid or benadryl to help with sleep. Most patients will resume sleeping through the night 8 weeks after surgery. Home Physical Therapy is arranged.  Home [facility] will contact you within 48 hrs of discharge that you have chosen. If you have not received a call within this time frame please contact that provider you chose. You should be given this information before you leave the hospital.     You are at a risk for falls. Use the rolling walker when walking. Patients who have had a joint replacement should not drive if they are still taking narcotic pain mediation during the daytime hours. Most patients wean themselves off of pain medication within 2-5 weeks after surgery. When to Call the office    - If you have a temperature greater then 101  - Uncontrolled vomiting   - Loose control of your bladder or bowel function  - Are unable to bear any weight   - Need a pain medication refill       DISCHARGE SUMMARY from Nurse    The following personal items collected during your admission are returned to you:   Dental Appliance: Dental Appliances: Uppers, Lowers, With patient  Vision:   na  Hearing Aid:   na  Jewelry: Jewelry: None  Clothing: Clothing: At bedside  Other Valuables: Other Valuables: Purse (son to take purse)  Valuables sent to safe:   na    PATIENT INSTRUCTIONS:    After general anesthesia or intravenous sedation, for 24 hours or while taking prescription Narcotics:  · Limit your activities  · Do not drive and operate hazardous machinery  · Do not make important personal or business decisions  · Do  not drink alcoholic beverages  · If you have not urinated within 8 hours after discharge, please contact your surgeon on call. Report the following to your surgeon:  · Excessive pain, swelling, redness or odor of or around the surgical area  · Temperature over 101  · Nausea and vomiting lasting longer than 4 hours or if unable to take medications  · Any signs of decreased circulation or nerve impairment to extremity: change in color, persistent  numbness, tingling, coldness or increase pain  · Follow hip precautions @ all times!   · Any questions, call office @ [phone]      Keep scheduled follow up appointment. If need to change, call office @ [phone]. *  Please give a list of your current medications to your Primary Care Provider. *  Please update this list whenever your medications are discontinued, doses are      changed, or new medications (including over-the-counter products) are added. *  Please carry medication information at all times in case of emergency situations. Hip Replacement Surgery (Posterior): What to Expect at Home  Your Recovery  Hip replacement surgery replaces the worn parts of your hip joint. When you leave the hospital, you will probably be walking with crutches or a walker. You may be able to climb a few stairs and get in and out of bed and chairs. But you will need someone to help you at home for the next few weeks or until you have more energy and can move around better. If there is no one to help you at home, you may go to a rehabilitation center or long-term care center. You will go home with a bandage and stitches, staples, tissue glue, or tape strips. You can remove the bandage when your doctor tells you to. If you have stitches or staples, your doctor will remove them 10 days to 3 weeks after your surgery. Glue or tape strips will fall off on their own over time. You may still have some mild pain, and the area may be swollen for 3 to 4 months after surgery. Your doctor will give you medicine for the pain. You will continue the rehabilitation program (rehab) you started in the hospital. The better you do with your rehab exercises, the sooner you will get your strength and movement back. Most people are able to return to work 4 weeks to 4 months after surgery. This care sheet gives you a general idea about how long it will take for you to recover. But each person recovers at a different pace. Follow the steps below to get better as quickly as possible. How can you care for yourself at home?   Activity    · Your doctor may not want your affected leg to cross the center of your body toward the other leg. If so, your therapist may suggest these ideas:  ¨ Do not cross your legs. ¨ Be very careful as you get in or out of bed or a car, so your leg does not cross that imaginary line in the middle of your body.     · Rest when you feel tired. You may take a nap, but do not stay in bed all day.     · Work with your physical therapist to learn the best way to exercise. You may be able to take frequent, short walks using crutches or a walker. You will probably have to use crutches or a walker for at least 4 to 6 weeks.     · Your doctor may advise you to stay away from activities that put stress on the joint. This includes sports such as tennis, football, and jogging.     · Try not to sit for too long at one time. You will feel less stiff if you take a short walk about every hour. When you sit, use chairs with arms, and do not sit in low chairs.     · Do not bend over more than 90 degrees (like the angle in a letter \"L\").     · Sleep on your back with your legs slightly apart or on your side with a pillow between your knees for about 6 weeks or as your doctor tells you. Do not sleep on your stomach or affected leg.     · Ask your doctor when you can drive again.     · Most people are able to return to work 4 weeks to 4 months after surgery.     · Ask your doctor when it is okay for you to have sex. Diet    · By the time you leave the hospital, you will probably be eating your normal diet. If your stomach is upset, try bland, low-fat foods like plain rice, broiled chicken, toast, and yogurt. Your doctor may recommend that you take iron and vitamin supplements.     · Drink plenty of fluids (unless your doctor tells you not to).   · Eat healthy foods, and watch your portion sizes. Try to stay at your ideal weight.  Too much weight puts more stress on your new hip joint.     · You may notice that your bowel movements are not regular right after your surgery. This is common. Try to avoid constipation and straining with bowel movements. You may want to take a fiber supplement every day. If you have not had a bowel movement after a couple of days, ask your doctor about taking a mild laxative. Medicines    · Your doctor will tell you if and when you can restart your medicines. He or she will also give you instructions about taking any new medicines.     · If you take blood thinners, such as warfarin (Coumadin), clopidogrel (Plavix), or aspirin, be sure to talk to your doctor. He or she will tell you if and when to start taking those medicines again. Make sure that you understand exactly what your doctor wants you to do.     · Your doctor may give you a blood-thinning medicine to prevent blood clots. If you take a blood thinner, be sure you get instructions about how to take your medicine safely. Blood thinners can cause serious bleeding problems. This medicine could be in pill form or as a shot (injection). If a shot is necessary, your doctor will tell you how to do this.     · Be safe with medicines. Take pain medicines exactly as directed. ¨ If the doctor gave you a prescription medicine for pain, take it as prescribed. ¨ If you are not taking a prescription pain medicine, ask your doctor if you can take an over-the-counter medicine.     · If you think your pain medicine is making you sick to your stomach:  ¨ Take your medicine after meals (unless your doctor has told you not to). ¨ Ask your doctor for a different pain medicine.     · If your doctor prescribed antibiotics, take them as directed. Do not stop taking them just because you feel better. You need to take the full course of antibiotics. Incision care    · If your doctor told you how to care for your cut (incision), follow your doctor's instructions. You will have a dressing over the cut. A dressing helps the incision heal and protects it.  Your doctor will tell you how to take care of this.     · If you did not get instructions, follow this general advice:  ¨ If you have strips of tape on the cut the doctor made, leave the tape on for a week or until it falls off. ¨ If you have stitches or staples, your doctor will tell you when to come back to have them removed. ¨ If you have skin adhesive on the cut, leave it on until it falls off. Skin adhesive is also called glue or liquid stitches. ¨ Change the bandage every day. ¨ Wash the area daily with warm water, and pat it dry. Don't use hydrogen peroxide or alcohol. They can slow healing. ¨ You may cover the area with a gauze bandage if it oozes fluid or rubs against clothing. ¨ You may shower 24 to 48 hours after surgery. Pat the incision dry. Don't swim or take a bath for the first 2 weeks, or until your doctor tells you it is okay. Exercise    · Your rehab program will include a number of exercises to do. Always do them as your therapist tells you. Ice and elevation    · For pain, put ice or a cold pack on the area for 10 to 20 minutes at a time. Put a thin cloth between the ice and your skin.     · Your ankle may swell for about 3 months. Prop up your ankle when you ice it or anytime you sit or lie down. Try to keep it above the level of your heart. This will help reduce swelling. Other instructions   Continue to wear your support stockings as your doctor says. These help to prevent blood clots. The length of time that you will have to wear them depends on your activity level and the amount of swelling you have. Most people wear these stockings for 4 to 6 weeks after surgery.   Preventing falls is also very important. To prevent falls:    · Arrange furniture so that you will not trip on it.     · Get rid of throw rugs, and move electrical cords out of the way.     · Walk only in areas with plenty of light.     · Put grab bars in showers and bathtubs.     · Avoid icy or snowy sidewalks.     · Wear shoes with sturdy, flat soles. Follow-up care is a key part of your treatment and safety. Be sure to make and go to all appointments, and call your doctor if you are having problems. It's also a good idea to know your test results and keep a list of the medicines you take. When should you call for help? Call 911 anytime you think you may need emergency care. For example, call if:    · You passed out (lost consciousness).     · You have severe trouble breathing.     · You have sudden chest pain and shortness of breath, or you cough up blood.    Call your doctor now or seek immediate medical care if:    · You have signs that your hip may be dislocated, including:  ¨ Severe pain and not being able to stand. ¨ A crooked leg that looks like your hip is out of position. ¨ Not being able to bend or straighten your leg.     · Your leg or foot is cool or pale or changes color.     · You cannot feel or move your leg.     · You have signs of a blood clot, such as:  ¨ Pain in your calf, back of the knee, thigh, or groin. ¨ Redness and swelling in your leg or groin.     · Your incision comes open and begins to bleed, or the bleeding increases.     · You feel like your heart is racing or beating irregularly.     · You have signs of infection, such as:  ¨ Increased pain, swelling, warmth, or redness. ¨ Red streaks leading from the incision. ¨ Pus draining from the incision. ¨ A fever.    Watch closely for changes in your health, and be sure to contact your doctor if:    · You do not have a bowel movement after taking a laxative.     · You do not get better as expected. Where can you learn more? Go to http://ada-melecio.info/. Enter K804 in the search box to learn more about \"Hip Replacement Surgery (Posterior): What to Expect at Home. \"  Current as of: November 29, 2017  Content Version: 11.7  © 2020-3786 [employer].  Care instructions adapted under license by [employer] (which disclaims liability or warranty for this information). If you have questions about a medical condition or this instruction, always ask your healthcare professional. [address] any warranty or liability for your use of this information. These are general instructions for a healthy lifestyle:    No smoking/ No tobacco products/ Avoid exposure to second hand smoke    Surgeon General's Warning:  Quitting smoking now greatly reduces serious risk to your health. Obesity, smoking, and sedentary lifestyle greatly increases your risk for illness    A healthy diet, regular physical exercise & weight monitoring are important for maintaining a healthy lifestyle    You may be retaining fluid if you have a history of heart failure or if you experience any of the following symptoms:  Weight gain of 3 pounds or more overnight or 5 pounds in a week, increased swelling in our hands or feet or shortness of breath while lying flat in bed. Please call your doctor as soon as you notice any of these symptoms; do not wait until your next office visit. Recognize signs and symptoms of STROKE:    F-face looks uneven    A-arms unable to move or move even    S-speech slurred or non-existent    T-time-call 911 as soon as signs and symptoms begin-DO NOT go       Back to bed or wait to see if you get better-TIME IS BRAIN. The discharge information has been reviewed with the patient. The patient verbalized understanding. Information obtained by :  I understand that if any problems occur once I am at home I am to contact my physician. I understand and acknowledge receipt of the instructions indicated above.                                                                                                                                            Physician's or R.N.'s Signature                                                                  Date/Time Patient or Representative Signature                                                          Date/Time

## 2018-08-03 NOTE — PROGRESS NOTES
Problem: Self Care Deficits Care Plan (Adult) Goal: *Acute Goals and Plan of Care (Insert Text) GOALS: GOAL MET 8/3/2018 DISCHARGE GOALS (in preparation for going home/rehab):  3 days 1. Ms. Neri Fernandez will perform one lower body dressing activity with minimal assistance with adaptive equipment to demonstrate improved functional mobility and safety. 2.  Ms. Neri Fernandez will perform one lower body bathing activity with minimal  assistance with adaptive equipment to demonstrate improved functional mobility and safety. 3.  Ms. Neri Fernandez will perform toileting/toilet transfer with contact guard assistance with adaptive equipment to demonstrate improved functional mobility and safety. 4.  Ms. Neri Fernandez will perform shower transfer with contact guard assistance with adaptive equipment to demonstrate improved functional mobility and safety. 5.  Ms. Neri Fernandez will state ANMOL precautions with two verbal cues to demonstrate improved functional mobility and safety. JOINT CAMP OCCUPATIONAL THERAPY ANMOL: Daily Note, Discharge and Treatment Day: Day of Assessment and 2nd 8/3/2018 INPATIENT: Hospital Day: 3 [place] / Product Type: Medicare /  
  
NAME/AGE/GENDER: Joe Miranda is a 65 y.o. female PRIMARY DIAGNOSIS:  Unilateral primary osteoarthritis, left hip [M16.12] Procedure(s) and Anesthesia Type: 
   * LEFT HIP ARTHROPLASTY TOTAL / DEPUY - Spinal (Left) ICD-10: Treatment Diagnosis:  
 · Pain in left hip (M25.552) · Stiffness of Left Hip, Not elsewhere classified (M25.652) ASSESSMENT:  
 
Ms. Neri Fernandez is s/p left ANMOL and presents with decreased weight bearing on left LE and decreased independence with functional mobility and activities of daily living as compared to prior level of function and safety. Patient would benefit from skilled Occupational Therapy to maximize independence and safety with self-care task and functional mobility.   Pt would also benefit from education on lower body adaptive equipment and hip precautions post-surgery in preparation for going home. Patient declined shower and sponge bath but completed dressing and toileting as charter below in ADL grid and is ambulating with rolling walker and stand by assist. Patient did well but is very slow. Patient instructed to call for assistance when needing to get up from recliner and all needs in reach. Patient verbalized understanding of call light. 8/3/2018 Patient completed declined shower but completed dressing as charter below in ADL grid and is ambulating with rolling walker and stand by assist.  Patient has met 5/5 goals and plans to return home with good family support. Family able to provide patient with appropriate level of assistance at this time. OT reviewed hip precautions throughout session and issued long handled sponge for home use. Patient instructed to call for assistance when needing to get up from recliner and all needs in reach. Patient verbalized understanding of call light. This section established at most recent assessment PROBLEM LIST (Impairments causing functional limitations): 1. Decreased Strength 2. Decreased ADL/Functional Activities 3. Decreased Transfer Abilities 4. Increased Pain 5. Increased Fatigue 6. Decreased Flexibility/Joint Mobility 7. Decreased Knowledge of Precautions INTERVENTIONS PLANNED: (Benefits and precautions of occupational therapy have been discussed with the patient.) 1. Activities of daily living training 2. Adaptive equipment training 3. Balance training 4. Clothing management 5. Donning&doffing training 6. Theraputic activity TREATMENT PLAN: Frequency/Duration: Follow patient 1-3 times to address above goals. Rehabilitation Potential For Stated Goals: Good RECOMMENDED REHABILITATION/EQUIPMENT: (at time of discharge pending progress): Continue Skilled Therapy and Home Health: Physical Therapy. OCCUPATIONAL PROFILE AND HISTORY:  
History of Present Injury/Illness (Reason for Referral): Pt presents this date s/p (left) ANMOL. Past Medical History/Comorbidities: Ms. 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 She also has no past medical history of Adverse effect of anesthesia; Aneurysm ([address] Arrhythmia; Asthma; Autoimmune disease ([address] CAD (coronary artery disease); Cancer ([address] Chronic kidney disease; Chronic obstructive pulmonary disease ([address] Chronic pain; Coagulation disorder ([address] Diabetes ([address] Difficult intubation; Endocarditis; GERD (gastroesophageal reflux disease); Heart failure ([address] Ill-defined condition; Liver disease; Malignant hyperthermia due to anesthesia; Nicotine vapor product user; Non-nicotine vapor product user; Pseudocholinesterase deficiency; Psychiatric disorder; PUD (peptic ulcer disease); Rheumatic fever; Seizures ([address] Stroke [facility]); Thromboembolus ([address] Thyroid disease; or Unspecified adverse effect of anesthesia. Ms. Princess Krause  has a past surgical history that includes hx cholecystectomy (2009); hx knee arthroscopy (Bilateral); pr breast surgery procedure unlisted (Right, 1968); hx hip replacement (Right, 05/10/2017); and hx knee replacement (Right, 08/2017). Social History/Living Environment:  
Home Environment: Private residence # Steps to Enter: 1 Wheelchair Ramp: Yes One/Two Story Residence: One story Living Alone: No 
Support Systems: Family member(s) Patient Expects to be Discharged to[de-identified] Private residence Current DME Used/Available at Home: Sedrick Garcia Prior Level of Function/Work/Activity: 
Independent with walker and sponge bathes with help from boyfriend. Number of Personal Factors/Comorbidities that affect the Plan of Care: Brief history (0):  LOW COMPLEXITY ASSESSMENT OF OCCUPATIONAL PERFORMANCE[de-identified]  
Most Recent Physical Functioning:  
Balance Sitting: Intact Standing: Intact; With support LLE AROM 
L Hip Flexion: 90 L Hip ABduction: 10 L Knee Extension: 0 Mental Status Neurologic State: Alert Orientation Level: Oriented X4 Basic ADLs (From Assessment) Complex ADLs (From Assessment) Basic ADL Feeding: Independent Oral Facial Hygiene/Grooming: Supervision (seated in recliner) Bathing: Moderate assistance Type of Bath: Bath Pack, Chlorhexidine (CHG) Upper Body Dressing: Supervision Lower Body Dressing: Moderate assistance Toileting: Minimum assistance Grooming/Bathing/Dressing Activities of Daily Living Grooming Grooming Assistance: Stand-by assistance (standing) Upper Body Bathing Bathing Assistance:  (pt declined bathing again today) Toileting Toileting Assistance: Stand-by assistance Adaptive Equipment: Elevated seat;Grab bars; Sedrick Garcia Upper Body Dressing Assistance Dressing Assistance: Stand-by assistance Functional Transfers Bathroom Mobility: Stand-by assistance Toilet Transfer : Stand-by assistance Cues: Verbal cues provided Adaptive Equipment: Grab bars; Sedrick Garcia (comment) Lower Body Dressing Assistance Dressing Assistance: Minimum assistance (protective underpants and pants) Bed/Mat Mobility Sit to Stand: Contact guard assistance;Stand-by assistance Physical Skills Involved: 1. Range of Motion 2. Balance 3. Strength Cognitive Skills Affected (resulting in the inability to perform in a timely and safe manner): 1. none Psychosocial Skills Affected: 1. Environmental Adaptation Number of elements that affect the Plan of Care: 3-5:  MODERATE COMPLEXITY CLINICAL DECISION MAKIN[address] [place] [address] AM-PAC 6 Clicks Daily Activity Inpatient Short Form How much help from another person does the patient currently need. .. Total A Lot A Little None 1. Putting on and taking off regular lower body clothing? [] 1   [] 2   [x] 3   [] 4  
2. Bathing (including washing, rinsing, drying)? [] 1   [] 2   [] 3   [x] 4  
3. Toileting, which includes using toilet, bedpan or urinal?   [] 1   [] 2   [] 3   [x] 4  
4. Putting on and taking off regular upper body clothing? [] 1   [] 2   [] 3   [x] 4  
5. Taking care of personal grooming such as brushing teeth? [] 1   [] 2   [] 3   [x] 4  
6. Eating meals? [] 1   [] 2   [] 3   [x] 4  
© , Trustees of [address] [place] [address], under license to [employer]. All rights reserved Score:  Initial: 19 Most Recent: 23 (Date: 8/3/2018 ) Interpretation of Tool:  Represents activities that are increasingly more difficult (i.e. Bed mobility, Transfers, Gait). Score 24 23 22-20 19-15 14-10 9-7 6 Modifier CH CI CJ CK CL CM CN   
 
? Self Care:  
  - CURRENT STATUS: CK - 40%-59% impaired, limited or restricted  - GOAL STATUS: CJ - 20%-39% impaired, limited or restricted  - D/C STATUS:  CI - 1%-19% impaired, limited or restricted [place] / Product Type: Medicare /   
 
Medical Necessity:    
· Patient is expected to demonstrate progress in range of motion, balance and functional technique to increase independence with self care. Reason for Services/Other Comments: 
· Patient  would benefit from skilled Occupational Therapy to maximize independence and safety with self-care task and functional mobility.  .  
Use of outcome tool(s) and clinical judgement create a POC that gives a: LOW COMPLEXITY  
  
 
 
 
TREATMENT:  
(In addition to Assessment/Re-Assessment sessions the following treatments were rendered) Pre-treatment Symptoms/Complaints:  Pt without complaint of pain 
Pain: Initial:  
Pain Intensity 1: 0  Post Session:  0 Assessment/Reassessment only, no treatment provided today Treatment/Session Assessment:   
 Response to Treatment:  Pt up to chair then to toilet completed toileting and dressing tolerated well. Education: 
[] Home Exercises [x] Fall Precautions [x] Hip Precautions [] Going Home Video 
[] Knee/Hip Prosthesis Review [x] Walker Management/Safety [x] Adaptive Equipment as Needed Interdisciplinary Collaboration:  
o Physical Therapist 
o Occupational Therapist 
o Registered Nurse After treatment position/precautions:  
o Up in chair 
o Bed/Chair-wheels locked 
o Call light within reach 
o RN notified 
o Family at bedside Compliance with Program/Exercises: compliant all of the time. Recommendations/Intent for next treatment session:  Treatment next visit will focus on increasing Ms. Sparrow's independence with bed mobility, transfers, self care, functional mobility, modalities for pain, and patient education. Total Treatment Duration: OT Patient Time In/Time Out Time In: 0900 Time Out: 9212 Angel Luis Mcguire, OT

## 2018-08-03 NOTE — PROGRESS NOTES
Problem: Mobility Impaired (Adult and Pediatric) Goal: *Acute Goals and Plan of Care (Insert Text) GOALS (1-4 days): 
(1.)Ms. Marcelo Hoyos will move from supine to sit and sit to supine  in bed with STAND BY ASSIST.   
(2.)Ms. Marcelo Hoyos will transfer from bed to chair and chair to bed with STAND BY ASSIST using the least restrictive device. Goal met (3.)Ms. Marcelo Hoyos will ambulate with STAND BY ASSIST for 100 feet with the least restrictive device. Goal met (4.)Ms. Marcelo Hoyos will ambulate up/down 2 steps with bilateral  railing with MINIMAL ASSIST with no device. (5.)Ms. Marcelo Hoyos will state/observe PATRICIA precautions with 0 verbal cues. goal met 
________________________________________________________________________________________________ PHYSICAL THERAPY [facility] [place] Patricia: Daily Note, AM 8/3/2018 INPATIENT: Hospital Day: 3 [place] / Product Type: Medicare /  
  
NAME/AGE/GENDER: Paulino Gonzalez is a 65 y.o. female PRIMARY DIAGNOSIS:  Unilateral primary osteoarthritis, left hip [M16.12] Procedure(s) and Anesthesia Type: 
   * LEFT HIP ARTHROPLASTY TOTAL / DEPUY - Spinal (Left) ICD-10: Treatment Diagnosis:  
 · Pain in left hip (M25.552) · Stiffness of Left Hip, Not elsewhere classified (M25.652) · Difficulty in walking, Not elsewhere classified (R26.2) ASSESSMENT:  
 
Ms. Marcelo Hoyos presents with limited rom and strength of left LE as well as decreased functional mobility and gait s/p left patricia. She plans to go home with HHPT. Kd. Mary Anne Harps to do well with no complaints and eager to go home today. She did patricia exercises with cues only. She ambulated well with walker. No questions or concerns about going home today. Boyfriend will be with her. No stairs at home. Reviewed patricia precautions again. This section established at most recent assessment PROBLEM LIST (Impairments causing functional limitations): 1. Decreased Strength 2.  Decreased ADL/Functional Activities 3. Decreased Transfer Abilities 4. Decreased Ambulation Ability/Technique 5. Decreased Balance 6. Increased Pain 7. Decreased Activity Tolerance 8. Decreased Flexibility/Joint Mobility 9. Decreased [place] with Home Exercise Program 
 INTERVENTIONS PLANNED: (Benefits and precautions of physical therapy have been discussed with the patient.) 1. Bed Mobility 2. Gait Training 3. Home Exercise Program (HEP) 4. Therapeutic Exercise/Strengthening 5. Transfer Training 6. Range of Motion: active/assisted/passive 7. Therapeutic Activities 8. Group Therapy TREATMENT PLAN: Frequency/Duration: Follow patient BID for duration of hospital stay to address above goals. Rehabilitation Potential For Stated Goals: Good RECOMMENDED REHABILITATION/EQUIPMENT: (at time of discharge pending progress): Continue Skilled Therapy and Home Health: Physical Therapy. HISTORY:  
History of Present Injury/Illness (Reason for Referral): S/p left patricia 
Past Medical History/Comorbidities: Ms. 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 She also has no past medical history of Adverse effect of anesthesia; Aneurysm ([address] Arrhythmia; Asthma; Autoimmune disease ([address] CAD (coronary artery disease); Cancer ([address] Chronic kidney disease; Chronic obstructive pulmonary disease ([address] Chronic pain; Coagulation disorder ([address] Diabetes ([address]  Difficult intubation; Endocarditis; GERD (gastroesophageal reflux disease); Heart failure ([facility] Ill-defined condition; Liver disease; Malignant hyperthermia due to anesthesia; Nicotine vapor product user; Non-nicotine vapor product user; Pseudocholinesterase deficiency; Psychiatric disorder; PUD (peptic ulcer disease); Rheumatic fever; Seizures ([facility] Stroke [facility]); Thromboembolus ([facility] Thyroid disease; or Unspecified adverse effect of anesthesia. Ms. Carolina Doe  has a past surgical history that includes hx cholecystectomy (2009); hx knee arthroscopy (Bilateral); pr breast surgery procedure unlisted (Right, 1968); hx hip replacement (Right, 05/10/2017); and hx knee replacement (Right, 08/2017). Social History/Living Environment:  
Home Environment: Private residence # Steps to Enter: 1 Wheelchair Ramp: Yes One/Two Story Residence: One story Living Alone: No 
Support Systems: Family member(s) Patient Expects to be Discharged to[de-identified] Private residence Current DME Used/Available at Home: [address] Prior Level of Function/Work/Activity: 
Independent using RW  
Number of Personal Factors/Comorbidities that affect the Plan of Care: 3+: HIGH COMPLEXITY EXAMINATION:  
Most Recent Physical Functioning: LLE AROM 
L Hip Flexion: 90 L Hip ABduction: 10 L Knee Extension: 0 Transfers Sit to Stand: Contact guard assistance;Stand-by assistance Stand to Sit: Stand-by assistance;Contact guard assistance Balance Sitting: Intact Standing: With support Weight Bearing Status Left Side Weight Bearing: As tolerated Distance (ft): 130 Feet (ft) Ambulation - Level of Assistance: Stand-by assistance Assistive Device: Walker, rolling Speed/Keli: Delayed Step Length: Left shortened;Right shortened Stance: Left decreased Gait Abnormalities: Antalgic Interventions: Safety awareness training;Verbal cues Braces/Orthotics:  
 
Left Hip Cold Type: Cold/ice pack Body Structures Involved: 1. Bones 2. Joints 3. Muscles 4. Ligaments Body Functions Affected: 1. Movement Related Activities and Participation Affected: 1. Mobility Number of elements that affect the Plan of Care: 3: MODERATE COMPLEXITY CLINICAL PRESENTATION:  
Presentation: Stable and uncomplicated: LOW COMPLEXITY CLINICAL DECISION MAKING:  
[facility] AM-PAC 6 Clicks Basic Mobility Inpatient Short Form How much difficulty does the patient currently have. .. Unable A Lot A Little None 1. Turning over in bed (including adjusting bedclothes, sheets and blankets)? [] 1   [] 2   [x] 3   [] 4  
2. Sitting down on and standing up from a chair with arms ( e.g., wheelchair, bedside commode, etc.)   [] 1   [] 2   [x] 3   [] 4  
3. Moving from lying on back to sitting on the side of the bed? [] 1   [] 2   [x] 3   [] 4 How much help from another person does the patient currently need. .. Total A Lot A Little None 4. Moving to and from a bed to a chair (including a wheelchair)? [] 1   [] 2   [x] 3   [] 4  
5. Need to walk in hospital room? [] 1   [] 2   [x] 3   [] 4  
6. Climbing 3-5 steps with a railing? [] 1   [x] 2   [] 3   [] 4  
© 2007, Trustees of [facility] [employer], under license to [employer]. All rights reserved Score:  Initial: 17 Most Recent: X (Date: -- ) Interpretation of Tool:  Represents activities that are increasingly more difficult (i.e. Bed mobility, Transfers, Gait). Score 24 23 22-20 19-15 14-10 9-7 6 Modifier CH CI CJ CK CL CM CN   
 
? Mobility - Walking and Moving Around:  
  - CURRENT STATUS: CK - 40%-59% impaired, limited or restricted  - GOAL STATUS: CJ - 20%-39% impaired, limited or restricted  - D/C STATUS:  ---------------To be determined--------------- [place] / Product Type: Medicare /   
 
Medical Necessity:    
· Patient is expected to demonstrate progress in strength, range of motion and balance to decrease assistance required with theraputic exercises and functional mobility. Reason for Services/Other Comments: 
· Patient continues to require present interventions due to patient's inability to perform theraputic exercises and functional mobility independently. Use of outcome tool(s) and clinical judgement create a POC that gives a: Clear prediction of patient's progress: LOW COMPLEXITY  
  
 
 
 
TREATMENT:  
(In addition to Assessment/Re-Assessment sessions the following treatments were rendered) Pre-treatment Symptoms/Complaints:  Hip sore Pain: Initial:  
   Post Session:  Did not rate Therapeutic Exercise: (30 Minutes):  Exercises per grid below to improve mobility and strength. Required minimal visual, verbal and manual cues to promote proper body alignment, promote proper body posture and promote proper body mechanics. Progressed range and repetitions as indicated. Gait Training (15 Minutes):  Gait training to improve and/or restore physical functioning as related to mobility, strength and balance. Ambulated 130 Feet (ft) with Stand-by assistance using a Walker, rolling and minimal Safety awareness training;Verbal cues related to their stance phase to promote proper body alignment, promote proper body posture and promote proper body mechanics. Date: 
8/2 Date: 
8/3 Date: 
  
ACTIVITY/EXERCISE AM PM AM PM AM PM  
GROUP THERAPY  []  [x]  [x]  []  []  [] Ankle Pumps 15a 15a 20a Quad Sets 10a 15a [id number] Gluteal Sets 10a 15a 20a Hip ABd/ADduction 10aa 15a 20a Straight Leg Raises Knee Slides 10aa 15a 20a Short Arc Quads  15a [id number] [address]  15a [id number] Chair Slides B = bilateral; AA = active assistive; A = active; P = passive Treatment/Session Assessment:   
 Response to Treatment:  Pt. Has done well Education: 
[x] Home Exercises [x] Fall Precautions [x] Hip Precautions [x] D/C Instruction Review [x] Knee/Hip Prosthesis Review [x] Walker Management/Safety [] Adaptive Equipment as Needed Interdisciplinary Collaboration:  
o Physical Therapist 
o Rehabilitation Attendant After treatment position/precautions:  
o Up in chair 
o Bed/Chair-wheels locked 
o Bed in low position 
o Call light within reach Compliance with Program/Exercises: yes. No questions. Recommendations/Intent for next treatment session:  Treatment next visit will focus on increasing Ms. Sparrow's independence with bed mobility, transfers, gait training, strength/ROM exercises, modalities for pain, and patient education. Total Treatment Duration: PT Patient Time In/Time Out Time In: 1030 Time Out: 1115 Jose Garcia PT

## 2018-08-03 NOTE — PROGRESS NOTES
Problem: Falls - Risk of 
Goal: *Absence of Falls Document Benigno Talavera Fall Risk and appropriate interventions in the flowsheet. Outcome: Progressing Towards Goal 
Fall Risk Interventions: 
Mobility Interventions: OT consult for ADLs, Patient to call before getting OOB, PT Consult for mobility concerns, PT Consult for assist device competence, Strengthening exercises (ROM-active/passive), Utilize walker, cane, or other assistive device Medication Interventions: Teach patient to arise slowly, Patient to call before getting OOB Elimination Interventions: Call light in reach, Elevated toilet seat, Patient to call for help with toileting needs

## 2018-08-03 NOTE — PROGRESS NOTES
Sitting up in bed. Medicated for pain with dilaudid po. L hip with aquacel in place. Good movement and sensation to LEs. Appetite good.

## 2018-08-03 NOTE — PROGRESS NOTES
Shift assessment completed. Pt is alert & oriented x4. Able to verbalize needs. Resting quietly with no distress noted. Dressing to left hip is clean, dry and intact. Ice pack provided. Neurovascular and peripheral vascular checks WNL. Incentive Spirometry at bedside. Patient encouraged to use hourly x 10 repetitions. Patient voiding clear yellow urine without difficulty. Pain is being managed with Dilaudid with patient tolerating well. Bed low and locked. Call light within reach. Patient instructed to call for assistance. Pt verbalizes understanding. Will monitor.

## 2018-08-03 NOTE — PROGRESS NOTES
August 3, 2018 Post Op day: 2 Days Post-OpProcedure(s) (LRB): LEFT HIP ARTHROPLASTY TOTAL / Vinod Cook (Left) Admit Date: 2018 Admit Diagnosis: Unilateral primary osteoarthritis, left hip [M16.12] LAB:   
Recent Results (from the past 24 hour(s)) PLEASE READ & DOCUMENT PPD TEST IN 24 HRS Collection Time: 18  9:25 AM  
Result Value Ref Range PPD  Negative  
 mm Induration 0 mm HEMOGLOBIN Collection Time: 18  4:49 AM  
Result Value Ref Range HGB 9.1 (L) 11.7 - 15.4 g/dL Vital Signs:   
Patient Vitals for the past 8 hrs: 
 BP Temp Pulse Resp SpO2  
18 0447 154/82 96.5 °F (35.8 °C) 78 16 96 % 18 0020 163/86 95.9 °F (35.5 °C) 80 16 98 % Temp (24hrs), Av.1 °F (36.2 °C), Min:95.9 °F (35.5 °C), Max:98.1 °F (36.7 °C) Body mass index is 33.23 kg/(m^2). Pain Control:  
Pain Assessment Pain Scale 1: Visual 
Pain Intensity 1: 3 Pain Onset 1: not sure Pain Location 1: Hip Pain Orientation 1: Left Pain Description 1: Constant Pain Intervention(s) 1: Medication (see MAR) Subjective: Doing well, No complaints, No SOB, No Chest Pain, No Nausea or Vomitting Objective: Vital Signs are Stable, No Acute Distress, Alert and Oriented, Dressing is Dry,  Neurovascular exam is normal. 
  
 
PT/OT:  
  
  
  
Assistive Device: Walker (comment) LLE AROM 
L Hip Flexion: 90 L Hip ABduction: 10 L Knee Extension: 0 Weight Bearing Status: WBAT Meds: 
[unfilled] [unfilled] [unfilled] Assessment:  
Patient Active Problem List  
Diagnosis Code  Preop respiratory exam Z01.811  
 Hypertension I10  
 Hyperlipidemia E78.5  Rheumatoid arthritis(714.0) M06.9  MOOK (obstructive sleep apnea) G47.33  
 Elevated serum creatinine R79.89  Status post right hip replacement Z96.641  Status post total right knee replacement Z96.651  CKD (chronic kidney disease) stage 3, GFR 30-59 ml/min N18.3  BMI 35.0-35.9,adult Z68.35  
 Status post total left knee replacement H93.072  Noncompliance with CPAP treatment Z91.14  
 Osteoarthritis of left hip M16.12  Status post left hip replacement T76.693 Plan: Continue Physical Therapy, Monitor  Hbg, D/C to home today Signed By: Sandra Clarke NP

## 2018-08-03 NOTE — PROGRESS NOTES
Given prescriptions for aspirin and dilaudid and instructed how to take. Has follow up appt already scheduled with Dr Anne Mott. Instructed to call doctor if having fever, excessive drainage, numbness or other problems. Home therapy to see pt. Reminded to follow hip precautions.

## 2018-08-04 ENCOUNTER — HOME CARE VISIT (OUTPATIENT)
Dept: SCHEDULING | Facility: HOME HEALTH | Age: 78
End: 2018-08-04
Payer: MEDICARE

## 2018-08-04 VITALS
SYSTOLIC BLOOD PRESSURE: 128 MMHG | DIASTOLIC BLOOD PRESSURE: 77 MMHG | TEMPERATURE: 98.3 F | RESPIRATION RATE: 18 BRPM | HEART RATE: 72 BPM

## 2018-08-04 PROCEDURE — G0151 HHCP-SERV OF PT,EA 15 MIN: HCPCS

## 2018-08-04 PROCEDURE — 400013 HH SOC

## 2018-08-04 PROCEDURE — 3331090002 HH PPS REVENUE DEBIT

## 2018-08-04 PROCEDURE — 3331090001 HH PPS REVENUE CREDIT

## 2018-08-05 PROCEDURE — 3331090001 HH PPS REVENUE CREDIT

## 2018-08-05 PROCEDURE — 3331090002 HH PPS REVENUE DEBIT

## 2018-08-06 ENCOUNTER — PATIENT OUTREACH (OUTPATIENT)
Dept: CASE MANAGEMENT | Age: 78
End: 2018-08-06

## 2018-08-06 ENCOUNTER — HOME CARE VISIT (OUTPATIENT)
Dept: SCHEDULING | Facility: HOME HEALTH | Age: 78
End: 2018-08-06
Payer: MEDICARE

## 2018-08-06 VITALS
TEMPERATURE: 96.1 F | DIASTOLIC BLOOD PRESSURE: 76 MMHG | RESPIRATION RATE: 19 BRPM | SYSTOLIC BLOOD PRESSURE: 132 MMHG | HEART RATE: 76 BPM

## 2018-08-06 PROCEDURE — 3331090001 HH PPS REVENUE CREDIT

## 2018-08-06 PROCEDURE — G0157 HHC PT ASSISTANT EA 15: HCPCS

## 2018-08-06 PROCEDURE — 3331090002 HH PPS REVENUE DEBIT

## 2018-08-06 NOTE — PROGRESS NOTES
This note will not be viewable in [address] Transition of Care Discharge Follow-up Questionnaire   Date/Time of Call:   8/6/18 3:31pm   What was the patient hospitalized for? Status Post Left Hip Replacement   Does the patient understand his/her diagnosis and/or treatment and what happened during the hospitalization? Yes, spoke with patient who was agreeable to conversation        Did the patient receive discharge instructions? Yes   CM Assessed Risk for Readmission:       Patient stated Risk for Readmission:      Infection at incision      Nothing I can think of; c/o upset stomach is relieved with pepto tablets, educated to eating when taking pain meds, plenty of fluids, and to call doctor if fever <100.4, she states her understanding   Review any discharge instructions (see discharge instructions/AVS in [facility]). Ask patient if they understand these. Do they have any questions? Reviewed, reports understanding of instructions. No questions at this time. Were home services ordered (nursing, PT, OT, ST, etc.)? Yes, [facility]   If so, has the first visit occurred? If not, why? (Assist with coordination of services if necessary.)   yes   Was any DME ordered? No     If so, has it been received? If not, why?  (Assist patient in obtaining DME orders &/or equipment if necessary.) N/A     Complete a review of all medications (new, continued and discontinued meds per the D/C instructions and medication tab in [facility]). Completed       Were all new prescriptions filled? If not, why?  (Assist patient in obtaining medications if necessary  escalate for CCM &/or SW if ongoing issues are verbalized by pt or anticipated)   Yes   Does the patient understand the purpose and dosing instructions for all medications?   (If patient has questions, provide explanation and education.)   Yes, states understanding of medications   Does the patient have any problems in performing ADLs? (If patient is unable to perform ADLs  what is the limiting factor(s)? Do they have a support system that can assist? If no support system is present, discuss possible assistance that they may be able to obtain. Escalate for CCM/SW if ongoing issues are verbalized by pt or anticipated)   Independent with ADLs, reports her boyfriend is staying with her and assists with all needs at this time     Does the patient have all follow-up appointments scheduled? 7 day f/up with PCP?   (f/up with PCP may be w/in 14 days if patient has a f/up with their specialist w/in 7 days)    7-14 day f/up with specialist?   (or per discharge instructions)    If f/up has not been made  what actions has the care coordinator made to accomplish this? Has transportation been arranged? Yes      Patient declines visit with PCP but that doctor is aware of her surgery      Екатерина Sinclair MD  on 9/11/18   [address] [place] [address],[address]           Yes, no issues with transportation, boyfriend will drive   Any other questions or concerns expressed by the patient? No other questions or concerns at this time    Patient was given contact information for [facility], instructed to call with needs or concerns. Schedule next appointment with MACHELLE HERNANDEZ Coordinator or refer to RN Case Manager/ per the workflow guidelines. When is care coordinators next follow-up call scheduled? If referred for CCM  what RN care manager was the referral assigned?    Community Care Coordinator will follow per workflow guidelines          3 to 4 weeks   CHARLETTE Call Completed By: Gordon Ruggiero, [address] [place] [address] Coordinator

## 2018-08-07 PROCEDURE — 3331090002 HH PPS REVENUE DEBIT

## 2018-08-07 PROCEDURE — 3331090001 HH PPS REVENUE CREDIT

## 2018-08-08 ENCOUNTER — HOME CARE VISIT (OUTPATIENT)
Dept: SCHEDULING | Facility: HOME HEALTH | Age: 78
End: 2018-08-08
Payer: MEDICARE

## 2018-08-08 VITALS
DIASTOLIC BLOOD PRESSURE: 64 MMHG | RESPIRATION RATE: 19 BRPM | HEART RATE: 76 BPM | SYSTOLIC BLOOD PRESSURE: 118 MMHG | TEMPERATURE: 97.2 F

## 2018-08-08 PROCEDURE — G0157 HHC PT ASSISTANT EA 15: HCPCS

## 2018-08-08 PROCEDURE — 3331090002 HH PPS REVENUE DEBIT

## 2018-08-08 PROCEDURE — 3331090001 HH PPS REVENUE CREDIT

## 2018-08-09 PROCEDURE — 3331090001 HH PPS REVENUE CREDIT

## 2018-08-09 PROCEDURE — 3331090002 HH PPS REVENUE DEBIT

## 2018-08-10 ENCOUNTER — HOME CARE VISIT (OUTPATIENT)
Dept: SCHEDULING | Facility: HOME HEALTH | Age: 78
End: 2018-08-10
Payer: MEDICARE

## 2018-08-10 VITALS
SYSTOLIC BLOOD PRESSURE: 122 MMHG | RESPIRATION RATE: 18 BRPM | TEMPERATURE: 97.2 F | HEART RATE: 80 BPM | DIASTOLIC BLOOD PRESSURE: 62 MMHG

## 2018-08-10 PROCEDURE — 3331090002 HH PPS REVENUE DEBIT

## 2018-08-10 PROCEDURE — G0157 HHC PT ASSISTANT EA 15: HCPCS

## 2018-08-10 PROCEDURE — 3331090001 HH PPS REVENUE CREDIT

## 2018-08-11 PROCEDURE — 3331090002 HH PPS REVENUE DEBIT

## 2018-08-11 PROCEDURE — 3331090001 HH PPS REVENUE CREDIT

## 2018-08-12 PROCEDURE — 3331090002 HH PPS REVENUE DEBIT

## 2018-08-12 PROCEDURE — 3331090001 HH PPS REVENUE CREDIT

## 2018-08-13 ENCOUNTER — HOME CARE VISIT (OUTPATIENT)
Dept: SCHEDULING | Facility: HOME HEALTH | Age: 78
End: 2018-08-13
Payer: MEDICARE

## 2018-08-13 VITALS
SYSTOLIC BLOOD PRESSURE: 118 MMHG | TEMPERATURE: 97.5 F | RESPIRATION RATE: 19 BRPM | DIASTOLIC BLOOD PRESSURE: 60 MMHG | HEART RATE: 76 BPM

## 2018-08-13 PROCEDURE — 3331090002 HH PPS REVENUE DEBIT

## 2018-08-13 PROCEDURE — 3331090001 HH PPS REVENUE CREDIT

## 2018-08-13 PROCEDURE — G0157 HHC PT ASSISTANT EA 15: HCPCS

## 2018-08-14 PROCEDURE — 3331090002 HH PPS REVENUE DEBIT

## 2018-08-14 PROCEDURE — 3331090001 HH PPS REVENUE CREDIT

## 2018-08-14 PROCEDURE — A4649 SURGICAL SUPPLIES: HCPCS

## 2018-08-15 ENCOUNTER — HOME CARE VISIT (OUTPATIENT)
Dept: SCHEDULING | Facility: HOME HEALTH | Age: 78
End: 2018-08-15
Payer: MEDICARE

## 2018-08-15 PROCEDURE — 3331090002 HH PPS REVENUE DEBIT

## 2018-08-15 PROCEDURE — 3331090001 HH PPS REVENUE CREDIT

## 2018-08-16 PROCEDURE — 3331090001 HH PPS REVENUE CREDIT

## 2018-08-16 PROCEDURE — 3331090002 HH PPS REVENUE DEBIT

## 2018-08-17 PROCEDURE — 3331090002 HH PPS REVENUE DEBIT

## 2018-08-17 PROCEDURE — 3331090001 HH PPS REVENUE CREDIT

## 2018-08-18 PROCEDURE — 3331090001 HH PPS REVENUE CREDIT

## 2018-08-18 PROCEDURE — 3331090002 HH PPS REVENUE DEBIT

## 2018-08-19 PROCEDURE — 3331090001 HH PPS REVENUE CREDIT

## 2018-08-19 PROCEDURE — 3331090002 HH PPS REVENUE DEBIT

## 2018-08-20 ENCOUNTER — HOME CARE VISIT (OUTPATIENT)
Dept: SCHEDULING | Facility: HOME HEALTH | Age: 78
End: 2018-08-20
Payer: MEDICARE

## 2018-08-20 VITALS
TEMPERATURE: 97.3 F | DIASTOLIC BLOOD PRESSURE: 76 MMHG | SYSTOLIC BLOOD PRESSURE: 130 MMHG | HEART RATE: 79 BPM | RESPIRATION RATE: 19 BRPM

## 2018-08-20 PROCEDURE — G0157 HHC PT ASSISTANT EA 15: HCPCS

## 2018-08-20 PROCEDURE — 3331090001 HH PPS REVENUE CREDIT

## 2018-08-20 PROCEDURE — 3331090002 HH PPS REVENUE DEBIT

## 2018-08-21 PROCEDURE — 3331090001 HH PPS REVENUE CREDIT

## 2018-08-21 PROCEDURE — 3331090002 HH PPS REVENUE DEBIT

## 2018-08-22 ENCOUNTER — HOME CARE VISIT (OUTPATIENT)
Dept: SCHEDULING | Facility: HOME HEALTH | Age: 78
End: 2018-08-22
Payer: MEDICARE

## 2018-08-22 PROCEDURE — 3331090002 HH PPS REVENUE DEBIT

## 2018-08-22 PROCEDURE — 3331090001 HH PPS REVENUE CREDIT

## 2018-08-23 PROCEDURE — 3331090001 HH PPS REVENUE CREDIT

## 2018-08-23 PROCEDURE — 3331090002 HH PPS REVENUE DEBIT

## 2018-08-24 PROCEDURE — 3331090002 HH PPS REVENUE DEBIT

## 2018-08-24 PROCEDURE — 3331090001 HH PPS REVENUE CREDIT

## 2018-08-25 PROCEDURE — 3331090002 HH PPS REVENUE DEBIT

## 2018-08-25 PROCEDURE — 3331090001 HH PPS REVENUE CREDIT

## 2018-08-26 PROCEDURE — 3331090001 HH PPS REVENUE CREDIT

## 2018-08-26 PROCEDURE — 3331090002 HH PPS REVENUE DEBIT

## 2018-08-27 ENCOUNTER — HOME CARE VISIT (OUTPATIENT)
Dept: SCHEDULING | Facility: HOME HEALTH | Age: 78
End: 2018-08-27
Payer: MEDICARE

## 2018-08-27 VITALS
TEMPERATURE: 97.8 F | HEART RATE: 76 BPM | SYSTOLIC BLOOD PRESSURE: 118 MMHG | RESPIRATION RATE: 18 BRPM | DIASTOLIC BLOOD PRESSURE: 70 MMHG

## 2018-08-27 PROCEDURE — G0151 HHCP-SERV OF PT,EA 15 MIN: HCPCS

## 2018-08-27 PROCEDURE — 3331090001 HH PPS REVENUE CREDIT

## 2018-08-27 PROCEDURE — 3331090002 HH PPS REVENUE DEBIT

## 2018-08-31 ENCOUNTER — PATIENT OUTREACH (OUTPATIENT)
Dept: CASE MANAGEMENT | Age: 78
End: 2018-08-31

## 2018-08-31 NOTE — PROGRESS NOTES
This note will not be viewable in [address] Transitions of Care  Follow up Outreach Note Outreach type Phone call: spoke with patient Home visit:  
Date/Time of Outreach: 8/31/18 1:43pm  
 
Has patient attended PCP or specialist follow-up appointments since last contact? What was outcome of appointment? When is next follow-up scheduled? Patient is scheduled to see Dr. Michelle Sr on 9/11/18 Review medications. Any medication changes since last outreach? Does patient have any questions or issues related to their medications? No medication changes Home health active? If yes  any issue? Progress? No, [facility] PT d/c on 8/23/18 per patient Referrals needed? 
(CM, SW, HH, etc. ) No referrals needed Other issues/Miscellaneous? (Transportation, access to meals, ability to perform ADLs, adequate caregiver support, etc.) Patient reports she is doing well, still having some pain but feels that is to be expected. Offered to have CCM contact and she declines at this time, stating she does not feel that is necessary and prefers to wait and see Dr. Michelle Sr. Next Outreach Scheduled? Graduation from program? 
 N/A Yes Next Steps/Goals (if applicable): N/A Outreach completed by: 
 Jazzmine Manuel LPN Community Care Coordinator

## 2018-09-12 ENCOUNTER — HOME HEALTH ADMISSION (OUTPATIENT)
Dept: HOME HEALTH SERVICES | Facility: HOME HEALTH | Age: 78
End: 2018-09-12
Payer: MEDICARE

## 2018-09-13 ENCOUNTER — HOME CARE VISIT (OUTPATIENT)
Dept: SCHEDULING | Facility: HOME HEALTH | Age: 78
End: 2018-09-13
Payer: MEDICARE

## 2018-09-13 PROCEDURE — G0151 HHCP-SERV OF PT,EA 15 MIN: HCPCS

## 2018-09-13 PROCEDURE — 3331090002 HH PPS REVENUE DEBIT

## 2018-09-13 PROCEDURE — 400013 HH SOC

## 2018-09-13 PROCEDURE — 3331090001 HH PPS REVENUE CREDIT

## 2018-09-14 VITALS
DIASTOLIC BLOOD PRESSURE: 84 MMHG | RESPIRATION RATE: 22 BRPM | HEART RATE: 83 BPM | SYSTOLIC BLOOD PRESSURE: 138 MMHG | TEMPERATURE: 97.3 F

## 2018-09-14 PROCEDURE — 3331090001 HH PPS REVENUE CREDIT

## 2018-09-14 PROCEDURE — 3331090002 HH PPS REVENUE DEBIT

## 2018-09-15 PROCEDURE — 3331090001 HH PPS REVENUE CREDIT

## 2018-09-15 PROCEDURE — 3331090002 HH PPS REVENUE DEBIT

## 2018-09-16 PROCEDURE — 3331090001 HH PPS REVENUE CREDIT

## 2018-09-16 PROCEDURE — 3331090002 HH PPS REVENUE DEBIT

## 2018-09-17 ENCOUNTER — HOME CARE VISIT (OUTPATIENT)
Dept: SCHEDULING | Facility: HOME HEALTH | Age: 78
End: 2018-09-17
Payer: MEDICARE

## 2018-09-17 VITALS
TEMPERATURE: 97.3 F | HEART RATE: 80 BPM | DIASTOLIC BLOOD PRESSURE: 70 MMHG | RESPIRATION RATE: 19 BRPM | SYSTOLIC BLOOD PRESSURE: 138 MMHG

## 2018-09-17 PROCEDURE — 3331090001 HH PPS REVENUE CREDIT

## 2018-09-17 PROCEDURE — G0157 HHC PT ASSISTANT EA 15: HCPCS

## 2018-09-17 PROCEDURE — 3331090002 HH PPS REVENUE DEBIT

## 2018-09-18 PROCEDURE — 3331090001 HH PPS REVENUE CREDIT

## 2018-09-18 PROCEDURE — 3331090002 HH PPS REVENUE DEBIT

## 2018-09-19 ENCOUNTER — HOME CARE VISIT (OUTPATIENT)
Dept: SCHEDULING | Facility: HOME HEALTH | Age: 78
End: 2018-09-19
Payer: MEDICARE

## 2018-09-19 VITALS
HEART RATE: 76 BPM | RESPIRATION RATE: 20 BRPM | TEMPERATURE: 97.6 F | SYSTOLIC BLOOD PRESSURE: 126 MMHG | DIASTOLIC BLOOD PRESSURE: 60 MMHG

## 2018-09-19 PROCEDURE — G0157 HHC PT ASSISTANT EA 15: HCPCS

## 2018-09-19 PROCEDURE — 3331090001 HH PPS REVENUE CREDIT

## 2018-09-19 PROCEDURE — 3331090002 HH PPS REVENUE DEBIT

## 2018-09-20 PROCEDURE — 3331090001 HH PPS REVENUE CREDIT

## 2018-09-20 PROCEDURE — 3331090002 HH PPS REVENUE DEBIT

## 2018-09-21 PROCEDURE — 3331090002 HH PPS REVENUE DEBIT

## 2018-09-21 PROCEDURE — 3331090001 HH PPS REVENUE CREDIT

## 2018-09-22 PROCEDURE — 3331090002 HH PPS REVENUE DEBIT

## 2018-09-22 PROCEDURE — 3331090001 HH PPS REVENUE CREDIT

## 2018-09-23 PROCEDURE — 3331090002 HH PPS REVENUE DEBIT

## 2018-09-23 PROCEDURE — 3331090001 HH PPS REVENUE CREDIT

## 2018-09-24 ENCOUNTER — HOME CARE VISIT (OUTPATIENT)
Dept: SCHEDULING | Facility: HOME HEALTH | Age: 78
End: 2018-09-24
Payer: MEDICARE

## 2018-09-24 VITALS
RESPIRATION RATE: 20 BRPM | TEMPERATURE: 97.5 F | DIASTOLIC BLOOD PRESSURE: 62 MMHG | HEART RATE: 78 BPM | SYSTOLIC BLOOD PRESSURE: 126 MMHG

## 2018-09-24 PROCEDURE — 3331090002 HH PPS REVENUE DEBIT

## 2018-09-24 PROCEDURE — 3331090001 HH PPS REVENUE CREDIT

## 2018-09-24 PROCEDURE — G0157 HHC PT ASSISTANT EA 15: HCPCS

## 2018-09-25 PROCEDURE — 3331090002 HH PPS REVENUE DEBIT

## 2018-09-25 PROCEDURE — 3331090001 HH PPS REVENUE CREDIT

## 2018-09-26 ENCOUNTER — HOME CARE VISIT (OUTPATIENT)
Dept: SCHEDULING | Facility: HOME HEALTH | Age: 78
End: 2018-09-26
Payer: MEDICARE

## 2018-09-26 VITALS
SYSTOLIC BLOOD PRESSURE: 126 MMHG | HEART RATE: 74 BPM | TEMPERATURE: 96.8 F | DIASTOLIC BLOOD PRESSURE: 68 MMHG | RESPIRATION RATE: 20 BRPM

## 2018-09-26 PROCEDURE — 3331090002 HH PPS REVENUE DEBIT

## 2018-09-26 PROCEDURE — G0157 HHC PT ASSISTANT EA 15: HCPCS

## 2018-09-26 PROCEDURE — 3331090001 HH PPS REVENUE CREDIT

## 2018-09-27 PROCEDURE — 3331090001 HH PPS REVENUE CREDIT

## 2018-09-27 PROCEDURE — 3331090002 HH PPS REVENUE DEBIT

## 2018-09-28 PROCEDURE — 3331090001 HH PPS REVENUE CREDIT

## 2018-09-28 PROCEDURE — 3331090002 HH PPS REVENUE DEBIT

## 2018-09-29 PROCEDURE — 3331090001 HH PPS REVENUE CREDIT

## 2018-09-29 PROCEDURE — 3331090002 HH PPS REVENUE DEBIT

## 2018-09-30 PROCEDURE — 3331090001 HH PPS REVENUE CREDIT

## 2018-09-30 PROCEDURE — 3331090002 HH PPS REVENUE DEBIT

## 2018-10-01 PROCEDURE — 3331090002 HH PPS REVENUE DEBIT

## 2018-10-01 PROCEDURE — 3331090001 HH PPS REVENUE CREDIT

## 2018-10-02 ENCOUNTER — HOME CARE VISIT (OUTPATIENT)
Dept: SCHEDULING | Facility: HOME HEALTH | Age: 78
End: 2018-10-02
Payer: MEDICARE

## 2018-10-02 VITALS
RESPIRATION RATE: 20 BRPM | SYSTOLIC BLOOD PRESSURE: 126 MMHG | HEART RATE: 78 BPM | TEMPERATURE: 97.2 F | DIASTOLIC BLOOD PRESSURE: 62 MMHG

## 2018-10-02 PROCEDURE — 3331090002 HH PPS REVENUE DEBIT

## 2018-10-02 PROCEDURE — G0157 HHC PT ASSISTANT EA 15: HCPCS

## 2018-10-02 PROCEDURE — 3331090001 HH PPS REVENUE CREDIT

## 2018-10-03 ENCOUNTER — HOME CARE VISIT (OUTPATIENT)
Dept: SCHEDULING | Facility: HOME HEALTH | Age: 78
End: 2018-10-03
Payer: MEDICARE

## 2018-10-03 VITALS
TEMPERATURE: 97.2 F | DIASTOLIC BLOOD PRESSURE: 66 MMHG | RESPIRATION RATE: 18 BRPM | HEART RATE: 78 BPM | SYSTOLIC BLOOD PRESSURE: 120 MMHG

## 2018-10-03 PROCEDURE — 3331090002 HH PPS REVENUE DEBIT

## 2018-10-03 PROCEDURE — G0157 HHC PT ASSISTANT EA 15: HCPCS

## 2018-10-03 PROCEDURE — 3331090001 HH PPS REVENUE CREDIT

## 2018-10-04 PROCEDURE — 3331090002 HH PPS REVENUE DEBIT

## 2018-10-04 PROCEDURE — 3331090001 HH PPS REVENUE CREDIT

## 2018-10-05 PROCEDURE — 3331090001 HH PPS REVENUE CREDIT

## 2018-10-05 PROCEDURE — 3331090002 HH PPS REVENUE DEBIT

## 2018-10-06 PROCEDURE — 3331090001 HH PPS REVENUE CREDIT

## 2018-10-06 PROCEDURE — 3331090002 HH PPS REVENUE DEBIT

## 2018-10-07 PROCEDURE — 3331090001 HH PPS REVENUE CREDIT

## 2018-10-07 PROCEDURE — 3331090002 HH PPS REVENUE DEBIT

## 2018-10-08 ENCOUNTER — HOME CARE VISIT (OUTPATIENT)
Dept: SCHEDULING | Facility: HOME HEALTH | Age: 78
End: 2018-10-08
Payer: MEDICARE

## 2018-10-08 VITALS
RESPIRATION RATE: 18 BRPM | SYSTOLIC BLOOD PRESSURE: 124 MMHG | DIASTOLIC BLOOD PRESSURE: 68 MMHG | TEMPERATURE: 97.2 F | HEART RATE: 78 BPM

## 2018-10-08 PROCEDURE — 3331090002 HH PPS REVENUE DEBIT

## 2018-10-08 PROCEDURE — 3331090001 HH PPS REVENUE CREDIT

## 2018-10-08 PROCEDURE — G0151 HHCP-SERV OF PT,EA 15 MIN: HCPCS

## 2024-05-13 NOTE — PROGRESS NOTES
CHARLETTE outreach call postponed for 21 days. Pt d/c 8/24 to [address] for STR. SNF call scheduled for 8/28. Edel Bradshaw LPN/ Care Coordinator  [address] [place] [address] / [place], [address] [facility]  [email or web address] note will not be viewable in [address] Session ID: [id number]  Language: Macedonian   ID: #[id number]   Name: Donte

## (undated) DEVICE — TRAY CATH 16F DRN BG LTX -- CONVERT TO ITEM 363158

## (undated) DEVICE — GENESIS TROCHLEAR PIN 1/8 X 3: Brand: GENESIS

## (undated) DEVICE — FAN SPRAY KIT: Brand: PULSAVAC®

## (undated) DEVICE — 3000CC GUARDIAN II: Brand: GUARDIAN

## (undated) DEVICE — 3M™ STERI-DRAPE™ INSTRUMENT POUCH 1018: Brand: STERI-DRAPE™

## (undated) DEVICE — SYRINGE CATH TIP 50ML

## (undated) DEVICE — TRAY PREP DRY W/ PREM GLV 2 APPL 6 SPNG 2 UNDPD 1 OVERWRAP

## (undated) DEVICE — Z DISCONTINUED USE 2744636  DRESSING AQUACEL 14 IN ALG W3.5XL14IN POLYUR FLM CVR W/ HYDRCOLL

## (undated) DEVICE — SUTURE PDS II SZ 1 L96IN ABSRB VLT TP-1 L65MM 1/2 CIR Z880G

## (undated) DEVICE — SYR 10ML LUER LOK 1/5ML GRAD --

## (undated) DEVICE — 3M™ STERI-DRAPE™ INCISE DRAPE, XL 1051: Brand: STERI-DRAPE™

## (undated) DEVICE — T4 HOOD

## (undated) DEVICE — DRAPE TWL SURG 16X26IN BLU ORB04] ALLCARE INC]

## (undated) DEVICE — SUTURE PDS II SZ 1 L54IN ABSRB VLT L65MM TP-1 1/2 CIR Z879G

## (undated) DEVICE — BUTTON SWITCH PENCIL BLADE ELECTRODE, HOLSTER: Brand: EDGE

## (undated) DEVICE — SURGICAL PROCEDURE PACK TOT HIP CDS

## (undated) DEVICE — (D)PREP SKN CHLRAPRP APPL 26ML -- CONVERT TO ITEM 371833

## (undated) DEVICE — SOLUTION IV DEXTROSE/SALINE 5%-0.9% 500ML - 500ML

## (undated) DEVICE — STOCKINETTE TUBE 6X48 -- MEDICHOICE

## (undated) DEVICE — SUT VCRL + 2-0 27IN CP1 VIO --

## (undated) DEVICE — 2000CC GUARDIAN II: Brand: GUARDIAN

## (undated) DEVICE — SUT VCRL + 3-0 27IN X1 VIO --

## (undated) DEVICE — X-LARGE COTTON GLOVE: Brand: DEROYAL

## (undated) DEVICE — PACK PROCEDURE SURG TOT KNEE

## (undated) DEVICE — CURETTE BNE CEM 10IN DISP --

## (undated) DEVICE — BIPOLAR SEALER 23-112-1 AQM 6.0: Brand: AQUAMANTYS ®

## (undated) DEVICE — (D)SYR 10ML 1/5ML GRAD NSAF -- PKGING CHANGE USE ITEM 338027

## (undated) DEVICE — MCLASS OSCILLATING SAW BLADE 19 X 1.27 (0.050") X 90 MM: Brand: MCLASS

## (undated) DEVICE — MEDI-VAC YANKAUER SUCTION HANDLE W/BULBOUS TIP: Brand: CARDINAL HEALTH

## (undated) DEVICE — SOLUTION IRRIG 3000ML 0.9% SOD CHL FLX CONT 0797208] ICU MEDICAL INC]

## (undated) DEVICE — SOLUTION IV 1000ML 0.9% SOD CHL

## (undated) DEVICE — SYR 50ML LR LCK 1ML GRAD NSAF --

## (undated) DEVICE — SUTURE ETHBND EXCEL SZ 5 L30IN NONABSORBABLE GRN L40MM V-37 MB66G

## (undated) DEVICE — REM POLYHESIVE ADULT PATIENT RETURN ELECTRODE: Brand: VALLEYLAB

## (undated) DEVICE — SUTURE STRATAFIX SYMMETRIC PDS + SZ 2-0 L18IN ABSRB VLT SXPP1A403

## (undated) DEVICE — AMD ANTIMICROBIAL GAUZE SPONGES 8 PLY USP TYPE VII: Brand: CURITY

## (undated) DEVICE — SYR LR LCK 1ML GRAD NSAF 30ML --

## (undated) DEVICE — SUTURE ABSRB X-1 REV CUT 1/2 CIR 22MM UD BRAID 27IN SZ 3-0 J458H

## (undated) DEVICE — SUTURE ETHBND 2 L30IN NONABSORBABLE GRN WHT LT GRN MO-7 D8793

## (undated) DEVICE — SUTURE VCRL SZ 1 L27IN ABSRB UD L36MM CP-1 1/2 CIR REV CUT J268H

## (undated) DEVICE — PRECISION FALCON OSCILLATING TIP SAW CARTRIDGE: Brand: PRECISION FALCON

## (undated) DEVICE — DRAPE,HIP,W/POUCHES,STERILE: Brand: MEDLINE

## (undated) DEVICE — BANDAGE COMPR SELF ADH 5 YDX4 IN TAN STRL PREMIERPRO LF

## (undated) DEVICE — SKIN STAPLER: Brand: SIGNET

## (undated) DEVICE — SUT ETHBND 2 30IN LR DA GRN --

## (undated) DEVICE — BLADE SAW PAT RMR PILT H 46MM --

## (undated) DEVICE — DRAPE,TOP,102X53,STERILE: Brand: MEDLINE

## (undated) DEVICE — STAPLER SKIN SQ 30 ABSRB STPL DISP INSORB

## (undated) DEVICE — 3M™ COBAN™ NL STERILE NON-LATEX SELF-ADHERENT WRAP, 2084S, 4 IN X 5 YD (10 CM X 4,5 M), 18 ROLLS/CASE: Brand: 3M™ COBAN™